# Patient Record
Sex: FEMALE | Race: WHITE | NOT HISPANIC OR LATINO | Employment: PART TIME | URBAN - METROPOLITAN AREA
[De-identification: names, ages, dates, MRNs, and addresses within clinical notes are randomized per-mention and may not be internally consistent; named-entity substitution may affect disease eponyms.]

---

## 2017-01-26 ENCOUNTER — PRENATAL OFFICE VISIT (OUTPATIENT)
Dept: FAMILY MEDICINE | Facility: CLINIC | Age: 32
End: 2017-01-26
Payer: COMMERCIAL

## 2017-01-26 VITALS
RESPIRATION RATE: 12 BRPM | HEIGHT: 68 IN | WEIGHT: 168 LBS | BODY MASS INDEX: 25.46 KG/M2 | HEART RATE: 118 BPM | OXYGEN SATURATION: 99 % | TEMPERATURE: 98 F | SYSTOLIC BLOOD PRESSURE: 108 MMHG | DIASTOLIC BLOOD PRESSURE: 62 MMHG

## 2017-01-26 DIAGNOSIS — Z34.80 SUPERVISION OF OTHER NORMAL PREGNANCY, ANTEPARTUM: Primary | ICD-10-CM

## 2017-01-26 DIAGNOSIS — Z67.40 TYPE O BLOOD, RH POSITIVE: ICD-10-CM

## 2017-01-26 LAB
ALBUMIN UR QL STRIP: NEGATIVE MG/DL
GLUCOSE UR STRIP-MCNC: NEGATIVE MG/DL

## 2017-01-26 PROCEDURE — 99207 ZZC PRENATAL VISIT: CPT | Performed by: FAMILY MEDICINE

## 2017-01-26 PROCEDURE — 00000219 ZZHCL STATISTIC OBSA - URINALYSIS: Performed by: FAMILY MEDICINE

## 2017-01-26 NOTE — PROGRESS NOTES
S:Mandy Novak is here today for a Prenatal Visit at 28w3d gestation.  Concerns today:  abd muscles stretching, lower pelvic pressure  Bleeding:  No  Cramping/Contractions:No  Leaking of fluid: No  Baby moving:  Yes  O: See OB Vitals  ASSESSMENT:/PLAN: went over signs of preeclampsia ( sudden swelling of hands, feet or face, sudden severe headache or right upper abdominal pain, abruption ( any bleeding or spotting) , premature labor, and fetal kick counts today.   Return in 2 weeks.   1 hr gct last visit was 120.   ---Mey Mcleod MD

## 2017-01-26 NOTE — PATIENT INSTRUCTIONS
went over signs of preeclampsia ( sudden swelling of hands, feet or face, sudden severe headache or right upper abdominal pain, abruption ( any bleeding or spotting) , premature labor, and fetal kick counts today.                Thank you for choosing [facility]  for your Health Care. It was a pleasure seeing you at your visit today. Please contact us with any questions or concerns you may have.                   Mey Mcleod MD                                  To reach your [facility] care team after hours call:   [phone]    Our clinic hours are:     Monday- 7:30 am - 7:00 pm                             Tuesday through Friday- 7:30 am - 5:00 pm                                        Saturday- 8:00 am - 12:00 pm                  Phone:  [phone]    Our pharmacy hours are:     Monday  8:00 am to 7:00 pm      Tuesday through Friday 8:00am to 6:00pm                        Saturday - 9:00 am to 1:00 pm      Sunday : Closed.              Phone:  [phone]      There is also information available at our web site:  [email or web address]    If your provider ordered any lab tests and you do not receive the results within 10 business days, please call the clinic.    If you need a medication refill please contact your pharmacy.  Please allow 2 business days for your refill to be completed.    Our clinic offers telephone visits and e visits.  Please ask one of your team members to explain more.      Use MTailorhart (secure email communication and access to your chart) to send your primary care provider a message or make an appointment. Ask someone on your Team how to sign up for [employer].

## 2017-01-26 NOTE — NURSING NOTE
"Chief Complaint   Patient presents with     Prenatal Care       Initial /62 mmHg  Pulse 118  Temp(Src) 98  F (36.7  C) (Tympanic)  Resp 12  Ht 5' 7.5\" (1.715 m)  Wt 168 lb (76.204 kg)  BMI 25.91 kg/m2  SpO2 99%  LMP 07/16/2016 Estimated body mass index is 25.91 kg/(m^2) as calculated from the following:    Height as of this encounter: 5' 7.5\" (1.715 m).    Weight as of this encounter: 168 lb (76.204 kg).  BP completed using cuff size: large  "

## 2017-01-26 NOTE — MR AVS SNAPSHOT
After Visit Summary   1/26/2017    Mandy Novak    MRN: [id number]           Patient Information     Date Of Birth          1985        Visit Information        Provider Department      1/26/2017 9:45 AM Mey Mcleod MD [facility]        Today's Diagnoses     Supervision of other normal pregnancy, antepartum    -  1     Type o blood, rh positive           Care Instructions    went over signs of preeclampsia ( sudden swelling of hands, feet or face, sudden severe headache or right upper abdominal pain, abruption ( any bleeding or spotting) , premature labor, and fetal kick counts today.                Thank you for choosing [facility]  for your Health Care. It was a pleasure seeing you at your visit today. Please contact us with any questions or concerns you may have.                   Mey Mcleod MD                                  To reach your [facility] care team after hours call:   [phone]    Our clinic hours are:     Monday- 7:30 am - 7:00 pm                             Tuesday through Friday- 7:30 am - 5:00 pm                                        Saturday- 8:00 am - 12:00 pm                  Phone:  [phone]    Our pharmacy hours are:     Monday  8:00 am to 7:00 pm      Tuesday through Friday 8:00am to 6:00pm                        Saturday - 9:00 am to 1:00 pm      Sunday : Closed.              Phone:  [phone]      There is also information available at our web site:  [email or web address]    If your provider ordered any lab tests and you do not receive the results within 10 business days, please call the clinic.    If you need a medication refill please contact your pharmacy.  Please allow 2 business days for your refill to be completed.    Our clinic offers telephone visits and e visits.  Please ask one of your team members to explain more.      Use [facility] (secure email communication and access to your  "chart) to send your primary care provider a message or make an appointment. Ask someone on your Team how to sign up for Effi[employer]t.                     Follow-ups after your visit        Your next 10 appointments already scheduled     Feb 23, 2017  8:00 AM   [facility]    [address] [place] [address] [id number] [phone]              Who to contact     If you have questions or need follow up information about today's clinic visit or your schedule please contact [facility] directly at [phone].  Normal or non-critical lab and imaging results will be communicated to you by MyChart, letter or phone within 4 business days after the clinic has received the results. If you do not hear from us within 7 days, please contact the clinic through LabArchiveshart or phone. If you have a critical or abnormal lab result, we will notify you by phone as soon as possible.  Submit refill requests through [employer] or call your pharmacy and they will forward the refill request to us. Please allow 3 business days for your refill to be completed.          Additional Information About Your Visit        LabArchiveshart Information     [employer] gives you secure access to your electronic health record. If you see a primary care provider, you can also send messages to your care team and make appointments. If you have questions, please call your primary care clinic.  If you do not have a primary care provider, please call [phone] and they will assist you.        Care EveryWhere ID     This is your Care EveryWhere ID. This could be used by other organizations to access your [place] medical records  [id number]        Your Vitals Were     Pulse Temperature Respirations Height BMI (Body Mass Index) Pulse Oximetry    118 98  F (36.7  C) (Tympanic) 12 5' 7.5\" (1.715 m) 25.91 kg/m2 99%    Last Period                   07/16/2016 "            Blood Pressure from Last 3 Encounters:   01/26/17 108/62   12/28/16 94/60   12/01/16 98/56    Weight from Last 3 Encounters:   01/26/17 168 lb (76.204 kg)   12/28/16 162 lb (73.483 kg)   12/01/16 156 lb (70.761 kg)              We Performed the Following     Glucose and albumin, OB urine        Primary Care Provider Office Phone # Fax #    Mey Mcleod [phone]       [facility] [address] [place] [address]        Thank you!     Thank you for choosing [facility]  for your care. Our goal is always to provide you with excellent care. Hearing back from our patients is one way we can continue to improve our services. Please take a few minutes to complete the written survey that you may receive in the mail after your visit with us. Thank you!             Your Updated Medication List - Protect others around you: Learn how to safely use, store and throw away your medicines at www.disposemymeds.org.          This list is accurate as of: 1/26/17 10:31 AM.  Always use your most recent med list.                   Brand Name Dispense Instructions for use    order for DME     1 Units    For breast pump and all needed accessories - tubing , valves, bottles, etc.       Prenatal Vitamins 28-0.8 MG Tabs      Take 1 tablet by mouth daily

## 2017-01-31 ENCOUNTER — MYC MEDICAL ADVICE (OUTPATIENT)
Dept: FAMILY MEDICINE | Facility: CLINIC | Age: 32
End: 2017-01-31

## 2017-02-09 ENCOUNTER — PRENATAL OFFICE VISIT (OUTPATIENT)
Dept: FAMILY MEDICINE | Facility: CLINIC | Age: 32
End: 2017-02-09
Payer: COMMERCIAL

## 2017-02-09 VITALS
DIASTOLIC BLOOD PRESSURE: 60 MMHG | HEART RATE: 84 BPM | OXYGEN SATURATION: 100 % | HEIGHT: 68 IN | BODY MASS INDEX: 26.07 KG/M2 | WEIGHT: 172 LBS | TEMPERATURE: 98.2 F | SYSTOLIC BLOOD PRESSURE: 102 MMHG

## 2017-02-09 DIAGNOSIS — Z34.80 SUPERVISION OF OTHER NORMAL PREGNANCY, ANTEPARTUM: Primary | ICD-10-CM

## 2017-02-09 DIAGNOSIS — Z67.40 TYPE O BLOOD, RH POSITIVE: ICD-10-CM

## 2017-02-09 DIAGNOSIS — O26.843 UTERINE SIZE DATE DISCREPANCY PREGNANCY, THIRD TRIMESTER: ICD-10-CM

## 2017-02-09 LAB
ALBUMIN UR QL STRIP: NEGATIVE MG/DL
GLUCOSE UR STRIP-MCNC: NEGATIVE MG/DL

## 2017-02-09 PROCEDURE — 99207 ZZC COMPLICATED OB VISIT: CPT | Performed by: FAMILY MEDICINE

## 2017-02-09 PROCEDURE — 00000219 ZZHCL STATISTIC OBSA - URINALYSIS: Performed by: FAMILY MEDICINE

## 2017-02-09 NOTE — PATIENT INSTRUCTIONS
went over signs of preeclampsia ( sudden swelling of hands, feet or face, sudden severe headache or right upper abdominal pain, abruption ( any bleeding or spotting) , premature labor, and fetal kick counts today.                Thank you for choosing [facility]  for your Health Care. It was a pleasure seeing you at your visit today. Please contact us with any questions or concerns you may have.                   Mey Mcleod MD                                  To reach your [facility] care team after hours call:   [phone]    Our clinic hours are:     Monday- 7:30 am - 7:00 pm                             Tuesday through Friday- 7:30 am - 5:00 pm                                        Saturday- 8:00 am - 12:00 pm                  Phone:  [phone]    Our pharmacy hours are:     Monday  8:00 am to 7:00 pm      Tuesday through Friday 8:00am to 6:00pm                        Saturday - 9:00 am to 1:00 pm      Sunday : Closed.              Phone:  [phone]      There is also information available at our web site:  [email or web address]    If your provider ordered any lab tests and you do not receive the results within 10 business days, please call the clinic.    If you need a medication refill please contact your pharmacy.  Please allow 2 business days for your refill to be completed.    Our clinic offers telephone visits and e visits.  Please ask one of your team members to explain more.      Use Greendizerhart (secure email communication and access to your chart) to send your primary care provider a message or make an appointment. Ask someone on your Team how to sign up for [employer].

## 2017-02-09 NOTE — NURSING NOTE
"Chief Complaint   Patient presents with     Prenatal Care     Initial /60 mmHg  Pulse 84  Temp(Src) 98.2  F (36.8  C) (Oral)  Ht 5' 7.5\" (1.715 m)  Wt 172 lb (78.019 kg)  BMI 26.53 kg/m2  SpO2 100%  LMP 07/16/2016 Estimated body mass index is 26.53 kg/(m^2) as calculated from the following:    Height as of this encounter: 5' 7.5\" (1.715 m).    Weight as of this encounter: 172 lb (78.019 kg).  BP completed using cuff size regular left Arm  Fanta Auuth CMA    "

## 2017-02-09 NOTE — MR AVS SNAPSHOT
After Visit Summary   2/9/2017    Mandy Novak    MRN: [id number]           Patient Information     Date Of Birth          1985        Visit Information        Provider Department      2/9/2017 1:45 PM Mey Mcleod MD [facility]        Today's Diagnoses     Supervision of other normal pregnancy, antepartum    -  1     Type o blood, rh positive           Care Instructions    went over signs of preeclampsia ( sudden swelling of hands, feet or face, sudden severe headache or right upper abdominal pain, abruption ( any bleeding or spotting) , premature labor, and fetal kick counts today.                Thank you for choosing [facility]  for your Health Care. It was a pleasure seeing you at your visit today. Please contact us with any questions or concerns you may have.                   Mey Mcleod MD                                  To reach your [facility] care team after hours call:   [phone]    Our clinic hours are:     Monday- 7:30 am - 7:00 pm                             Tuesday through Friday- 7:30 am - 5:00 pm                                        Saturday- 8:00 am - 12:00 pm                  Phone:  [phone]    Our pharmacy hours are:     Monday  8:00 am to 7:00 pm      Tuesday through Friday 8:00am to 6:00pm                        Saturday - 9:00 am to 1:00 pm      Sunday : Closed.              Phone:  [phone]      There is also information available at our web site:  [email or web address]    If your provider ordered any lab tests and you do not receive the results within 10 business days, please call the clinic.    If you need a medication refill please contact your pharmacy.  Please allow 2 business days for your refill to be completed.    Our clinic offers telephone visits and e visits.  Please ask one of your team members to explain more.      Use [employer] (secure email communication and access to your  chart) to send your primary care provider a message or make an appointment. Ask someone on your Team how to sign up for MyChart.                     Follow-ups after your visit        Your next 10 appointments already scheduled     Feb 23, 2017  8:00 AM   MyChart OB Short with [facility]   [facility]    [address] [place] [id number]   [phone]            Mar 06, 2017  3:30 PM   MyChart OB Short with Mey Mcleod MD   [facility]    [address] [place] [id number]   [phone]            Mar 24, 2017  9:45 AM   MyChart OB Short with Mey Mcleod MD   [facility]    [address] [place] [id number]   [phone]            Mar 30, 2017  9:45 AM   MyChart OB Short with Mey Mcleod MD   [facility]    [address] [place] [address]   [phone]            Apr 06, 2017  9:45 AM   MyChart OB Short with Mey Mcleod MD   [facility]    [address] [place] [address]   [phone]              Who to contact     If you have questions or need follow up information about today's clinic visit or your schedule please contact [facility] directly at [phone].  Normal or non-critical lab and imaging results will be communicated to you by MyChart, letter or phone within 4 business days after the clinic has received the results. If you do not hear from us within 7 days, please contact the clinic through MyChart or phone. If you have a critical or abnormal lab result, we will notify you by phone as soon as possible.  Submit refill requests through [employer] or call your pharmacy and they will forward the  "refill request to us. Please allow 3 business days for your refill to be completed.          Additional Information About Your Visit        ClusterFlunkhart Information     [employer] gives you secure access to your electronic health record. If you see a primary care provider, you can also send messages to your care team and make appointments. If you have questions, please call your primary care clinic.  If you do not have a primary care provider, please call [phone] and they will assist you.        Care EveryWhere ID     This is your Care EveryWhere ID. This could be used by other organizations to access your [place] medical records  [id number]        Your Vitals Were     Pulse Temperature Height BMI (Body Mass Index) Pulse Oximetry Last Period    84 98.2  F (36.8  C) (Oral) 5' 7.5\" (1.715 m) 26.53 kg/m2 100% 07/16/2016       Blood Pressure from Last 3 Encounters:   02/09/17 102/60   01/26/17 108/62   12/28/16 94/60    Weight from Last 3 Encounters:   02/09/17 172 lb (78.019 kg)   01/26/17 168 lb (76.204 kg)   12/28/16 162 lb (73.483 kg)              We Performed the Following     Glucose and albumin, OB urine        Primary Care Provider Office Phone # Fax #    Mey Mcleod [phone]       [facility] [address] [place] [address]        Thank you!     Thank you for choosing [facility]  for your care. Our goal is always to provide you with excellent care. Hearing back from our patients is one way we can continue to improve our services. Please take a few minutes to complete the written survey that you may receive in the mail after your visit with us. Thank you!             Your Updated Medication List - Protect others around you: Learn how to safely use, store and throw away your medicines at www.disposemymeds.org.          This list is accurate as of: 2/9/17  2:12 PM.  Always use your most recent med list.                   Brand Name Dispense " Instructions for use    order for DME     1 Units    For breast pump and all needed accessories - tubing , valves, bottles, etc.       Prenatal Vitamins 28-0.8 MG Tabs      Take 1 tablet by mouth daily

## 2017-02-09 NOTE — PROGRESS NOTES
S:Mandy Novak is here today for a Prenatal Visit at 30w3d gestation.  Concerns today:  None   Bleeding:  No  Cramping/Contractions: Yes some intermittent contractions   Leaking of fluid: No  Baby moving:  Yes  Edema : :No  O: See OB Vitals  ASSESSMENT:/PLAN:     ICD-10-CM    1. Supervision of other normal pregnancy, antepartum Z34.80 Glucose and albumin, OB urine     MAT FETAL MED CTR REFERRAL-PREGNANCY   2. Type o blood, rh positive Z67.40    3. Uterine size date discrepancy pregnancy, third trimester O26.843 MAT FETAL MED CTR REFERRAL-PREGNANCY     went over signs of preeclampsia ( sudden swelling of hands, feet or face, sudden severe headache or right upper abdominal pain, abruption ( any bleeding or spotting) , premature labor, and fetal kick counts today.    Labor warnings and kick counts discussed. Follow up in 2 weeks. Will get MFM US to rule out polyhyrdramios. --Mey Mcleod MD

## 2017-02-13 ENCOUNTER — PRE VISIT (OUTPATIENT)
Dept: MATERNAL FETAL MEDICINE | Facility: CLINIC | Age: 32
End: 2017-02-13

## 2017-02-13 DIAGNOSIS — Z53.9 ERRONEOUS ENCOUNTER--DISREGARD: Primary | ICD-10-CM

## 2017-02-14 ENCOUNTER — PRE VISIT (OUTPATIENT)
Dept: MATERNAL FETAL MEDICINE | Facility: CLINIC | Age: 32
End: 2017-02-14

## 2017-02-17 ENCOUNTER — OFFICE VISIT (OUTPATIENT)
Dept: MATERNAL FETAL MEDICINE | Facility: CLINIC | Age: 32
End: 2017-02-17
Attending: FAMILY MEDICINE
Payer: COMMERCIAL

## 2017-02-17 ENCOUNTER — HOSPITAL ENCOUNTER (OUTPATIENT)
Dept: ULTRASOUND IMAGING | Facility: CLINIC | Age: 32
Discharge: HOME | End: 2017-02-17
Attending: FAMILY MEDICINE | Admitting: FAMILY MEDICINE
Payer: COMMERCIAL

## 2017-02-17 DIAGNOSIS — O26.90 PREGNANCY RELATED CONDITION, UNSPECIFIED TRIMESTER: ICD-10-CM

## 2017-02-17 DIAGNOSIS — O26.843 UTERINE SIZE DATE DISCREPANCY, THIRD TRIMESTER: Primary | ICD-10-CM

## 2017-02-17 PROCEDURE — 76811 OB US DETAILED SNGL FETUS: CPT

## 2017-02-17 NOTE — PROGRESS NOTES
"Please see \"Imaging\" tab under \"Chart Review\" for details of today's US at the [facility].    Patricio Covington MD  Maternal-Fetal Medicine    "

## 2017-02-17 NOTE — MR AVS SNAPSHOT
After Visit Summary   2/17/2017    Mandy Novak    MRN: [id number]           Patient Information     Date Of Birth          1985        Visit Information        Provider Department      2/17/2017 8:30 AM Patricio Covington MD [facility] Maternal Fetal Medicine - [facility]        Today's Diagnoses     Uterine size date discrepancy, third trimester    -  1       Follow-ups after your visit        Your next 10 appointments already scheduled     Feb 23, 2017  8:00 AM CST   MyChart OB Short with [facility]   [facility]    [address] [place] [phone]   [phone]            Mar 06, 2017  3:30 PM CST   MyChart OB Short with Mey [facility]cleod MD   [facility]    [address] [place] [address]   [phone]            Mar 24, 2017  9:45 AM CDT   MyChart OB Short with Mey Mcleod MD   [facility]    [address] [place] [address]   [phone]            Mar 30, 2017  9:45 AM CDT   MyChart OB Short with Mey Mcleod MD   [facility]    [address] [place] [address]   [phone]            Apr 06, 2017  9:45 AM CDT   MyChart OB Short with Mey Mcleod MD   [facility]    [address] [place] [address]   [phone]              Who to contact     If you have questions or need follow up information about today's clinic visit or your schedule please contact [facility] directly at [phone].  Normal or non-critical lab and imaging results will be communicated to you by MyChart, letter or phone within 4 business days after the clinic has received the results. If you do not hear  from us within 7 days, please contact the clinic through [employer] or phone. If you have a critical or abnormal lab result, we will notify you by phone as soon as possible.  Submit refill requests through [employer] or call your pharmacy and they will forward the refill request to us. Please allow 3 business days for your refill to be completed.          Additional Information About Your Visit        [employer] System[employer]ar[employer] Information     [employer] gives you secure access to your electronic health record. If you see a primary care provider, you can also send messages to your care team and make appointments. If you have questions, please call your primary care clinic.  If you do not have a primary care provider, please call [phone] and they will assist you.        Care EveryWhere ID     This is your Care EveryWhere ID. This could be used by other organizations to access your [place] medical records  [id number]        Your Vitals Were     Last Period                   07/16/2016            Blood Pressure from Last 3 Encounters:   02/09/17 102/60   01/26/17 108/62   12/28/16 94/60    Weight from Last 3 Encounters:   02/09/17 78 kg (172 lb)   01/26/17 76.2 kg (168 lb)   12/28/16 73.5 kg (162 lb)              Today, you had the following     No orders found for display       Primary Care Provider Office Phone # Fax #    Mey Mcleod [phone]       [facility] [address] [place] [address]        Thank you!     Thank you for choosing [facility]  for your care. Our goal is always to provide you with excellent care. Hearing back from our patients is one way we can continue to improve our services. Please take a few minutes to complete the written survey that you may receive in the mail after your visit with us. Thank you!             Your Updated Medication List - Protect others around you: Learn how to safely use, store and throw away your medicines  at www.disposemymeds.org.          This list is accurate as of: 2/17/17  8:36 AM.  Always use your most recent med list.                   Brand Name Dispense Instructions for use    order for DME     1 Units    For breast pump and all needed accessories - tubing , valves, bottles, etc.       Prenatal Vitamins 28-0.8 MG Tabs      Take 1 tablet by mouth daily

## 2017-02-23 ENCOUNTER — PRENATAL OFFICE VISIT (OUTPATIENT)
Dept: FAMILY MEDICINE | Facility: CLINIC | Age: 32
End: 2017-02-23
Payer: COMMERCIAL

## 2017-02-23 VITALS
BODY MASS INDEX: 26.84 KG/M2 | TEMPERATURE: 97.9 F | DIASTOLIC BLOOD PRESSURE: 70 MMHG | WEIGHT: 177.13 LBS | HEIGHT: 68 IN | SYSTOLIC BLOOD PRESSURE: 110 MMHG | OXYGEN SATURATION: 100 %

## 2017-02-23 DIAGNOSIS — Z34.80 SUPERVISION OF OTHER NORMAL PREGNANCY, ANTEPARTUM: ICD-10-CM

## 2017-02-23 DIAGNOSIS — Z67.40 TYPE O BLOOD, RH POSITIVE: ICD-10-CM

## 2017-02-23 LAB
ALBUMIN UR QL STRIP: NEGATIVE MG/DL
GLUCOSE UR STRIP-MCNC: NEGATIVE MG/DL

## 2017-02-23 PROCEDURE — 00000219 ZZHCL STATISTIC OBSA - URINALYSIS: Performed by: FAMILY MEDICINE

## 2017-02-23 PROCEDURE — 99207 ZZC COMPLICATED OB VISIT: CPT | Performed by: FAMILY MEDICINE

## 2017-02-23 NOTE — NURSING NOTE
"Chief Complaint   Patient presents with     Prenatal Care     32w3d       Initial /70  Temp 97.9  F (36.6  C) (Tympanic)  Ht 5' 7.5\" (1.715 m)  Wt 177 lb 2 oz (80.3 kg)  LMP 07/16/2016  SpO2 100%  BMI 27.33 kg/m2 Estimated body mass index is 27.33 kg/(m^2) as calculated from the following:    Height as of this encounter: 5' 7.5\" (1.715 m).    Weight as of this encounter: 177 lb 2 oz (80.3 kg).  Medication Reconciliation: complete   Any Woo, CMA      "

## 2017-02-23 NOTE — MR AVS SNAPSHOT
After Visit Summary   2/23/2017    Mandy Novak    MRN: [id number]           Patient Information     Date Of Birth          1985        Visit Information        Provider Department      2/23/2017 8:00 AM Mey Mcleod MD [facility]        Today's Diagnoses     Cecilio breech presentation, not applicable or unspecified fetus    -  1    Supervision of other normal pregnancy, antepartum        Type o blood, rh positive           Follow-ups after your visit        Your next 10 appointments already scheduled     Mar 06, 2017  3:30 PM CST   MyChart OB Short with Mey Mcleod MD   [facility] ([facility])    [address] [place] [address]   [phone]            Mar 24, 2017  9:45 AM CDT   MyChart OB Short with Mey Mcleod MD   [facility] ([facility])    [address] [place] [address]   [phone]            Mar 30, 2017  9:45 AM CDT   MyChart OB Short with Mey Mcleod MD   [facility] ([facility])    [address] [place] [address]   [phone]            Apr 06, 2017  9:45 AM CDT   MyChart OB Short with Mey Mcleod MD   [facility] ([facility])    [address] [place] [address]   [phone]              Who to contact     If you have questions or need follow up information about today's clinic visit or your schedule please contact [facility] directly at [phone].  Normal or non-critical lab and imaging results will be communicated to you by MyChart, letter or phone within 4 business days after the clinic has received the results. If you do not hear from us within 7 days, please contact the clinic through MyChart or phone. If you have a critical or abnormal lab result, we will notify  "you by phone as soon as possible.  Submit refill requests through Yummly or call your pharmacy and they will forward the refill request to us. Please allow 3 business days for your refill to be completed.          Additional Information About Your Visit        SandLinksharZounds Information     Yummly gives you secure access to your electronic health record. If you see a primary care provider, you can also send messages to your care team and make appointments. If you have questions, please call your primary care clinic.  If you do not have a primary care provider, please call [phone] and they will assist you.        Care EveryWhere ID     This is your Care EveryWhere ID. This could be used by other organizations to access your [facility] medical records  [id number]        Your Vitals Were     Temperature Height Last Period Pulse Oximetry BMI (Body Mass Index)       97.9  F (36.6  C) (Tympanic) 5' 7.5\" (1.715 m) 07/16/2016 100% 27.33 kg/m2        Blood Pressure from Last 3 Encounters:   02/23/17 110/70   02/09/17 102/60   01/26/17 108/62    Weight from Last 3 Encounters:   02/23/17 177 lb 2 oz (80.3 kg)   02/09/17 172 lb (78 kg)   01/26/17 168 lb (76.2 kg)              Today, you had the following     No orders found for display       Primary Care Provider Office Phone # Fax #    Meyblossom Mcleod [phone]       [address] [address]        Thank you!     Thank you for choosing [facility]  for your care. Our goal is always to provide you with excellent care. Hearing back from our patients is one way we can continue to improve our services. Please take a few minutes to complete the written survey that you may receive in the mail after your visit with us. Thank you!             Your Updated Medication List - Protect others around you: Learn how to safely use, store and throw away your medicines at www.disposemymeds.org.          This " list is accurate as of: 2/23/17  8:59 AM.  Always use your most recent med list.                   Brand Name Dispense Instructions for use    order for DME     1 Units    For breast pump and all needed accessories - tubing , valves, bottles, etc.       Prenatal Vitamins 28-0.8 MG Tabs      Take 1 tablet by mouth daily

## 2017-02-23 NOTE — PROGRESS NOTES
S:Mandy Novak is here today for a Prenatal Visit at 32w3d gestation.  Concerns today:  None except for new diagnosis of breech presentation - on US last week   Bleeding:  No  Cramping/Contractions:irregular mild tightening   Leaking of fluid: No  Baby moving:  Yes  Edema : :No  O: See OB Vitals  ASSESSMENT:/PLAN:hopefully fetus will turn, but if any questions - will repeat US at 35+ weeks in anticipation of possible version at 36+ weeks.   Labor warnings and kick counts discussed. Follow up in 2 weeks. went over signs of preeclampsia ( sudden swelling of hands, feet or face, sudden severe headache or right upper abdominal pain, abruption ( any bleeding or spotting) , premature labor, and fetal kick counts today.  ---Mey Mcleod MD

## 2017-03-06 ENCOUNTER — PRENATAL OFFICE VISIT (OUTPATIENT)
Dept: FAMILY MEDICINE | Facility: CLINIC | Age: 32
End: 2017-03-06
Payer: COMMERCIAL

## 2017-03-06 VITALS
SYSTOLIC BLOOD PRESSURE: 100 MMHG | BODY MASS INDEX: 27.28 KG/M2 | HEIGHT: 68 IN | TEMPERATURE: 97 F | OXYGEN SATURATION: 99 % | HEART RATE: 118 BPM | RESPIRATION RATE: 12 BRPM | WEIGHT: 180 LBS | DIASTOLIC BLOOD PRESSURE: 56 MMHG

## 2017-03-06 DIAGNOSIS — Z23 NEED FOR TDAP VACCINATION: Primary | ICD-10-CM

## 2017-03-06 DIAGNOSIS — Z67.40 TYPE O BLOOD, RH POSITIVE: ICD-10-CM

## 2017-03-06 DIAGNOSIS — Z34.80 SUPERVISION OF OTHER NORMAL PREGNANCY, ANTEPARTUM: ICD-10-CM

## 2017-03-06 LAB
ALBUMIN UR QL STRIP: NEGATIVE MG/DL
GLUCOSE UR STRIP-MCNC: NEGATIVE MG/DL

## 2017-03-06 PROCEDURE — 99207 ZZC COMPLICATED OB VISIT: CPT | Performed by: FAMILY MEDICINE

## 2017-03-06 PROCEDURE — 00000219 ZZHCL STATISTIC OBSA - URINALYSIS: Performed by: FAMILY MEDICINE

## 2017-03-06 NOTE — MR AVS SNAPSHOT
After Visit Summary   3/6/2017    Mandy Novak    MRN: [id number]           Patient Information     Date Of Birth          1985        Visit Information        Provider Department      3/6/2017 3:30 PM Mey Mcleod MD [facility]        Today's Diagnoses     Need for Tdap vaccination    -  1    Cecilio breech presentation, not applicable or unspecified fetus        Supervision of other normal pregnancy, antepartum        Type o blood, rh positive          Care Instructions      went over signs of preeclampsia ( sudden swelling of hands, feet or face, sudden severe headache or right upper abdominal pain, abruption ( any bleeding or spotting) , premature labor, and fetal kick counts today.    Breech Presentation  With breech presentation, your baby is in a buttocks - or feet-first position. Babies are usually in a head-first position. A breech presentation can make it hard for the baby s head to fit through the birth canal during delivery. This can cause lack of oxygen or nerve damage in your baby.  Checking for breech presentation  Your doctor can tell that your baby is in a breech presentation by gently pressing on your belly. If after about 35 weeks your baby still isn t in a head-first position, you may have a test called ultrasound. This test uses sound waves to form an image of your baby on a screen.  Types of breech presentations  As you near your due date, your baby may be in one of the following 3 breech presentations:    Cecilio breech  The baby s buttocks point down toward the birth canal. The legs extend up toward the head.    Complete breech  The baby sits cross-legged. The buttocks point down and the knees are bent. The feet are tucked under the legs.    Footling breech  One or both of the baby s feet or legs are stretched down into the birth canal. The buttocks are also pointing downward.  Delivering your baby  Even if the baby s position can t be changed,  a breech baby can sometimes be born vaginally, although this is rare. Your doctor will discuss the risks with you. More often, a  section (surgical delivery) is done. You will have anesthesia (medicine to block pain). But you may remain awake and alert.  Once you deliver  Whether you give birth vaginally or by  section, you and your baby will most likely be fine. Just because your baby is in a breech position doesn t mean that he or she will have health problems.  Can you have a vaginal delivery?  In some cases, your doctor may try to turn the baby head-down by applying pressure on your abdomen. This technique is called an external cephalic version. If this works, you might be able to have a vaginal delivery. Your doctor will discuss this with you.     1872-7917 [employer]. [address] [place] [address], [place] [address]. All rights reserved. This information is not intended as a substitute for professional medical care. Always follow your healthcare professional's instructions.        External Version  If your baby doesn t move into a head-first position on his or her own, your healthcare provider may attempt to do an external version. Your healthcare provider will try to rotate your baby by pressing down on your belly. Your healthcare provider may give you medicine to relax your uterus. This can make it easier for him or her to rotate your baby. During a version, your healthcare provider will use ultrasound to watch your baby.  Changing your baby s position    1    Your healthcare provider presses down on your belly and locates your baby s head and bottom.    2    By pressing down on your belly, your healthcare provider may be able to rotate the baby into a head-first position. This often will allow a vaginal birth.  Delivering your baby  Even if your baby s position can t be changed, he or she can sometimes be born vaginally. The mode of delivery should depend on the experience of your  healthcare provider. A  section (surgical delivery) is the preferred mode of delivery for most healthcare providers, because of the diminishing expertise in vaginal breech delivery. You will have anesthesia (medicine to block pain), but you may remain awake and alert.     8458-4706 [employer]. [address] [place] [address]. All rights reserved. This information is not intended as a substitute for professional medical care. Always follow your healthcare professional's instructions.                 Thank you for choosing [facility]  for your Health Care. It was a pleasure seeing you at your visit today. Please contact us with any questions or concerns you may have.                   Mey Mcleod MD                                  To reach your [facility] care team after hours call:   [phone]    Our clinic hours are:     Monday- 7:30 am - 7:00 pm                             Tuesday through Friday- 7:30 am - 5:00 pm                                        Saturday- 8:00 am - 12:00 pm                  Phone:  [phone]    Our pharmacy hours are:     Monday  8:00 am to 7:00 pm      Tuesday through Friday 8:00am to 6:00pm                        Saturday - 9:00 am to 1:00 pm       : Closed.              Phone:  [phone]      There is also information available at our web site:  [email or web address]    If your provider ordered any lab tests and you do not receive the results within 10 business days, please call the clinic.    If you need a medication refill please contact your pharmacy.  Please allow 2 business days for your refill to be completed.    Our clinic offers telephone visits and e visits.  Please ask one of your team members to explain more.      Use PayActiv (secure email communication and access to your chart) to send your primary care provider a message or make an appointment. Ask someone on your Team how to sign up  for MyChart.                     Follow-ups after your visit        Your next 10 appointments already scheduled     Mar 17, 2017  3:45 PM CDT   ESTABLISHED PRENATAL with Mey Mcleod MD   [facility]    [address] [place] [id number]   [phone]            Mar 24, 2017  9:45 AM CDT   MyChart OB Short with Mey Mcleod MD   [facility] ([facility])    [address] [place] [id number] [phone]            Mar 30, 2017  9:45 AM CDT   MyChart OB Short with Mey Mcleod MD   [facility]    [address] [place] [address]   [phone]            Apr 06, 2017  9:45 AM CDT   MyChart OB Short with Mey Mcleod MD   [facility] ([facility])    [address] [place] [address]   [phone]            Apr 13, 2017  3:30 PM CDT   MyChart OB Short with Mey Mcleod MD   [facility] ([facility])    [address] [place] [id number] [phone]              Who to contact     If you have questions or need follow up information about today's clinic visit or your schedule please contact [facility] directly at [phone].  Normal or non-critical lab and imaging results will be communicated to you by MyChart, letter or phone within 4 business days after the clinic has received the results. If you do not hear from us within 7 days, please contact the clinic through MyChart or phone. If you have a critical or abnormal lab result, we will notify you by phone as soon as possible.  Submit refill requests through AdEspresso or call your pharmacy and they will forward the refill request to us. Please allow 3 business days for your refill to be completed.           "Additional Information About Your Visit        MyChart Information     [employer] gives you secure access to your electronic health record. If you see a primary care provider, you can also send messages to your care team and make appointments. If you have questions, please call your primary care clinic.  If you do not have a primary care provider, please call [phone] and they will assist you.        Care EveryWhere ID     This is your Care EveryWhere ID. This could be used by other organizations to access your [facility] medical records  [id number]        Your Vitals Were     Pulse Temperature Respirations Height Last Period Pulse Oximetry    118 97  F (36.1  C) (Tympanic) 12 5' 7.5\" (1.715 m) 07/16/2016 99%    Breastfeeding? BMI (Body Mass Index)                No 27.78 kg/m2           Blood Pressure from Last 3 Encounters:   03/06/17 100/56   02/23/17 110/70   02/09/17 102/60    Weight from Last 3 Encounters:   03/06/17 180 lb (81.6 kg)   02/23/17 177 lb 2 oz (80.3 kg)   02/09/17 172 lb (78 kg)              We Performed the Following     ADMIN 1st VACCINE     Glucose and albumin, OB urine     TDAP (ADACEL AGES 11-64)        Primary Care Provider Office Phone # Fax #    Mey Mcleod [phone]       [facility] [address] [place] [address]        Thank you!     Thank you for choosing [facility]  for your care. Our goal is always to provide you with excellent care. Hearing back from our patients is one way we can continue to improve our services. Please take a few minutes to complete the written survey that you may receive in the mail after your visit with us. Thank you!             Your Updated Medication List - Protect others around you: Learn how to safely use, store and throw away your medicines at www.disposemymeds.org.          This list is accurate as of: 3/6/17  4:39 PM.  Always use your most recent med list.                   Brand " Name Dispense Instructions for use    order for DME     1 Units    For breast pump and all needed accessories - tubing , valves, bottles, etc.       Prenatal Vitamins 28-0.8 MG Tabs      Take 1 tablet by mouth daily

## 2017-03-06 NOTE — PROGRESS NOTES
S:Mandy Novak is here today for a Prenatal Visit at 34w0d gestation.  Concerns today:  Breech- rechecking again with me at 35+weeks .   Bleeding:  No  Cramping/Contractions:No  Leaking of fluid: No  Baby moving:  Yes  O: See OB Vitals  ASSESSMENT:/PLAN: went over signs of preeclampsia ( sudden swelling of hands, feet or face, sudden severe headache or right upper abdominal pain, abruption ( any bleeding or spotting) , premature labor, and fetal kick counts today.  Will recheck US at approx 36 weeks. 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  I'll call ob/gyn specialists to firm up a plan re: version.   Return in 10+days. ---Mey Mcleod MD

## 2017-03-06 NOTE — NURSING NOTE
"Chief Complaint   Patient presents with     Prenatal Care       Initial Pulse 118  Temp 97  F (36.1  C) (Tympanic)  Resp 12  Ht 5' 7.5\" (1.715 m)  Wt 180 lb (81.6 kg)  LMP 07/16/2016  SpO2 99%  Breastfeeding? No  BMI 27.78 kg/m2 Estimated body mass index is 27.78 kg/(m^2) as calculated from the following:    Height as of this encounter: 5' 7.5\" (1.715 m).    Weight as of this encounter: 180 lb (81.6 kg).  Medication Reconciliation: complete   Laurie Bloedow LPN    "

## 2017-03-06 NOTE — PATIENT INSTRUCTIONS
went over signs of preeclampsia ( sudden swelling of hands, feet or face, sudden severe headache or right upper abdominal pain, abruption ( any bleeding or spotting) , premature labor, and fetal kick counts today.    Breech Presentation  With breech presentation, your baby is in a buttocks - or feet-first position. Babies are usually in a head-first position. A breech presentation can make it hard for the baby s head to fit through the birth canal during delivery. This can cause lack of oxygen or nerve damage in your baby.  Checking for breech presentation  Your doctor can tell that your baby is in a breech presentation by gently pressing on your belly. If after about 35 weeks your baby still isn t in a head-first position, you may have a test called ultrasound. This test uses sound waves to form an image of your baby on a screen.  Types of breech presentations  As you near your due date, your baby may be in one of the following 3 breech presentations:    Cecilio breech  The baby s buttocks point down toward the birth canal. The legs extend up toward the head.    Complete breech  The baby sits cross-legged. The buttocks point down and the knees are bent. The feet are tucked under the legs.    Footling breech  One or both of the baby s feet or legs are stretched down into the birth canal. The buttocks are also pointing downward.  Delivering your baby  Even if the baby s position can t be changed, a breech baby can sometimes be born vaginally, although this is rare. Your doctor will discuss the risks with you. More often, a  section (surgical delivery) is done. You will have anesthesia (medicine to block pain). But you may remain awake and alert.  Once you deliver  Whether you give birth vaginally or by  section, you and your baby will most likely be fine. Just because your baby is in a breech position doesn t mean that he or she will have health problems.  Can you have a vaginal delivery?  In some  cases, your doctor may try to turn the baby head-down by applying pressure on your abdomen. This technique is called an external cephalic version. If this works, you might be able to have a vaginal delivery. Your doctor will discuss this with you.     4666-9109 [employer]. [address] [place] [address]. All rights reserved. This information is not intended as a substitute for professional medical care. Always follow your healthcare professional's instructions.        External Version  If your baby doesn t move into a head-first position on his or her own, your healthcare provider may attempt to do an external version. Your healthcare provider will try to rotate your baby by pressing down on your belly. Your healthcare provider may give you medicine to relax your uterus. This can make it easier for him or her to rotate your baby. During a version, your healthcare provider will use ultrasound to watch your baby.  Changing your baby s position    1    Your healthcare provider presses down on your belly and locates your baby s head and bottom.    2    By pressing down on your belly, your healthcare provider may be able to rotate the baby into a head-first position. This often will allow a vaginal birth.  Delivering your baby  Even if your baby s position can t be changed, he or she can sometimes be born vaginally. The mode of delivery should depend on the experience of your healthcare provider. A  section (surgical delivery) is the preferred mode of delivery for most healthcare providers, because of the diminishing expertise in vaginal breech delivery. You will have anesthesia (medicine to block pain), but you may remain awake and alert.     9820-4482 [employer]. [address] [place] [address]. All rights reserved. This information is not intended as a substitute for professional medical care. Always follow your healthcare professional's instructions.                  Thank you for choosing [facility]  for your Health Care. It was a pleasure seeing you at your visit today. Please contact us with any questions or concerns you may have.                   Mey Mcleod MD                                  To reach your [facility] care team after hours call:   [phone]    Our clinic hours are:     Monday- 7:30 am - 7:00 pm                             Tuesday through Friday- 7:30 am - 5:00 pm                                        Saturday- 8:00 am - 12:00 pm                  Phone:  [phone]    Our pharmacy hours are:     Monday  8:00 am to 7:00 pm      Tuesday through Friday 8:00am to 6:00pm                        Saturday - 9:00 am to 1:00 pm      Sunday : Closed.              Phone:  [phone]      There is also information available at our web site:  [email or web address]    If your provider ordered any lab tests and you do not receive the results within 10 business days, please call the clinic.    If you need a medication refill please contact your pharmacy.  Please allow 2 business days for your refill to be completed.    Our clinic offers telephone visits and e visits.  Please ask one of your team members to explain more.      Use FRESShart (secure email communication and access to your chart) to send your primary care provider a message or make an appointment. Ask someone on your Team how to sign up for [employer].

## 2017-03-16 ENCOUNTER — TELEPHONE (OUTPATIENT)
Dept: FAMILY MEDICINE | Facility: CLINIC | Age: 32
End: 2017-03-16

## 2017-03-16 NOTE — TELEPHONE ENCOUNTER
Last saw pt 3/6/2017 and was still breech at that time. Cecilio patiño on US on 2/17/2017 at [facility].  Talked with ob/gyn specialists - initially I thought pt was going to be 36 weeks tomorrow, but she isn't going to be 36 weeks until Monday 3/20/2017.  Version attempt can't be done until that time.  I tt'd pt and she'd like to hold off on version attempt till 3/24/2017 as she has a work commitment that she'd really like to be at on Thursday 3/23/2017 in the evening.   Laurie, ob/gyn specialists's surgery/procedure  will call pt to discuss and schedule her for possible version next week , taking the above into account.  Pt will discuss her timing preference with Laurie on that call.      Pt also mentioned that she had a really vivid dream that baby had turned on its own last week and since then she's had more kicks on her right side  And more pelvic/bladder pressure without signif.or rhythmic contractions.  Pt will keep her appt with me for tomorrow 3/17/2017 and we will examine for presentation at that time.

## 2017-03-16 NOTE — PROGRESS NOTES
S:Mandy Novak is here today for a Prenatal Visit at 35w3d gestation.  Concerns today:  Hoping baby has turned- was amisha breech 2 weeks ago. Feels like baby has turned -feeling a butt up in her left rib cage and getting kicked more now on the right. More suprapubic pressure than previous - ema on bladder.   Bleeding:  No  Cramping/Contractions:No  Leaking of fluid: No  Baby moving:  Yes  O: See OB Vitals. Measuring 39cm today. Presenting part not engaged in pelvis at all.  I'm not fully convinced baby has turned spontaneously.    ASSESSMENT:/PLAN:     ICD-10-CM    1. Supervision of other normal pregnancy, antepartum Z34.80 Glucose and albumin, OB urine     Group B strep PCR   2. Amisha breech presentation, not applicable or unspecified fetus O32.1XX0 OB/GYN REFERRAL     US OB Ltd One Or More Fetus FU/Repeat     CANCELED: US OB Ltd One Or More Fetus FU/Repeat   3. Type o blood, rh positive Z67.40    4. Need for Tdap vaccination Z23 TDAP (ADACEL AGES 11-64)     ADMIN 1st VACCINE   5. Large for gestational age P08.1 US OB Ltd One Or More Fetus FU/Repeat     CANCELED: US OB Ltd One Or More Fetus FU/Repeat    has appt with ob/gyn specialists for 10:30am next Friday - 1 week from today 3/24/2017,  if still breech, for version. Will get US for presentation and EFW early next week prior to that to ensure presentation.   Tdap booster and GBS PCR done today.  went over signs of preeclampsia ( sudden swelling of hands, feet or face, sudden severe headache or right upper abdominal pain, abruption ( any bleeding or spotting) , premature labor, and fetal kick counts today.    Pelvis proven to 7lbs 14oz at at 38.5 weeks.   Return in 1 weeks. ---Mey Mcleod MD

## 2017-03-17 ENCOUNTER — PRENATAL OFFICE VISIT (OUTPATIENT)
Dept: FAMILY MEDICINE | Facility: CLINIC | Age: 32
End: 2017-03-17
Payer: COMMERCIAL

## 2017-03-17 VITALS
BODY MASS INDEX: 27.63 KG/M2 | HEIGHT: 68 IN | HEART RATE: 120 BPM | TEMPERATURE: 98.4 F | WEIGHT: 182.3 LBS | DIASTOLIC BLOOD PRESSURE: 68 MMHG | OXYGEN SATURATION: 100 % | SYSTOLIC BLOOD PRESSURE: 112 MMHG

## 2017-03-17 DIAGNOSIS — Z23 NEED FOR TDAP VACCINATION: ICD-10-CM

## 2017-03-17 DIAGNOSIS — Z67.40 TYPE O BLOOD, RH POSITIVE: ICD-10-CM

## 2017-03-17 DIAGNOSIS — Z34.80 SUPERVISION OF OTHER NORMAL PREGNANCY, ANTEPARTUM: Primary | ICD-10-CM

## 2017-03-17 LAB
ALBUMIN UR QL STRIP: NEGATIVE MG/DL
GLUCOSE UR STRIP-MCNC: NEGATIVE MG/DL

## 2017-03-17 PROCEDURE — 87653 STREP B DNA AMP PROBE: CPT | Performed by: FAMILY MEDICINE

## 2017-03-17 PROCEDURE — 99207 ZZC COMPLICATED OB VISIT: CPT | Performed by: FAMILY MEDICINE

## 2017-03-17 PROCEDURE — 00000219 ZZHCL STATISTIC OBSA - URINALYSIS: Performed by: FAMILY MEDICINE

## 2017-03-17 PROCEDURE — 90471 IMMUNIZATION ADMIN: CPT | Performed by: FAMILY MEDICINE

## 2017-03-17 PROCEDURE — 90715 TDAP VACCINE 7 YRS/> IM: CPT | Performed by: FAMILY MEDICINE

## 2017-03-17 NOTE — MR AVS SNAPSHOT
After Visit Summary   3/17/2017    Mandy Novak    MRN: [id number]           Patient Information     Date Of Birth          1985        Visit Information        Provider Department      3/17/2017 3:45 PM Mey Mcleod MD [facility]        Today's Diagnoses     Supervision of other normal pregnancy, antepartum    -  1    Cecilio breech presentation, not applicable or unspecified fetus        Type o blood, rh positive        Need for Tdap vaccination        Large for gestational age          Care Instructions    went over signs of preeclampsia ( sudden swelling of hands, feet or face, sudden severe headache or right upper abdominal pain, abruption ( any bleeding or spotting) , premature labor, and fetal kick counts today.                Thank you for choosing [facility]  for your Health Care. It was a pleasure seeing you at your visit today. Please contact us with any questions or concerns you may have.                   Mey Mcleod MD                                  To reach your [facility] care team after hours call:   [phone]    Our clinic hours are:     Monday- 7:30 am - 7:00 pm                             Tuesday through Friday- 7:30 am - 5:00 pm                                        Saturday- 8:00 am - 12:00 pm                  Phone:  [phone]    Our pharmacy hours are:     Monday  8:00 am to 7:00 pm      Tuesday through Friday 8:00am to 6:00pm                        Saturday - 9:00 am to 1:00 pm      Sunday : Closed.              Phone:  [phone]      There is also information available at our web site:  [email or web address]    If your provider ordered any lab tests and you do not receive the results within 10 business days, please call the clinic.    If you need a medication refill please contact your pharmacy.  Please allow 2 business days for your refill to be completed.    Our clinic offers  telephone visits and e visits.  Please ask one of your team members to explain more.      Use [employer] (secure email communication and access to your chart) to send your primary care provider a message or make an appointment. Ask someone on your Team how to sign up for [employer].                     Follow-ups after your visit        Additional Services     OB/GYN REFERRAL       Your provider has referred you to:  [facility]: OBGYN MANNY Jean-Baptiste [phone]   [email or web address]    Please be aware that coverage of these services is subject to the terms and limitations of your health insurance plan.  Call member services at your health plan with any benefit or coverage questions.      Please bring the following with you to your appointment:    (1) Any X-Rays, CTs or MRIs which have been performed.  Contact the facility where they were done to arrange for  prior to your scheduled appointment.  Any new CT, MRI or other procedures ordered by your specialist must be performed at a [facility] or coordinated by your clinic's referral office.    (2) List of current medications   (3) This referral request   (4) Any documents/labs given to you for this referral                  Your next 10 appointments already scheduled     Mar 24, 2017  9:45 AM CDT   ESTABLISHED PRENATAL with Mey Mcleod MD   [facility] ([facility]    [address] [place] [address]   [phone]            Mar 30, 2017  9:45 AM CDT   ESTABLISHED PRENATAL with Mey Mcleod MD   [facility] ([facility])    [address] [place] [address]   [phone]            Apr 06, 2017  9:45 AM CDT   ESTABLISHED PRENATAL with Mey Mcleod MD   [facility] ([facility])    [address] [place] [address]   [phone]            Apr 13, 2017   "3:30 PM CDT   ESTABLISHED PRENATAL with Mey Mcleod MD   [facility]    [address] [place] [id number] [phone]              Future tests that were ordered for you today     Open Future Orders        Priority Expected Expires Ordered    US OB Ltd One Or More Fetus FU/Repeat Routine 6/15/2017 3/17/2018 3/17/2017            Who to contact     If you have questions or need follow up information about today's clinic visit or your schedule please contact [facility] directly at [phone].  Normal or non-critical lab and imaging results will be communicated to you by MyChart, letter or phone within 4 business days after the clinic has received the results. If you do not hear from us within 7 days, please contact the clinic through [employer] or phone. If you have a critical or abnormal lab result, we will notify you by phone as soon as possible.  Submit refill requests through [employer] or call your pharmacy and they will forward the refill request to us. Please allow 3 business days for your refill to be completed.          Additional Information About Your Visit        [employer] Information     [employer] gives you secure access to your electronic health record. If you see a primary care provider, you can also send messages to your care team and make appointments. If you have questions, please call your primary care clinic.  If you do not have a primary care provider, please call [phone] and they will assist you.        Care EveryWhere ID     This is your Care EveryWhere ID. This could be used by other organizations to access your [facility] medical records  [id number]        Your Vitals Were     Pulse Temperature Height Last Period Pulse Oximetry BMI (Body Mass Index)    120 98.4  F (36.9  C) (Oral) 5' 7.5\" (1.715 m) 07/16/2016 100% 28.13 kg/m2       Blood Pressure from Last 3 Encounters:   03/17/17 112/68   03/06/17 " 100/56   02/23/17 110/70    Weight from Last 3 Encounters:   03/17/17 182 lb 4.8 oz (82.7 kg)   03/06/17 180 lb (81.6 kg)   02/23/17 177 lb 2 oz (80.3 kg)              We Performed the Following     ADMIN 1st VACCINE     Glucose and albumin, OB urine     Group B strep PCR     OB/GYN REFERRAL     TDAP (ADACEL AGES 11-64)          Today's Medication Changes          These changes are accurate as of: 3/17/17  4:26 PM.  If you have any questions, ask your nurse or doctor.               Stop taking these medicines if you haven't already. Please contact your care team if you have questions.     order for DME   Stopped by:  Mey Mcleod MD                    Primary Care Provider Office Phone # Fax #    Mey Mcleod [phone]       [address] [address]        Thank you!     Thank you for choosing [facility]  for your care. Our goal is always to provide you with excellent care. Hearing back from our patients is one way we can continue to improve our services. Please take a few minutes to complete the written survey that you may receive in the mail after your visit with us. Thank you!             Your Updated Medication List - Protect others around you: Learn how to safely use, store and throw away your medicines at www.disposemymeds.org.          This list is accurate as of: 3/17/17  4:26 PM.  Always use your most recent med list.                   Brand Name Dispense Instructions for use    Prenatal Vitamins 28-0.8 MG Tabs      Take 1 tablet by mouth daily

## 2017-03-17 NOTE — NURSING NOTE
"Chief Complaint   Patient presents with     Prenatal Care       Initial /68 (BP Location: Right arm, Patient Position: Chair, Cuff Size: Adult Regular)  Pulse 120  Temp 98.4  F (36.9  C) (Oral)  Ht 5' 7.5\" (1.715 m)  Wt 182 lb 4.8 oz (82.7 kg)  LMP 07/16/2016  SpO2 100%  BMI 28.13 kg/m2 Estimated body mass index is 28.13 kg/(m^2) as calculated from the following:    Height as of this encounter: 5' 7.5\" (1.715 m).    Weight as of this encounter: 182 lb 4.8 oz (82.7 kg).  Medication Reconciliation: complete   Jeanette Saul, EDGARD  "

## 2017-03-17 NOTE — PATIENT INSTRUCTIONS
went over signs of preeclampsia ( sudden swelling of hands, feet or face, sudden severe headache or right upper abdominal pain, abruption ( any bleeding or spotting) , premature labor, and fetal kick counts today.                Thank you for choosing [facility]  for your Health Care. It was a pleasure seeing you at your visit today. Please contact us with any questions or concerns you may have.                   Mey Mcleod MD                                  To reach your [facility] care team after hours call:   [phone]    Our clinic hours are:     Monday- 7:30 am - 7:00 pm                             Tuesday through Friday- 7:30 am - 5:00 pm                                        Saturday- 8:00 am - 12:00 pm                  Phone:  [phone]    Our pharmacy hours are:     Monday  8:00 am to 7:00 pm      Tuesday through Friday 8:00am to 6:00pm                        Saturday - 9:00 am to 1:00 pm      Sunday : Closed.              Phone:  [phone]      There is also information available at our web site:  [email or web address]    If your provider ordered any lab tests and you do not receive the results within 10 business days, please call the clinic.    If you need a medication refill please contact your pharmacy.  Please allow 2 business days for your refill to be completed.    Our clinic offers telephone visits and e visits.  Please ask one of your team members to explain more.      Use [employer] (secure email communication and access to your chart) to send your primary care provider a message or make an appointment. Ask someone on your Team how to sign up for [employer].

## 2017-03-19 LAB
GP B STREP DNA SPEC QL NAA+PROBE: NORMAL
SPECIMEN SOURCE: NORMAL

## 2017-03-20 ENCOUNTER — HOSPITAL ENCOUNTER (OUTPATIENT)
Dept: ULTRASOUND IMAGING | Facility: CLINIC | Age: 32
Discharge: HOME | End: 2017-03-20
Attending: FAMILY MEDICINE | Admitting: FAMILY MEDICINE
Payer: COMMERCIAL

## 2017-03-20 PROCEDURE — 76816 OB US FOLLOW-UP PER FETUS: CPT

## 2017-03-24 ENCOUNTER — HOSPITAL ENCOUNTER (OUTPATIENT)
Facility: CLINIC | Age: 32
Discharge: HOME | End: 2017-03-24
Attending: OBSTETRICS & GYNECOLOGY | Admitting: OBSTETRICS & GYNECOLOGY
Payer: COMMERCIAL

## 2017-03-24 VITALS
DIASTOLIC BLOOD PRESSURE: 74 MMHG | SYSTOLIC BLOOD PRESSURE: 127 MMHG | TEMPERATURE: 98.8 F | RESPIRATION RATE: 16 BRPM | WEIGHT: 182 LBS | BODY MASS INDEX: 27.58 KG/M2 | HEIGHT: 68 IN

## 2017-03-24 PROCEDURE — 59025 FETAL NON-STRESS TEST: CPT | Mod: 76

## 2017-03-24 PROCEDURE — 59412 ANTEPARTUM MANIPULATION: CPT

## 2017-03-24 PROCEDURE — 59025 FETAL NON-STRESS TEST: CPT

## 2017-03-24 PROCEDURE — 25000125 ZZHC RX 250: Performed by: OBSTETRICS & GYNECOLOGY

## 2017-03-24 PROCEDURE — 96372 THER/PROPH/DIAG INJ SC/IM: CPT | Mod: 59

## 2017-03-24 RX ORDER — TERBUTALINE SULFATE 1 MG/ML
0.25 INJECTION, SOLUTION SUBCUTANEOUS ONCE
Status: COMPLETED | OUTPATIENT
Start: 2017-03-24 | End: 2017-03-24

## 2017-03-24 RX ADMIN — TERBUTALINE SULFATE 0.25 MG: 1 INJECTION, SOLUTION SUBCUTANEOUS at 11:11

## 2017-03-24 NOTE — PROGRESS NOTES
Mandy is a 32 year old  IUP at 36 4/7 weeks admitted for external version.  Baby was noted to be breech during an u/s at 30 weeks gestation. Risks of external version reviewed. She agrees to proceed.    U/S= Breech, back maternal left side. Pt given Terbutaline 45 minutes prior to procedure. Category 1 fetal tracing.    External version attempted in a forward roll.     Assessment per u/s successful version    Plan may d/c home after 1 hour of fetal monitoring          Precautions reviewed

## 2017-03-24 NOTE — DISCHARGE INSTRUCTIONS
Discharge Instruction for Undelivered Patients      You were seen for: External Version  We Consulted: Dr Caren Mejia  You had (Test or Medicine):Terbutaline and NST     Diet:   Drink 8 to 12 glasses of liquids (milk, juice, water) every day.  You may eat meals and snacks.     Activity:  Call your doctor or nurse midwife if your baby is moving less than usual.     Call your provider if you notice:  Swelling in your face or increased swelling in your hands or legs.  Headaches that are not relieved by Tylenol (acetaminophen).  Changes in your vision (blurring: seeing spots or stars.)  Nausea (sick to your stomach) and vomiting (throwing up).   Weight gain of 5 pounds or more per week.  Heartburn that doesn't go away.    Signs of bladder infection: pain when you urinate (use the toilet), need to go more often and more urgently.  The bag of neal (rupture of membranes) breaks, or you notice leaking in your underwear.  Bright red blood in your underwear.  Abdominal (lower belly) or stomach pain.    Second (plus) baby: Contractions (tightening) less than 10 minutes apart and getting stronger.  *If less than 34 weeks: Contractions (tightenings) more than 6 times in one hour.  Increase or change in vaginal discharge (note the color and amount)      Follow-up:  As scheduled in the clinic

## 2017-03-24 NOTE — PROGRESS NOTES
Data: Patient [facility] at 1030.   Reason for maternal/fetal assessment per patient is External Version  . Patient is a . Prenatal record reviewed.      Obstetric History       T1      TAB0   SAB0   E0   M0   L1       # Outcome Date GA Lbr Eh/2nd Weight Sex Delivery Anes PTL Lv   2 Current            1 Term 06/19/15 38w5d 16:00 / 02:37 3.569 kg (7 lb 13.9 oz) M Vag-Spont EPI  Y      Apgar1:  8                Apgar5: 9         Medical History:   Past Medical History:   Diagnosis Date     ASCUS with positive high risk HPV 2014    + HPV 16, hx of ASCUS , but neg HPV in       Cervical high risk HPV (human papillomavirus) test positive 2015    NIL pap, + HPV 16 & other HPV colp - atypia      (normal spontaneous vaginal delivery) 2015     Resolved - Marginal placenta previa with intrapartum hemorrhage in first trimester-repeat 1/15/2015= resolved  2014     Shoulder dystocia, delivered, current hospitalization 2015     Type o blood, rh positive    . Gestational Age 36w4d. VSS. Cervix: not examined.  Fetal movement present. Patient denies cramping, backache, vaginal discharge, pelvic pressure, UTI symptoms, GI problems, bloody show, vaginal bleeding, edema, headache, visual disturbances, epigastric or URQ pain, abdominal pain, rupture of membranes. Support persons Raymond present.  Action: Verbal consent for EFM. Triage assessment completed. EFM applied for fetal well being. Uterine assessment performed. Fetal assessment: Presumed adequate fetal oxygenation documented (see flow record). Patient education given on fetal movement. Patient instructed to report change in fetal movement, vaginal leaking of fluid or bleeding, abdominal pain, or any concerns related to the pregnancy to her nurse/physician.   Response: Dr. Mejia informed of pt disposition. Plan per provider is discharge home. Patient verbalized understanding of education and verbalized agreement with  plan. Discharged ambulatory at 1245.

## 2017-03-24 NOTE — PLAN OF CARE
36.4 wk  pt and spouse present to labor and delivery for scheduled external version.  Monitors explained and applied, database completed, all questions answered with nothing further at this time.

## 2017-03-24 NOTE — IP AVS SNAPSHOT
[facility] Labor and Delivery    [address]    [facility] [address]    Phone:  [phone]    Fax:  [phone]                                       After Visit Summary   3/24/2017    Mandy Novak    MRN: [id number]           After Visit Summary Signature Page     I have received my discharge instructions, and my questions have been answered. I have discussed any challenges I see with this plan with the nurse or doctor.    ..........................................................................................................................................  Patient/Patient Representative Signature      ..........................................................................................................................................  Patient Representative Print Name and Relationship to Patient    ..................................................               ................................................  Date                                            Time    ..........................................................................................................................................  Reviewed by Signature/Title    ...................................................              ..............................................  Date                                                            Time

## 2017-03-24 NOTE — PROVIDER NOTIFICATION
03/24/17 1130   Provider Notification   Provider Name/Title Dr Mejia   Method of Notification At Bedside   here for external version

## 2017-03-24 NOTE — IP AVS SNAPSHOT
MRN:[id number]                      After Visit Summary   3/24/2017    Mandy Novak    MRN: [id number]           Thank you!     Thank you for choosing [facility] for your care. Our goal is always to provide you with excellent care. Hearing back from our patients is one way we can continue to improve our services. Please take a few minutes to complete the written survey that you may receive in the mail after you visit. If you would like to speak to someone directly about your visit please contact Patient Relations at [phone]. Thank you!          Patient Information     Date Of Birth          1985        About your hospital stay     You were admitted on:  March 24, 2017 You last received care in the:  [facility] Labor and Delivery    You were discharged on:  March 24, 2017       Who to Call     For medical emergencies, please call 911.  For non-urgent questions about your medical care, please call your primary care provider or clinic, [phone]          Attending Provider     Provider Specialty    Caren Mejia MD OB/Gyn       Primary Care Provider Office Phone # Fax #    Mey Mcleod [phone]       [address] [address]        After Care Instructions     Discharge Instructions - Post Extrenal Version Procedure                 Your next 10 appointments already scheduled     Mar 30, 2017  9:45 AM CDT   ESTABLISHED PRENATAL with Mey Mcleod MD   [facility]    [address] [place] [id number] [phone]            Apr 06, 2017  9:45 AM CDT   ESTABLISHED PRENATAL with Mey Mcleod MD   [facility] ([facility])    [address] [place] [id number] [phone]            Apr 13, 2017  3:30 PM CDT   ESTABLISHED PRENATAL with Mey GOMEZ  "MD Harsha   [facility]    [address] [place] [id number] [phone]              Further instructions from your care team       Discharge Instruction for Undelivered Patients      You were seen for: External Version  We Consulted: Dr Caren Mejia  You had (Test or Medicine):Terbutaline and NST     Diet:   Drink 8 to 12 glasses of liquids (milk, juice, water) every day.  You may eat meals and snacks.     Activity:  Call your doctor or nurse midwife if your baby is moving less than usual.     Call your provider if you notice:  Swelling in your face or increased swelling in your hands or legs.  Headaches that are not relieved by Tylenol (acetaminophen).  Changes in your vision (blurring: seeing spots or stars.)  Nausea (sick to your stomach) and vomiting (throwing up).   Weight gain of 5 pounds or more per week.  Heartburn that doesn't go away.    Signs of bladder infection: pain when you urinate (use the toilet), need to go more often and more urgently.  The bag of neal (rupture of membranes) breaks, or you notice leaking in your underwear.  Bright red blood in your underwear.  Abdominal (lower belly) or stomach pain.    Second (plus) baby: Contractions (tightening) less than 10 minutes apart and getting stronger.  *If less than 34 weeks: Contractions (tightenings) more than 6 times in one hour.  Increase or change in vaginal discharge (note the color and amount)      Follow-up:  As scheduled in the clinic          Pending Results     No orders found from 3/22/2017 to 3/25/2017.            Admission Information     Date & Time Provider Department Dept. Phone    3/24/2017 Caren Mejia MD [facility] Labor and Delivery [phone]      Your Vitals Were     Blood Pressure Temperature Respirations Height Weight Last Period    127/74 98.8  F (37.1  C) (Oral) 16 1.727 m (5' 8\") 82.6 kg (182 lb) 07/16/2016    BMI (Body Mass Index)       "             27.67 kg/m2           [employer] Information     [employer] gives you secure access to your electronic health record. If you see a primary care provider, you can also send messages to your care team and make appointments. If you have questions, please call your primary care clinic.  If you do not have a primary care provider, please call [phone] and they will assist you.        Care EveryWhere ID     This is your Care EveryWhere ID. This could be used by other organizations to access your [facility] medical records  [id number]           Review of your medicines      UNREVIEWED medicines. Ask your doctor about these medicines        Dose / Directions    Prenatal Vitamins 28-0.8 MG Tabs   Used for:  Absence of menstruation        Dose:  1 tablet   Take 1 tablet by mouth daily   Refills:  0                Protect others around you: Learn how to safely use, store and throw away your medicines at www.disposemymeds.org.             Medication List: This is a list of all your medications and when to take them. Check marks below indicate your daily home schedule. Keep this list as a reference.      Medications           Morning Afternoon Evening Bedtime As Needed    Prenatal Vitamins 28-0.8 MG Tabs   Take 1 tablet by mouth daily

## 2017-03-25 NOTE — OP NOTE
DATE OF PROCEDURE:  3/24/2017      PREOPERATIVE DIAGNOSES:   1.  Intrauterine pregnancy at 36 weeks and 4 days.   2.  Breech presentation.      POSTOPERATIVE DIAGNOSES:   1.  Intrauterine pregnancy at 36 weeks and 4 days.   2.  Vertex presentation, successful version     PROCEDURE:  External version.      SURGEON:  Salma Avitia MD      PROCEDURE:  Mandy Novak was given the risks of the procedure.  She agreed to go ahead with the external version.  She was given a shot of terbutaline 0.2 mg subcu 45 minutes before the procedure.  An ultrasound prior to the terbutaline confirmed breech presentation.  The fetus was noted to have a category 1 fetal tracing.  Ultrasound revealed breech presentation with the baby's back along the maternal left side.  The patient was placed in the supine position.  Version was performed with a forward roll.  Gentle pressure was placed along the fetal head and  the buttocks was elevated out of the pelvis.  With 1 attempt the baby was successfully verted to the vertex position.  The fetal heart tracing will be monitored for 1 hour post-procedure and if category 1, the patient will be discharged home.  She was given precautions and will see her primary doctor next week.         SALMA AVITIA MD             D: 2017 11:59   T: 2017 23:18   MT: [id number]      Name:     MANDY NOVAK   MRN:      [id number]        Account:        [id number]   :      1985           Procedure Date: 2017      Document: [id number]

## 2017-03-30 ENCOUNTER — PRENATAL OFFICE VISIT (OUTPATIENT)
Dept: FAMILY MEDICINE | Facility: CLINIC | Age: 32
End: 2017-03-30
Payer: COMMERCIAL

## 2017-03-30 VITALS
TEMPERATURE: 98 F | OXYGEN SATURATION: 100 % | SYSTOLIC BLOOD PRESSURE: 104 MMHG | HEART RATE: 104 BPM | HEIGHT: 68 IN | BODY MASS INDEX: 27.92 KG/M2 | WEIGHT: 184.2 LBS | DIASTOLIC BLOOD PRESSURE: 68 MMHG

## 2017-03-30 DIAGNOSIS — Z34.80 SUPERVISION OF OTHER NORMAL PREGNANCY, ANTEPARTUM: Primary | ICD-10-CM

## 2017-03-30 DIAGNOSIS — Z67.40 TYPE O BLOOD, RH POSITIVE: ICD-10-CM

## 2017-03-30 DIAGNOSIS — O44.31: ICD-10-CM

## 2017-03-30 LAB
ALBUMIN UR QL STRIP: NEGATIVE MG/DL
GLUCOSE UR STRIP-MCNC: NEGATIVE MG/DL

## 2017-03-30 PROCEDURE — 99207 ZZC COMPLICATED OB VISIT: CPT | Performed by: FAMILY MEDICINE

## 2017-03-30 PROCEDURE — 00000219 ZZHCL STATISTIC OBSA - URINALYSIS: Performed by: FAMILY MEDICINE

## 2017-03-30 NOTE — PATIENT INSTRUCTIONS
Am I in Labor?  What is Labor?  Labor is the process by which your uterus contacts in a regular pattern and causes the cervix to open and prepare for delivery  What do contractions feel like?  When they first start, contractions feel like menstrual cramps.  You can feel them in your back.  At first, they will probably be 15 to 20 minutes apart.  As labor goes on, the contractions will get stronger, closer together and more painful.  What should I do when the contractions start?   -Walk.  If the pains you are having are labor pains, walking will make the contractions   come faster and harder.     -Take a shower or bath   -Eat, Labor is a big event.  It takes a lot of energy.   -Drink water  When should I go to the hospital or call my Provider?   -Your contractions have been 5 minutes apart or less for at least 1 hour   -If several contractions are so painful that you cannot walk or talk during one.   -Your bag of water breaks  Call the main office number [phone] 24 hours a day, 7 days a week and ask for the physician on call if you think you are in labor.                 Thank you for choosing [facility]  for your Health Care. It was a pleasure seeing you at your visit today. Please contact us with any questions or concerns you may have.                   Mey Mcleod MD                                  To reach your [facility] care team after hours call:   [phone]    Our clinic hours are:     Monday- 7:30 am - 7:00 pm                             Tuesday through Friday- 7:30 am - 5:00 pm                                        Saturday- 8:00 am - 12:00 pm                  Phone:  [phone]    Our pharmacy hours are:     Monday  8:00 am to 7:00 pm      Tuesday through Friday 8:00am to 6:00pm                        Saturday - 9:00 am to 1:00 pm      Sunday : Closed.              Phone:  [phone]      There is also information available at  our web site:  [email or web address]    If your provider ordered any lab tests and you do not receive the results within 10 business days, please call the clinic.    If you need a medication refill please contact your pharmacy.  Please allow 2 business days for your refill to be completed.    Our clinic offers telephone visits and e visits.  Please ask one of your team members to explain more.      Use [employer] (secure email communication and access to your chart) to send your primary care provider a message or make an appointment. Ask someone on your Team how to sign up for [employer].

## 2017-03-30 NOTE — PROGRESS NOTES
SUBJECTIVE:                                                    Mandy Novak is here today for a Prenatal Visit at 37w3d gestation.    Concerns today:  Had a successful version  last Friday and baby turned with head down  Bleeding:  No.   Cramping/Contractions:Yes contractions - nothing rhythmic   Leaking of fluid: No  Baby moving:  Yes  O: See OB Vitals. EFW on US on 3/20/2017 = 3139g .   ASSESSMENT:/PLAN: went over signs of preeclampsia ( sudden swelling of hands, feet or face, sudden severe headache or right upper abdominal pain, abruption ( any bleeding or spotting) , premature labor, and fetal kick counts today.  GBS negative, blood type O positive.   Return in 1 weeks . --- Mey Mcleod MD  [facility]

## 2017-03-30 NOTE — MR AVS SNAPSHOT
After Visit Summary   3/30/2017    Mandy Novak    MRN: [id number]           Patient Information     Date Of Birth          1985        Visit Information        Provider Department      3/30/2017 9:45 AM Mey Mcleod MD [facility]        Today's Diagnoses     Supervision of other normal pregnancy, antepartum    -  1    Type o blood, rh positive        Resolved - Marginal placenta previa with intrapartum hemorrhage in first trimester-repeat 1/15/2015= resolved         Cecilio breech presentation, not applicable or unspecified fetus- resolved s/p version on 3/24/2017           Follow-ups after your visit        Your next 10 appointments already scheduled     Apr 06, 2017  9:45 AM CDT   ESTABLISHED PRENATAL with Mey Mcleod MD   [facility]    [address] [place] [id number] [phone]            Apr 13, 2017  3:30 PM CDT   ESTABLISHED PRENATAL with Mey Mcleod MD   [facility]    [address] [place] [id number] [phone]              Who to contact     If you have questions or need follow up information about today's clinic visit or your schedule please contact [facility] directly at [phone].  Normal or non-critical lab and imaging results will be communicated to you by MyChart, letter or phone within 4 business days after the clinic has received the results. If you do not hear from us within 7 days, please contact the clinic through MyChart or phone. If you have a critical or abnormal lab result, we will notify you by phone as soon as possible.  Submit refill requests through [employer] or call your pharmacy and they will forward the refill request to us. Please allow 3 business days for your refill to be completed.          Additional Information About Your Visit        [facility]  "Information     [employer] gives you secure access to your electronic health record. If you see a primary care provider, you can also send messages to your care team and make appointments. If you have questions, please call your primary care clinic.  If you do not have a primary care provider, please call [phone] and they will assist you.        Care EveryWhere ID     This is your Care EveryWhere ID. This could be used by other organizations to access your [place] medical records  [id number]        Your Vitals Were     Pulse Temperature Height Last Period Pulse Oximetry BMI (Body Mass Index)    104 98  F (36.7  C) (Oral) 5' 7.5\" (1.715 m) 07/16/2016 100% 28.42 kg/m2       Blood Pressure from Last 3 Encounters:   03/30/17 104/68   03/24/17 127/74   03/17/17 112/68    Weight from Last 3 Encounters:   03/30/17 184 lb 3.2 oz (83.6 kg)   03/24/17 182 lb (82.6 kg)   03/17/17 182 lb 4.8 oz (82.7 kg)              We Performed the Following     Glucose albumin OB urine        Primary Care Provider Office Phone # Fax #    Mey Mcleod [phone]       [address] [address]        Thank you!     Thank you for choosing [facility]  for your care. Our goal is always to provide you with excellent care. Hearing back from our patients is one way we can continue to improve our services. Please take a few minutes to complete the written survey that you may receive in the mail after your visit with us. Thank you!             Your Updated Medication List - Protect others around you: Learn how to safely use, store and throw away your medicines at www.disposemymeds.org.          This list is accurate as of: 3/30/17 10:42 AM.  Always use your most recent med list.                   Brand Name Dispense Instructions for use    Prenatal Vitamins 28-0.8 MG Tabs      Take 1 tablet by mouth daily         "

## 2017-03-30 NOTE — NURSING NOTE
"Chief Complaint   Patient presents with     Prenatal Care       Initial /68 (BP Location: Right arm, Patient Position: Chair, Cuff Size: Adult Regular)  Pulse 104  Temp 98  F (36.7  C) (Oral)  Ht 5' 7.5\" (1.715 m)  Wt 184 lb 3.2 oz (83.6 kg)  LMP 07/16/2016  SpO2 100%  BMI 28.42 kg/m2 Estimated body mass index is 28.42 kg/(m^2) as calculated from the following:    Height as of this encounter: 5' 7.5\" (1.715 m).    Weight as of this encounter: 184 lb 3.2 oz (83.6 kg).  Medication Reconciliation: complete   Jeanette Saul, CMA    "

## 2017-04-06 ENCOUNTER — OFFICE VISIT (OUTPATIENT)
Dept: MATERNAL FETAL MEDICINE | Facility: CLINIC | Age: 32
End: 2017-04-06
Attending: FAMILY MEDICINE
Payer: COMMERCIAL

## 2017-04-06 ENCOUNTER — PRENATAL OFFICE VISIT (OUTPATIENT)
Dept: FAMILY MEDICINE | Facility: CLINIC | Age: 32
End: 2017-04-06
Payer: COMMERCIAL

## 2017-04-06 ENCOUNTER — HOSPITAL ENCOUNTER (OUTPATIENT)
Dept: ULTRASOUND IMAGING | Facility: CLINIC | Age: 32
Discharge: HOME | End: 2017-04-06
Attending: FAMILY MEDICINE | Admitting: FAMILY MEDICINE
Payer: COMMERCIAL

## 2017-04-06 VITALS
HEIGHT: 68 IN | SYSTOLIC BLOOD PRESSURE: 108 MMHG | HEART RATE: 101 BPM | TEMPERATURE: 97.8 F | DIASTOLIC BLOOD PRESSURE: 70 MMHG | OXYGEN SATURATION: 100 % | WEIGHT: 185 LBS | BODY MASS INDEX: 28.04 KG/M2

## 2017-04-06 DIAGNOSIS — R11.0 NAUSEA: ICD-10-CM

## 2017-04-06 DIAGNOSIS — R42 LIGHTHEADEDNESS: ICD-10-CM

## 2017-04-06 DIAGNOSIS — O26.843 UTERINE SIZE DATE DISCREPANCY, THIRD TRIMESTER: Primary | ICD-10-CM

## 2017-04-06 DIAGNOSIS — Z34.80 SUPERVISION OF OTHER NORMAL PREGNANCY, ANTEPARTUM: Primary | ICD-10-CM

## 2017-04-06 DIAGNOSIS — Z67.40 TYPE O BLOOD, RH POSITIVE: ICD-10-CM

## 2017-04-06 DIAGNOSIS — O26.90 PREGNANCY RELATED CONDITION, UNSPECIFIED TRIMESTER: ICD-10-CM

## 2017-04-06 LAB
ALBUMIN UR QL STRIP: NEGATIVE MG/DL
GLUCOSE UR STRIP-MCNC: NEGATIVE MG/DL

## 2017-04-06 PROCEDURE — 99207 ZZC COMPLICATED OB VISIT: CPT | Performed by: FAMILY MEDICINE

## 2017-04-06 PROCEDURE — 00000219 ZZHCL STATISTIC OBSA - URINALYSIS: Performed by: FAMILY MEDICINE

## 2017-04-06 PROCEDURE — 76816 OB US FOLLOW-UP PER FETUS: CPT

## 2017-04-06 NOTE — LETTER
[address] [address]  [phone]          April 6, 2017    RE:  Mandy Novak                                                                                                                                                       [address] [place] [address] [address] [place] [address]            To whom it may concern:    Mandy Novak is under my professional care for pregnancy related complications.   She  needs to start her maternity leave now - as of today April 6, 2017.  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         Sincerely,         Mey Mcleod M.D.

## 2017-04-06 NOTE — MR AVS SNAPSHOT
After Visit Summary   4/6/2017    Mandy Novak    MRN: [id number]           Patient Information     Date Of Birth          1985        Visit Information        Provider Department      4/6/2017 2:00 PM George Valdivia MD [facility] Maternal Fetal Medicine [facility]        Today's Diagnoses     Uterine size date discrepancy, third trimester    -  1      Care Instructions    No c          Follow-ups after your visit        Your next 10 appointments already scheduled     Apr 13, 2017  3:30 PM CDT   ESTABLISHED PRENATAL with Mey [facility]cleod MD   [facility]    [address] [place] [id number]   [phone]            Apr 20, 2017  3:45 PM CDT   ESTABLISHED PRENATAL with Mey Mcleod MD   [facility]    [address] [place] [address]   [phone]              Future tests that were ordered for you today     Open Future Orders        Priority Expected Expires Ordered    MFM US Comprehensive Single F/U Routine  4/6/2018 4/6/2017            Who to contact     If you have questions or need follow up information about today's clinic visit or your schedule please contact [facility]AL [facility] directly at [phone].  Normal or non-critical lab and imaging results will be communicated to you by MyChart, letter or phone within 4 business days after the clinic has received the results. If you do not hear from us within 7 days, please contact the clinic through MyChart or phone. If you have a critical or abnormal lab result, we will notify you by phone as soon as possible.  Submit refill requests through [employer] or call your pharmacy and they will forward the refill request to us. Please allow 3 business days for your refill to be completed.          Additional Information About Your Visit        MyChart Information      [employer] gives you secure access to your electronic health record. If you see a primary care provider, you can also send messages to your care team and make appointments. If you have questions, please call your primary care clinic.  If you do not have a primary care provider, please call [phone] and they will assist you.        Care EveryWhere ID     This is your Care EveryWhere ID. This could be used by other organizations to access your [place] medical records  [id number]        Your Vitals Were     Last Period                   07/16/2016            Blood Pressure from Last 3 Encounters:   04/06/17 108/70   03/30/17 104/68   03/24/17 127/74    Weight from Last 3 Encounters:   04/06/17 83.9 kg (185 lb)   03/30/17 83.6 kg (184 lb 3.2 oz)   03/24/17 82.6 kg (182 lb)              Today, you had the following     No orders found for display       Primary Care Provider Office Phone # Fax #    Mey Mcleod [phone]       [place] [address]        Thank you!     Thank you for choosing [facility]  for your care. Our goal is always to provide you with excellent care. Hearing back from our patients is one way we can continue to improve our services. Please take a few minutes to complete the written survey that you may receive in the mail after your visit with us. Thank you!             Your Updated Medication List - Protect others around you: Learn how to safely use, store and throw away your medicines at www.disposemymeds.org.          This list is accurate as of: 4/6/17 11:59 PM.  Always use your most recent med list.                   Brand Name Dispense Instructions for use    Prenatal Vitamins 28-0.8 MG Tabs      Take 1 tablet by mouth daily

## 2017-04-06 NOTE — PATIENT INSTRUCTIONS
went over signs of preeclampsia ( sudden swelling of hands, feet or face, sudden severe headache or right upper abdominal pain, abruption ( any bleeding or spotting) , premature labor, and fetal kick counts today.                    Am I in Labor?  What is Labor?  Labor is the process by which your uterus contacts in a regular pattern and causes the cervix to open and prepare for delivery  What do contractions feel like?  When they first start, contractions feel like menstrual cramps.  You can feel them in your back.  At first, they will probably be 15 to 20 minutes apart.  As labor goes on, the contractions will get stronger, closer together and more painful.  What should I do when the contractions start?   -Walk.  If the pains you are having are labor pains, walking will make the contractions   come faster and harder.     -Take a shower or bath   -Eat, Labor is a big event.  It takes a lot of energy.   -Drink water  When should I go to the hospital or call my Provider?   -Your contractions have been 5 minutes apart or less for at least 1 hour   -If several contractions are so painful that you cannot walk or talk during one.   -Your bag of water breaks  Call the main office number [phone] 24 hours a day, 7 days a week and ask for the physician on call if you think you are in labor.                 Thank you for choosing [facility]  for your Health Care. It was a pleasure seeing you at your visit today. Please contact us with any questions or concerns you may have.                   Mey Mcleod MD                                  To reach your [facility] care team after hours call:   [phone]    Our clinic hours are:     Monday- 7:30 am - 7:00 pm                             Tuesday through Friday- 7:30 am - 5:00 pm                                        Saturday- 8:00 am - 12:00 pm                  Phone:  [phone]    Our pharmacy hours are:     Monday   8:00 am to 7:00 pm      Tuesday through Friday 8:00am to 6:00pm                        Saturday - 9:00 am to 1:00 pm      Sunday : Closed.              Phone:  [phone]      There is also information available at our web site:  [email or web address]    If your provider ordered any lab tests and you do not receive the results within 10 business days, please call the clinic.    If you need a medication refill please contact your pharmacy.  Please allow 2 business days for your refill to be completed.    Our clinic offers telephone visits and e visits.  Please ask one of your team members to explain more.      Use [employer] (secure email communication and access to your chart) to send your primary care provider a message or make an appointment. Ask someone on your Team how to sign up for [employer].

## 2017-04-06 NOTE — NURSING NOTE
"Chief Complaint   Patient presents with     Prenatal Care       Initial /70 (BP Location: Left arm, Patient Position: Chair, Cuff Size: Adult Regular)  Pulse 101  Temp 97.8  F (36.6  C) (Oral)  Ht 5' 7.5\" (1.715 m)  Wt 185 lb (83.9 kg)  LMP 07/16/2016  SpO2 100%  BMI 28.55 kg/m2 Estimated body mass index is 28.55 kg/(m^2) as calculated from the following:    Height as of this encounter: 5' 7.5\" (1.715 m).    Weight as of this encounter: 185 lb (83.9 kg).  Medication Reconciliation: complete   Jeanette Saul CMA  "

## 2017-04-06 NOTE — PROGRESS NOTES
"  SUBJECTIVE:                                                    Mandy Novak is here today for a Prenatal Visit at 38w3d gestation.      Concerns today:  None. No big movements to suggest return to breech.  Getting kicked a lot RUQ. Having a hard time at work - on her feet all the time as a - getting a lot of pressure and was a bit lightheaded and nauseated today.   Bleeding:  No  Cramping/Contractions:Yes, having more contractions - when standing up or moving in bed  Leaking of fluid: No  Baby moving:  Yes  O: See OB Vitals  /70 (BP Location: Left arm, Patient Position: Chair, Cuff Size: Adult Regular)  Pulse 101  Temp 97.8  F (36.6  C) (Oral)  Ht 5' 7.5\" (1.715 m)  Wt 185 lb (83.9 kg)  LMP 07/16/2016  SpO2 100%  BMI 28.55 kg/m2     ASSESSMENT:/PLAN:went over signs of preeclampsia ( sudden swelling of hands, feet or face, sudden severe headache or right upper abdominal pain, abruption ( any bleeding or spotting) , premature labor, and fetal kick counts today.  GBS negative. Start maternity leave now. Letter written. Pt will drop of FMLA paperwork if needed.   Return in 1 weeks.     ASSESSMENT/PLAN:                                                        ICD-10-CM    1. Supervision of other normal pregnancy, antepartum Z34.80 Glucose and albumin, OB urine   2. Type o blood, rh positive Z67.40    3. Nausea- with standing for long periods for her job R11.0    4. Lightheadedness- with standing R42    5. Cecilio breech presentation, not applicable or unspecified fetus- resolved with external version on 3/24/2017 O32.1XX0    6. Large for gestational age P08.1 MAT FETAL MED CTR REFERRAL-PREGNANCY     Measuring large for gestational age - 42 cm today. Will get another US for EFW and ensure cephalic presentation - feels cephalic on external and internal exam today.   See Patient Instructions.          Mey Mcleod MD  [facility]      "

## 2017-04-06 NOTE — MR AVS SNAPSHOT
After Visit Summary   4/6/2017    Mandy Novak    MRN: [id number]           Patient Information     Date Of Birth          1985        Visit Information        Provider Department      4/6/2017 9:45 AM Mey Mcleod MD [facility]        Today's Diagnoses     Supervision of other normal pregnancy, antepartum    -  1    Type o blood, rh positive        Nausea- with standing for long periods for her job        Lightheadedness- with standing        Cecilio breech presentation, not applicable or unspecified fetus- resolved with external version on 3/24/2017        Large for gestational age          Care Instructions    went over signs of preeclampsia ( sudden swelling of hands, feet or face, sudden severe headache or right upper abdominal pain, abruption ( any bleeding or spotting) , premature labor, and fetal kick counts today.                    Am I in Labor?  What is Labor?  Labor is the process by which your uterus contacts in a regular pattern and causes the cervix to open and prepare for delivery  What do contractions feel like?  When they first start, contractions feel like menstrual cramps.  You can feel them in your back.  At first, they will probably be 15 to 20 minutes apart.  As labor goes on, the contractions will get stronger, closer together and more painful.  What should I do when the contractions start?   -Walk.  If the pains you are having are labor pains, walking will make the contractions   come faster and harder.     -Take a shower or bath   -Eat, Labor is a big event.  It takes a lot of energy.   -Drink water  When should I go to the hospital or call my Provider?   -Your contractions have been 5 minutes apart or less for at least 1 hour   -If several contractions are so painful that you cannot walk or talk during one.   -Your bag of water breaks  Call the main office number [phone] 24 hours a day, 7 days a week and ask for the physician on  call if you think you are in labor.                 Thank you for choosing [facility]  for your Health Care. It was a pleasure seeing you at your visit today. Please contact us with any questions or concerns you may have.                   Mey Mcleod MD                                  To reach your [facility] care team after hours call:   [phone]    Our clinic hours are:     Monday- 7:30 am - 7:00 pm                             Tuesday through Friday- 7:30 am - 5:00 pm                                        Saturday- 8:00 am - 12:00 pm                  Phone:  [phone]    Our pharmacy hours are:     Monday  8:00 am to 7:00 pm      Tuesday through Friday 8:00am to 6:00pm                        Saturday - 9:00 am to 1:00 pm      Sunday : Closed.              Phone:  [phone]      There is also information available at our web site:  [email or web address]    If your provider ordered any lab tests and you do not receive the results within 10 business days, please call the clinic.    If you need a medication refill please contact your pharmacy.  Please allow 2 business days for your refill to be completed.    Our clinic offers telephone visits and e visits.  Please ask one of your team members to explain more.      Use [employer] (secure email communication and access to your chart) to send your primary care provider a message or make an appointment. Ask someone on your Team how to sign up for [employer].                     Follow-ups after your visit        Additional Services     [facility] F[facility] REFERRAL-PREGNANCY       >> Patient may proceed with recommendations for further testing as directed by the Maternal Fetal Medicine Specialist >>    >> If requesting Fetal Echo: MFM will determine appropriate location for exam due to indication.    >> If requesting Lung Maturity Amnio:  If results indicate fetal lung maturity, induction or C/S is recommended within 36  hours.  Please schedule accordingly.     Dear Patient:   Please be aware that coverage of these services is subject to the terms and limitations of your health insurance plan.  Call member services at your health plan with any benefit or coverage questions.      Please bring the following to your appointment:    >>  Any x-rays, CTs or MRIs which have been performed.  Contact the facility where they were done to arrange for  prior to your scheduled appointment.  Any new CT, MRI or other procedures ordered by your specialist must be performed at a [facility] or coordinated by your clinic's referral office.  >>  List of current medications   >>  This referral request   >>  Any documents/labs given to you for this referral                  Your next 10 appointments already scheduled     Apr 13, 2017  3:30 PM CDT   [facility]    [address] [place] [id number] [phone]            Apr 20, 2017  3:45 PM CDT   ESTABLISHED PRENATAL with Mey Mcleod MD   [facility]    [address] [place] [id number] [phone]              Who to contact     If you have questions or need follow up information about today's clinic visit or your schedule please contact [facility] directly at [phone].  Normal or non-critical lab and imaging results will be communicated to you by MyChart, letter or phone within 4 business days after the clinic has received the results. If you do not hear from us within 7 days, please contact the clinic through MyChart or phone. If you have a critical or abnormal lab result, we will notify you by phone as soon as possible.  Submit refill requests through [employer] or call your pharmacy and they will forward the refill request to us. Please allow 3 business days for your  "refill to be completed.          Additional Information About Your Visit        [employer] Information     [employer] gives you secure access to your electronic health record. If you see a primary care provider, you can also send messages to your care team and make appointments. If you have questions, please call your primary care clinic.  If you do not have a primary care provider, please call [phone] and they will assist you.        Care EveryWhere ID     This is your Care EveryWhere ID. This could be used by other organizations to access your [facility] medical records  [id number]        Your Vitals Were     Pulse Temperature Height Last Period Pulse Oximetry BMI (Body Mass Index)    101 97.8  F (36.6  C) (Oral) 5' 7.5\" (1.715 m) 07/16/2016 100% 28.55 kg/m2       Blood Pressure from Last 3 Encounters:   04/06/17 108/70   03/30/17 104/68   03/24/17 127/74    Weight from Last 3 Encounters:   04/06/17 185 lb (83.9 kg)   03/30/17 184 lb 3.2 oz (83.6 kg)   03/24/17 182 lb (82.6 kg)              We Performed the Following     Glucose and albumin, OB urine     MAT FETAL MED CTR REFERRAL-PREGNANCY        Primary Care Provider Office Phone # Fax #    Mey Mcleod [phone]       [facility] [address] [place] [id number]        Thank you!     Thank you for choosing [facility]  for your care. Our goal is always to provide you with excellent care. Hearing back from our patients is one way we can continue to improve our services. Please take a few minutes to complete the written survey that you may receive in the mail after your visit with us. Thank you!             Your Updated Medication List - Protect others around you: Learn how to safely use, store and throw away your medicines at www.disposemymeds.org.          This list is accurate as of: 4/6/17 10:51 AM.  Always use your most recent med list.                   Brand Name Dispense Instructions for " use    Prenatal Vitamins 28-0.8 MG Tabs      Take 1 tablet by mouth daily

## 2017-04-07 NOTE — PROGRESS NOTES
"Please see \"Imaging\" tab under \"Chart Review\" for details of today's ultrasound.    George Valdivia M.D.  Specialist in Maternal-Fetal Medicine     "

## 2017-04-12 NOTE — PROGRESS NOTES
SUBJECTIVE:                                                    Mandy Novak is here today for a Prenatal Visit at 39w3d gestation.    Concerns today:  None.   Bleeding:  No  Cramping/Contractions:Yes - more contractions, nothing since Monday night.  Leaking of fluid: No  Baby moving:  Yes  O: See OB Vitals  US on 2017: IMPRESSION   ---------------------------------------------------------------------------------------------------------  1) Intrauterine pregnancy at 38 3/7 weeks gestational age.  2) None of the anomalies commonly detected by ultrasound were evident in the detailed fetal anatomic survey described above.  3) Growth parameters and estimated fetal weight were consistent with a LARGE for gestation age pattern of growth. EFW is in the 85th %ile and  the abdominal circumference is in the 99th %ile for this gestational age. The margin of error in determining fetal size is significant at this gestation.  4) The amniotic fluid volume appeared normal. MADISON = 20.8.      ASSESSMENT/PLAN:                                                        ICD-10-CM    1. Supervision of other normal pregnancy, antepartum Z34.80 Glucose and albumin, OB urine   2. Type o blood, rh positive Z67.40    3. Large for gestational age P08.1    4. History of shoulder dystocia in prior pregnancy- with first baby 7lbs 14oz 2015  Z87.59    5. Cecilio breech presentation, fetus 1-resolved with external version on 3/24/2017- baby now cephalic O32.1XX1    EFW today : 3950g by Leopold's.     Elias's Scorin  Number assessment:   0  1  2  3  Dilation of cervix:     0  1-2  3-4  5-6+   Effacement of cervix:    0-30% 40-50% 60-70% 80%+   Consistency :  Firm  Average Soft  ----  Position:  Posterior Mid  Anterior ----  Station:   -3   -2  -1/0  +1     And ballotable  Plan to induce on 2017, for fetal macrosomia with borderline high polyhydramnios at 40 weeks EGA.   See Patient Instructions.   Plan for induction on  4/17/2017 at 40 weeks EGA secondary to large for gestational age, borderline high MADISON and history of shoulder dystocia with previous baby 7lbs 14oz. I called L&D just now and unable to schedule induction until tomorrow. Will call them back tomorrow. went over signs of preeclampsia ( sudden swelling of hands, feet or face, sudden severe headache or right upper abdominal pain, abruption ( any bleeding or spotting) , premature labor, and fetal kick counts today.             Mey Mcleod MD    [facility]

## 2017-04-13 ENCOUNTER — PRENATAL OFFICE VISIT (OUTPATIENT)
Dept: FAMILY MEDICINE | Facility: CLINIC | Age: 32
End: 2017-04-13
Payer: COMMERCIAL

## 2017-04-13 VITALS
OXYGEN SATURATION: 100 % | DIASTOLIC BLOOD PRESSURE: 70 MMHG | HEIGHT: 68 IN | HEART RATE: 104 BPM | TEMPERATURE: 98.1 F | WEIGHT: 187.8 LBS | SYSTOLIC BLOOD PRESSURE: 110 MMHG | BODY MASS INDEX: 28.46 KG/M2

## 2017-04-13 DIAGNOSIS — Z67.40 TYPE O BLOOD, RH POSITIVE: ICD-10-CM

## 2017-04-13 DIAGNOSIS — Z34.80 SUPERVISION OF OTHER NORMAL PREGNANCY, ANTEPARTUM: Primary | ICD-10-CM

## 2017-04-13 DIAGNOSIS — Z87.59 HISTORY OF SHOULDER DYSTOCIA IN PRIOR PREGNANCY: ICD-10-CM

## 2017-04-13 LAB
ALBUMIN UR QL STRIP: NEGATIVE MG/DL
GLUCOSE UR STRIP-MCNC: NEGATIVE MG/DL

## 2017-04-13 PROCEDURE — 00000219 ZZHCL STATISTIC OBSA - URINALYSIS: Performed by: FAMILY MEDICINE

## 2017-04-13 PROCEDURE — 99207 ZZC COMPLICATED OB VISIT: CPT | Performed by: FAMILY MEDICINE

## 2017-04-13 NOTE — NURSING NOTE
"Chief Complaint   Patient presents with     Prenatal Care       Initial /70 (BP Location: Left arm, Patient Position: Chair, Cuff Size: Adult Regular)  Pulse 104  Temp 98.1  F (36.7  C) (Oral)  Ht 5' 7.5\" (1.715 m)  Wt 187 lb 12.8 oz (85.2 kg)  LMP 07/16/2016  SpO2 100%  BMI 28.98 kg/m2 Estimated body mass index is 28.98 kg/(m^2) as calculated from the following:    Height as of this encounter: 5' 7.5\" (1.715 m).    Weight as of this encounter: 187 lb 12.8 oz (85.2 kg).  Medication Reconciliation: complete   Jeanette Saul CMA  "

## 2017-04-13 NOTE — MR AVS SNAPSHOT
After Visit Summary   4/13/2017    Mandy Novak    MRN: [id number]           Patient Information     Date Of Birth          1985        Visit Information        Provider Department      4/13/2017 3:30 PM Mey Mcleod MD [facility] [place]        Today's Diagnoses     Supervision of other normal pregnancy, antepartum    -  1    Type o blood, rh positive          Care Instructions                 1 hour prior to scheduled arrival time - call [facility] Labor and Delivery [phone] to confirm 0730 arrival time.     Labor Induction  Labor induction is a way to help get your labor started. This can protect your health and your baby s, too.   Ways to Induce Labor  Your doctor can get your labor started by using any of three methods or a combination of them. Here are some common treatments:  Prostaglandin: A medicine that s placed in your vagina. It may be given as a time-release stick. It softens, thins, and opens the cervix. This is called cervical ripening.   Pitocin: A medicine that s given through an IV (intravenous) line. You may get it within 4-24 hours of being given prostaglandin. Pitocin helps start contractions. It s always given in the hospital.   Rupturing the membrane: A procedure that uses a small tool to break your bag of water. It s done more often in women who have given birth before. And it s always done in the hospital.    Reasons for Inducing Labor:   Being past due date  Pre-eclampsia ( High Blood pressure with related health problems)   Certain disesases such as diabetes  Low amniotic fluid  A ruptured bag of water or other risk factor that can affect the baby's health      What to Expect  Prostaglandin may be given in your doctor s office. You may then be sent home to wait for your cervix to ripen. Other methods of inducing labor are done in the hospital. You ll need to stay there until you give birth. Monitors may be attached to your belly to measure  contractions and help make sure your baby has no problems. No matter how labor is induced, a few factors may affect how long it takes you to give birth. These include how long it takes for your cervix to thin and open and when contractions begin.    With labor induction, you may have a greater chance of :   A  Section ( surgical delivery)  An infection  A Longer hospital stay      Give Yourself Time  Even though inducing labor gets the process started, you still may need to wait. Mothers who have labor induced most often give birth within a day or so. But it can take as long as a few days to give birth.  If You re Sent Home  In some cases of cervical ripening, you may be sent home for a while. But be ready to go to the hospital as soon as you feel regular and strong contractions. You should go to the hospital right away if your bag of water breaks or if you can t feel your baby move.    2442-0761 [place] [address]. All rights reserved. This information is not intended as a substitute for professional medical care. Always follow your healthcare professional's instructions  Patient Education     Dinoprostone Vaginal gel     Dinoprostone Vaginal insert     Dinoprostone Vaginal suppository   Dinoprostone Vaginal suppository   What is this medicine?  DINOPROSTONE, PROSTAGLANDIN E2 (dye rolando PROST one  pros tuh GLAN din) stimulates contractions to empty the uterus. Sometimes it is used after a miscarriage. It can be used when necessary to induce  between the 12th and 20th week of pregnancy. This medicine may also be used to treat a condition called benign hydatidiform mole.     This medicine may be used for other purposes; ask your health care provider or pharmacist if you have questions.  What should I tell my health care provider before I take this medicine?  They need to know if you have any of the following conditions:    heart disease    kidney disease    liver  disease    lung or breathing disease, like asthma    vaginal inflammation or infection    an unusual or allergic reaction to dinoprostone, prostaglandins, other medicines, foods, dyes, or preservatives    breast-feeding  How should I use this medicine?  This medicine is inserted into the vagina by a health-care professional in a hospital or clinic setting. Remain lying down for 10 minutes after it is inserted.  Talk to your pediatrician regarding the use of this medicine in children. Special care may be needed.  Overdosage: If you think you have taken too much of this medicine contact a poison control center or emergency room at once.  NOTE: This medicine is only for you. Do not share this medicine with others.  What if I miss a dose?  This does not apply.  What may interact with this medicine?    oxytocin  This list may not describe all possible interactions. Give your health care provider a list of all the medicines, herbs, non-prescription drugs, or dietary supplements you use. Also tell them if you smoke, drink alcohol, or use illegal drugs. Some items may interact with your medicine.  What should I watch for while using this medicine?  You will be closely monitored while you receive this medicine.  This medicine can affect other muscles outside of the uterus and cause side effects. Most of these effects will go away quickly.  Contact your doctor or health care professional immediately if you get an unpleasant vaginal discharge, continued fever, chills and shivering, or increase in vaginal bleeding several days after treatment.  What side effects may I notice from receiving this medicine?  Side effects that you should report to your doctor or health care professional as soon as possible:    allergic reactions like skin rash, itching or hives, swelling of the face, lips, or tongue    chest pain or palpitations    continuous or excessive vaginal bleeding    dizziness or fainting    fever    foul smelling vaginal  discharge    abdominal pain, or severe cramping pains  Side effects that usually do not require medical attention (report to your doctor or health care professional if they continue or are bothersome):    diarrhea    chills or shivering    hot flashes    headache    nausea, vomiting  This list may not describe all possible side effects. Call your doctor for medical advice about side effects. You may report side effects to FDA at 6-132-BVZ-2382.  Where should I keep my medicine?  This does not apply. You will not be given this medicine to store at home.  NOTE:This sheet is a summary. It may not cover all possible information. If you have questions about this medicine, talk to your doctor, pharmacist, or health care provider. Copyright   [employer]  Back  SP    Oxytocin Solution for injection  What is this medicine?  OXYTOCIN (ox i TOE sin) is a man-made form of a natural hormone. It works by causing the uterus to contract. It is used to increase the strength of contractions of the uterus. It can be used during childbirth to speed delivery or after childbirth to control bleeding. It is also used clear the uterus after an incomplete  or miscarriage.  This medicine may be used for other purposes; ask your health care provider or pharmacist if you have questions.  What should I tell my health care provider before I take this medicine?  They need to know if you have any of these conditions:    any condition where vaginal childbirth is unwanted like cervical cancer, herpes infection, oversized fetal head    dangerous position of the fetus, placenta, or umbilical cord    history of uterine surgery like  section    pregnant many times before    uterus over stimulated    an unusual or allergic reaction to oxytocin, other medicines, foods, dyes, or preservatives    pregnant or trying to get pregnant    breast-feeding  How should I use this medicine?  This medicine is for infusion into a vein. It is  given by a health care professional in a hospital or clinic setting.  Talk to your pediatrician regarding the use of this medicine in children. Special care may be needed.  Overdosage: If you think you have taken too much of this medicine contact a poison control center or emergency room at once.  NOTE: This medicine is only for you. Do not share this medicine with others.  What if I miss a dose?  This does not apply.  What may interact with this medicine?  Do not take this medicine with any of the following medications:    Amna Tam  This medicine may also interact with the following medications:    dinoprostone, prostaglandin E2    medicines for blood pressure    medicines used for sleep during surgery    other medicines to contract the uterus  This list may not describe all possible interactions. Give your health care provider a list of all the medicines, herbs, non-prescription drugs, or dietary supplements you use. Also tell them if you smoke, drink alcohol, or use illegal drugs. Some items may interact with your medicine.  What should I watch for while using this medicine?  Your condition will be monitored carefully while you are receiving this medicine.  What side effects may I notice from receiving this medicine?  Side effects that you should report to your doctor or health care professional as soon as possible:    allergic reactions like skin rash, itching or hives, swelling of the face, lips, or tongue    breathing problems    excessive or continuing vaginal bleeding    fast, irregular heartbeat    feeling faint or lightheaded, falls    high blood pressure    seizures    unusual bleeding or bruising    unusual swelling, sudden weight gain  Side effects that usually do not require medical attention (report to your doctor or health care professional if they continue or are bothersome):    headache    nausea and vomiting  This list may not describe all possible side effects. Call your doctor for  medical advice about side effects. You may report side effects to FDA at 0-907-MXI-6244.  Where should I keep my medicine?  This drug is given in a hospital or clinic and will not be stored at home.  NOTE:This sheet is a summary. It may not cover all possible information. If you have questions about this medicine, talk to your doctor, pharmacist, or health care provider. Copyright  2011 [employer]  Back.           Follow-ups after your visit        Your next 10 appointments already scheduled     Apr 20, 2017  3:45 PM CDT   [facility]    [address] [place] [id number] [phone]              Who to contact     If you have questions or need follow up information about today's clinic visit or your schedule please contact [facility] directly at [phone].  Normal or non-critical lab and imaging results will be communicated to you by MyChart, letter or phone within 4 business days after the clinic has received the results. If you do not hear from us within 7 days, please contact the clinic through [employer] or phone. If you have a critical or abnormal lab result, we will notify you by phone as soon as possible.  Submit refill requests through [employer] or call your pharmacy and they will forward the refill request to us. Please allow 3 business days for your refill to be completed.          Additional Information About Your Visit        Linkedwith Information     [employer] gives you secure access to your electronic health record. If you see a primary care provider, you can also send messages to your care team and make appointments. If you have questions, please call your primary care clinic.  If you do not have a primary care provider, please call [phone] and they will assist you.        Care EveryWhere ID     This is your Care EveryWhere ID. This could be used by other  "organizations to access your [facility] medical records  [id number]        Your Vitals Were     Pulse Temperature Height Last Period Pulse Oximetry BMI (Body Mass Index)    104 98.1  F (36.7  C) (Oral) 5' 7.5\" (1.715 m) 07/16/2016 100% 28.98 kg/m2       Blood Pressure from Last 3 Encounters:   04/13/17 110/70   04/06/17 108/70   03/30/17 104/68    Weight from Last 3 Encounters:   04/13/17 187 lb 12.8 oz (85.2 kg)   04/06/17 185 lb (83.9 kg)   03/30/17 184 lb 3.2 oz (83.6 kg)              We Performed the Following     Glucose and albumin, OB urine        Primary Care Provider Office Phone # Fax #    Mey Mcleod [phone]       [address] [address]        Thank you!     Thank you for choosing [facility]  for your care. Our goal is always to provide you with excellent care. Hearing back from our patients is one way we can continue to improve our services. Please take a few minutes to complete the written survey that you may receive in the mail after your visit with us. Thank you!             Your Updated Medication List - Protect others around you: Learn how to safely use, store and throw away your medicines at www.disposemymeds.org.          This list is accurate as of: 4/13/17  4:10 PM.  Always use your most recent med list.                   Brand Name Dispense Instructions for use    Prenatal Vitamins 28-0.8 MG Tabs      Take 1 tablet by mouth daily         "

## 2017-04-13 NOTE — PATIENT INSTRUCTIONS
1 hour prior to scheduled arrival time - call [facility] Labor and Delivery [phone] to confirm 0730 arrival time.     Labor Induction  Labor induction is a way to help get your labor started. This can protect your health and your baby s, too.   Ways to Induce Labor  Your doctor can get your labor started by using any of three methods or a combination of them. Here are some common treatments:  Prostaglandin: A medicine that s placed in your vagina. It may be given as a time-release stick. It softens, thins, and opens the cervix. This is called cervical ripening.   Pitocin: A medicine that s given through an IV (intravenous) line. You may get it within 4-24 hours of being given prostaglandin. Pitocin helps start contractions. It s always given in the hospital.   Rupturing the membrane: A procedure that uses a small tool to break your bag of water. It s done more often in women who have given birth before. And it s always done in the hospital.    Reasons for Inducing Labor:   Being past due date  Pre-eclampsia ( High Blood pressure with related health problems)   Certain disesases such as diabetes  Low amniotic fluid  A ruptured bag of water or other risk factor that can affect the baby's health      What to Expect  Prostaglandin may be given in your doctor s office. You may then be sent home to wait for your cervix to ripen. Other methods of inducing labor are done in the hospital. You ll need to stay there until you give birth. Monitors may be attached to your belly to measure contractions and help make sure your baby has no problems. No matter how labor is induced, a few factors may affect how long it takes you to give birth. These include how long it takes for your cervix to thin and open and when contractions begin.    With labor induction, you may have a greater chance of :   A  Section ( surgical delivery)  An infection  A Longer hospital stay      Give Yourself Time  Even though inducing  labor gets the process started, you still may need to wait. Mothers who have labor induced most often give birth within a day or so. But it can take as long as a few days to give birth.  If You re Sent Home  In some cases of cervical ripening, you may be sent home for a while. But be ready to go to the hospital as soon as you feel regular and strong contractions. You should go to the hospital right away if your bag of water breaks or if you can t feel your baby move.    [id number] [facility], [address] [place] [address], [place] [address]. All rights reserved. This information is not intended as a substitute for professional medical care. Always follow your healthcare professional's instructions  Patient Education     Dinoprostone Vaginal gel     Dinoprostone Vaginal insert     Dinoprostone Vaginal suppository   Dinoprostone Vaginal suppository   What is this medicine?  DINOPROSTONE, PROSTAGLANDIN E2 (dye rolando PROST one  pros tuh GLAN din) stimulates contractions to empty the uterus. Sometimes it is used after a miscarriage. It can be used when necessary to induce  between the 12th and 20th week of pregnancy. This medicine may also be used to treat a condition called benign hydatidiform mole.     This medicine may be used for other purposes; ask your health care provider or pharmacist if you have questions.  What should I tell my health care provider before I take this medicine?  They need to know if you have any of the following conditions:    heart disease    kidney disease    liver disease    lung or breathing disease, like asthma    vaginal inflammation or infection    an unusual or allergic reaction to dinoprostone, prostaglandins, other medicines, foods, dyes, or preservatives    breast-feeding  How should I use this medicine?  This medicine is inserted into the vagina by a health-care professional in a hospital or clinic setting. Remain lying down for 10 minutes after it is inserted.  Talk to your  pediatrician regarding the use of this medicine in children. Special care may be needed.  Overdosage: If you think you have taken too much of this medicine contact a poison control center or emergency room at once.  NOTE: This medicine is only for you. Do not share this medicine with others.  What if I miss a dose?  This does not apply.  What may interact with this medicine?    oxytocin  This list may not describe all possible interactions. Give your health care provider a list of all the medicines, herbs, non-prescription drugs, or dietary supplements you use. Also tell them if you smoke, drink alcohol, or use illegal drugs. Some items may interact with your medicine.  What should I watch for while using this medicine?  You will be closely monitored while you receive this medicine.  This medicine can affect other muscles outside of the uterus and cause side effects. Most of these effects will go away quickly.  Contact your doctor or health care professional immediately if you get an unpleasant vaginal discharge, continued fever, chills and shivering, or increase in vaginal bleeding several days after treatment.  What side effects may I notice from receiving this medicine?  Side effects that you should report to your doctor or health care professional as soon as possible:    allergic reactions like skin rash, itching or hives, swelling of the face, lips, or tongue    chest pain or palpitations    continuous or excessive vaginal bleeding    dizziness or fainting    fever    foul smelling vaginal discharge    abdominal pain, or severe cramping pains  Side effects that usually do not require medical attention (report to your doctor or health care professional if they continue or are bothersome):    diarrhea    chills or shivering    hot flashes    headache    nausea, vomiting  This list may not describe all possible side effects. Call your doctor for medical advice about side effects. You may report side effects to FDA  at 1-854-FDA-4232.  Where should I keep my medicine?  This does not apply. You will not be given this medicine to store at home.  NOTE:This sheet is a summary. It may not cover all possible information. If you have questions about this medicine, talk to your doctor, pharmacist, or health care provider. Copyright   [employer]  Back  SP    Oxytocin Solution for injection  What is this medicine?  OXYTOCIN (ox i TOE sin) is a man-made form of a natural hormone. It works by causing the uterus to contract. It is used to increase the strength of contractions of the uterus. It can be used during childbirth to speed delivery or after childbirth to control bleeding. It is also used clear the uterus after an incomplete  or miscarriage.  This medicine may be used for other purposes; ask your health care provider or pharmacist if you have questions.  What should I tell my health care provider before I take this medicine?  They need to know if you have any of these conditions:    any condition where vaginal childbirth is unwanted like cervical cancer, herpes infection, oversized fetal head    dangerous position of the fetus, placenta, or umbilical cord    history of uterine surgery like  section    pregnant many times before    uterus over stimulated    an unusual or allergic reaction to oxytocin, other medicines, foods, dyes, or preservatives    pregnant or trying to get pregnant    breast-feeding  How should I use this medicine?  This medicine is for infusion into a vein. It is given by a health care professional in a hospital or clinic setting.  Talk to your pediatrician regarding the use of this medicine in children. Special care may be needed.  Overdosage: If you think you have taken too much of this medicine contact a poison control center or emergency room at once.  NOTE: This medicine is only for you. Do not share this medicine with others.  What if I miss a dose?  This does not apply.  What  may interact with this medicine?  Do not take this medicine with any of the following medications:    Amna Tam  This medicine may also interact with the following medications:    dinoprostone, prostaglandin E2    medicines for blood pressure    medicines used for sleep during surgery    other medicines to contract the uterus  This list may not describe all possible interactions. Give your health care provider a list of all the medicines, herbs, non-prescription drugs, or dietary supplements you use. Also tell them if you smoke, drink alcohol, or use illegal drugs. Some items may interact with your medicine.  What should I watch for while using this medicine?  Your condition will be monitored carefully while you are receiving this medicine.  What side effects may I notice from receiving this medicine?  Side effects that you should report to your doctor or health care professional as soon as possible:    allergic reactions like skin rash, itching or hives, swelling of the face, lips, or tongue    breathing problems    excessive or continuing vaginal bleeding    fast, irregular heartbeat    feeling faint or lightheaded, falls    high blood pressure    seizures    unusual bleeding or bruising    unusual swelling, sudden weight gain  Side effects that usually do not require medical attention (report to your doctor or health care professional if they continue or are bothersome):    headache    nausea and vomiting  This list may not describe all possible side effects. Call your doctor for medical advice about side effects. You may report side effects to FDA at 8-087-FDA-3843.  Where should I keep my medicine?  This drug is given in a hospital or clinic and will not be stored at home.  NOTE:This sheet is a summary. It may not cover all possible information. If you have questions about this medicine, talk to your doctor, pharmacist, or health care provider. Copyright  2011 [employer]  Back.

## 2017-04-14 ENCOUNTER — TELEPHONE (OUTPATIENT)
Dept: FAMILY MEDICINE | Facility: CLINIC | Age: 32
End: 2017-04-14

## 2017-04-14 DIAGNOSIS — Z87.59 HISTORY OF SHOULDER DYSTOCIA IN PRIOR PREGNANCY: ICD-10-CM

## 2017-04-14 DIAGNOSIS — Z67.40 TYPE O BLOOD, RH POSITIVE: ICD-10-CM

## 2017-04-14 DIAGNOSIS — Z34.80 SUPERVISION OF OTHER NORMAL PREGNANCY, ANTEPARTUM: ICD-10-CM

## 2017-04-17 ENCOUNTER — ANESTHESIA (OUTPATIENT)
Dept: OBGYN | Facility: CLINIC | Age: 32
End: 2017-04-17
Payer: COMMERCIAL

## 2017-04-17 ENCOUNTER — ANESTHESIA EVENT (OUTPATIENT)
Dept: OBGYN | Facility: CLINIC | Age: 32
End: 2017-04-17
Payer: COMMERCIAL

## 2017-04-17 ENCOUNTER — HOSPITAL ENCOUNTER (INPATIENT)
Facility: CLINIC | Age: 32
LOS: 3 days | Discharge: HOME | End: 2017-04-20
Attending: FAMILY MEDICINE | Admitting: FAMILY MEDICINE
Payer: COMMERCIAL

## 2017-04-17 DIAGNOSIS — D62 ACUTE POSTHEMORRHAGIC ANEMIA: ICD-10-CM

## 2017-04-17 DIAGNOSIS — R79.89 ELEVATED D-DIMER: ICD-10-CM

## 2017-04-17 DIAGNOSIS — K59.03 DRUG-INDUCED CONSTIPATION: ICD-10-CM

## 2017-04-17 DIAGNOSIS — Z91.89 AT RISK FOR INEFFECTIVE BREASTFEEDING: ICD-10-CM

## 2017-04-17 LAB — HGB BLD-MCNC: 8.3 G/DL (ref 11.7–15.7)

## 2017-04-17 PROCEDURE — 86780 TREPONEMA PALLIDUM: CPT | Performed by: FAMILY MEDICINE

## 2017-04-17 PROCEDURE — 25000128 H RX IP 250 OP 636: Performed by: ANESTHESIOLOGY

## 2017-04-17 PROCEDURE — 25000125 ZZHC RX 250: Performed by: FAMILY MEDICINE

## 2017-04-17 PROCEDURE — 86900 BLOOD TYPING SEROLOGIC ABO: CPT | Performed by: FAMILY MEDICINE

## 2017-04-17 PROCEDURE — 37000011 ZZH ANESTHESIA WARD SERVICE

## 2017-04-17 PROCEDURE — 59400 OBSTETRICAL CARE: CPT | Performed by: FAMILY MEDICINE

## 2017-04-17 PROCEDURE — 00HU33Z INSERTION OF INFUSION DEVICE INTO SPINAL CANAL, PERCUTANEOUS APPROACH: ICD-10-PCS | Performed by: FAMILY MEDICINE

## 2017-04-17 PROCEDURE — 88307 TISSUE EXAM BY PATHOLOGIST: CPT | Mod: 26 | Performed by: FAMILY MEDICINE

## 2017-04-17 PROCEDURE — 86901 BLOOD TYPING SEROLOGIC RH(D): CPT | Performed by: FAMILY MEDICINE

## 2017-04-17 PROCEDURE — 40000671 ZZH STATISTIC ANESTHESIA CASE

## 2017-04-17 PROCEDURE — 36415 COLL VENOUS BLD VENIPUNCTURE: CPT | Performed by: FAMILY MEDICINE

## 2017-04-17 PROCEDURE — 3E0R3CZ INTRODUCTION OF REGIONAL ANESTHETIC INTO SPINAL CANAL, PERCUTANEOUS APPROACH: ICD-10-PCS | Performed by: FAMILY MEDICINE

## 2017-04-17 PROCEDURE — 25800025 ZZH RX 258: Performed by: FAMILY MEDICINE

## 2017-04-17 PROCEDURE — 88307 TISSUE EXAM BY PATHOLOGIST: CPT | Performed by: FAMILY MEDICINE

## 2017-04-17 PROCEDURE — 10907ZC DRAINAGE OF AMNIOTIC FLUID, THERAPEUTIC FROM PRODUCTS OF CONCEPTION, VIA NATURAL OR ARTIFICIAL OPENING: ICD-10-PCS | Performed by: FAMILY MEDICINE

## 2017-04-17 PROCEDURE — 0KQM0ZZ REPAIR PERINEUM MUSCLE, OPEN APPROACH: ICD-10-PCS | Performed by: FAMILY MEDICINE

## 2017-04-17 PROCEDURE — 86923 COMPATIBILITY TEST ELECTRIC: CPT | Performed by: FAMILY MEDICINE

## 2017-04-17 PROCEDURE — 72200001 ZZH LABOR CARE VAGINAL DELIVERY SINGLE

## 2017-04-17 PROCEDURE — 85018 HEMOGLOBIN: CPT | Performed by: FAMILY MEDICINE

## 2017-04-17 PROCEDURE — 12000029 ZZH R&B OB INTERMEDIATE

## 2017-04-17 PROCEDURE — 86850 RBC ANTIBODY SCREEN: CPT | Performed by: FAMILY MEDICINE

## 2017-04-17 RX ORDER — SODIUM CHLORIDE, SODIUM LACTATE, POTASSIUM CHLORIDE, CALCIUM CHLORIDE 600; 310; 30; 20 MG/100ML; MG/100ML; MG/100ML; MG/100ML
INJECTION, SOLUTION INTRAVENOUS CONTINUOUS
Status: DISCONTINUED | OUTPATIENT
Start: 2017-04-17 | End: 2017-04-18 | Stop reason: CLARIF

## 2017-04-17 RX ORDER — OXYTOCIN/0.9 % SODIUM CHLORIDE 30/500 ML
1-24 PLASTIC BAG, INJECTION (ML) INTRAVENOUS CONTINUOUS
Status: DISCONTINUED | OUTPATIENT
Start: 2017-04-17 | End: 2017-04-18 | Stop reason: CLARIF

## 2017-04-17 RX ORDER — OXYTOCIN/0.9 % SODIUM CHLORIDE 30/500 ML
100-340 PLASTIC BAG, INJECTION (ML) INTRAVENOUS CONTINUOUS PRN
Status: DISCONTINUED | OUTPATIENT
Start: 2017-04-17 | End: 2017-04-18 | Stop reason: CLARIF

## 2017-04-17 RX ORDER — NALOXONE HYDROCHLORIDE 0.4 MG/ML
.1-.4 INJECTION, SOLUTION INTRAMUSCULAR; INTRAVENOUS; SUBCUTANEOUS
Status: DISCONTINUED | OUTPATIENT
Start: 2017-04-17 | End: 2017-04-17

## 2017-04-17 RX ORDER — ACETAMINOPHEN 325 MG/1
650 TABLET ORAL EVERY 4 HOURS PRN
Status: DISCONTINUED | OUTPATIENT
Start: 2017-04-17 | End: 2017-04-18 | Stop reason: CLARIF

## 2017-04-17 RX ORDER — ONDANSETRON 4 MG/1
4 TABLET, ORALLY DISINTEGRATING ORAL EVERY 6 HOURS PRN
Status: DISCONTINUED | OUTPATIENT
Start: 2017-04-17 | End: 2017-04-18 | Stop reason: CLARIF

## 2017-04-17 RX ORDER — CARBOPROST TROMETHAMINE 250 UG/ML
250 INJECTION, SOLUTION INTRAMUSCULAR
Status: DISCONTINUED | OUTPATIENT
Start: 2017-04-17 | End: 2017-04-18 | Stop reason: CLARIF

## 2017-04-17 RX ORDER — OXYTOCIN 10 [USP'U]/ML
10 INJECTION, SOLUTION INTRAMUSCULAR; INTRAVENOUS
Status: DISCONTINUED | OUTPATIENT
Start: 2017-04-17 | End: 2017-04-18 | Stop reason: CLARIF

## 2017-04-17 RX ORDER — FENTANYL CITRATE 50 UG/ML
50-100 INJECTION, SOLUTION INTRAMUSCULAR; INTRAVENOUS
Status: DISCONTINUED | OUTPATIENT
Start: 2017-04-17 | End: 2017-04-18 | Stop reason: CLARIF

## 2017-04-17 RX ORDER — NALBUPHINE HYDROCHLORIDE 10 MG/ML
2.5-5 INJECTION, SOLUTION INTRAMUSCULAR; INTRAVENOUS; SUBCUTANEOUS EVERY 6 HOURS PRN
Status: DISCONTINUED | OUTPATIENT
Start: 2017-04-17 | End: 2017-04-17

## 2017-04-17 RX ORDER — ONDANSETRON 2 MG/ML
4 INJECTION INTRAMUSCULAR; INTRAVENOUS EVERY 6 HOURS PRN
Status: DISCONTINUED | OUTPATIENT
Start: 2017-04-17 | End: 2017-04-18 | Stop reason: CLARIF

## 2017-04-17 RX ORDER — NALOXONE HYDROCHLORIDE 0.4 MG/ML
.1-.4 INJECTION, SOLUTION INTRAMUSCULAR; INTRAVENOUS; SUBCUTANEOUS
Status: DISCONTINUED | OUTPATIENT
Start: 2017-04-17 | End: 2017-04-18 | Stop reason: CLARIF

## 2017-04-17 RX ORDER — NALBUPHINE HYDROCHLORIDE 10 MG/ML
2.5-5 INJECTION, SOLUTION INTRAMUSCULAR; INTRAVENOUS; SUBCUTANEOUS EVERY 6 HOURS PRN
Status: DISCONTINUED | OUTPATIENT
Start: 2017-04-17 | End: 2017-04-18 | Stop reason: CLARIF

## 2017-04-17 RX ORDER — EPHEDRINE SULFATE 50 MG/ML
5 INJECTION, SOLUTION INTRAMUSCULAR; INTRAVENOUS; SUBCUTANEOUS
Status: DISCONTINUED | OUTPATIENT
Start: 2017-04-17 | End: 2017-04-17

## 2017-04-17 RX ORDER — OXYCODONE AND ACETAMINOPHEN 5; 325 MG/1; MG/1
1 TABLET ORAL
Status: DISCONTINUED | OUTPATIENT
Start: 2017-04-17 | End: 2017-04-18 | Stop reason: CLARIF

## 2017-04-17 RX ORDER — IBUPROFEN 800 MG/1
800 TABLET, FILM COATED ORAL
Status: COMPLETED | OUTPATIENT
Start: 2017-04-17 | End: 2017-04-18

## 2017-04-17 RX ORDER — EPHEDRINE SULFATE 50 MG/ML
5 INJECTION, SOLUTION INTRAMUSCULAR; INTRAVENOUS; SUBCUTANEOUS
Status: DISCONTINUED | OUTPATIENT
Start: 2017-04-17 | End: 2017-04-18 | Stop reason: CLARIF

## 2017-04-17 RX ORDER — LIDOCAINE 40 MG/G
CREAM TOPICAL
Status: DISCONTINUED | OUTPATIENT
Start: 2017-04-17 | End: 2017-04-18 | Stop reason: CLARIF

## 2017-04-17 RX ORDER — METHYLERGONOVINE MALEATE 0.2 MG/ML
200 INJECTION INTRAVENOUS
Status: DISCONTINUED | OUTPATIENT
Start: 2017-04-17 | End: 2017-04-18 | Stop reason: CLARIF

## 2017-04-17 RX ADMIN — FENTANYL CITRATE 100 MCG: 50 INJECTION INTRAMUSCULAR; INTRAVENOUS at 18:11

## 2017-04-17 RX ADMIN — OXYTOCIN-SODIUM CHLORIDE 0.9% IV SOLN 30 UNIT/500ML 2 MILLI-UNITS/MIN: 30-0.9/5 SOLUTION at 08:39

## 2017-04-17 RX ADMIN — SODIUM CHLORIDE, POTASSIUM CHLORIDE, SODIUM LACTATE AND CALCIUM CHLORIDE: 600; 310; 30; 20 INJECTION, SOLUTION INTRAVENOUS at 18:50

## 2017-04-17 RX ADMIN — SODIUM CHLORIDE, POTASSIUM CHLORIDE, SODIUM LACTATE AND CALCIUM CHLORIDE: 600; 310; 30; 20 INJECTION, SOLUTION INTRAVENOUS at 16:18

## 2017-04-17 RX ADMIN — FENTANYL CITRATE 100 MCG: 50 INJECTION INTRAMUSCULAR; INTRAVENOUS at 17:10

## 2017-04-17 RX ADMIN — Medication: at 18:27

## 2017-04-17 RX ADMIN — SODIUM CHLORIDE, POTASSIUM CHLORIDE, SODIUM LACTATE AND CALCIUM CHLORIDE: 600; 310; 30; 20 INJECTION, SOLUTION INTRAVENOUS at 08:20

## 2017-04-17 NOTE — PROVIDER NOTIFICATION
04/17/17 1725   Provider Notification   Provider Name/Title Dr. Mcleod   Method of Notification Phone   Request Evaluate - Remote   Notification Reason SVE   MD updated on SVE 6.5/70/-2, pt more uncomfortable, received fentanyl 100mcg x1, thinking epidural in an hour or so. No new orders, will continue to update MD.

## 2017-04-17 NOTE — PROVIDER NOTIFICATION
04/17/17 1454   Provider Notification   Provider Name/Title Dr. Mcleod   Method of Notification Phone   Request Evaluate - Remote   Notification Reason Labor Status   MD updated on SVE, ctx pattern, FHR tracing, pt getting more uncomfortable but not wanting anything for pain yet. No new orders at this time.

## 2017-04-17 NOTE — PROVIDER NOTIFICATION
04/17/17 1257   Provider Notification   Provider Name/Title Dr. Mcleod   Method of Notification Phone   Notification Reason Status Update   MD called unit, updated on tracing, ctx pattern and strength, FHR, and maternal position, no SVE since last update. No new orders, will update physician with any changes.

## 2017-04-17 NOTE — ANESTHESIA PROCEDURE NOTES
Peripheral nerve/Neuraxial procedure note :        Assessment/Narrative  .  .  . Comments:  Pre-Procedure  Performed by Leonides Morales MD  Location: OB.      PreAnesthestic Checklist: patient identified, IV checked, risks and benefits discussed, informed consent obtained, monitors and equipment checked, pre-op evaluation and at physician/surgeon's request.    Timeout   Correct Patient: Yes  Correct Procedure: Epidural catheter placement  Correct Site: Yes   Correct Position: Yes    Procedure Documentation  Procedure:   Epidural catheter block for Labor    Patient currently in labor and she and OBMD request a labor epidural to control her labor pains. Patient was interviewed and examined. Procedure and risks including but not limited to bleeding, infection, nerve injury, paralysis, PDPH, and inadequate block requiring intervention discussed with patient. Questions answered. This epidural is to be placed in anticipation of vaginal delivery.  She consents to the epidural procedure.  Time-out was performed.  I or my partners remain immediately available for management of any issues or complications and will monitor at appropriate intervals.  Procedure: Patient sitting. Betadine prep x 3. Sterile drape applied.  Lidocaine 1%  local infiltration at L 3-4.  17 G. Tuohy needle at L3-4 by loss of resistance into epidural space.  No CSF, paresthesia or blood. 1.5 % Lidocaine with 1:200,000 Epinephrine 5cc test dose. Then 0.25% bupivicaine 10 cc with NS 5 cc.  Epidural catheter inserted w/o resistance to 5 cm in epidural space.  Aspiration negative for blood and CSF.   Negative for neuro change, paresthesia or symptoms of intravascular injection or intrathecal injection.  Infusion orders written and infusion of 0.125% bupivicaine 15cc per hour started.    Leonides Morales MD

## 2017-04-17 NOTE — IP AVS SNAPSHOT
[facility]    [address] [facility] [address]    Phone:  [phone]    Fax:  [phone]                                       After Visit Summary   4/17/2017    Mandy Novak    MRN: [id number]           After Visit Summary Signature Page     I have received my discharge instructions, and my questions have been answered. I have discussed any challenges I see with this plan with the nurse or doctor.    ..........................................................................................................................................  Patient/Patient Representative Signature      ..........................................................................................................................................  Patient Representative Print Name and Relationship to Patient    ..................................................               ................................................  Date                                            Time    ..........................................................................................................................................  Reviewed by Signature/Title    ...................................................              ..............................................  Date                                                            Time

## 2017-04-17 NOTE — PLAN OF CARE
Problem: Goal Outcome Summary  Goal: Goal Outcome Summary  Outcome: Therapy, progress toward functional goals as expected  0739:  , 40w0d, here for induction of labor for hx of shoulder dystocia and mild polyhydramnios.  EUM and EFM placed.  Admission exam and database collected.    0812:  SVE done.  SVE 3/40/-2.  Dr. Mcleod notified of SVE.  Plan for Dr. Mcleod to come see patient and AROM around 0930.    0820:  18G IV placed.  LR started.   0839:  Pitocin started per Dr. Mcleod order.  Will continue to monitor.   0952:  Dr. Mcleod here to AROM.  AROM done with FSE.  Clear fluid noted.  SVE per Dr. Mcleod 3/40/-2.    1115:  Report given to Marleny RN and ORLIN Levine.    Jaz Dixon RN

## 2017-04-17 NOTE — ANESTHESIA PREPROCEDURE EVALUATION
PAC NOTE:       ANESTHESIA PRE EVALUATION:  Anesthesia Evaluation       history and physical reviewed .             ROS/MED HX    ENT/Pulmonary:  - neg pulmonary ROS     Neurologic:  - neg neurologic ROS     Cardiovascular:  - neg cardiovascular ROS       METS/Exercise Tolerance:     Hematologic:         Musculoskeletal:         GI/Hepatic:  - neg GI/hepatic ROS       Renal/Genitourinary:         Endo:         Psychiatric:         Infectious Disease:         Malignancy:         Other:                     Physical Exam  Normal systems: cardiovascular, pulmonary and dental    Airway   Mallampati: II  TM distance: > 3 FB  Neck ROM: full  Mouth opening: > 3 cm    Dental     Cardiovascular       Pulmonary         neg OB ROS            Anesthesia Plan      History & Physical Review      ASA Status:  .  OB Epidural Asa: 2            Postoperative Care      Consents  Anesthetic plan, risks, benefits and alternatives discussed with:  Patient..                            .

## 2017-04-17 NOTE — H&P
[facility] Labor and Delivery History and Physical    Mandy Novak MRN# [id number]   Age: 32 year old YOB: 1985     Date of Admission:  2017    Primary care provider: Mey Mcleod           Chief Complaint:   Mandy Novak is a 32 year old female who is 40w0d pregnant and being admitted for induction of labor, indication suspected macrosomia and history of shoulder dystocia with 7lbs 14oz infant.          Pregnancy history:     OBSTETRIC HISTORY:    Obstetric History       T1      TAB0   SAB0   E0   M0   L1       # Outcome Date GA Lbr Eh/2nd Weight Sex Delivery Anes PTL Lv   2 Current            1 Term 06/19/15 38w5d 16:00 / 02:37 3.569 kg (7 lb 13.9 oz) M Vag-Spont EPI  Y      Name: Maik      Complications: Shoulder Dystocia      Apgar1:  8                Apgar5: 9          EDC: Estimated Date of Delivery: 17    Prenatal Labs:   Lab Results   Component Value Date    ABO O 2017    RH  Pos 2017    AS Neg 2017    HEPBANG Nonreactive 2016    CHPCRT  2016     Negative   Negative for C. trachomatis rRNA by transcription mediated amplification.   A negative result by transcription mediated amplification does not preclude the   presence of C. trachomatis infection because results are dependent on proper   and adequate collection, absence of inhibitors, and sufficient rRNA to be   detected.      GCPCRT  2016     Negative   Negative for N. gonorrhoeae rRNA by transcription mediated amplification.   A negative result by transcription mediated amplification does not preclude the   presence of N. gonorrhoeae infection because results are dependent on proper   and adequate collection, absence of inhibitors, and sufficient rRNA to be   detected.      TREPAB Negative 2016    HGB 11.7 2016       GBS Status:   Lab Results   Component Value Date    GBS  2017     Negative  No GBS DNA detected, presumed  negative for GBS or number of bacteria may be   below the limit of detection of the assay.   Assay performed on incubated broth culture of specimen using [employer] real-time   PCR.         Active Problem List  Patient Active Problem List   Diagnosis     CARDIOVASCULAR SCREENING; LDL GOAL LESS THAN 160     Situational syncope- with any blood draws or shots     ASCUS with positive high risk HPV- while pregnant 2016 = normal - recheck pap and cotest postpartum     Resolved - Marginal placenta previa with intrapartum hemorrhage in first trimester-repeat 1/15/2015= resolved      Multiple pigmented nevi     Supervision of other normal pregnancy, antepartum     Type o blood, rh positive     Cecilio breech presentation - seen on 31 week US - resolved with external version on 3/24/2017     Large for gestational age     Indication for care in labor or delivery     History of shoulder dystocia in prior pregnancy- with first baby 7lbs 14oz 2015        Medication Prior to Admission  Prescriptions Prior to Admission   Medication Sig Dispense Refill Last Dose     Prenatal Vit-Fe Fumarate-FA (PRENATAL VITAMINS) 28-0.8 MG TABS Take 1 tablet by mouth daily   2017 at Unknown time   .        Maternal Past Medical History:     Past Medical History:   Diagnosis Date     ASCUS with positive high risk HPV 2014    + HPV 16, hx of ASCUS , but neg HPV in       Cervical high risk HPV (human papillomavirus) test positive 2015    NIL pap, + HPV 16 & other HPV colp - atypia      (normal spontaneous vaginal delivery) 2015     Resolved - Marginal placenta previa with intrapartum hemorrhage in first trimester-repeat 1/15/2015= resolved  2014     Shoulder dystocia, delivered, current hospitalization 2015     Type o blood, rh positive                        Family History:     Family History   Problem Relation Age of Onset     DIABETES Maternal Grandfather      diet controlled     Thyroid Disease Maternal  "Grandmother      Family History Negative Mother      Family History Negative Father      Family history reviewed and updated in [facility]            Social History:     Social History   Substance Use Topics     Smoking status: Never Smoker     Smokeless tobacco: Never Used     Alcohol use No      Comment: Rarely - none since becoming pregnant            Review of Systems:   C: NEGATIVE for fever, chills, change in weight  E/M: NEGATIVE for ear, mouth and throat problems  R: NEGATIVE for significant cough or SOB  CV: NEGATIVE for chest pain, palpitations or peripheral edema          Physical Exam:     Vitals were reviewed  Patient Vitals for the past 8 hrs:   BP Temp Temp src Pulse Height   17 0920 108/65 - - - -   17 0851 - - - - 1.727 m (5' 8\")   17 0751 118/77 98.4  F (36.9  C) Oral 102 -     Constitutional:   awake, alert, cooperative, no apparent distress, and appears stated age      Cervix:   Membranes: intact   Dilation: 3   Effacement: 40%   Station:-2   Consistency: soft   Position: Mid  Attempted rupture of membranes with amniohook, but no bulging bag.  Required some mild fundal pressure - FSE placed without difficulty = no fluid return at all. Good capture achieved.   Presentation:Cephalic- confirmed by my manual exam today.   Fetal Heart Rate Tracing: reactive and reassuring, Tier 1 (normal)  Tocometer:pt presented malcolm spontaneously on her own q2-3 minutes on admission this am at approx. 0800, now  frequency q 3-5 minutes -now getting started on pitocin per protocol for augmentation/induction of labor.   EFW - 4100g by Leopold's today.                        Assessment:   Mandy Novak is a 40w0d pregnant female admitted with active labor management and induction of labor, indication suspected macrosomia and history of shoulder dystocia at term with first baby = 7lbs 14oz fetus.         Plan:   Admit - see IP orders  Pain medication - ok for epidural   Anticipate LINDA TSAI " consultant on call Dr. Crissy Souza/ available prn- left message at her office to call me on my cell , [phone],  when she comes out of a room.   Labor induction/augmentation  with Pitocin.              Mey Mcleod MD

## 2017-04-17 NOTE — PROVIDER NOTIFICATION
04/17/17 1807   Provider Notification   Provider Name/Title Dr. Mcleod   Method of Notification Phone   Notification Reason SVE   Updated on SVE 8/70%/-2, prepping for epidural. MD on her way in.

## 2017-04-18 PROBLEM — R79.89 ELEVATED D-DIMER: Status: ACTIVE | Noted: 2017-04-18

## 2017-04-18 PROBLEM — D62 ACUTE POSTHEMORRHAGIC ANEMIA: Status: ACTIVE | Noted: 2017-04-18

## 2017-04-18 LAB
APTT PPP: 28 SEC (ref 22–37)
BASOPHILS # BLD AUTO: 0 10E9/L (ref 0–0.2)
BASOPHILS # BLD AUTO: 0 10E9/L (ref 0–0.2)
BASOPHILS NFR BLD AUTO: 0.2 %
BASOPHILS NFR BLD AUTO: 0.2 %
D DIMER PPP FEU-MCNC: 6.3 UG/ML FEU (ref 0–0.5)
D DIMER PPP FEU-MCNC: 8.6 UG/ML FEU (ref 0–0.5)
DIFFERENTIAL METHOD BLD: ABNORMAL
DIFFERENTIAL METHOD BLD: ABNORMAL
EOSINOPHIL # BLD AUTO: 0 10E9/L (ref 0–0.7)
EOSINOPHIL # BLD AUTO: 0.1 10E9/L (ref 0–0.7)
EOSINOPHIL NFR BLD AUTO: 0.1 %
EOSINOPHIL NFR BLD AUTO: 0.4 %
ERYTHROCYTE [DISTWIDTH] IN BLOOD BY AUTOMATED COUNT: 13.2 % (ref 10–15)
ERYTHROCYTE [DISTWIDTH] IN BLOOD BY AUTOMATED COUNT: 13.2 % (ref 10–15)
FACT X ACT/NOR PPP CHRO: 90 % (ref 70–130)
FIBRINOGEN PPP-MCNC: 348 MG/DL (ref 200–420)
HCT VFR BLD AUTO: 21.4 % (ref 35–47)
HCT VFR BLD AUTO: 22.9 % (ref 35–47)
HGB BLD-MCNC: 6.9 G/DL (ref 11.7–15.7)
HGB BLD-MCNC: 7.5 G/DL (ref 11.7–15.7)
IMM GRANULOCYTES # BLD: 0.1 10E9/L (ref 0–0.4)
IMM GRANULOCYTES # BLD: 0.1 10E9/L (ref 0–0.4)
IMM GRANULOCYTES NFR BLD: 0.6 %
IMM GRANULOCYTES NFR BLD: 0.7 %
INR PPP: 1.05 (ref 0.86–1.14)
LYMPHOCYTES # BLD AUTO: 1.7 10E9/L (ref 0.8–5.3)
LYMPHOCYTES # BLD AUTO: 2.8 10E9/L (ref 0.8–5.3)
LYMPHOCYTES NFR BLD AUTO: 12.6 %
LYMPHOCYTES NFR BLD AUTO: 22.9 %
MCH RBC QN AUTO: 27.5 PG (ref 26.5–33)
MCH RBC QN AUTO: 27.5 PG (ref 26.5–33)
MCHC RBC AUTO-ENTMCNC: 32.2 G/DL (ref 31.5–36.5)
MCHC RBC AUTO-ENTMCNC: 32.8 G/DL (ref 31.5–36.5)
MCV RBC AUTO: 84 FL (ref 78–100)
MCV RBC AUTO: 85 FL (ref 78–100)
MONOCYTES # BLD AUTO: 0.8 10E9/L (ref 0–1.3)
MONOCYTES # BLD AUTO: 1.1 10E9/L (ref 0–1.3)
MONOCYTES NFR BLD AUTO: 6.6 %
MONOCYTES NFR BLD AUTO: 8.2 %
NEUTROPHILS # BLD AUTO: 10.8 10E9/L (ref 1.6–8.3)
NEUTROPHILS # BLD AUTO: 8.5 10E9/L (ref 1.6–8.3)
NEUTROPHILS NFR BLD AUTO: 69.3 %
NEUTROPHILS NFR BLD AUTO: 78.2 %
NRBC # BLD AUTO: 0 10*3/UL
NRBC # BLD AUTO: 0 10*3/UL
NRBC BLD AUTO-RTO: 0 /100
NRBC BLD AUTO-RTO: 0 /100
PLATELET # BLD AUTO: 200 10E9/L (ref 150–450)
PLATELET # BLD AUTO: 210 10E9/L (ref 150–450)
RBC # BLD AUTO: 2.51 10E12/L (ref 3.8–5.2)
RBC # BLD AUTO: 2.73 10E12/L (ref 3.8–5.2)
T PALLIDUM IGG+IGM SER QL: NORMAL
WBC # BLD AUTO: 12.2 10E9/L (ref 4–11)
WBC # BLD AUTO: 13.8 10E9/L (ref 4–11)

## 2017-04-18 PROCEDURE — 40000084 ZZH STATISTIC IP LACTATION SERVICES 16-30 MIN

## 2017-04-18 PROCEDURE — 85384 FIBRINOGEN ACTIVITY: CPT | Performed by: FAMILY MEDICINE

## 2017-04-18 PROCEDURE — 85610 PROTHROMBIN TIME: CPT | Performed by: FAMILY MEDICINE

## 2017-04-18 PROCEDURE — 85025 COMPLETE CBC W/AUTO DIFF WBC: CPT | Performed by: FAMILY MEDICINE

## 2017-04-18 PROCEDURE — 12000029 ZZH R&B OB INTERMEDIATE

## 2017-04-18 PROCEDURE — 85018 HEMOGLOBIN: CPT | Performed by: FAMILY MEDICINE

## 2017-04-18 PROCEDURE — 85730 THROMBOPLASTIN TIME PARTIAL: CPT | Performed by: FAMILY MEDICINE

## 2017-04-18 PROCEDURE — 25000132 ZZH RX MED GY IP 250 OP 250 PS 637: Performed by: FAMILY MEDICINE

## 2017-04-18 PROCEDURE — 36415 COLL VENOUS BLD VENIPUNCTURE: CPT | Performed by: FAMILY MEDICINE

## 2017-04-18 PROCEDURE — 25000125 ZZHC RX 250: Performed by: FAMILY MEDICINE

## 2017-04-18 PROCEDURE — 85260 CLOT FACTOR X STUART-POWER: CPT | Performed by: FAMILY MEDICINE

## 2017-04-18 PROCEDURE — 85379 FIBRIN DEGRADATION QUANT: CPT | Performed by: FAMILY MEDICINE

## 2017-04-18 RX ORDER — OXYCODONE HYDROCHLORIDE 5 MG/1
5-10 TABLET ORAL
Status: DISCONTINUED | OUTPATIENT
Start: 2017-04-18 | End: 2017-04-20 | Stop reason: HOSPADM

## 2017-04-18 RX ORDER — BISACODYL 10 MG
10 SUPPOSITORY, RECTAL RECTAL DAILY PRN
Status: DISCONTINUED | OUTPATIENT
Start: 2017-04-20 | End: 2017-04-20 | Stop reason: HOSPADM

## 2017-04-18 RX ORDER — LANOLIN 100 %
OINTMENT (GRAM) TOPICAL
Status: DISCONTINUED | OUTPATIENT
Start: 2017-04-18 | End: 2017-04-20 | Stop reason: HOSPADM

## 2017-04-18 RX ORDER — NALOXONE HYDROCHLORIDE 0.4 MG/ML
.1-.4 INJECTION, SOLUTION INTRAMUSCULAR; INTRAVENOUS; SUBCUTANEOUS
Status: DISCONTINUED | OUTPATIENT
Start: 2017-04-18 | End: 2017-04-20 | Stop reason: HOSPADM

## 2017-04-18 RX ORDER — OXYTOCIN/0.9 % SODIUM CHLORIDE 30/500 ML
100 PLASTIC BAG, INJECTION (ML) INTRAVENOUS CONTINUOUS
Status: DISCONTINUED | OUTPATIENT
Start: 2017-04-18 | End: 2017-04-20 | Stop reason: HOSPADM

## 2017-04-18 RX ORDER — HYDROCORTISONE 2.5 %
CREAM (GRAM) TOPICAL 3 TIMES DAILY PRN
Status: DISCONTINUED | OUTPATIENT
Start: 2017-04-18 | End: 2017-04-20 | Stop reason: HOSPADM

## 2017-04-18 RX ORDER — IBUPROFEN 400 MG/1
400-800 TABLET, FILM COATED ORAL EVERY 6 HOURS PRN
Status: DISCONTINUED | OUTPATIENT
Start: 2017-04-18 | End: 2017-04-20 | Stop reason: HOSPADM

## 2017-04-18 RX ORDER — FERROUS GLUCONATE 324(38)MG
324 TABLET ORAL
Status: DISCONTINUED | OUTPATIENT
Start: 2017-04-18 | End: 2017-04-20 | Stop reason: HOSPADM

## 2017-04-18 RX ORDER — OXYTOCIN/0.9 % SODIUM CHLORIDE 30/500 ML
340 PLASTIC BAG, INJECTION (ML) INTRAVENOUS CONTINUOUS PRN
Status: DISCONTINUED | OUTPATIENT
Start: 2017-04-18 | End: 2017-04-20 | Stop reason: HOSPADM

## 2017-04-18 RX ORDER — MISOPROSTOL 200 UG/1
400 TABLET ORAL
Status: DISCONTINUED | OUTPATIENT
Start: 2017-04-18 | End: 2017-04-20 | Stop reason: HOSPADM

## 2017-04-18 RX ORDER — OXYTOCIN 10 [USP'U]/ML
10 INJECTION, SOLUTION INTRAMUSCULAR; INTRAVENOUS
Status: DISCONTINUED | OUTPATIENT
Start: 2017-04-18 | End: 2017-04-20 | Stop reason: HOSPADM

## 2017-04-18 RX ORDER — AMOXICILLIN 250 MG
1-2 CAPSULE ORAL 2 TIMES DAILY
Status: DISCONTINUED | OUTPATIENT
Start: 2017-04-18 | End: 2017-04-20 | Stop reason: HOSPADM

## 2017-04-18 RX ORDER — ACETAMINOPHEN 325 MG/1
650 TABLET ORAL EVERY 4 HOURS PRN
Status: DISCONTINUED | OUTPATIENT
Start: 2017-04-18 | End: 2017-04-20 | Stop reason: HOSPADM

## 2017-04-18 RX ADMIN — IBUPROFEN 800 MG: 400 TABLET ORAL at 09:29

## 2017-04-18 RX ADMIN — SENNOSIDES AND DOCUSATE SODIUM 2 TABLET: 8.6; 5 TABLET ORAL at 21:43

## 2017-04-18 RX ADMIN — IBUPROFEN 800 MG: 400 TABLET ORAL at 21:43

## 2017-04-18 RX ADMIN — IBUPROFEN 800 MG: 400 TABLET ORAL at 15:30

## 2017-04-18 RX ADMIN — OXYTOCIN-SODIUM CHLORIDE 0.9% IV SOLN 30 UNIT/500ML 100 ML/HR: 30-0.9/5 SOLUTION at 02:00

## 2017-04-18 RX ADMIN — FERROUS GLUCONATE 324 MG: 324 TABLET ORAL at 10:48

## 2017-04-18 RX ADMIN — SENNOSIDES AND DOCUSATE SODIUM 1 TABLET: 8.6; 5 TABLET ORAL at 09:29

## 2017-04-18 RX ADMIN — IBUPROFEN 800 MG: 800 TABLET ORAL at 01:48

## 2017-04-18 NOTE — ANESTHESIA POSTPROCEDURE EVALUATION
Patient: Mandy Novak    * No procedures listed *    Diagnosis:* No pre-op diagnosis entered *  Diagnosis Additional Information: labor    Anesthesia Type:  Epidural    Note:  Anesthesia Post Evaluation    Patient location during evaluation: Floor  Patient participation: Able to fully participate in evaluation  Level of consciousness: awake  Pain management: adequate  Airway patency: patent  Cardiovascular status: acceptable  Respiratory status: acceptable  Hydration status: euvolemic  PONV: controlled     Anesthetic complications: None          Last vitals:  Vitals:    04/18/17 0453 04/18/17 0631 04/18/17 0825   BP: 92/45 92/47 95/54   Pulse:  97    Resp:  16 16   Temp:  98.7  F (37.1  C) 98.4  F (36.9  C)   SpO2: 98% 99% 99%         Electronically Signed By: Dutch Kumar MD  April 18, 2017  9:24 AM

## 2017-04-18 NOTE — PROVIDER NOTIFICATION
04/17/17 1836   Provider Notification   Provider Name/Title Dr. Mcleod   Method of Notification At Bedside   Notification Reason Status Update   MD at bedside, reviewed tracing and POC with patient, will be in house.

## 2017-04-18 NOTE — PROVIDER NOTIFICATION
04/17/17 2022   Provider Notification   Provider Name/Title Dr Mcleod   Method of Notification At Bedside   Request Evaluate in Person   Notification Reason SVE;Status Update     Dr Mcleod at bedside to perform SVE.  Pt informed and plan to labor down at this point.

## 2017-04-18 NOTE — PLAN OF CARE
"Problem: Goal Outcome Summary  Goal: Goal Outcome Summary  Outcome: Improving  Stable patient. Bleeding has been small, no clots. Started on oral iron this AM. Ruslan discontinued this AM. Orders for labs to be drawn at 1800 and results to be called to Dr. Mcleod. First time patient was up she stated she felt \"whoosy\" right before getting back in bed. Second time patient tolerated well. Pain being managed with ibuprofen. Using ice for comfort. Breastfeeding infant.       "

## 2017-04-18 NOTE — PROVIDER NOTIFICATION
04/18/17 0649   Provider Notification   Provider Name/Title Dr. Mcleod   Method of Notification Phone   Request Evaluate-Remote   Notification Reason Lab Results;Status Update     MD notified of Hgb result, and pt vital signs. BP's soft, but seem to be close to baseline for patient. Scant bleeding. MD will be in before 9 AM to see patient. No new orders at this time.

## 2017-04-18 NOTE — LACTATION NOTE
Lactation visit. Parents report they saw me with their 1st baby 22 months ago. Mom had inverted nipples and was using a too small nipple shield with nipple damage. Her nipples are more everted this time. She only stopped nursing her older baby when she found out she was pregnant again this time. Offered LatchAssist to help ramya the nipples a little ac if baby is searching unsuccessfully otherwise, baby has latched and NW already. Since she is so sleepy this AM assisted with laid back position at 45 degrees with baby on top to help awaken sleepy baby. Baby continued to sleep at this time but did do a little rooting. They understand this could take awhile to awaken and nurse so are prepared to wait. Offered nipple shield again if struggling to latch as they were successful weaning their first from the shield early on. Enc mom to do hand expression after nursing or if baby is sleepy and not awakening, they could squeeze out onto a spoon and feed baby that way to see if it helps her awaken sooner. Instructed mom in best technique and mom did it perfectly getting large drops of colostrum. Lactation to follow up tomorrow. Encouraged mom to call us PRN.

## 2017-04-18 NOTE — PLAN OF CARE
Transferred from L&D at 01:45, PPH in L&D. Vitals/fundus checks completed per PPH orders, WNL and scant bleeding. Breastfeeding infant. Bonding well. SO at bedside, supportive with cares. Hgb drawn this AM, results pending, please update MD with result. Will continue to monitor.

## 2017-04-18 NOTE — L&D DELIVERY NOTE
OB Vaginal Delivery Note - late entry - secondary to EPIC down at time of delivery     Mandy Novak MRN# [id number]   Age: 32 year old YOB: 1985       GA: 40w0d  GP:   Labor Complications: Excessive Vaginal Bleeding;Dysfunctional Labor   EBL: 1385  mL  QBL:  See paper flow sheets.    Delivery Type: Vaginal, Spontaneous Delivery   ROM to Delivery Time: 11h 57m  [place] Weight: 4.36 kg (9 lb 9.8 oz)    1 Minute 5 Minute 10 Minute   Apgar Totals: 8    9                Delivery Details:  Mandy Novak, a 32 year old  female delivered a viable infant with apgars of 8   and 9  . Patient was fully dilated at 2022 hr , elected to labor down till 2149hrs and pushing after 5  hours 32  minutes in active labor. Delivery was via vaginal, spontaneous delivery  to a sterile field under epidural  anesthesia. Infant delivered in vertex  middle  occiput  anterior  position. Anterior and posterior shoulders delivered without difficulty after a midline episiotomy was cut secondary to EFW 4100g with hx of shoulder dystocia of 7lbs 14oz baby with previous delivery. The cord was clamped x2 ,after allowing cord to stop pulsating at 1.5minutes, cut in between the clamps and 3 vessels  were noted. Cord blood was obtained in routine fashion with the following disposition: lab .      Cord complications: none   Placenta delivered at  10:00 PM . Placental disposition was Pathology . Pitocin was administered in pt's IV wide open after delivery of the placenta. There was a large clot and some brisk bleeding behind the placenta , but Fundal massage performed and fundus found to be firm within 30 seconds after delivery of the placenta. No cytotec or methergine used as uterus firmed up fairly quickly with the above.     Episiotomy: median    Perineum, vagina, cervix were inspected, and the following lacerations were noted:   Perineal lacerations: 2nd            vaginal laceration noted     there was  significant bleeding from 2 small arteries from extension of the episiotomy slightly into the vagina which was stopped with 3-0 monocryl suture.     Any lacerations were repaired in the usual fashion using 3-0 monocryl.     Excellent hemostasis was noted after repair was complete. Needle and lap counts correct. Infant and patient in delivery room in good and stable condition.         Labor Event Times    Labor onset date:  17 Onset time:   2:50 PM   Dilation complete date:  17 Complete time:   8:22 PM   Start pushing date/time:  2017            Labor Length    1st Stage (hrs):  5 (min):  32   2nd Stage (hrs):  1 (min):  27   3rd Stage (hrs):  0 (min):  11      Labor Events     labor?:  No    steroids:  None   Labor Type:  AROM, Induction, Augmentation   Predominate monitoring during 1st stage:  continuous electronic fetal monitoring      Antibiotics received during labor?:  No      Rupture identifier:  Rupture 1   Rupture date/time: 17 0952   Rupture type:  Artificial Rupture of Membranes   Fluid color:  Clear      Induction:  Oxytocin, AROM   Induction date/time:     Cervical ripening date/time:        Augmentation:  Oxytocin, AROM   Additional Management:  induction done at 40 +0/7 wks EGA by first trimester US for LGA with EFW of 4100g and hx of shoulder dystocia w/previous baby at 7lbs 14oz   1:1 continuous labor support provided by?:  RN          Delivery/Placenta Date and Time    Delivery Date:  17 Delivery Time:   9:49 PM   Placenta Date/Time:  2017 10:00 PM   Oxytocin given at the time of delivery:  after delivery of placenta      Vaginal Counts    Initial count performed by 2 team members:   Two Team Members   Dr. Harsha RILEY, RN          Needles Suture Tiro Sponges Instruments   Initial counts 2 0 5    Added to count  1     Final counts 2 1 5       Placed during labor Accounted for at the end of labor   No NA   No NA   No NA      Final  "count performed by 2 team members:   Two Team Members   MD Rena Harp RN         Final count correct?:  Yes         Apgars    Living status:  Yes    1 Minute 5 Minute 10 Minute 15 Minute 20 Minute   Skin color: 0  1       Heart rate: 2  2       Reflex irritability: 2  2       Muscle tone: 2  2       Respiratory effort: 2  2       Total: 8  9          Apgars assigned by:  RENA COPE RN      Cord    Vessels:  3 Vessels Complications:  None   Cord Blood Disposition:  Lab Gases Sent?:  No         Jeannette Resuscitation    Methods:  None      Jeannette Care at Delivery:  None needed - category 1 tracing throughout labor and delivery   Output in Delivery Room:  Stool- terminal just after  body delivered        Measurements    Weight:  153.8 oz - 4360g - 9lbs 10oz Length:  20.5\"   Head circumference:  0.356 m = 14 inches    Observed anomalies, comments:  none      Skin to Skin and Feeding Plan    Skin to skin initiation date/time: 17   Skin to skin with:  Mother   Skin to skin end date/time: 2017   Breastfeeding initiated date/time:  2017   How do you plan to feed your baby:  Breastfeeding      Labor Events and Shoulder Dystocia    Fetal Tracing Prior to Delivery:  Category 1   Fetal Tracing Comments:  several mild variable decels only    Shoulder dystocia present?:  Neg- a 2nd degree midline episiotomy was done prior to delivery of the fetal head to avoid recurrence of this.             Delivery (Maternal) (Provider to Complete) ([id number])    Episiotomy:  Median   Indications for episiotomy:  Fetal Macrosomia   Perineal lacerations:  2nd    Vaginal laceration?:  Yes Repaired?:  Yes   Cervical laceration?:  No    Vaginal delivery est. blood loss (mL):  1385         Mother's Information  Mother: Mandy Novak #[id number]    Start of Mother's Information     IO Blood Loss  17 1450 - 17 1054    Mom's I/O Activity            End of Mother's " Information  Mother: Mandy Novak #[id number]            Delivery - Provider to Complete ([id number])    Delivering clinician:  MEY HUERTA   Attempted Delivery Types (Choose all that apply):  Spontaneous Vaginal Delivery   Delivery Type (Choose the 1 that will go to the Birth History):  Vaginal, Spontaneous Delivery                     Placenta    Delayed Cord Clamping:  Done   Date/Time:  4/17/2017 10:00 PM   Removal:  Spontaneous   Comments:  intact with 3vessel cord - signif. clot passed after delivery of the placenta - placenta sent to pathology secondary to post-partum hemorrhage   Disposition:  Pathology      Anesthesia    Method:  Epidural   Cervical dilation at placement:  8-10   Analgesic:   BIRTH HISTORY: ANALGESIC   BUPIVACAINE 0.125%  ML NS EPIDURAL DRIP            Presentation and Position    Presentation:  Vertex   Position:  Middle Occiput Anterior                        Mey Huerta MD

## 2017-04-18 NOTE — PROVIDER NOTIFICATION
04/17/17 1939   Provider Notification   Provider Name/Title Dr Mcleod   Method of Notification At Bedside   Request (came in for update)   Notification Reason Status Update;SVE;Maternal Vital Sign Change       Dr Mcleod came to bedside for update.  Updated on SVE, decreased blood pressures resolving with fluid bolus and continued moderate variability.

## 2017-04-18 NOTE — DOWNTIME EVENT NOTE
The EMR was down for 7 hours on 4/18/2017.     SARAI Beck RN were responsible for completing the paper charting during this time period.      The following information was re-entered into the system by Babar Beck: Flowsheet data, Intake and output and MAR     The following information will remain in the paper chart: progress notes, focused assessment data.      Babar Beck  4/18/2017

## 2017-04-18 NOTE — PROGRESS NOTES
[facility] Obstetrics Post-Partum Progress Note          Assessment and Plan:    Assessment:   Post-partum day #1  Induced vaginal delivery secondary to suspected macrosomia and hx of shoulder dystocia with 7lbs 14oz baby   Midline episiotomy  1.  (normal spontaneous vaginal delivery)    2. Routine postpartum follow-up    3. Immediate postpartum hemorrhage, postpartum    4. Acute posthemorrhagic anemia    5. Elevated d-dimer       L&D complications: Vaginal bleeding with post-partum hemorrhage with EBL = 1385 by QBL      Doing well.  Clean wound without signs of infection.  No excessive bleeding  Pain well-controlled.  Was on post-partum hemorrhage precautions overnight and has a larios catheter in place - good UOP .       Plan:   Ambulation encouraged  Breast feeding strategies discussed  Repeat CBC d/p  and repeat d-dimer at 6pm tonight and again tomorrow am  Lactation consultation  Remove larios catheter.   Monitor wound for signs of infection  Pain control measures as needed  Reportable signs and symptoms dicussed with the patient  Start iron supplementation  Uterine massage  Anticipate discharge in 1 days           Interval History:   Doing well.  Pain is well-controlled with Ibuprofen thus far.  No fevers.  No history of foul-smelling vaginal discharge.  Good appetite.  Denies chest pain, shortness of breath, nausea or vomiting.  Vaginal bleeding is similar to a heavy menstrual flow, but no clots noted.  Not ambulating as yet secondary to larios and post-partum hemorrhage.   Breastfeeding well.          Significant Problems:    post-partum hemorrhage and post-hemorrhagic anemia. No signs of tachycardia. BP low, but at baseline = tends to run in the /60-70 range.  Hasn't been up to RR yet . Will watch for pre-syncopal symptoms.  No headache. otherwise feeling pretty well.           Review of Systems:    The patient denies any chest pain, shortness of breath, excessive pain, fever, chills, purulent  drainage from the wound, nausea or vomiting.          Medications:     All medications related to the patient's surgery have been reviewed  Current Facility-Administered Medications   Medication     oxytocin (PITOCIN) 30 units in 500 mL 0.9% NaCl infusion     ibuprofen (ADVIL/MOTRIN) tablet 400-800 mg     acetaminophen (TYLENOL) tablet 650 mg     naloxone (NARCAN) injection 0.1-0.4 mg     senna-docusate (SENOKOT-S;PERICOLACE) 8.6-50 MG per tablet 1-2 tablet     [START ON 4/20/2017] bisacodyl (DULCOLAX) Suppository 10 mg     [START ON 4/20/2017] sodium phosphate (FLEET ENEMA) 1 enema     hydrocortisone 2.5 % cream     lanolin ointment     lactated ringers BOLUS 1,000 mL     oxytocin (PITOCIN) 30 units in 500 mL 0.9% NaCl infusion     oxytocin (PITOCIN) injection 10 Units     misoprostol (CYTOTEC) tablet 400 mcg     NO Rho (D) immune globulin (RhoGam) needed - mother Rh POSITIVE     oxyCODONE (ROXICODONE) IR tablet 5-10 mg     No MMR Needed - Assessment: Patient does not need MMR vaccine     No Tdap Needed - Assessment: Patient does not need Tdap vaccine     ferrous gluconate (FERGON) tablet 324 mg             Physical Exam:     All vitals stable  Patient Vitals for the past 8 hrs:   BP Temp Temp src Pulse Heart Rate Resp SpO2   04/18/17 1040 96/58 - - - 86 - -   04/18/17 0825 95/54 98.4  F (36.9  C) Oral - 91 16 99 %   04/18/17 0631 92/47 98.7  F (37.1  C) Oral 97 - 16 99 %   04/18/17 0453 92/45 - - - 69 - 98 %   04/18/17 0340 94/46 - - - 88 - 97 %     Uterine fundus is firm, non-tender and 2cm below the level of the umbilicus  Episiotomy sutures intact and wound healing well.           Data:     All laboratory data related to this surgery reviewed  Hemoglobin   Date Value Ref Range Status   04/18/2017 7.5 (L) 11.7 - 15.7 g/dL Final   04/17/2017 8.3 (L) 11.7 - 15.7 g/dL Final   12/01/2016 11.7 11.7 - 15.7 g/dL Final   09/22/2016 13.1 11.7 - 15.7 g/dL Final   07/31/2015 13.3 11.7 - 15.7 g/dL Final     INR   Date Value  Ref Range Status   2017 1.05 0.86 - 1.14 Final        Results for orders placed or performed during the hospital encounter of 17 (from the past 24 hour(s))   Hemoglobin   Result Value Ref Range    Hemoglobin 8.3 (L) 11.7 - 15.7 g/dL   CBC with platelets differential   Result Value Ref Range    WBC 13.8 (H) 4.0 - 11.0 10e9/L    RBC Count 2.73 (L) 3.8 - 5.2 10e12/L    Hemoglobin 7.5 (L) 11.7 - 15.7 g/dL    Hematocrit 22.9 (L) 35.0 - 47.0 %    MCV 84 78 - 100 fl    MCH 27.5 26.5 - 33.0 pg    MCHC 32.8 31.5 - 36.5 g/dL    RDW 13.2 10.0 - 15.0 %    Platelet Count 200 150 - 450 10e9/L    Diff Method Automated Method     % Neutrophils 78.2 %    % Lymphocytes 12.6 %    % Monocytes 8.2 %    % Eosinophils 0.1 %    % Basophils 0.2 %    % Immature Granulocytes 0.7 %    Nucleated RBCs 0 0 /100    Absolute Neutrophil 10.8 (H) 1.6 - 8.3 10e9/L    Absolute Lymphocytes 1.7 0.8 - 5.3 10e9/L    Absolute Monocytes 1.1 0.0 - 1.3 10e9/L    Absolute Eosinophils 0.0 0.0 - 0.7 10e9/L    Absolute Basophils 0.0 0.0 - 0.2 10e9/L    Abs Immature Granulocytes 0.1 0 - 0.4 10e9/L    Absolute Nucleated RBC 0.0    D dimer quantitative   Result Value Ref Range    D Dimer 8.6 (H) 0.0 - 0.50 ug/ml FEU   Factor 10 chromogenic   Result Value Ref Range    Chromogenic Factor 10 90 70 - 130 %   Fibrinogen activity   Result Value Ref Range    Fibrinogen 348 200 - 420 mg/dL   INR   Result Value Ref Range    INR 1.05 0.86 - 1.14   Partial thromboplastin time   Result Value Ref Range    PTT 28 22 - 37 sec     No imaging studies have been ordered.     Spent 35 minutes on pt care today.  face to face time from 1010am  to 1035am.  Greater than 50% of time spent in coordination of care/counseling today re:     1.  (normal spontaneous vaginal delivery)    2. Routine postpartum follow-up    3. Immediate postpartum hemorrhage, postpartum    4. Acute posthemorrhagic anemia    5. Elevated d-dimer       Discussed pt's case and elevated d-dimer and hgb  level with Dr. LEONIE Romeo, ob/gyn specialists.  With pt being relatively asymptomatic in regards to anemia and post-partum hemorrhage right now - will hold on transfusion and start oral iron supplement.  Consider Venofer. Recheck cbc with diff and d-dimer again at 6pm today. Call results to me.             Mey Mcleod MD

## 2017-04-18 NOTE — PLAN OF CARE
Blood pressures stable in 80's/40's.  Patient declines headache, nausea, or dizziness.  Dr Mcleod present and aware.

## 2017-04-19 LAB
ABO + RH BLD: NORMAL
ABO + RH BLD: NORMAL
BASOPHILS # BLD AUTO: 0 10E9/L (ref 0–0.2)
BASOPHILS NFR BLD AUTO: 0.3 %
BLD GP AB SCN SERPL QL: NORMAL
BLD PROD TYP BPU: NORMAL
BLD UNIT ID BPU: 0
BLD UNIT ID BPU: 0
BLOOD BANK CMNT PATIENT-IMP: NORMAL
BLOOD PRODUCT CODE: NORMAL
BLOOD PRODUCT CODE: NORMAL
BPU ID: NORMAL
BPU ID: NORMAL
DIFFERENTIAL METHOD BLD: ABNORMAL
EOSINOPHIL # BLD AUTO: 0.1 10E9/L (ref 0–0.7)
EOSINOPHIL NFR BLD AUTO: 1.2 %
ERYTHROCYTE [DISTWIDTH] IN BLOOD BY AUTOMATED COUNT: 13.2 % (ref 10–15)
HCT VFR BLD AUTO: 21.9 % (ref 35–47)
HGB BLD-MCNC: 6.8 G/DL (ref 11.7–15.7)
HGB BLD-MCNC: 8.5 G/DL (ref 11.7–15.7)
IMM GRANULOCYTES # BLD: 0.1 10E9/L (ref 0–0.4)
IMM GRANULOCYTES NFR BLD: 1.1 %
LYMPHOCYTES # BLD AUTO: 2.9 10E9/L (ref 0.8–5.3)
LYMPHOCYTES NFR BLD AUTO: 25 %
MCH RBC QN AUTO: 27 PG (ref 26.5–33)
MCHC RBC AUTO-ENTMCNC: 31.1 G/DL (ref 31.5–36.5)
MCV RBC AUTO: 87 FL (ref 78–100)
MONOCYTES # BLD AUTO: 0.9 10E9/L (ref 0–1.3)
MONOCYTES NFR BLD AUTO: 7.4 %
NEUTROPHILS # BLD AUTO: 7.5 10E9/L (ref 1.6–8.3)
NEUTROPHILS NFR BLD AUTO: 65 %
NRBC # BLD AUTO: 0 10*3/UL
NRBC BLD AUTO-RTO: 0 /100
NUM BPU REQUESTED: 2
PLATELET # BLD AUTO: 219 10E9/L (ref 150–450)
RBC # BLD AUTO: 2.52 10E12/L (ref 3.8–5.2)
SPECIMEN EXP DATE BLD: NORMAL
TRANSFUSION STATUS PATIENT QL: NORMAL
WBC # BLD AUTO: 11.6 10E9/L (ref 4–11)

## 2017-04-19 PROCEDURE — 25000132 ZZH RX MED GY IP 250 OP 250 PS 637: Performed by: FAMILY MEDICINE

## 2017-04-19 PROCEDURE — 85025 COMPLETE CBC W/AUTO DIFF WBC: CPT | Performed by: FAMILY MEDICINE

## 2017-04-19 PROCEDURE — 40000083 ZZH STATISTIC IP LACTATION SERVICES 1-15 MIN

## 2017-04-19 PROCEDURE — P9016 RBC LEUKOCYTES REDUCED: HCPCS | Performed by: FAMILY MEDICINE

## 2017-04-19 PROCEDURE — 85018 HEMOGLOBIN: CPT | Performed by: FAMILY MEDICINE

## 2017-04-19 PROCEDURE — 12000027 ZZH R&B OB

## 2017-04-19 PROCEDURE — 36415 COLL VENOUS BLD VENIPUNCTURE: CPT | Performed by: FAMILY MEDICINE

## 2017-04-19 RX ADMIN — SENNOSIDES AND DOCUSATE SODIUM 2 TABLET: 8.6; 5 TABLET ORAL at 22:05

## 2017-04-19 RX ADMIN — IBUPROFEN 800 MG: 400 TABLET ORAL at 15:46

## 2017-04-19 RX ADMIN — IBUPROFEN 800 MG: 400 TABLET ORAL at 05:24

## 2017-04-19 RX ADMIN — IBUPROFEN 800 MG: 400 TABLET ORAL at 22:05

## 2017-04-19 RX ADMIN — SENNOSIDES AND DOCUSATE SODIUM 1 TABLET: 8.6; 5 TABLET ORAL at 09:30

## 2017-04-19 RX ADMIN — SENNOSIDES AND DOCUSATE SODIUM 1 TABLET: 8.6; 5 TABLET ORAL at 09:42

## 2017-04-19 RX ADMIN — FERROUS GLUCONATE 324 MG: 324 TABLET ORAL at 09:30

## 2017-04-19 NOTE — LACTATION NOTE
"LC to see patient.  She reports that nursing has been going well but her nipples are \"tender\".  LC assessed nipples to be reddened.  No cracking or scabbing.  She is using hydrogel.  LC also adjusted the latch to bring baby closer and to look up to breast.  She used a shield with her first and has dimpling to the center of her nipple.  Baby is nursing well without the shield.  She is aware she should call with any feeding difficulties.  No other questions or concerns noted.  "

## 2017-04-19 NOTE — PLAN OF CARE
Problem: Goal Outcome Summary  Goal: Goal Outcome Summary  Outcome: Improving  Data: Vital signs within normal limits. Postpartum checks within normal limits - see flow record. Patient eating and drinking normally. Patient able to empty bladder independently and is up ambulating. No apparent signs of infection. Episiotomy healing well. Patient performing self cares and is able to care for infant. Breastfeeding. Hemoglobin 6.8 - provider aware - pt receiving blood transfusion. No reaction - tolerating well. Will monitor. Pt had post partum hemorrhage - milk not in yet. Mother and baby to stay overnight per MD.   Action:  Pt declined pain intervention.  Patient education done about blood transfusion.   Response: Positive attachment behaviors observed with infant. Support persons father of baby present.   Plan: Anticipate discharge on 4/20/17.

## 2017-04-19 NOTE — PROVIDER NOTIFICATION
04/19/17 0848   Provider Notification   Provider Name/Title Dr. Mcleod   Method of Notification Phone   Request Evaluate-Remote   Notification Reason Lab Results     Lab called with critical lab value - hemoglobin 6.8. MD to assess.

## 2017-04-19 NOTE — PROVIDER NOTIFICATION
04/19/17 1122   Provider Notification   Provider Name/Title Dr. Mcleod   Method of Notification Phone   Request Evaluate-Remote   Notification Reason Other     Pt consented to blood transfusion - provider notified. Plan to give 2 units and have pt stay overnight. Will monitor and wait for orders.

## 2017-04-19 NOTE — PROVIDER NOTIFICATION
04/18/17 2127   Provider Notification   Provider Name/Title Dr. Mcleod   Method of Notification Phone   Request Evaluate-Remote   Notification Reason Lab Results   Hgb critical at 6.9, patient asymptomatic. D-dimer decreased from yesterday at 6.3. Plan to maintain IV access overnight and reevaluate plan of care after AM lab draw.

## 2017-04-19 NOTE — PLAN OF CARE
Vitals and postpartum checks WNL. Breastfeeding, reports tender nipples, using mother's love and was given hydrogel pads. Hgb low from evening, plan for recheck of labs in AM and possible transfusion pending the results. Bonding well with infant. Meeting expected outcomes. Will continue to monitor.

## 2017-04-19 NOTE — PROGRESS NOTES
[facility] Obstetrics Post-Partum Progress Note          Assessment and Plan:    Assessment:  1.  (normal spontaneous vaginal delivery)    2. Routine postpartum follow-up    3. Immediate postpartum hemorrhage, postpartum    4. Acute posthemorrhagic anemia - hgb slightly decreased again today = 6.8. Pt is relatively asymptomatic.    5. Elevated d-dimer - decreased yesterday am from post-partum. No evidence of DIC.        Post-partum day #2  Induced vaginal delivery secondary to suspected macrosomia and previous hx of shoulder dystocia with a 7lbs 14 oz infant   L&D complications: Vaginal bleeding secondary to episiotomy and extension of that   Post-partum hemorrhage secondary to clot behind the placenta       Doing well.  Clean wound without signs of infection.  Normal healing wound.  No excessive bleeding currently - vaginal bleeding has been minimal since episiotomy repaired. No clots. Tapering off well s/p vaginal delivery and postpartum hemorrhage.   Pain well-controlled.      Plan:will keep in hospital an additional midnight secondary to post-hemorrhagic anemia at a critical level and need for transfusion.    Ambulation encouraged  Breast feeding strategies discussed  Hemoglobin in AM  Monitor wound for signs of infection  Pain control measures as needed  Reportable signs and symptoms dicussed with the patient  Start iron supplementation  Uterine massage  Transfuse 2 units of PRBC's secondary to post-partum hemorrhage with post-hemorrhagic anemia and still downward trending hgb. Spent 30 minutes talking with pt and her  this am about risks , benefits and alternatives and went over all the risks on the transfusion consent in detail item by item.    I had ORLIN Marcos witness most of this discussion and she and I signed the consent.  Pt thought about this for approx. 3 hours prior to her signing the consent as well and agreeing to the transfusion.   Anticipate discharge tomorrow.             Interval History:   Doing well relatively speaking.  Pain is well-controlled.  No fevers.  No history of foul-smelling vaginal discharge.  Good appetite.  Denies chest pain, shortness of breath, nausea or vomiting.  Vaginal bleeding is similar to a heavy menstrual flow.  Ambulatory.  Breastfeeding well.  No headache. No dizziness or presyncopal feelings.    No tachycardia.           Significant Problems:    see above. otherwise no signif. Chronic medical problems   Patient Active Problem List   Diagnosis     CARDIOVASCULAR SCREENING; LDL GOAL LESS THAN 160     Situational syncope- with any blood draws or shots     ASCUS with positive high risk HPV- while pregnant 2016 = normal - recheck pap and cotest postpartum     Resolved - Marginal placenta previa with intrapartum hemorrhage in first trimester-repeat 1/15/2015= resolved      Multiple pigmented nevi     Supervision of other normal pregnancy, antepartum     Type o blood, rh positive     Cecilio breech presentation - seen on 31 week US - resolved with external version on 3/24/2017     Large for gestational age     Indication for care in labor or delivery     History of shoulder dystocia in prior pregnancy- with first baby 7lbs 14oz 2015      Labor and delivery, indication for care      (normal spontaneous vaginal delivery)     Immediate postpartum hemorrhage, postpartum     Elevated d-dimer     Acute posthemorrhagic anemia               Review of Systems:    The patient denies any chest pain, shortness of breath, excessive pain, fever, chills, purulent drainage from the wound, nausea or vomiting.          Medications:     Current Facility-Administered Medications   Medication     oxytocin (PITOCIN) 30 units in 500 mL 0.9% NaCl infusion     ibuprofen (ADVIL/MOTRIN) tablet 400-800 mg     acetaminophen (TYLENOL) tablet 650 mg     naloxone (NARCAN) injection 0.1-0.4 mg     senna-docusate (SENOKOT-S;PERICOLACE) 8.6-50 MG per tablet 1-2 tablet     [START ON  4/20/2017] bisacodyl (DULCOLAX) Suppository 10 mg     [START ON 4/20/2017] sodium phosphate (FLEET ENEMA) 1 enema     hydrocortisone 2.5 % cream     lanolin ointment     lactated ringers BOLUS 1,000 mL     oxytocin (PITOCIN) 30 units in 500 mL 0.9% NaCl infusion     oxytocin (PITOCIN) injection 10 Units     misoprostol (CYTOTEC) tablet 400 mcg     NO Rho (D) immune globulin (RhoGam) needed - mother Rh POSITIVE     oxyCODONE (ROXICODONE) IR tablet 5-10 mg     No MMR Needed - Assessment: Patient does not need MMR vaccine     No Tdap Needed - Assessment: Patient does not need Tdap vaccine     ferrous gluconate (FERGON) tablet 324 mg     sodium chloride (PF) 0.9% PF flush 3 mL             Physical Exam:     All vitals stable  Patient Vitals for the past 12 hrs:   BP Temp Temp src Pulse Resp   04/19/17 1222 - 98.6  F (37  C) Oral - -   04/19/17 1221 99/65 99.1  F (37.3  C) Oral 79 16   04/19/17 0900 95/54 98.1  F (36.7  C) Oral 73 18   04/19/17 0149 97/44 97.9  F (36.6  C) Oral 82 18     Uterine fundus is firm, non-tender and 3cm below  the level of the umbilicus.   Lower extremity varicose veins = nontender to palpation, no bruits, and compressible - no palpable cords or clots detected on palpation.           Data:     Hemoglobin   Date Value Ref Range Status   04/19/2017 6.8 (LL) 11.7 - 15.7 g/dL Final     Comment:     This result has been called to TROY BANG(OB) by Dulce Thompson on 04 19 2017   at   0846, and has been read back.     04/18/2017 6.9 (LL) 11.7 - 15.7 g/dL Final     Comment:     This result has been called to EMELY MONTIEL(LD) by Cuong Beebe on 04 18 2017 at 1924, and has been read back.     04/18/2017 7.5 (L) 11.7 - 15.7 g/dL Final   04/17/2017 8.3 (L) 11.7 - 15.7 g/dL Final   12/01/2016 11.7 11.7 - 15.7 g/dL Final     INR   Date Value Ref Range Status   04/18/2017 1.05 0.86 - 1.14 Final        Lab Results   Component Value Date    WBC 11.6 (H) 04/19/2017    WBC 12.2 (H) 04/18/2017    WBC 13.8 (H)  2017    WBC 7.8 2016    WBC 7.9 2014    HGB 6.8 (LL) 2017    HGB 6.9 (LL) 2017    HGB 7.5 (L) 2017    HGB 8.3 (L) 2017    HGB 11.7 2016    HCT 21.9 (L) 2017    HCT 21.4 (L) 2017    HCT 22.9 (L) 2017    HCT 36.6 2016    HCT 35.8 2014     2017     2017     2017     2016     2014    DD 6.3 (H) 2017    DD 8.6 (H) 2017    INR 1.05 2017     No imaging studies have been ordered    Spent 50 minutes on pt care today.  face to face time from 0730  to 0810.  Greater than 50% of time spent in coordination of care/counseling today re:     1.  (normal spontaneous vaginal delivery)    2. Routine postpartum follow-up    3. Immediate postpartum hemorrhage, postpartum    4. Acute posthemorrhagic anemia    5. Elevated d-dimer                    Mey Mcleod MD

## 2017-04-19 NOTE — PLAN OF CARE
Problem: Goal Outcome Summary  Goal: Goal Outcome Summary  Outcome: Improving  Data: Vital signs within normal limits. Postpartum checks within normal limits - see flow record. Patient eating and drinking normally. Patient able to empty bladder independently and is up ambulating. No apparent signs of infection. Episiotomy healing well. Patient performing self cares and is able to care for infant.  Action: Patient medicated during the shift for cramping. See MAR. Patient reassessed within 1 hour after each medication and pain was improved - patient stated she was comfortable. Patient education done about postpartum cares. See flow record.  Response: Positive attachment behaviors observed with infant. Support persons Raymond present.   Plan: Maintain IV access overnight and reevaluate plan of care after AM lab draw. Anticipate discharge on 4/19/17.

## 2017-04-20 VITALS
OXYGEN SATURATION: 99 % | SYSTOLIC BLOOD PRESSURE: 107 MMHG | RESPIRATION RATE: 16 BRPM | HEART RATE: 73 BPM | TEMPERATURE: 98.7 F | HEIGHT: 68 IN | DIASTOLIC BLOOD PRESSURE: 73 MMHG

## 2017-04-20 LAB
APTT PPP: 25 SEC (ref 22–37)
BASOPHILS # BLD AUTO: 0 10E9/L (ref 0–0.2)
BASOPHILS NFR BLD AUTO: 0.4 %
COPATH REPORT: NORMAL
D DIMER PPP FEU-MCNC: 3.1 UG/ML FEU (ref 0–0.5)
DIFFERENTIAL METHOD BLD: ABNORMAL
EOSINOPHIL # BLD AUTO: 0.2 10E9/L (ref 0–0.7)
EOSINOPHIL NFR BLD AUTO: 2 %
ERYTHROCYTE [DISTWIDTH] IN BLOOD BY AUTOMATED COUNT: 13.3 % (ref 10–15)
FIBRINOGEN PPP-MCNC: 338 MG/DL (ref 200–420)
HCT VFR BLD AUTO: 25.1 % (ref 35–47)
HGB BLD-MCNC: 8.2 G/DL (ref 11.7–15.7)
IMM GRANULOCYTES # BLD: 0.1 10E9/L (ref 0–0.4)
IMM GRANULOCYTES NFR BLD: 1.1 %
INR PPP: 0.87 (ref 0.86–1.14)
LYMPHOCYTES # BLD AUTO: 2.5 10E9/L (ref 0.8–5.3)
LYMPHOCYTES NFR BLD AUTO: 25 %
MCH RBC QN AUTO: 28 PG (ref 26.5–33)
MCHC RBC AUTO-ENTMCNC: 32.7 G/DL (ref 31.5–36.5)
MCV RBC AUTO: 86 FL (ref 78–100)
MONOCYTES # BLD AUTO: 0.7 10E9/L (ref 0–1.3)
MONOCYTES NFR BLD AUTO: 6.8 %
NEUTROPHILS # BLD AUTO: 6.4 10E9/L (ref 1.6–8.3)
NEUTROPHILS NFR BLD AUTO: 64.7 %
NRBC # BLD AUTO: 0 10*3/UL
NRBC BLD AUTO-RTO: 0 /100
PLATELET # BLD AUTO: 229 10E9/L (ref 150–450)
RBC # BLD AUTO: 2.93 10E12/L (ref 3.8–5.2)
WBC # BLD AUTO: 10 10E9/L (ref 4–11)

## 2017-04-20 PROCEDURE — 25000132 ZZH RX MED GY IP 250 OP 250 PS 637: Performed by: FAMILY MEDICINE

## 2017-04-20 PROCEDURE — 36415 COLL VENOUS BLD VENIPUNCTURE: CPT | Performed by: FAMILY MEDICINE

## 2017-04-20 PROCEDURE — 85610 PROTHROMBIN TIME: CPT | Performed by: FAMILY MEDICINE

## 2017-04-20 PROCEDURE — 85730 THROMBOPLASTIN TIME PARTIAL: CPT | Performed by: FAMILY MEDICINE

## 2017-04-20 PROCEDURE — 40000084 ZZH STATISTIC IP LACTATION SERVICES 16-30 MIN

## 2017-04-20 PROCEDURE — 85025 COMPLETE CBC W/AUTO DIFF WBC: CPT | Performed by: FAMILY MEDICINE

## 2017-04-20 PROCEDURE — 85379 FIBRIN DEGRADATION QUANT: CPT | Performed by: FAMILY MEDICINE

## 2017-04-20 PROCEDURE — 85384 FIBRINOGEN ACTIVITY: CPT | Performed by: FAMILY MEDICINE

## 2017-04-20 RX ORDER — LANOLIN 100 %
OINTMENT (GRAM) TOPICAL
COMMUNITY
Start: 2017-04-20 | End: 2018-08-15

## 2017-04-20 RX ORDER — ACETAMINOPHEN 325 MG/1
650 TABLET ORAL EVERY 4 HOURS PRN
Qty: 100 TABLET | COMMUNITY
Start: 2017-04-20 | End: 2017-05-31

## 2017-04-20 RX ORDER — AMOXICILLIN 250 MG
1-2 CAPSULE ORAL 2 TIMES DAILY
Qty: 30 TABLET | Refills: 3 | Status: SHIPPED | OUTPATIENT
Start: 2017-04-20 | End: 2017-05-31

## 2017-04-20 RX ORDER — BISACODYL 10 MG
10 SUPPOSITORY, RECTAL RECTAL DAILY PRN
Qty: 10 SUPPOSITORY | Refills: 0 | Status: SHIPPED | OUTPATIENT
Start: 2017-04-20 | End: 2017-05-31

## 2017-04-20 RX ORDER — FERROUS GLUCONATE 324(38)MG
324 TABLET ORAL
Qty: 90 TABLET | Refills: 1 | Status: SHIPPED | OUTPATIENT
Start: 2017-04-20 | End: 2017-06-07

## 2017-04-20 RX ORDER — IBUPROFEN 400 MG/1
400-800 TABLET, FILM COATED ORAL EVERY 6 HOURS PRN
Qty: 120 TABLET | COMMUNITY
Start: 2017-04-20 | End: 2017-05-31

## 2017-04-20 RX ADMIN — SENNOSIDES AND DOCUSATE SODIUM 2 TABLET: 8.6; 5 TABLET ORAL at 08:17

## 2017-04-20 RX ADMIN — FERROUS GLUCONATE 324 MG: 324 TABLET ORAL at 08:17

## 2017-04-20 RX ADMIN — IBUPROFEN 800 MG: 400 TABLET ORAL at 08:17

## 2017-04-20 NOTE — PLAN OF CARE
Problem: Goal Outcome Summary  Goal: Goal Outcome Summary  Outcome: Improving  Pt vital signs stable. Up ad brooke and independent with all self and  cares. Breast feeding with good latch. Taking ibuprofen for pain. Received 2 units of blood today, tolerated it well. HGB drawn and is pending. IV is saline locked. Spouse at bedside and supportive. Pt was teary this shift, she said she was missing her son who is at home and feels emotional. Eden score is 1. Continue to monitor.

## 2017-04-20 NOTE — PLAN OF CARE
Patient is discharging to home with baby and .  Discharge instructions and medications given and reviewed with no further questions at this time.  Decline breast pump.  Specific follow up recommendation discussed.  ID bands verified.

## 2017-04-20 NOTE — LACTATION NOTE
Lactation visit. Mom reports her milk is in and baby is NW. She asked for a nipple shield last evening for soreness and it is working well. She used with her 1st baby and is familiar with use and care and will call us PRN if problems arise.

## 2017-04-20 NOTE — DISCHARGE SUMMARY
[facility] Discharge Summary    Mandy Novak MRN# [id number]   Age: 32 year old YOB: 1985     Date of Admission:  2017  Date of Discharge::  2017  Admitting Physician:  Mey Mcleod MD  Discharge Physician:  Mey Mcleod MD     Home clinic: [facility]          Admission Diagnoses:   Indication for care in labor or delivery  Labor and delivery, indication for care          Discharge Diagnosis:   1.  (normal spontaneous vaginal delivery)    2. Routine postpartum follow-up    3. Immediate postpartum hemorrhage, postpartum    4. Acute posthemorrhagic anemia    5. Elevated d-dimer    6. Drug-induced constipation    7. At risk for ineffective breastfeeding               Procedures:   Procedure(s): Fetal scalp electrode placement  Midline episiotomy  Repair of second degree perineal laceration  Blood transfusion for hemoglobin 6.8       Blood or human products: 2 units PRBC's on 2017            Medications Prior to Admission:     Prescriptions Prior to Admission   Medication Sig Dispense Refill Last Dose     Prenatal Vit-Fe Fumarate-FA (PRENATAL VITAMINS) 28-0.8 MG TABS Take 1 tablet by mouth daily   2017 at Unknown time             Discharge Medications:     Current Discharge Medication List      START taking these medications    Details   ibuprofen (ADVIL/MOTRIN) 400 MG tablet Take 1-2 tablets (400-800 mg) by mouth every 6 hours as needed for other (cramping)  Qty: 120 tablet    Associated Diagnoses:  (normal spontaneous vaginal delivery)      acetaminophen (TYLENOL) 325 MG tablet Take 2 tablets (650 mg) by mouth every 4 hours as needed for mild pain or fever (greater than or equal to 38  C /100.4  F (oral) or 38.5  C/ 101.4  F (core).)  Qty: 100 tablet    Associated Diagnoses:  (normal spontaneous vaginal delivery)      ferrous gluconate (FERGON) 324 (38 FE) MG tablet Take 1 tablet (324 mg) by mouth daily (with  breakfast) Take 1 tab twice daily for the first 1-2 weeks  Qty: 90 tablet, Refills: 1    Associated Diagnoses: Immediate postpartum hemorrhage, postpartum; Acute posthemorrhagic anemia      bisacodyl (DULCOLAX) 10 MG Suppository Place 1 suppository (10 mg) rectally daily as needed for constipation  Qty: 10 suppository, Refills: 0    Associated Diagnoses: Drug-induced constipation      senna-docusate (SENOKOT-S;PERICOLACE) 8.6-50 MG per tablet Take 1-2 tablets by mouth 2 times daily  Qty: 30 tablet, Refills: 3    Associated Diagnoses: Drug-induced constipation      lanolin ointment Apply topically every hour as needed for dry skin (sore nipples)    Associated Diagnoses: At risk for ineffective breastfeeding      methylcellulose, laxative, POWD Take 0.3 g (1.05 teaspoonful) by mouth daily In 8 oz of water or juice for daily fiber therapy while on iron  Qty: 245 g, Refills: 1    Associated Diagnoses: Drug-induced constipation         CONTINUE these medications which have NOT CHANGED    Details   Prenatal Vit-Fe Fumarate-FA (PRENATAL VITAMINS) 28-0.8 MG TABS Take 1 tablet by mouth daily    Associated Diagnoses: Absence of menstruation                   Consultations:   No consultations were requested during this admission          Brief History of Labor:   See L&D summary.  Uncomplicated induced labor at 40wks 0 days for LGA and hx of shoulder dystocia with 7lbs 14oz baby.  Pt received and epidural for anesthesia at 8cm dilation and was allowed to labor down after completion of dilation for approx. 1 hour.  A moderately long Midline episiotomy was cut after perineum and introitus noted not to be stretching adequately at +4 station.  Baby - viable female infant (9lbs 10oz) delivered in OA position vertex presentation.  No shoulder dystocia. After delivery of baby placenta delivered 11 minutes later with signif. Large  Clots behind the placenta and signif. Small arterial bleeding from vaginal extension of the midline  episiotomy.  2nd degree laceration/episiotomy was repaired in the usual fashion with 3-0 monocryl.   QBL was 1385ml.  And a post-partum hemorrhage was diagnosed.            Hospital Course:   The patient's hospital course was unremarkable for symptoms, post-partum hemorrhage order set was enacted immediately after delivery.  Pt had no further signif. Vaginal bleeding after episiotomy was repaired - normal post-partum lochia without any clots.  However her d-dimer was high initially at 8.6, no clotting noted, no evidence of DIC.  Her hgb immediately after delivery and repair was 8.3 and continued to trend downward while the patient was asymptomatic.  At approx. 33hrs post-partum, pt's hgb was 6.8.  Written informed consent was obtained for blood transfusion.  pt received 2 units of prbc's without complication and felt much better after the transfusion and then next am felt much better from an energy perspective. On discharge, her pain was well controlled. Vaginal bleeding is similar to mild  menstrual flow.  Voiding without difficulty.  Ambulating well and tolerating a normal diet.  No fever.  Breastfeeding well.  Infant is stable.  No bowel movement yet.* She was discharged on post-partum day #3 - discharge was post-poned secondary to postpartum hemorrhagic anemia and need for transfusion. Post transfusion hgb was 8.5 last night.     Post-partum hemoglobin this am:  Hemoglobin   Date Value Ref Range Status   04/20/2017 8.2 (L) 11.7 - 15.7 g/dL Final   .          Discharge Instructions and Follow-Up:   Discharge diet: Regular   Discharge activity: Activity as tolerated   Discharge follow-up: Follow up with primary care provider in 1 week for hgb recheck and in 6 weeks for regular post-partum full exam.    Wound care: Drink plenty of fluids  Ice to area for comfort  Keep wound clean and dry           Discharge Disposition:   Discharged to home      Spent 45 minutes on pt care today.  face to face time from 0820 to  0852hrs.  Greater than 50% of time spent in coordination of care/counseling today re:   1.  (normal spontaneous vaginal delivery)    2. Routine postpartum follow-up    3. Immediate postpartum hemorrhage, postpartum    4. Acute posthemorrhagic anemia    5. Elevated d-dimer    6. Drug-induced constipation    7. At risk for ineffective breastfeeding       Attestation:  I have reviewed today's vital signs, notes, medications, labs and examined pt myself.             Mey Mcleod MD

## 2017-04-20 NOTE — PLAN OF CARE
Problem: Goal Outcome Summary  Goal: Goal Outcome Summary  Outcome: Improving  Vss, mood is improved and no longer tearful, up ad [place], fundal checks wnl, voids w/o difficulty, iv SL, using ibuprofen for pain, breastfeeding well independently with shield, using ml and hydrogel for bilateral breast tenderness, bonding well with infant,  at bedside and attentive, hgb improved from 6.8 to 8.5 after transfusion on evenings, continue to monitor.

## 2017-04-20 NOTE — PLAN OF CARE
Problem: Postpartum, Vaginal Delivery (Adult)  Goal: Signs and Symptoms of Listed Potential Problems Will be Absent or Manageable (Postpartum, Vaginal Delivery)  Signs and symptoms of listed potential problems will be absent or manageable by discharge/transition of care (reference Postpartum, Vaginal Delivery (Adult) CPG).   Data: Vital signs within normal limits. Postpartum checks within normal limits - see flow record. Patient eating and drinking normally. Patient able to empty bladder independently and is up ambulating. No apparent signs of infection. episiotomy healing well. Patient performing self cares and is able to care for infant.  Action: Patient medicated during the shift for pain. See MAR. Patient reassessed within 1 hour after each medication and pain was improved - patient stated she was comfortable. Patient education done about discharge teaching and when to follow up with a doctor. See flow record.  Response: Positive attachment behaviors observed with infant. Support persons FOB present.   Plan: Anticipate discharge today afternoon.

## 2017-04-20 NOTE — DISCHARGE INSTRUCTIONS
[facility] Lactation Consultant:  [phone]    Postpartum Vaginal Delivery Instructions  Follow up with your primary OB in clinic in 1 week for a hemoglobin check, and  Follow up with your primary OB in clinic in 6 weeks for a full postpartum check.  Activity       Ask family and friends for help when you need it.    Do not place anything in your vagina for 6 weeks.    You are not restricted on other activities, but take it easy for a few weeks to allow your body to recover from delivery.  You are able to do any activities you feel up to that point.    No driving until you have stopped taking your pain medications (usually two weeks after delivery).     Call your health care provider if you have any of these symptoms:       Increased pain, swelling, redness, or fluid around your stiches from an episiotomy or perineal tear.    A fever above 100.4 F (38 C) with or without chills when placing a thermometer under your tongue.    You soak a sanitary pad with blood within 1 hour, or you see blood clots larger than a golf ball.    Bleeding that lasts more than 6 weeks.    Vaginal discharge that smells bad.    Severe pain, cramping or tenderness in your lower belly area.    A need to urinate more frequently (use the toilet more often), more urgently (use the toilet very quickly), or it burns when you urinate.    Nausea and vomiting.    Redness, swelling or pain around a vein in your leg.    Problems breastfeeding or a red or painful area on your breast.    Chest pain and cough or are gasping for air.    Problems coping with sadness, anxiety, or depression.  If you have any concerns about hurting yourself or the baby, call your provider immediately.     You have questions or concerns after you return home.     Keep your hands clean:  Always wash your hands before touching your perineal area and stitches.  This helps reduce your risk of infection.  If your hands aren't dirty, you may use an alcohol hand-rub to clean  your hands. Keep your nails clean and short.

## 2017-04-21 DIAGNOSIS — D62 ACUTE POSTHEMORRHAGIC ANEMIA: ICD-10-CM

## 2017-04-21 LAB — HGB BLD-MCNC: 8.6 G/DL (ref 11.7–15.7)

## 2017-04-21 PROCEDURE — 36415 COLL VENOUS BLD VENIPUNCTURE: CPT | Performed by: FAMILY MEDICINE

## 2017-04-21 PROCEDURE — 00000218 ZZHCL STATISTIC OBHBG - HEMOGLOBIN: Performed by: FAMILY MEDICINE

## 2017-04-28 ENCOUNTER — HOSPITAL ENCOUNTER (OUTPATIENT)
Dept: LAB | Facility: CLINIC | Age: 32
Discharge: HOME | End: 2017-04-28
Attending: FAMILY MEDICINE | Admitting: FAMILY MEDICINE
Payer: COMMERCIAL

## 2017-04-28 DIAGNOSIS — D62 ACUTE POSTHEMORRHAGIC ANEMIA: ICD-10-CM

## 2017-04-28 LAB — HGB BLD-MCNC: 11.2 G/DL (ref 11.7–15.7)

## 2017-04-28 PROCEDURE — 85018 HEMOGLOBIN: CPT | Performed by: FAMILY MEDICINE

## 2017-04-28 PROCEDURE — 36415 COLL VENOUS BLD VENIPUNCTURE: CPT | Performed by: FAMILY MEDICINE

## 2017-05-31 ENCOUNTER — PRENATAL OFFICE VISIT (OUTPATIENT)
Dept: FAMILY MEDICINE | Facility: CLINIC | Age: 32
End: 2017-05-31
Payer: COMMERCIAL

## 2017-05-31 VITALS
WEIGHT: 156.4 LBS | DIASTOLIC BLOOD PRESSURE: 80 MMHG | HEIGHT: 68 IN | TEMPERATURE: 98.1 F | OXYGEN SATURATION: 97 % | BODY MASS INDEX: 23.7 KG/M2 | SYSTOLIC BLOOD PRESSURE: 124 MMHG | HEART RATE: 68 BPM

## 2017-05-31 DIAGNOSIS — E04.9 ENLARGED THYROID: ICD-10-CM

## 2017-05-31 DIAGNOSIS — D62 ACUTE POSTHEMORRHAGIC ANEMIA: ICD-10-CM

## 2017-05-31 DIAGNOSIS — Z67.40 TYPE O BLOOD, RH POSITIVE: ICD-10-CM

## 2017-05-31 DIAGNOSIS — R79.89 ELEVATED D-DIMER: ICD-10-CM

## 2017-05-31 DIAGNOSIS — Z34.80 SUPERVISION OF OTHER NORMAL PREGNANCY, ANTEPARTUM: ICD-10-CM

## 2017-05-31 DIAGNOSIS — Z87.59 HISTORY OF SHOULDER DYSTOCIA IN PRIOR PREGNANCY: ICD-10-CM

## 2017-05-31 DIAGNOSIS — D22.9 MULTIPLE PIGMENTED NEVI: ICD-10-CM

## 2017-05-31 DIAGNOSIS — Z30.011 ENCOUNTER FOR INITIAL PRESCRIPTION OF CONTRACEPTIVE PILLS: ICD-10-CM

## 2017-05-31 DIAGNOSIS — Z12.4 SCREENING FOR MALIGNANT NEOPLASM OF CERVIX: ICD-10-CM

## 2017-05-31 DIAGNOSIS — Z91.89 BREASTFEEDING PROBLEM: ICD-10-CM

## 2017-05-31 LAB
HGB BLD-MCNC: 14.2 G/DL (ref 11.7–15.7)
T3 SERPL-MCNC: 99 NG/DL (ref 60–181)
T4 FREE SERPL-MCNC: 0.91 NG/DL (ref 0.76–1.46)
TSH SERPL DL<=0.05 MIU/L-ACNC: 1.25 MU/L (ref 0.4–4)

## 2017-05-31 PROCEDURE — 36415 COLL VENOUS BLD VENIPUNCTURE: CPT | Performed by: FAMILY MEDICINE

## 2017-05-31 PROCEDURE — 99207 ZZC POST PARTUM EXAM: CPT | Performed by: FAMILY MEDICINE

## 2017-05-31 PROCEDURE — 84439 ASSAY OF FREE THYROXINE: CPT | Performed by: FAMILY MEDICINE

## 2017-05-31 PROCEDURE — 84480 ASSAY TRIIODOTHYRONINE (T3): CPT | Performed by: FAMILY MEDICINE

## 2017-05-31 PROCEDURE — G0145 SCR C/V CYTO,THINLAYER,RESCR: HCPCS | Performed by: FAMILY MEDICINE

## 2017-05-31 PROCEDURE — 87624 HPV HI-RISK TYP POOLED RSLT: CPT | Performed by: FAMILY MEDICINE

## 2017-05-31 PROCEDURE — 86800 THYROGLOBULIN ANTIBODY: CPT | Performed by: FAMILY MEDICINE

## 2017-05-31 PROCEDURE — 00000218 ZZHCL STATISTIC OBHBG - HEMOGLOBIN: Performed by: FAMILY MEDICINE

## 2017-05-31 PROCEDURE — 84443 ASSAY THYROID STIM HORMONE: CPT | Performed by: FAMILY MEDICINE

## 2017-05-31 PROCEDURE — 86376 MICROSOMAL ANTIBODY EACH: CPT | Performed by: FAMILY MEDICINE

## 2017-05-31 RX ORDER — ACETAMINOPHEN AND CODEINE PHOSPHATE 120; 12 MG/5ML; MG/5ML
1 SOLUTION ORAL DAILY
Qty: 28 TABLET | Refills: 11 | Status: SHIPPED | OUTPATIENT
Start: 2017-05-31 | End: 2018-05-03

## 2017-05-31 ASSESSMENT — ANXIETY QUESTIONNAIRES
2. NOT BEING ABLE TO STOP OR CONTROL WORRYING: NOT AT ALL
1. FEELING NERVOUS, ANXIOUS, OR ON EDGE: SEVERAL DAYS
GAD7 TOTAL SCORE: 2
5. BEING SO RESTLESS THAT IT IS HARD TO SIT STILL: NOT AT ALL
3. WORRYING TOO MUCH ABOUT DIFFERENT THINGS: NOT AT ALL
7. FEELING AFRAID AS IF SOMETHING AWFUL MIGHT HAPPEN: NOT AT ALL
6. BECOMING EASILY ANNOYED OR IRRITABLE: SEVERAL DAYS

## 2017-05-31 ASSESSMENT — PATIENT HEALTH QUESTIONNAIRE - PHQ9: 5. POOR APPETITE OR OVEREATING: NOT AT ALL

## 2017-05-31 NOTE — PROGRESS NOTES
SUBJECTIVE:                                                    Mandy Novak is a 32 year old female who presents to clinic today for the following health issues:    Post Partum Visit :                   Delivery date: 2017        Patient had a  of viable girl, weight 9 lbs 10 ozs,           with Induction and Hemorrhage complications.  Hemoglobin   Date Value Ref Range Status   2017 11.2 (L) 11.7 - 15.7 g/dL Final   2017 8.6 (L) 11.7 - 15.7 g/dL Final   did receive 2 units of PRBC's in hospital for postpartum hemorrhage.   hgb got down to 6.8 in hospital.           Accompanying Signs & Symptoms:                        Breast feeding: breastfeeding going well, every 1-3 hrs, 8-12 times/24 hours  - wondering about seeing a lactation consultant  Abdominal pain: no                       Bleeding since delivery: YES        Contraception choice: none        Patient has not had intercourse since delivery.        Last PAP: Pap letter mailed.         Pt screened for postpartum depression and complaints are: NEGATIVE         PHQ9= 1        CRISTOFER= 2    Patient Active Problem List   Diagnosis     CARDIOVASCULAR SCREENING; LDL GOAL LESS THAN 160     Situational syncope- with any blood draws or shots     ASCUS with positive high risk HPV- while pregnant 2016 = normal - recheck pap and cotest postpartum     Resolved - Marginal placenta previa with intrapartum hemorrhage in first trimester-repeat 1/15/2015= resolved      Multiple pigmented nevi     Supervision of other normal pregnancy, antepartum     Type o blood, rh positive     Cecilio breech presentation - seen on 31 week US - resolved with external version on 3/24/2017     Large for gestational age     Indication for care in labor or delivery     History of shoulder dystocia in prior pregnancy- with first baby 7lbs 14oz 2015       (normal spontaneous vaginal delivery)     Immediate postpartum hemorrhage, postpartum     Elevated d-dimer  "    Acute posthemorrhagic anemia       Current Outpatient Prescriptions   Medication Sig Dispense Refill     norethindrone (MICRONOR) 0.35 MG per tablet Take 1 tablet (0.35 mg) by mouth daily 28 tablet 11     ferrous gluconate (FERGON) 324 (38 FE) MG tablet Take 1 tablet (324 mg) by mouth daily (with breakfast) Take 1 tab twice daily for the first 1-2 weeks 90 tablet 1     lanolin ointment Apply topically every hour as needed for dry skin (sore nipples)       Prenatal Vit-Fe Fumarate-FA (PRENATAL VITAMINS) 28-0.8 MG TABS Take 1 tablet by mouth daily          No Known Allergies       Problem list and histories reviewed & adjusted, as indicated.  Additional history: as documented    Reviewed and updated as needed this visit by clinical staff  Tobacco  Allergies  Meds  Med Hx  Surg Hx  Fam Hx  Soc Hx      Reviewed and updated as needed this visit by Provider         ROS:   ROS: 12 point ROS neg other than the symptoms noted above    OBJECTIVE:                                                    /80 (BP Location: Right arm, Patient Position: Chair, Cuff Size: Adult Regular)  Pulse 68  Temp 98.1  F (36.7  C) (Oral)  Ht 5' 7.5\" (1.715 m)  Wt 156 lb 6.4 oz (70.9 kg)  LMP 07/16/2016  SpO2 97%  BMI 24.13 kg/m2  Body mass index is 24.13 kg/(m^2).   GENERAL: healthy, alert, well nourished, well hydrated, no distress  HENT: ear canals- normal; TMs- normal; Nose- normal; Mouth- no ulcers, no lesions  NECK: no tenderness, no adenopathy, no asymmetry, no masses, no stiffness; thyroid- diffusely, mildly enlarged to  palpation  RESP: lungs clear to auscultation - no rales, no rhonchi, no wheezes  BREAST: no masses, no tenderness, no nipple discharge, no palpable axillary masses or adenopathy  CV: regular rates and rhythm, normal S1 S2, no S3 or S4 and no murmur, no click or rub -  ABDOMEN: soft, no tenderness, no  hepatosplenomegaly, no masses, normal bowel sounds  MS: extremities- no gross deformities noted, no " edema  - female:normal female external genitalia;  cervix- normal, adnexae- normal; uterus- nonpregnant size - normal, no masses, no discharge  Skin: many pigmented nevi     Diagnostic test results:  See [facility] orders.      ASSESSMENT/PLAN:                                                        ICD-10-CM    1. Routine postpartum follow-up Z39.2    2. Immediate postpartum hemorrhage, postpartum O72.1 OB hemoglobin   3. Large for gestational age P08.1    4. Cecilio breech presentation, fetus 1- resolved s/p version at 36 weeks O32.1XX1    5. Supervision of other normal pregnancy, antepartum Z34.80    6. Type o blood, rh positive Z67.40    7.  (normal spontaneous vaginal delivery) O80    8. Elevated d-dimer R79.1    9. Acute posthemorrhagic anemia D62 OB hemoglobin   10. Screening for malignant neoplasm of cervix Z12.4 Pap imaged thin layer screen with HPV - recommended age 30 - 65 years (select HPV order below)     HPV High Risk Types DNA Cervical   11. History of shoulder dystocia in prior pregnancy Z87.59    12. Enlarged thyroid E04.9 Anti thyroglobulin antibody     T4 free     TSH     Thyroid peroxidase antibody     T3 total   13. Multiple pigmented nevi D22.9 DERMATOLOGY REFERRAL   14. Encounter for initial prescription of contraceptive pills Z30.011 norethindrone (MICRONOR) 0.35 MG per tablet   15. Breastfeeding problem Z91.89 LACTATION REFERRAL     Please, call or return to clinic or go to the ER immediately if signs or symptoms worsen or fail to improve as anticipated.   See Patient Instructions. .recheck with me in 1 year or sooner , if needed.          Mey Mcleod MD    [facility]

## 2017-05-31 NOTE — MR AVS SNAPSHOT
After Visit Summary   2017    Mandy Novak    MRN: [id number]           Patient Information     Date Of Birth          1985        Visit Information        Provider Department      2017 11:15 AM Mey Mcleod MD [facility] [place]        Today's Diagnoses     Routine postpartum follow-up    -  1    Immediate postpartum hemorrhage, postpartum        Large for gestational age        Cceilio breech presentation, fetus 1- resolved s/p version at 36 weeks        Supervision of other normal pregnancy, antepartum        Type o blood, rh positive         (normal spontaneous vaginal delivery)        Elevated d-dimer        Acute posthemorrhagic anemia        Screening for malignant neoplasm of cervix        History of shoulder dystocia in prior pregnancy        Enlarged thyroid        Multiple pigmented nevi        Encounter for initial prescription of contraceptive pills          Care Instructions                    Common Problems of New Mothers  Most mothers-to-be focus on the birth of their child. It is only after the birth that you begin to realize that the birth was just the beginning.  Mental and emotional preparation is just as important as preparing the layette.  Fatigue  Delivering a baby has been compared to running a marathon. Combine that with the change in sleep habits that night feedings require and you can understand why new mothers are exhausted. Anxiety over being a new parent adds to the tiredness.  Hints to help manage tiredness:  Expect to be tired, and don't be upset with yourself about it.   Nap when the baby naps.   Try to sleep at least 1 and 1/2 or 2 hours during the day for the first 2 to 3 weeks. Ask your , a friend, or relative to take care of the baby during this time.   You may want to nurse the baby in bed during night feedings.   If you are bottle feeding, share night feedings with your spouse.  Appearance  As a new mother, don't  expect miracles. It took you 9 months of pregnancy to gain weight, and it will take time to get your body back into shape. One of the biggest challenges of early motherhood may be just having the time and energy to brush your hair once a day!  Hints on appearance:  Get a haircut that is easy to care for.   Start exercising as soon as your healthcare provider gives the OK. Walk with your baby around your house, yard, or the neighborhood as often as you can. Being more physically active will help you lose weight, and walking can also help calm a fussy baby.   When you are physically ready, joining an exercise or aerobics class will get you out of the house and keep you motivated to exercise. If you go back to work, park as far away from entrances as you can and use stairs instead of elevators.   Buy some new clothes as a reward for a successful start in life as a mother and for starting regular exercise. But wait until your figure has shrunk a bit from the exercise!  Lack of Confidence  It may take some time to get used to your new baby. Even if this is not your first baby, you soon learn that all babies are different. Give yourself time to get to know your  and don t expect to get it perfect every time. Some babies are fussy, some are colicky, some develop allergies and most won t sleep through the night for many months. Babies and parenting are unpredictable. Stay flexible and have a good sense of humor. If you expect too much, it can lead to a feeling a failure.  Pain from a  Section    birth complicates the healing process and requires more rest and recuperation.  Hints for recovering from a  birth:  Use the time in the hospital to rest. You may need to control the number of phone calls and visitors.   Make sure there is some kind of help available when you come home for at least the first 2 weeks. The more rest you get during that time, the faster you will heal.   Until your  incision heals, make sure you lift your baby slowly, keeping your arms close to your body, so that you put minimal strain on your stomach muscles.  Pain from an Episiotomy   An episiotomy is an incision often made during birth to give more room for the baby to pass through the birth canal. It usually heals within 7 to 10 days and with no complications.  Hints for recovering from an episiotomy:  Warm sitz baths and heat lamps (at a safe distance) can provide some comfort.   You can prevent some of the pain by tightening your buttocks before you sit down.   Avoid straining when you have a bowel movement.  Postpartum Depression  After childbirth, many mothers feel more emotional. The hormones your body made when your were pregnant, a lack of sleep, pain from childbirth, changing eating habits and change in appearance can all lead to the baby blues. You may feel sad, afraid, or angry. For most women these baby blues are mild and go away within a week. Postpartum depression lasts longer and is more severe.  If you feel you can't, or don t want to, take care of your baby, or if you have thoughts of hurting yourself or the baby, get help. Do not try to overcome postpartum depression by yourself. It can be successfully treated with either therapy or antidepressant medicine or both.  Hints for dealing with post partum depression:  Find someone you trust to talk about how you are feeling. Other new mothers are a good support system.   Get someone to watch the baby and do something to pamper yourself. Get a massage, get a pedicure or just take a long nap. Take time to focus on yourself and not just on the baby.   Try to return to some of the things you enjoyed doing before the baby was born. It's important to know that even though you're a mom now, you still have your own interests.   Try infant massage. Spending quiet time with your baby not only can relax your baby but can relax you as well.  Don't try to be supermom. Give  yourself time to adjust to being a mother. Listen to your body and enjoy your new baby.    Published by [employer].  This content is reviewed periodically and is subject to change as new health information becomes available. The information is intended to inform and educate and is not a replacement for medical evaluation, advice, diagnosis or treatment by a healthcare professional.  Written by Kareen Villegas.    2011 [facility] and/or its affiliates. All rights reserved.                 Postpartum Depression  What is postpartum depression?   After childbirth, many mothers feel more emotional. They may feel sad, afraid, or angry. This is called postpartum blues or the baby blues. For most women these postpartum blues are mild and go away within a week. Postpartum depression lasts longer and is more severe. About 10 to 20% of women, especially very young mothers, have the more severe form.   How does it occur?   You may have postpartum depression within a few days to a few weeks after giving birth or having a miscarriage. For about 60% of women, it is their first episode of depression. While hormone changes after giving birth seem to play a part, the full causes are not known. Risk factors that increase your chances of getting postpartum depression are:   having been depressed sometime before you got pregnant   having been depressed after a previous pregnancy   having family members who were depressed, especially after a pregnancy   returning home with your baby to a very stressful home or relationship   having a baby with health problems or a baby who cries often   having a miscarriage late in pregnancy or a stillbirth   If your pregnancy was unwanted you are also at risk for post partum depression   What are the symptoms?   Besides feeling sad and uninterested in activities, you may also:   feel unable or unwilling to care for your baby   think often about bad things that could happen to your baby or feel like  hurting your baby   be irritable   have trouble falling asleep, wake up very early, or sleep too much   feel overwhelmed by everyday activities such as taking a shower or doing laundry   have little appetite or eat too much   be tired and low in energy   have low sexual desire and function   feel worthless and guilty   have trouble concentrating or remembering things   feel hopeless or just do not care about anything   have unexplained pain in your back or abdomen, or get headaches   worry that you will never feel better   Some women also become anxious, have hallucinations, or delusions. If you have hallucinations (hear voices or see things not present) or delusions (thoughts not grounded in reality) this is called postpartum psychosis. This is a medical emergency. Get help right away.   How is it diagnosed?   Your healthcare provider or a mental health professional can tell you if your symptoms are postpartum depression. He or she will ask about your symptoms and any drug or alcohol use. You may be tested to rule out medical problems such as hormone imbalances.   How is it treated?   Do not try to deal with postpartum depression by yourself. It can be successfully treated with either psychotherapy or antidepressant medicine or both. Discuss this with your healthcare provider or therapist.   Medicine  Several types of medicines can help treat postpartum depression. Discuss the use of medicines with your healthcare provider if you are breast-feeding. Your healthcare provider will carefully select a medicine for you.   You must take antidepressant medicines daily to get full benefit from them. It is recommended that you keep taking the medicine for at least 6 months.   Psychotherapy  Seeing a mental health therapist is helpful. Therapy may last a short time or may need to go on for many months. Cognitive behavioral therapy (CBT) is a way to help you identify and change thought processes that lead to depression.  Replacing negative thoughts with more positive ones can help your depression.   Claims have been made that certain herbal and dietary products help control depression symptoms. Omega-3 fatty acids may help to reduce symptoms of depression.  No herb or dietary supplement has been proven to consistently or completely relieve postpartum depression. Supplements are not tested or standardized and may vary in strengths and effects. They may have side effects and are not always safe.   Learning ways to relax may help. Yoga and meditation may also be helpful. You may want to talk with your healthcare provider about using these methods along with medicines and psychotherapy.   Support  Ask for help with night time feedings so that you can sleep. You may also find it useful to get help with household chores. Take time for yourself without your baby. Hire a sitter, leave your baby with a close friend or your spouse, and get out. Spend time with support groups and friends, and don't be afraid to share both your fears and your joys.   How long will the effects last?   In most cases postpartum depression slowly goes away in the first 9 months after birth. For a few women it lasts for more than 1 year. Treatment helps speed the recovery.   What can I do to help myself or my loved one?   Maintaining a healthy lifestyle is crucial. Staying physically and socially active, especially with your partner, is very important. Having regular sleep and eating patterns will also help you. Since you will need to be up during the night with your baby during the first few months, it is important to take naps to keep your energy up.   Certain medicines such as benzodiazepines and levofloxacin (Levaquin) can add to the symptoms of depression. It is important to check with your healthcare provider before taking any new prescription or nonprescription medicines.   To help prevent postpartum depression:   Exercise as appropriate for your physical  condition in the days right after giving birth.   Participate in activities with your significant other and baby.   Talk to your family and friends.   Ask for support.   Avoid alcohol and caffeine.   Eat a healthy diet.   Develop a regular sleep and nap pattern.   Learn ways to lower stress, such as breathing and muscle relaxation exercises.   When should I seek help?   Do not try to overcome postpartum depression by yourself. Seek professional help if you believe that you or a loved one has the symptoms described here.   Get emergency care if you or a loved one has serious thoughts of suicide or harming your baby, or if you hear voices or see things not present, or have delusions (thoughts not grounded in reality).     Published by [employer].  This content is reviewed periodically and is subject to change as new health information becomes available. The information is intended to inform and educate and is not a replacement for medical evaluation, advice, diagnosis or treatment by a healthcare professional.   Written by Haydee Murguia, PhD, for [employer].   ? 2010 [employer] and/or its affiliates. All Rights Reserved.   Copyright   Clinical Reference Systems 2011  Adult Health Advisor                    Exercise After Delivery  What are the benefits of a postpartum exercise program?   Now that your baby is here, you may want to get rid of added pregnancy pounds and get back into shape. Along with losing weight, an exercise program can help you:   Reduce stress.   Tighten stretched abdominal and pelvic muscles.   Give you more energy.   Lessen the feelings of depression that can happen after childbirth.   Prepare you for the physical demands of parenthood.   When can I start exercising?   It can take up to 1 year to recover from the changes that happen during pregnancy and childbirth. Once you have received the OK from your healthcare provider AND you feel ready, you can begin a gentle exercise program. Walking  and gentle stretching and strengthening exercises are the best exercises to start with. You should avoid any rigorous exercise such as running or jumping for at least 6 weeks after the birth of your baby. If you had a , you might also need to wait 6 weeks before you begin any abdominal strengthening exercises.   What exercises should I do?   Walking is one of the best exercises to start with because it is gentle, you do not need special equipment, and you can bring your baby with you. Begin with 15 minutes of walking at least 3 times a week. Try to increase this time 5 minutes each week. Once you are up to walking continuously for 45 minutes, increase the intensity of your workout by walking faster or walking up hills. After 6 weeks you may be able to begin a jogging program if that is your goal.   Bicycling and swimming are also good choices. Yoga and Pilates classes for new mothers can also be helpful. Usually these can be started 1 to 2 weeks after a vaginal delivery.   When your healthcare provider gives you the okay, you can begin doing exercises to strengthen your abdominal muscles.   Kegel exercises can help strengthen the muscles of your pelvic floor. The pelvic floor muscles help support the urethra, bladder, vagina, uterus, and rectum. They are used when you urinate, have bowel movements and during sex. Your healthcare provider can teach you how to do Kegel exercises.   How often should I exercise?   When you exercise, listen to your body. Don't push yourself too hard or too fast. Try to exercise at least 3 days each week, with a goal of 5 days a week. If you have to, exercise for short periods of time during the day. Two 15-minute sessions can be just as good as one 30-minute workout.   How can I make the most of my exercise program?   Warm up and cool down with light stretches before and after your workout.   Drink plenty of water before and after you exercise to avoid getting dehydrated.   Try  to eat a healthy diet to keep your energy level up.   Nurse your baby or pump before exercising if you are breastfeeding.   Wear a sports bra that fits properly.   Make sure that your exercises are enjoyable, not stressful   Remember to be patient. It may take several months before you are as fit as you were before your pregnancy.   If you have any increased pain, bleeding, or dizziness, stop exercising right away and contact your healthcare provider.     Published by [employer].  This content is reviewed periodically and is subject to change as new health information becomes available. The information is intended to inform and educate and is not a replacement for medical evaluation, advice, diagnosis or treatment by a healthcare professional.   Written by Kindra Pritchett PT, [facility], OCS.   ? 2010 [facility] and/or its affiliates. All Rights Reserved.   Copyright   Clinical Reference Systems 2011               Thank you for choosing [facility]  for your Health Care. It was a pleasure seeing you at your visit today. Please contact us with any questions or concerns you may have.                   Mey Mcleod MD                                  To reach your [facility] care team after hours call:   [phone]    Our clinic hours are:     Monday- 7:30 am - 7:00 pm                             Tuesday through Friday- 7:30 am - 5:00 pm                                        Saturday- 8:00 am - 12:00 pm                  Phone:  [phone]    Our pharmacy hours are:     Monday  8:00 am to 7:00 pm      Tuesday through Friday 8:00am to 6:00pm                        Saturday - 9:00 am to 1:00 pm      Sunday : Closed.              Phone:  [phone]      There is also information available at our web site:  [email or web address]    If your provider ordered any lab tests and you do not receive the results within 10 business days, please call the clinic.    If you need a medication  refill please contact your pharmacy.  Please allow 2 business days for your refill to be completed.    Our clinic offers telephone visits and e visits.  Please ask one of your team members to explain more.      Use HelpHivet (secure email communication and access to your chart) to send your primary care provider a message or make an appointment. Ask someone on your Team how to sign up for [employer].                 Follow-ups after your visit        Additional Services     DERMATOLOGY REFERRAL       Your provider has referred you to: [facility]: [facility] [place] [phone]   [email or web address]    Please be aware that coverage of these services is subject to the terms and limitations of your health insurance plan.  Call member services at your health plan with any benefit or coverage questions.      Please bring the following with you to your appointment:    (1) Any X-Rays, CTs or MRIs which have been performed.  Contact the facility where they were done to arrange for  prior to your scheduled appointment.  Any new CT, MRI or other procedures ordered by your specialist must be performed at a [facility] facility or coordinated by your clinic's referral office.  (2) List of current medications  (3) This referral request   (4) Any documents/labs given to you for this referral                  Who to contact     If you have questions or need follow up information about today's clinic visit or your schedule please contact [facility] [place] directly at [phone].  Normal or non-critical lab and imaging results will be communicated to you by MyChart, letter or phone within 4 business days after the clinic has received the results. If you do not hear from us within 7 days, please contact the clinic through MyChart or phone. If you have a critical or abnormal lab result, we will notify you by phone as soon as possible.  Submit refill requests through  "[employer] or call your pharmacy and they will forward the refill request to us. Please allow 3 business days for your refill to be completed.          Additional Information About Your Visit        MyChart Information     [employer] gives you secure access to your electronic health record. If you see a primary care provider, you can also send messages to your care team and make appointments. If you have questions, please call your primary care clinic.  If you do not have a primary care provider, please call [phone] and they will assist you.        Care EveryWhere ID     This is your Care EveryWhere ID. This could be used by other organizations to access your [facility] medical records  [id number]        Your Vitals Were     Pulse Temperature Height Last Period Pulse Oximetry BMI (Body Mass Index)    68 98.1  F (36.7  C) (Oral) 5' 7.5\" (1.715 m) 07/16/2016 97% 24.13 kg/m2       Blood Pressure from Last 3 Encounters:   05/31/17 124/80   04/20/17 107/73   04/13/17 110/70    Weight from Last 3 Encounters:   05/31/17 156 lb 6.4 oz (70.9 kg)   04/13/17 187 lb 12.8 oz (85.2 kg)   04/06/17 185 lb (83.9 kg)              We Performed the Following     Anti thyroglobulin antibody     DERMATOLOGY REFERRAL     HPV High Risk Types DNA Cervical     OB hemoglobin     Pap imaged thin layer screen with HPV - recommended age 30 - 65 years (select HPV order below)     T3 total     T4 free     Thyroid peroxidase antibody     TSH          Today's Medication Changes          These changes are accurate as of: 5/31/17 12:13 PM.  If you have any questions, ask your nurse or doctor.               Start taking these medicines.        Dose/Directions    norethindrone 0.35 MG per tablet   Commonly known as:  MICRONOR   Used for:  Encounter for initial prescription of contraceptive pills   Started by:  Mey Mcleod MD        Dose:  1 tablet   Take 1 tablet (0.35 mg) by mouth daily   Quantity:  28 tablet   Refills:  11            Where " to get your medicines      These medications were sent to [facility] [address] [facility] - [place], [place] - [address]  [address], [facility] [address]     Phone:  [phone]     norethindrone 0.35 MG per tablet                Primary Care Provider Office Phone # Fax #    Mey Mcleod [phone]       [address] [address]        Thank you!     Thank you for choosing [facility]  for your care. Our goal is always to provide you with excellent care. Hearing back from our patients is one way we can continue to improve our services. Please take a few minutes to complete the written survey that you may receive in the mail after your visit with us. Thank you!             Your Updated Medication List - Protect others around you: Learn how to safely use, store and throw away your medicines at www.disposemymeds.org.          This list is accurate as of: 5/31/17 12:13 PM.  Always use your most recent med list.                   Brand Name Dispense Instructions for use    ferrous gluconate 324 (38 FE) MG tablet    FERGON    90 tablet    Take 1 tablet (324 mg) by mouth daily (with breakfast) Take 1 tab twice daily for the first 1-2 weeks       lanolin ointment      Apply topically every hour as needed for dry skin (sore nipples)       norethindrone 0.35 MG per tablet    MICRONOR    28 tablet    Take 1 tablet (0.35 mg) by mouth daily       Prenatal Vitamins 28-0.8 MG Tabs      Take 1 tablet by mouth daily

## 2017-05-31 NOTE — PATIENT INSTRUCTIONS
Common Problems of New Mothers  Most mothers-to-be focus on the birth of their child. It is only after the birth that you begin to realize that the birth was just the beginning.  Mental and emotional preparation is just as important as preparing the layette.  Fatigue  Delivering a baby has been compared to running a marathon. Combine that with the change in sleep habits that night feedings require and you can understand why new mothers are exhausted. Anxiety over being a new parent adds to the tiredness.  Hints to help manage tiredness:  Expect to be tired, and don't be upset with yourself about it.   Nap when the baby naps.   Try to sleep at least 1 and 1/2 or 2 hours during the day for the first 2 to 3 weeks. Ask your , a friend, or relative to take care of the baby during this time.   You may want to nurse the baby in bed during night feedings.   If you are bottle feeding, share night feedings with your spouse.  Appearance  As a new mother, don't expect miracles. It took you 9 months of pregnancy to gain weight, and it will take time to get your body back into shape. One of the biggest challenges of early motherhood may be just having the time and energy to brush your hair once a day!  Hints on appearance:  Get a haircut that is easy to care for.   Start exercising as soon as your healthcare provider gives the OK. Walk with your baby around your house, yard, or the neighborhood as often as you can. Being more physically active will help you lose weight, and walking can also help calm a fussy baby.   When you are physically ready, joining an exercise or aerobics class will get you out of the house and keep you motivated to exercise. If you go back to work, park as far away from entrances as you can and use stairs instead of elevators.   Buy some new clothes as a reward for a successful start in life as a mother and for starting regular exercise. But wait until your figure has shrunk a bit  from the exercise!  Lack of Confidence  It may take some time to get used to your new baby. Even if this is not your first baby, you soon learn that all babies are different. Give yourself time to get to know your  and don t expect to get it perfect every time. Some babies are fussy, some are colicky, some develop allergies and most won t sleep through the night for many months. Babies and parenting are unpredictable. Stay flexible and have a good sense of humor. If you expect too much, it can lead to a feeling a failure.  Pain from a  Section    birth complicates the healing process and requires more rest and recuperation.  Hints for recovering from a  birth:  Use the time in the hospital to rest. You may need to control the number of phone calls and visitors.   Make sure there is some kind of help available when you come home for at least the first 2 weeks. The more rest you get during that time, the faster you will heal.   Until your incision heals, make sure you lift your baby slowly, keeping your arms close to your body, so that you put minimal strain on your stomach muscles.  Pain from an Episiotomy   An episiotomy is an incision often made during birth to give more room for the baby to pass through the birth canal. It usually heals within 7 to 10 days and with no complications.  Hints for recovering from an episiotomy:  Warm sitz baths and heat lamps (at a safe distance) can provide some comfort.   You can prevent some of the pain by tightening your buttocks before you sit down.   Avoid straining when you have a bowel movement.  Postpartum Depression  After childbirth, many mothers feel more emotional. The hormones your body made when your were pregnant, a lack of sleep, pain from childbirth, changing eating habits and change in appearance can all lead to the baby blues. You may feel sad, afraid, or angry. For most women these baby blues are mild and go away within a week.  Postpartum depression lasts longer and is more severe.  If you feel you can't, or don t want to, take care of your baby, or if you have thoughts of hurting yourself or the baby, get help. Do not try to overcome postpartum depression by yourself. It can be successfully treated with either therapy or antidepressant medicine or both.  Hints for dealing with post partum depression:  Find someone you trust to talk about how you are feeling. Other new mothers are a good support system.   Get someone to watch the baby and do something to pamper yourself. Get a massage, get a pedicure or just take a long nap. Take time to focus on yourself and not just on the baby.   Try to return to some of the things you enjoyed doing before the baby was born. It's important to know that even though you're a mom now, you still have your own interests.   Try infant massage. Spending quiet time with your baby not only can relax your baby but can relax you as well.  Don't try to be supermom. Give yourself time to adjust to being a mother. Listen to your body and enjoy your new baby.    Published by [employer].  This content is reviewed periodically and is subject to change as new health information becomes available. The information is intended to inform and educate and is not a replacement for medical evaluation, advice, diagnosis or treatment by a healthcare professional.  Written by Kareen Villegas.    2011 [facility] and/or its affiliates. All rights reserved.                 Postpartum Depression  What is postpartum depression?   After childbirth, many mothers feel more emotional. They may feel sad, afraid, or angry. This is called postpartum blues or the baby blues. For most women these postpartum blues are mild and go away within a week. Postpartum depression lasts longer and is more severe. About 10 to 20% of women, especially very young mothers, have the more severe form.   How does it occur?   You may have postpartum depression  within a few days to a few weeks after giving birth or having a miscarriage. For about 60% of women, it is their first episode of depression. While hormone changes after giving birth seem to play a part, the full causes are not known. Risk factors that increase your chances of getting postpartum depression are:   having been depressed sometime before you got pregnant   having been depressed after a previous pregnancy   having family members who were depressed, especially after a pregnancy   returning home with your baby to a very stressful home or relationship   having a baby with health problems or a baby who cries often   having a miscarriage late in pregnancy or a stillbirth   If your pregnancy was unwanted you are also at risk for post partum depression   What are the symptoms?   Besides feeling sad and uninterested in activities, you may also:   feel unable or unwilling to care for your baby   think often about bad things that could happen to your baby or feel like hurting your baby   be irritable   have trouble falling asleep, wake up very early, or sleep too much   feel overwhelmed by everyday activities such as taking a shower or doing laundry   have little appetite or eat too much   be tired and low in energy   have low sexual desire and function   feel worthless and guilty   have trouble concentrating or remembering things   feel hopeless or just do not care about anything   have unexplained pain in your back or abdomen, or get headaches   worry that you will never feel better   Some women also become anxious, have hallucinations, or delusions. If you have hallucinations (hear voices or see things not present) or delusions (thoughts not grounded in reality) this is called postpartum psychosis. This is a medical emergency. Get help right away.   How is it diagnosed?   Your healthcare provider or a mental health professional can tell you if your symptoms are postpartum depression. He or she will ask about  your symptoms and any drug or alcohol use. You may be tested to rule out medical problems such as hormone imbalances.   How is it treated?   Do not try to deal with postpartum depression by yourself. It can be successfully treated with either psychotherapy or antidepressant medicine or both. Discuss this with your healthcare provider or therapist.   Medicine  Several types of medicines can help treat postpartum depression. Discuss the use of medicines with your healthcare provider if you are breast-feeding. Your healthcare provider will carefully select a medicine for you.   You must take antidepressant medicines daily to get full benefit from them. It is recommended that you keep taking the medicine for at least 6 months.   Psychotherapy  Seeing a mental health therapist is helpful. Therapy may last a short time or may need to go on for many months. Cognitive behavioral therapy (CBT) is a way to help you identify and change thought processes that lead to depression. Replacing negative thoughts with more positive ones can help your depression.   Claims have been made that certain herbal and dietary products help control depression symptoms. Omega-3 fatty acids may help to reduce symptoms of depression.  No herb or dietary supplement has been proven to consistently or completely relieve postpartum depression. Supplements are not tested or standardized and may vary in strengths and effects. They may have side effects and are not always safe.   Learning ways to relax may help. Yoga and meditation may also be helpful. You may want to talk with your healthcare provider about using these methods along with medicines and psychotherapy.   Support  Ask for help with night time feedings so that you can sleep. You may also find it useful to get help with household chores. Take time for yourself without your baby. Hire a sitter, leave your baby with a close friend or your spouse, and get out. Spend time with support groups and  friends, and don't be afraid to share both your fears and your joys.   How long will the effects last?   In most cases postpartum depression slowly goes away in the first 9 months after birth. For a few women it lasts for more than 1 year. Treatment helps speed the recovery.   What can I do to help myself or my loved one?   Maintaining a healthy lifestyle is crucial. Staying physically and socially active, especially with your partner, is very important. Having regular sleep and eating patterns will also help you. Since you will need to be up during the night with your baby during the first few months, it is important to take naps to keep your energy up.   Certain medicines such as benzodiazepines and levofloxacin (Levaquin) can add to the symptoms of depression. It is important to check with your healthcare provider before taking any new prescription or nonprescription medicines.   To help prevent postpartum depression:   Exercise as appropriate for your physical condition in the days right after giving birth.   Participate in activities with your significant other and baby.   Talk to your family and friends.   Ask for support.   Avoid alcohol and caffeine.   Eat a healthy diet.   Develop a regular sleep and nap pattern.   Learn ways to lower stress, such as breathing and muscle relaxation exercises.   When should I seek help?   Do not try to overcome postpartum depression by yourself. Seek professional help if you believe that you or a loved one has the symptoms described here.   Get emergency care if you or a loved one has serious thoughts of suicide or harming your baby, or if you hear voices or see things not present, or have delusions (thoughts not grounded in reality).     Published by [employer].  This content is reviewed periodically and is subject to change as new health information becomes available. The information is intended to inform and educate and is not a replacement for medical evaluation,  advice, diagnosis or treatment by a healthcare professional.   Written by Haydee Murguia, PhD, for [employer].   ?  [facility] and/or its affiliates. All Rights Reserved.   Copyright   [employer] 2011  Adult Health Advisor                    Exercise After Delivery  What are the benefits of a postpartum exercise program?   Now that your baby is here, you may want to get rid of added pregnancy pounds and get back into shape. Along with losing weight, an exercise program can help you:   Reduce stress.   Tighten stretched abdominal and pelvic muscles.   Give you more energy.   Lessen the feelings of depression that can happen after childbirth.   Prepare you for the physical demands of parenthood.   When can I start exercising?   It can take up to 1 year to recover from the changes that happen during pregnancy and childbirth. Once you have received the OK from your healthcare provider AND you feel ready, you can begin a gentle exercise program. Walking and gentle stretching and strengthening exercises are the best exercises to start with. You should avoid any rigorous exercise such as running or jumping for at least 6 weeks after the birth of your baby. If you had a , you might also need to wait 6 weeks before you begin any abdominal strengthening exercises.   What exercises should I do?   Walking is one of the best exercises to start with because it is gentle, you do not need special equipment, and you can bring your baby with you. Begin with 15 minutes of walking at least 3 times a week. Try to increase this time 5 minutes each week. Once you are up to walking continuously for 45 minutes, increase the intensity of your workout by walking faster or walking up hills. After 6 weeks you may be able to begin a jogging program if that is your goal.   Bicycling and swimming are also good choices. Yoga and Pilates classes for new mothers can also be helpful. Usually these can be started 1 to 2  weeks after a vaginal delivery.   When your healthcare provider gives you the okay, you can begin doing exercises to strengthen your abdominal muscles.   Kegel exercises can help strengthen the muscles of your pelvic floor. The pelvic floor muscles help support the urethra, bladder, vagina, uterus, and rectum. They are used when you urinate, have bowel movements and during sex. Your healthcare provider can teach you how to do Kegel exercises.   How often should I exercise?   When you exercise, listen to your body. Don't push yourself too hard or too fast. Try to exercise at least 3 days each week, with a goal of 5 days a week. If you have to, exercise for short periods of time during the day. Two 15-minute sessions can be just as good as one 30-minute workout.   How can I make the most of my exercise program?   Warm up and cool down with light stretches before and after your workout.   Drink plenty of water before and after you exercise to avoid getting dehydrated.   Try to eat a healthy diet to keep your energy level up.   Nurse your baby or pump before exercising if you are breastfeeding.   Wear a sports bra that fits properly.   Make sure that your exercises are enjoyable, not stressful   Remember to be patient. It may take several months before you are as fit as you were before your pregnancy.   If you have any increased pain, bleeding, or dizziness, stop exercising right away and contact your healthcare provider.     Published by [employer].  This content is reviewed periodically and is subject to change as new health information becomes available. The information is intended to inform and educate and is not a replacement for medical evaluation, advice, diagnosis or treatment by a healthcare professional.   Written by Kindra Pritchett PT, [facility], OCS.   ? 2010 [employer] and/or its affiliates. All Rights Reserved.   Copyright   [employer] 2011               Thank you for choosing [facility]  for your Health Care. It was a pleasure seeing you at your visit today. Please contact us with any questions or concerns you may have.                   Mey Mcleod MD                                  To reach your [facility] care team after hours call:   [phone]    Our clinic hours are:     Monday- 7:30 am - 7:00 pm                             Tuesday through Friday- 7:30 am - 5:00 pm                                        Saturday- 8:00 am - 12:00 pm                  Phone:  [phone]    Our pharmacy hours are:     Monday  8:00 am to 7:00 pm      Tuesday through Friday 8:00am to 6:00pm                        Saturday - 9:00 am to 1:00 pm      Sunday : Closed.              Phone:  [phone]      There is also information available at our web site:  [email or web address]    If your provider ordered any lab tests and you do not receive the results within 10 business days, please call the clinic.    If you need a medication refill please contact your pharmacy.  Please allow 2 business days for your refill to be completed.    Our clinic offers telephone visits and e visits.  Please ask one of your team members to explain more.      Use [email or web address] (secure email communication and access to your chart) to send your primary care provider a message or make an appointment. Ask someone on your Team how to sign up for [employer]t.

## 2017-05-31 NOTE — NURSING NOTE
"Chief Complaint   Patient presents with     Postpartum Care       Initial /80 (BP Location: Right arm, Patient Position: Chair, Cuff Size: Adult Regular)  Pulse 68  Temp 98.1  F (36.7  C) (Oral)  Ht 5' 7.5\" (1.715 m)  Wt 156 lb 6.4 oz (70.9 kg)  LMP 07/16/2016  SpO2 97%  BMI 24.13 kg/m2 Estimated body mass index is 24.13 kg/(m^2) as calculated from the following:    Height as of this encounter: 5' 7.5\" (1.715 m).    Weight as of this encounter: 156 lb 6.4 oz (70.9 kg).  Medication Reconciliation: complete   Jeanette Saul, EDGARD  "

## 2017-06-01 ASSESSMENT — ANXIETY QUESTIONNAIRES: GAD7 TOTAL SCORE: 2

## 2017-06-01 ASSESSMENT — PATIENT HEALTH QUESTIONNAIRE - PHQ9: SUM OF ALL RESPONSES TO PHQ QUESTIONS 1-9: 1

## 2017-06-02 LAB
THYROGLOB AB SERPL IA-ACNC: <20 IU/ML (ref 0–40)
THYROPEROXIDASE AB SERPL-ACNC: 35 IU/ML

## 2017-06-06 LAB
COPATH REPORT: NORMAL
PAP: NORMAL

## 2017-06-07 LAB
FINAL DIAGNOSIS: NORMAL
HPV HR 12 DNA CVX QL NAA+PROBE: NEGATIVE
HPV16 DNA SPEC QL NAA+PROBE: NEGATIVE
HPV18 DNA SPEC QL NAA+PROBE: NEGATIVE
SPECIMEN DESCRIPTION: NORMAL

## 2017-06-28 ENCOUNTER — OFFICE VISIT (OUTPATIENT)
Dept: FAMILY MEDICINE | Facility: CLINIC | Age: 32
End: 2017-06-28
Payer: COMMERCIAL

## 2017-06-28 VITALS
WEIGHT: 156.4 LBS | DIASTOLIC BLOOD PRESSURE: 70 MMHG | SYSTOLIC BLOOD PRESSURE: 108 MMHG | TEMPERATURE: 98.6 F | BODY MASS INDEX: 24.13 KG/M2 | OXYGEN SATURATION: 97 % | HEART RATE: 72 BPM

## 2017-06-28 DIAGNOSIS — F41.9 ANXIETY: ICD-10-CM

## 2017-06-28 LAB — TSH SERPL DL<=0.005 MIU/L-ACNC: 1.06 MU/L (ref 0.4–4)

## 2017-06-28 PROCEDURE — 36415 COLL VENOUS BLD VENIPUNCTURE: CPT | Performed by: FAMILY MEDICINE

## 2017-06-28 PROCEDURE — 84443 ASSAY THYROID STIM HORMONE: CPT | Performed by: FAMILY MEDICINE

## 2017-06-28 PROCEDURE — 99213 OFFICE O/P EST LOW 20 MIN: CPT | Performed by: FAMILY MEDICINE

## 2017-06-28 ASSESSMENT — PATIENT HEALTH QUESTIONNAIRE - PHQ9: 5. POOR APPETITE OR OVEREATING: MORE THAN HALF THE DAYS

## 2017-06-28 ASSESSMENT — ANXIETY QUESTIONNAIRES
6. BECOMING EASILY ANNOYED OR IRRITABLE: NEARLY EVERY DAY
7. FEELING AFRAID AS IF SOMETHING AWFUL MIGHT HAPPEN: NOT AT ALL
5. BEING SO RESTLESS THAT IT IS HARD TO SIT STILL: NOT AT ALL
3. WORRYING TOO MUCH ABOUT DIFFERENT THINGS: NOT AT ALL
2. NOT BEING ABLE TO STOP OR CONTROL WORRYING: NOT AT ALL
IF YOU CHECKED OFF ANY PROBLEMS ON THIS QUESTIONNAIRE, HOW DIFFICULT HAVE THESE PROBLEMS MADE IT FOR YOU TO DO YOUR WORK, TAKE CARE OF THINGS AT HOME, OR GET ALONG WITH OTHER PEOPLE: SOMEWHAT DIFFICULT
GAD7 TOTAL SCORE: 8
1. FEELING NERVOUS, ANXIOUS, OR ON EDGE: NEARLY EVERY DAY

## 2017-06-28 NOTE — PROGRESS NOTES
SUBJECTIVE:                                                    Mandy Novak is a 32 year old female who presents to clinic today for the following health issues:      Abnormal Mood Symptoms - is 2 months postpartum   Onset: 6+ weeks - had some mild postpartum blues after first baby born 2 years ago, but never on medication. She had to get rid of   her 2 cats who never really adjusted to the children since her 2 year old was born and right after bringing her 2 month old home from hospital they had to place them outside the home.     Description:   Depression: YES  Anxiety: YES    Accompanying Signs & Symptoms:  Still participating in activities that you used to enjoy: no  Fatigue: YES, even with 2 month old baby girl sleeping 8 hrs at night.   Irritability: YES- a little   Difficulty concentrating: YES- a little   Changes in appetite: no  Problems with sleep: YES- sometimes   Heart racing/beating fast : no  Thoughts of hurting yourself or others: none    History:   Recent stress: YES- birth of baby and taking care of 2 yr old son as well - does have a really supportive family   Prior depression hospitalization: None  Family history of depression: YES  Family history of anxiety: YES    Precipitating factors:   Alcohol/drug use: no     Alleviating factors:  Sleeping.   Strong fam hx of depression - MGM and pt's brother.     CRISTOFER-7 SCORE 5/31/2017 6/28/2017   Total Score 2 8     PHQ-9 SCORE 5/31/2017 6/28/2017   Total Score 1 8     Pt's mother helps her a lot and she appreciates it and is very supportive of pt.     Therapies Tried and outcome: None      Patient Active Problem List   Diagnosis     CARDIOVASCULAR SCREENING; LDL GOAL LESS THAN 160     Situational syncope- with any blood draws or shots     ASCUS with positive high risk HPV- while pregnant 4/13/2016 = normal - recheck pap and cotest postpartum     Resolved - Marginal placenta previa with intrapartum hemorrhage in first trimester-repeat 1/15/2015=  resolved      Multiple pigmented nevi     Supervision of other normal pregnancy, antepartum     Type O blood, Rh positive     Cecilio breech presentation - seen on 31 week US - resolved with external version on 3/24/2017     Large for gestational age     Indication for care in labor or delivery     History of shoulder dystocia in prior pregnancy- with first baby 7lbs 14oz 2015       (normal spontaneous vaginal delivery)     Immediate postpartum hemorrhage, postpartum     Elevated d-dimer     Acute posthemorrhagic anemia     Anxiety     Postpartum depression associated with second pregnancy       Current Outpatient Prescriptions   Medication Sig Dispense Refill     norethindrone (MICRONOR) 0.35 MG per tablet Take 1 tablet (0.35 mg) by mouth daily 28 tablet 11     lanolin ointment Apply topically every hour as needed for dry skin (sore nipples)       Prenatal Vit-Fe Fumarate-FA (PRENATAL VITAMINS) 28-0.8 MG TABS Take 1 tablet by mouth daily          No Known Allergies     CBC RESULTS:   Recent Labs   Lab Test  17   1241   17   0655   WBC   --    --   10.0   RBC   --    --   2.93*   HGB  14.2   < >  8.2*   HCT   --    --   25.1*   MCV   --    --   86   MCH   --    --   28.0   MCHC   --    --   32.7   RDW   --    --   13.3   PLT   --    --   229    < > = values in this interval not displayed.     Problem list and histories reviewed & adjusted, as indicated.  Additional history: as documented      BP Readings from Last 3 Encounters:   17 108/70   17 124/80   17 107/73    Wt Readings from Last 3 Encounters:   17 156 lb 6.4 oz (70.9 kg)   17 156 lb 6.4 oz (70.9 kg)   17 187 lb 12.8 oz (85.2 kg)         Labs reviewed in EPIC    Reviewed and updated as needed this visit by clinical staff       Reviewed and updated as needed this visit by Provider         ROS:  C: NEGATIVE for fever, chills, change in weight  E/M: NEGATIVE for ear, mouth and throat problems  R: NEGATIVE  for significant cough or SOB  CV: NEGATIVE for chest pain, palpitations or peripheral edema    OBJECTIVE:                                                    /70 (BP Location: Right arm, Patient Position: Chair, Cuff Size: Adult Regular)  LMP 07/16/2016  Body mass index is 24.13 kg/(m^2).   Weight:  156 lbs 6.4 oz    GENERAL: healthy, alert, well nourished, well hydrated, in teary  Distress when talking about her feelings  HENT:  Mouth- no ulcers, no lesions  NECK: no tenderness, no adenopathy, no asymmetry, no masses, no stiffness; thyroid- normal to palpation.   RESP: lungs clear to auscultation - no rales, no rhonchi, no wheezes  CV: regular rates and rhythm, normal S1 S2, no S3 or S4 and no murmur, no click or rub   Alert and oriented. In mild teary  Distress at times. Appears well-groomed and casually dressed. Affect is normal, not particularly depressed. In good humor and laughs appropriately. Not particularly anxious. No evidence of psychosis. Denies any hallucinations or delusions.       Diagnostic test results:  none      ASSESSMENT/PLAN:                                                        ICD-10-CM    1. Postpartum depression associated with second pregnancy F53 TSH with free T4 reflex     MENTAL HEALTH REFERRAL   2. Anxiety F41.9      Pt declines anti-depressant medication at this time - she'll call right away if she changes her mind - would do citalopram 10mg daily to start. Requests a [facility]-based counselor, if possible.    Please, call or return to clinic or go to the ER immediately if signs or symptoms worsen or fail to improve as anticipated.   See Patient Instructions.   Recommended mercury-free fish oil 2000mg twice daily ( if you get fishy burps - keep your fish oil capsules in the freezer).     Recheck with me in 2 weeks or sooner if needed - ok for telephone visit, if needed.             Mey Mcleod MD  [facility] [place]

## 2017-06-28 NOTE — MR AVS SNAPSHOT
After Visit Summary   6/28/2017    Mandy Novak    MRN: [id number]           Patient Information     Date Of Birth          1985        Visit Information        Provider Department      6/28/2017 10:30 AM Mey Mcleod MD [facility] [place]        Today's Diagnoses     Postpartum depression associated with second pregnancy    -  1      Care Instructions     Daisy Mcrae MDiv, MA, LMFT.  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     UNDERSTANDING POSTPARTUM DEPRESSION  What is postpartum depression?  Postpartum depression (PPD) is a psychological disorder experienced days, weeks and even months after delivery. PPD is not completely understood, but it is recognized that psychological, biochemical and hormonal factors contribute to the disorder. PPD is fairly common, affecting, at least 10 to 20 percent of new mothers.  What are the symptoms of PPD?  Symptoms of PPD include:  Exhaustion, severe insomnia, changes in appetite, loss of sexual interest, crying spells without obvious cause, guilt, sadness and anger, despair and/or feelings of worthlessness, forgetfulness, indecisiveness, inability to concentrate, desperation or feelings of hopelessness, withdrawal and obsessive/compulsive behavior   Is PPD the  baby blues   PPD and  baby blues  are not the same.   Baby blues  is characterized by an extreme sense of letdown that lasts approximately two weeks.  PPD is more complicated and longer lasting. PPD does not resolve itself within a few weeks or a month, and if left untreated, it may lead to a more severe anxiety/panic disorder. Effective treatment does not include tranquilizers or anti anxiety medication because these serve only as temporary relief and do not correct the biochemical abnormalities associated with PPD. PPD  "should not be confused with postpartum psychosis.  Postpartum psychosis is characterized by hallucinations, delusions and thoughts of suicide or  of harming the infant.  A woman who experiences these symptoms should find a capable adult to care for her infant and seek help immediately.  What kinds of treatments are available?  A professional treatment plan may include medical and psychiatric evaluations, psychotherapy and group therapy. Medications such as antidepressants can also be prescribed. New antidepressants act more specifically toward PPD and pose no known threat to  babies. Seeking additional support from family and friends is also recommended. Local support programs can also assist mothers experiencing PPD.                 Generalized Anxiety Disorder  What is generalized anxiety disorder?   Generalized anxiety disorder (CRISTOFER) is a condition in which a person worries excessively and unrealistically. They may also be jittery, restless, or dizzy. When these symptoms last for at least 6 months, a diagnosis of CRISTOFER may be made.  CRISTOFER may exist by itself, or with both anxiety and depression. It is estimated that almost 5% of people have had this disorder during their lives.  How does it occur?   The cause of CRISTOFER is unknown. Genetic and environmental factors play a role. Women have CRISTOFER about twice as often as men.  The worry in CRISTOFER is not about panic attacks or being afraid in public places. It is typically \"free-floating\" anxiety out of proportion to any real life situation. The worrying can interfere with normal day-to-day activities and work or school.  What are the symptoms?   Symptoms include excessive, unrealistic, and uncontrollable worrying about many things such as:  the state of the world   the economy   violence in society   your job   the bills   chores   family members  Physical symptoms such as muscle tension, sleep problems, or feeling on edge usually go along with anxiety. A person may be " short-tempered and unable to focus or concentrate because of the worrying. Other symptoms include sweating, shaking, having a very fast heartbeat, feeling out of breath, needing to go to the bathroom often and feeling like fainting. People with CRISTOFER may be uneasy in a group or in a waiting room.  How is it diagnosed?   There is no lab test for CRISTOFER. Your healthcare provider or therapist will ask about your symptoms. He or she will make sure you do not have a medical illness or drug or alcohol problem that could cause the symptoms. Some medicines can cause anxiety or make it worse. These include asthma medicines, stimulants, and steroids such as prednisone.  If you have had the symptoms for at least 6 months, if you have had to cut back on your activities, and if you find it difficult to get things done, you may be diagnosed with generalized anxiety disorder.  How is it treated?   Different types of approaches have proven helpful in treating CRISTOFER. These include medicine, behavior therapy, relaxation therapy, cognitive therapy, and stress management techniques. Which treatments your healthcare provider or therapist uses may depend upon how much the disorder interferes with your day-to-day life.  Several types of medicines can help treat CRISTOFER. Your healthcare provider will work with you to carefully select the best one for you.  How long will the effects last?   CRISTOFER can last many years and sometimes an entire lifetime.   How can I take care of myself?   Get support. Talk with family and friends. Consider joining a support group in your area. Go to a stress management class in your local community.   Learn to manage stress. Ask for help at home and work when the load is too great to handle. Find ways to relax, for example take up a hobby, listen to music, watch movies, take walks. Try deep breathing exercises when you feel stressed.   Take care of your physical health. Try to get at least 7 to 9 hours of sleep each night.  Eat a healthy diet. Limit caffeine. If you smoke, quit. Avoid alcohol and drugs, because they can make your symptoms worse. Exercise according to your healthcare provider's instructions.   Check your medicines. To help prevent problems, tell your healthcare provider and pharmacist about all the medicines, natural remedies, vitamins, and other supplements that you take.   Contact your healthcare provider or therapist if you have any questions or your symptoms seem to be getting worse.  You may also want to contact [facility] (formerly the National Mental Health Association or [facility]). [facility]'s toll-free Information Center number is 3-159-596-[facility]. Its web site address is [email or web address]               Thank you for choosing [facility]  for your Health Care. It was a pleasure seeing you at your visit today. Please contact us with any questions or concerns you may have.                   Mey Mcleod MD                                  To reach your [facility] care team after hours call:   [phone]    Our clinic hours are:     Monday- 7:30 am - 7:00 pm                             Tuesday through Friday- 7:30 am - 5:00 pm                                        Saturday- 8:00 am - 12:00 pm                  Phone:  [phone]    Our pharmacy hours are:     Monday  8:00 am to 7:00 pm      Tuesday through Friday 8:00am to 6:00pm                        Saturday - 9:00 am to 1:00 pm      Sunday : Closed.              Phone:  [phone]      There is also information available at our web site:  [email or web address]    If your provider ordered any lab tests and you do not receive the results within 10 business days, please call the clinic.    If you need a medication refill please contact your pharmacy.  Please allow 2 business days for your refill to be completed.    Our clinic offers telephone visits and e visits.  Please ask one of your team members to explain  more.      Use [email or web address] (secure email communication and access to your chart) to send your primary care provider a message or make an appointment. Ask someone on your Team how to sign up for [email or web address].                             Follow-ups after your visit        Additional Services     MENTAL HEALTH REFERRAL       Your provider has referred you to: [facility]: [facility] - Counseling (Individual/Couples/Family) - [facility] [phone]   [email or web address]   *Patient will be contacted by [facility]'s scheduling partner, [facility]), to schedule an appointment.  Patients may also call P to schedule.  [facility] [phone]   [email or web address]/   *Patient will be contacted by [facility]'s scheduling partner, [facility] ([facility]), to schedule an appointment.  Patients may also call [facility] to schedule.    All scheduling is subject to the client's specific insurance plan & benefits, provider/location availability, and provider clinical specialities.  Please arrive 15 minutes early for your first appointment and bring your completed paperwork.    Please be aware that coverage of these services is subject to the terms and limitations of your health insurance plan.  Call member services at your health plan with any benefit or coverage questions.                  Who to contact     If you have questions or need follow up information about today's clinic visit or your schedule please contact [facility] directly at [phone].  Normal or non-critical lab and imaging results will be communicated to you by Usetracehart, letter or phone within 4 business days after the clinic has received the results. If you do not hear from us within 7 days, please contact the clinic through Usetracehart or phone. If you have a critical or abnormal  lab result, we will notify you by phone as soon as possible.  Submit refill requests through Plain Vanilla or call your pharmacy and they will forward the refill request to us. Please allow 3 business days for your refill to be completed.          Additional Information About Your Visit        [facility] Information     [employer] gives you secure access to your electronic health record. If you see a primary care provider, you can also send messages to your care team and make appointments. If you have questions, please call your primary care clinic.  If you do not have a primary care provider, please call [phone] and they will assist you.        Care EveryWhere ID     This is your Care EveryWhere ID. This could be used by other organizations to access your [facility] medical records  [id number]        Your Vitals Were     Last Period                   07/16/2016            Blood Pressure from Last 3 Encounters:   06/28/17 108/70   05/31/17 124/80   04/20/17 107/73    Weight from Last 3 Encounters:   05/31/17 156 lb 6.4 oz (70.9 kg)   04/13/17 187 lb 12.8 oz (85.2 kg)   04/06/17 185 lb (83.9 kg)              We Performed the Following     MENTAL HEALTH REFERRAL     TSH with free T4 reflex        Primary Care Provider Office Phone # Fax #    Mey Mcleod [phone]       [facility] [address] [place] [id number]        Equal Access to Services     [facility] : Hadii aad ku hadasho Soomaali, waaxda luqadaha, qaybta kaalmada adeegyada, miroslava vega . So [facility] [phone].    ATENCIÓN: Si habla español, tiene a loco disposición servicios gratuitos de asistencia lingüística. Cheryl al [phone].    We comply with applicable federal civil rights laws and [place] laws. We do not discriminate on the basis of race, color, national origin, age, disability sex, sexual orientation or gender identity.            Thank you!     Thank you for choosing  [facility]  for your care. Our goal is always to provide you with excellent care. Hearing back from our patients is one way we can continue to improve our services. Please take a few minutes to complete the written survey that you may receive in the mail after your visit with us. Thank you!             Your Updated Medication List - Protect others around you: Learn how to safely use, store and throw away your medicines at www.disposemymeds.org.          This list is accurate as of: 6/28/17 11:18 AM.  Always use your most recent med list.                   Brand Name Dispense Instructions for use Diagnosis    lanolin ointment      Apply topically every hour as needed for dry skin (sore nipples)    At risk for ineffective breastfeeding       norethindrone 0.35 MG per tablet    MICRONOR    28 tablet    Take 1 tablet (0.35 mg) by mouth daily    Encounter for initial prescription of contraceptive pills       Prenatal Vitamins 28-0.8 MG Tabs      Take 1 tablet by mouth daily    Absence of menstruation

## 2017-06-28 NOTE — PATIENT INSTRUCTIONS
Daisy Mcrae, Wm, MA, LMFT.  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     UNDERSTANDING POSTPARTUM DEPRESSION  What is postpartum depression?  Postpartum depression (PPD) is a psychological disorder experienced days, weeks and even months after delivery. PPD is not completely understood, but it is recognized that psychological, biochemical and hormonal factors contribute to the disorder. PPD is fairly common, affecting, at least 10 to 20 percent of new mothers.  What are the symptoms of PPD?  Symptoms of PPD include:  Exhaustion, severe insomnia, changes in appetite, loss of sexual interest, crying spells without obvious cause, guilt, sadness and anger, despair and/or feelings of worthlessness, forgetfulness, indecisiveness, inability to concentrate, desperation or feelings of hopelessness, withdrawal and obsessive/compulsive behavior   Is PPD the  baby blues   PPD and  baby blues  are not the same.   Baby blues  is characterized by an extreme sense of letdown that lasts approximately two weeks.  PPD is more complicated and longer lasting. PPD does not resolve itself within a few weeks or a month, and if left untreated, it may lead to a more severe anxiety/panic disorder. Effective treatment does not include tranquilizers or anti anxiety medication because these serve only as temporary relief and do not correct the biochemical abnormalities associated with PPD. PPD should not be confused with postpartum psychosis.  Postpartum psychosis is characterized by hallucinations, delusions and thoughts of suicide or  of harming the infant.  A woman who experiences these symptoms should find a capable adult to care for her infant and seek help immediately.  What kinds of treatments are available?  A professional treatment plan may include medical and psychiatric  "evaluations, psychotherapy and group therapy. Medications such as antidepressants can also be prescribed. New antidepressants act more specifically toward PPD and pose no known threat to  babies. Seeking additional support from family and friends is also recommended. Local support programs can also assist mothers experiencing PPD.                 Generalized Anxiety Disorder  What is generalized anxiety disorder?   Generalized anxiety disorder (CRISTOFER) is a condition in which a person worries excessively and unrealistically. They may also be jittery, restless, or dizzy. When these symptoms last for at least 6 months, a diagnosis of CRISTOFER may be made.  CRISTOFER may exist by itself, or with both anxiety and depression. It is estimated that almost 5% of people have had this disorder during their lives.  How does it occur?   The cause of CRISTOFER is unknown. Genetic and environmental factors play a role. Women have CRISTOFER about twice as often as men.  The worry in CRISTOFER is not about panic attacks or being afraid in public places. It is typically \"free-floating\" anxiety out of proportion to any real life situation. The worrying can interfere with normal day-to-day activities and work or school.  What are the symptoms?   Symptoms include excessive, unrealistic, and uncontrollable worrying about many things such as:  the state of the world   the economy   violence in society   your job   the bills   chores   family members  Physical symptoms such as muscle tension, sleep problems, or feeling on edge usually go along with anxiety. A person may be short-tempered and unable to focus or concentrate because of the worrying. Other symptoms include sweating, shaking, having a very fast heartbeat, feeling out of breath, needing to go to the bathroom often and feeling like fainting. People with CRISTOFER may be uneasy in a group or in a waiting room.  How is it diagnosed?   There is no lab test for CRISTOFER. Your healthcare provider or therapist will " ask about your symptoms. He or she will make sure you do not have a medical illness or drug or alcohol problem that could cause the symptoms. Some medicines can cause anxiety or make it worse. These include asthma medicines, stimulants, and steroids such as prednisone.  If you have had the symptoms for at least 6 months, if you have had to cut back on your activities, and if you find it difficult to get things done, you may be diagnosed with generalized anxiety disorder.  How is it treated?   Different types of approaches have proven helpful in treating CRISTOFER. These include medicine, behavior therapy, relaxation therapy, cognitive therapy, and stress management techniques. Which treatments your healthcare provider or therapist uses may depend upon how much the disorder interferes with your day-to-day life.  Several types of medicines can help treat CRISTOFER. Your healthcare provider will work with you to carefully select the best one for you.  How long will the effects last?   CRISTOFER can last many years and sometimes an entire lifetime.   How can I take care of myself?   Get support. Talk with family and friends. Consider joining a support group in your area. Go to a stress management class in your local community.   Learn to manage stress. Ask for help at home and work when the load is too great to handle. Find ways to relax, for example take up a hobby, listen to music, watch movies, take walks. Try deep breathing exercises when you feel stressed.   Take care of your physical health. Try to get at least 7 to 9 hours of sleep each night. Eat a healthy diet. Limit caffeine. If you smoke, quit. Avoid alcohol and drugs, because they can make your symptoms worse. Exercise according to your healthcare provider's instructions.   Check your medicines. To help prevent problems, tell your healthcare provider and pharmacist about all the medicines, natural remedies, vitamins, and other supplements that you take.   Contact your  healthcare provider or therapist if you have any questions or your symptoms seem to be getting worse.  You may also want to contact [facility] (formerly the National Mental Health Association or [facility]). [facility]'s toll-free Information Center number is 0-897-161-[facility]. Its web site address is [email or web address]               Thank you for choosing [facility]  for your Health Care. It was a pleasure seeing you at your visit today. Please contact us with any questions or concerns you may have.                   Mey Mcleod MD                                  To reach your [facility] care team after hours call:   [phone]    Our clinic hours are:     Monday- 7:30 am - 7:00 pm                             Tuesday through Friday- 7:30 am - 5:00 pm                                        Saturday- 8:00 am - 12:00 pm                  Phone:  [phone]    Our pharmacy hours are:     Monday  8:00 am to 7:00 pm      Tuesday through Friday 8:00am to 6:00pm                        Saturday - 9:00 am to 1:00 pm      Sunday : Closed.              Phone:  [phone]      There is also information available at our web site:  [email or web address]    If your provider ordered any lab tests and you do not receive the results within 10 business days, please call the clinic.    If you need a medication refill please contact your pharmacy.  Please allow 2 business days for your refill to be completed.    Our clinic offers telephone visits and e visits.  Please ask one of your team members to explain more.      Use NodePrimehart (secure email communication and access to your chart) to send your primary care provider a message or make an appointment. Ask someone on your Team how to sign up for Qu[facility].

## 2017-06-29 ASSESSMENT — ANXIETY QUESTIONNAIRES: GAD7 TOTAL SCORE: 8

## 2017-06-29 ASSESSMENT — PATIENT HEALTH QUESTIONNAIRE - PHQ9: SUM OF ALL RESPONSES TO PHQ QUESTIONS 1-9: 8

## 2017-08-14 ENCOUNTER — OFFICE VISIT (OUTPATIENT)
Dept: FAMILY MEDICINE | Facility: CLINIC | Age: 32
End: 2017-08-14
Payer: COMMERCIAL

## 2017-08-14 DIAGNOSIS — D22.30 COMPOUND NEVUS OF FACE: ICD-10-CM

## 2017-08-14 DIAGNOSIS — Z12.83 SKIN CANCER SCREENING: Primary | ICD-10-CM

## 2017-08-14 DIAGNOSIS — D22.9 MULTIPLE BENIGN MELANOCYTIC NEVI: ICD-10-CM

## 2017-08-14 DIAGNOSIS — D22.72: ICD-10-CM

## 2017-08-14 DIAGNOSIS — Z80.8 FAMILY HISTORY OF NONMELANOMA SKIN CANCER: ICD-10-CM

## 2017-08-14 PROCEDURE — 99213 OFFICE O/P EST LOW 20 MIN: CPT | Performed by: FAMILY MEDICINE

## 2017-08-14 NOTE — PROGRESS NOTES
[facility] - PRIMARY CARE SKIN    CC : skin cancer screening (full-body)  SUBJECTIVE:                                                    Mandy Novak is a 32 year old female who presents to clinic today for a full-body skin exam because of numerous moles. No particular lesions are bothersome. She would like moles on the face, breast and thigh to be removed due to irritation from friction.        Personal history of skin cancer : NO - has had history of excised nevi, all without dysplasia.  Family history of skin cancer : YES - ?non-melanoma in maternal grandmother.    Sun Exposure History  Previous history of significant sun exposure:  Blistering sunburns : NO  Tanning beds : YES - when younger.  Sunscreen Use : YES, frequency : daily on face and when outdoors on body.  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    Refer to electronic medical record (EMR) for past medical history and medications.    INTEGUMENTARY/SKIN: NEGATIVE for worrisome rashes or lesions  ROS : 14 point review of systems was negative except the symptoms listed above in the HPI.    This document serves as a record of the services and decisions personally performed and made by Eloina Betts MD. It was created on her behalf by Colby Lake, a trained medical scribe.  The creation of this document is based on the scribe's personal observations and the provider's statements to the medical scribe.  Colby Lake, August 14, 2017 3:20 PM      OBJECTIVE:                                                    GENERAL: healthy, alert and no distress  SKIN: Paul Skin Type - I.  This patient was examined from the top of the head to the bottom of the feet  including scalp, face, neck, back, chest, breasts, buttocks, both arms, both legs, both hands, both feet, all 10 fingers and all 10 toes. The dermatoscope was  used to help evaluate pigmented lesions.  Skin Pertinent Findings:  Face : Scattered, 4 mm - 6 mm in size, raised, flesh-colored to light brown, benign lesions consistent with compound nevi.    Arms : Multiple, brown macules most consistent with benign nevi (melanocytic nevi).    Chest : Scattered, 2 mm - 6 mm in size, brown macules most consistent with benign nevi (melanocytic nevi) on the upper and mid chest.    Left base of second toe : 3 mm in size brown macule most consistent with a benign nevus.    Back : Multiple, 2 mm - 5 mm in size, brown to pink macules and raised lesions most consistent with benign nevi (melanocytic nevi).    Significant Findings:  Abdomen, 11 o'clock position, 2 cm superior to umbilicus : 14 mm x 7 mm in size, brown macule. Dermoscopy - no suspicious characteristics.    Left medial proximal thigh : 9 mm x 5 mm in size brown lesion consistent with compound  nevus. ? Irritated benign nevus ? Other    Right lateral cheek : 7 mm x 5 mm in size raised brown lesion consistent with compound nevus. ? Irritated benign nevus ? Other    Left breast, medial lower quadrant : 6 mm x 5 mm in size, flesh-colored, raised lesion consistent with compound nevus. ? Irritated benign nevus ? Other    Diagnostic Test Results:  none           ASSESSMENT:                                                      Encounter Diagnoses   Name Primary?     Skin cancer screening Yes     Multiple benign melanocytic nevi      Compound nevus of face      Family history of nonmelanoma skin cancer      Compound nevus of left thigh          PLAN:                                                    Patient Instructions   FUTURE APPOINTMENTS    Follow up in 1 year(s) for a full-body skin cancer screening.    Return at your convenience for shave removal of lesions on the left leg, right cheek and left breast.    SUN PROTECTION INSTRUCTIONS  Sun damage can lead to skin cancer and premature aging of the skin.      The best way to  "protect from sun damage to your skin is to avoid the sun during peak hours (10 am - 2 pm) even on overcast days.      Use UPF sun-protective clothing, which while more expensive initially provides longer lasting coverage without having to worry about remembering to re-apply.  1. Wear a wide-brimmed hat and sunglasses.   2. Wear sun-protective clothing.  [employer]en and other [employer] make sun protective clothing that are stylish, comfortable and cool. [employer] and other [employer] make UV arm sleeves suitable for golfing, gardening and other activities.      Sunscreen instructions:  1. Use sunscreens with Zinc Oxide, Titanium Dioxide or Avobenzone to protect from UVA rays.  2. Use SPF 30-50+ to protect from UVB rays.  3. Re-apply every 2 hours even if water resistant.  4. Apply on your face every day even when cloudy and even in the winter. UVA \"aging rays\" penetrate window glass and are just as strong in the winter as in the summer.    Product Recommendations:    Good examples include: Prashant Chin, EltaMD, Solbar    Good daily moisturizers with SPF: Vanicream, CeraVe.    For sensitive skin, consider : SkinMedica Essential Defense Mineral Shield Broad Spectrum SPF 35      Never use tanning beds. Tanning beds are associated with much higher risks of skin cancer.    All tanning damages the skin. Aim for ivory skin year round and you will have less trouble with your skin in years to come. There is no merit in getting \"a base tan\" before a warm weather vacation, as any tanning indicates your body's response to sun damage.    Stop smoking. Smokers have higher rates of skin cancer and also have premature skin wrinkling.    FYI  You should use about 3 tablespoons of sunscreen to protect your whole body. Thus a typical eight ounce bottle of sunscreen should last 4 applications. Remember, that the SPF rating is compromised if you don t apply enough. Most people only apply 1/2 - 1/3 of the amount they need. Also " don t forget areas such as your ears, feet, upper back and harder to reach places. Keep in mind that these amounts should be increased for larger body sizes.    Sunscreens with titanium dioxide and/or zinc oxide in the active ingredients are physical blockers as opposed to chemical blockers. Chemical-free sunscreens should not irritate the skin.    Spray-on sunscreens may be used for touch-up application only, not as a base layer. Also, use with caution around small children due to inhalation risk.    Avoid retinyl palmitate products.    Avoid combination products that include both sunscreen and insect repellant, as sunscreen should be applied every 2 hours, but insect repellant should not be applied as frequently.    SPF means sun protection factor, which is just the degree to which the sunscreen can protect against UVB rays. There is no rating system for UVA rays. SPF is calculated as the time skin will burn when sunscreen is applied vs. skin without sunscreen.    Water resistant sunscreens should be re-applied every 1-2 hours.    For more information:  http://www.skincancer.org/prevention/sun-protection/sunscreen/sunscreens-safe-and-effective    SKIN CANCER SELF-EXAM INSTRUCTIONS  Check every month in the mirror or with a household member. Be aware of any changes, especially bleeding or tenderness. Also, make sure to check your nails for color changes after removal of nail polish.    For melanoma, check for:  A - Asymmetry. One half unlike the other half.  B - Border. Irregular, scalloped, ragged, notched, blurred or poorly defined borders.  C - Color. Color variations from one area to another, with shades of tan, brown and/or black present. Sometimes white, red or blue.  D - Diameter. Greater than 6 mm (about the size of a pencil eraser). Any new growth of a mole should be concerning and be evaluated.  E - Evolving. A mole or skin lesion that looks different from the rest or is changing in size, shape or  color.    For basal cell carcinoma and squamous cell carcinoma, check for:    Sores, shiny bumps, nodules, scaly lesions, or wart-like growths that are itchy, tender, crusting, scabbing, eroding, oozing or bleeding.    Open sores/wounds or reddish/irritated areas that do not heal within 2-3 weeks.    Scar-like areas that are white, yellow or waxy in color.    The patient was counseled about sunscreens and sun avoidance. The patient was counseled to check the skin regularly and report any lesion that is new, changing, itching, scabbing, bleeding or otherwise bothersome. The patient was discharged ambulatory and in stable condition.    Recommendations : q1 year skin exams.  Educational brochures given to patient : skin cancer.      PROCEDURES:                                                    None.    TT : 25 minutes.  CT : 15 minutes.      The information in this document, created by the medical scribe for me, accurately reflects the services I personally performed and the decisions made by me. I have reviewed and approved this document for accuracy prior to leaving the patient care area.  Eloina Betts MD August 14, 2017 3:20 PM  [facility] - PRIMARY CARE SKIN

## 2017-08-14 NOTE — PATIENT INSTRUCTIONS
"FUTURE APPOINTMENTS    Follow up in 1 year(s) for a full-body skin cancer screening.    Return at your convenience for shave removal of lesions on the left leg, right cheek and left breast.    SUN PROTECTION INSTRUCTIONS  Sun damage can lead to skin cancer and premature aging of the skin.      The best way to protect from sun damage to your skin is to avoid the sun during peak hours (10 am - 2 pm) even on overcast days.      Use UPF sun-protective clothing, which while more expensive initially provides longer lasting coverage without having to worry about remembering to re-apply.  1. Wear a wide-brimmed hat and sunglasses.   2. Wear sun-protective clothing.  [employer] and other [employer] make sun protective clothing that are stylish, comfortable and cool. [employer] and other [employer] make UV arm sleeves suitable for golfing, gardening and other activities.      Sunscreen instructions:  1. Use sunscreens with Zinc Oxide, Titanium Dioxide or Avobenzone to protect from UVA rays.  2. Use SPF 30-50+ to protect from UVB rays.  3. Re-apply every 2 hours even if water resistant.  4. Apply on your face every day even when cloudy and even in the winter. UVA \"aging rays\" penetrate window glass and are just as strong in the winter as in the summer.    Product Recommendations:    Good examples include: Prashant Chin, EltaMD, Solbar    Good daily moisturizers with SPF: Vanicream, CeraVe.    For sensitive skin, consider : SkinMedica Essential Defense Mineral Shield Broad Spectrum SPF 35      Never use tanning beds. Tanning beds are associated with much higher risks of skin cancer.    All tanning damages the skin. Aim for ivory skin year round and you will have less trouble with your skin in years to come. There is no merit in getting \"a base tan\" before a warm weather vacation, as any tanning indicates your body's response to sun damage.    Stop smoking. Smokers have higher rates of skin cancer and also have premature " skin wrinkling.    FYI  You should use about 3 tablespoons of sunscreen to protect your whole body. Thus a typical eight ounce bottle of sunscreen should last 4 applications. Remember, that the SPF rating is compromised if you don t apply enough. Most people only apply 1/2 - 1/3 of the amount they need. Also don t forget areas such as your ears, feet, upper back and harder to reach places. Keep in mind that these amounts should be increased for larger body sizes.    Sunscreens with titanium dioxide and/or zinc oxide in the active ingredients are physical blockers as opposed to chemical blockers. Chemical-free sunscreens should not irritate the skin.    Spray-on sunscreens may be used for touch-up application only, not as a base layer. Also, use with caution around small children due to inhalation risk.    Avoid retinyl palmitate products.    Avoid combination products that include both sunscreen and insect repellant, as sunscreen should be applied every 2 hours, but insect repellant should not be applied as frequently.    SPF means sun protection factor, which is just the degree to which the sunscreen can protect against UVB rays. There is no rating system for UVA rays. SPF is calculated as the time skin will burn when sunscreen is applied vs. skin without sunscreen.    Water resistant sunscreens should be re-applied every 1-2 hours.    For more information:  http://www.skincancer.org/prevention/sun-protection/sunscreen/sunscreens-safe-and-effective    SKIN CANCER SELF-EXAM INSTRUCTIONS  Check every month in the mirror or with a household member. Be aware of any changes, especially bleeding or tenderness. Also, make sure to check your nails for color changes after removal of nail polish.    For melanoma, check for:  A - Asymmetry. One half unlike the other half.  B - Border. Irregular, scalloped, ragged, notched, blurred or poorly defined borders.  C - Color. Color variations from one area to another, with shades of  tan, brown and/or black present. Sometimes white, red or blue.  D - Diameter. Greater than 6 mm (about the size of a pencil eraser). Any new growth of a mole should be concerning and be evaluated.  E - Evolving. A mole or skin lesion that looks different from the rest or is changing in size, shape or color.    For basal cell carcinoma and squamous cell carcinoma, check for:    Sores, shiny bumps, nodules, scaly lesions, or wart-like growths that are itchy, tender, crusting, scabbing, eroding, oozing or bleeding.    Open sores/wounds or reddish/irritated areas that do not heal within 2-3 weeks.    Scar-like areas that are white, yellow or waxy in color.

## 2017-08-14 NOTE — MR AVS SNAPSHOT
"              After Visit Summary   8/14/2017    Mandy Novak    MRN: [id number]           Patient Information     Date Of Birth          1985        Visit Information        Provider Department      8/14/2017 3:20 PM Yolande Betts MD [facility] - Primary Care Skin        Today's Diagnoses     Skin cancer screening    -  1    Multiple benign melanocytic nevi        Compound nevus of face        Family history of nonmelanoma skin cancer        Compound nevus of left thigh          Care Instructions    FUTURE APPOINTMENTS    Follow up in 1 year(s) for a full-body skin cancer screening.    Return at your convenience for shave removal of lesions on the left leg, right cheek and left breast.    SUN PROTECTION INSTRUCTIONS  Sun damage can lead to skin cancer and premature aging of the skin.      The best way to protect from sun damage to your skin is to avoid the sun during peak hours (10 am - 2 pm) even on overcast days.      Use UPF sun-protective clothing, which while more expensive initially provides longer lasting coverage without having to worry about remembering to re-apply.  1. Wear a wide-brimmed hat and sunglasses.   2. Wear sun-protective clothing.  [employer] and other [employer] make sun protective clothing that are stylish, comfortable and cool. [employer] [place] and other [employer] make UV arm sleeves suitable for golfing, gardening and other activities.      Sunscreen instructions:  1. Use sunscreens with Zinc Oxide, Titanium Dioxide or Avobenzone to protect from UVA rays.  2. Use SPF 30-50+ to protect from UVB rays.  3. Re-apply every 2 hours even if water resistant.  4. Apply on your face every day even when cloudy and even in the winter. UVA \"aging rays\" penetrate window glass and are just as strong in the winter as in the summer.    Product Recommendations:    Good examples include: Blue Lizard, EltaMD, Solbar    Good daily moisturizers with SPF: Vanicream, " "CeraVe.    For sensitive skin, consider : SkinMedica Essential Defense Mineral Shield Broad Spectrum SPF 35      Never use tanning beds. Tanning beds are associated with much higher risks of skin cancer.    All tanning damages the skin. Aim for ivory skin year round and you will have less trouble with your skin in years to come. There is no merit in getting \"a base tan\" before a warm weather vacation, as any tanning indicates your body's response to sun damage.    Stop smoking. Smokers have higher rates of skin cancer and also have premature skin wrinkling.    FYI  You should use about 3 tablespoons of sunscreen to protect your whole body. Thus a typical eight ounce bottle of sunscreen should last 4 applications. Remember, that the SPF rating is compromised if you don t apply enough. Most people only apply 1/2 - 1/3 of the amount they need. Also don t forget areas such as your ears, feet, upper back and harder to reach places. Keep in mind that these amounts should be increased for larger body sizes.    Sunscreens with titanium dioxide and/or zinc oxide in the active ingredients are physical blockers as opposed to chemical blockers. Chemical-free sunscreens should not irritate the skin.    Spray-on sunscreens may be used for touch-up application only, not as a base layer. Also, use with caution around small children due to inhalation risk.    Avoid retinyl palmitate products.    Avoid combination products that include both sunscreen and insect repellant, as sunscreen should be applied every 2 hours, but insect repellant should not be applied as frequently.    SPF means sun protection factor, which is just the degree to which the sunscreen can protect against UVB rays. There is no rating system for UVA rays. SPF is calculated as the time skin will burn when sunscreen is applied vs. skin without sunscreen.    Water resistant sunscreens should be re-applied every 1-2 hours.    For more " information:  http://www.skincancer.org/prevention/sun-protection/sunscreen/sunscreens-safe-and-effective    SKIN CANCER SELF-EXAM INSTRUCTIONS  Check every month in the mirror or with a household member. Be aware of any changes, especially bleeding or tenderness. Also, make sure to check your nails for color changes after removal of nail polish.    For melanoma, check for:  A - Asymmetry. One half unlike the other half.  B - Border. Irregular, scalloped, ragged, notched, blurred or poorly defined borders.  C - Color. Color variations from one area to another, with shades of tan, brown and/or black present. Sometimes white, red or blue.  D - Diameter. Greater than 6 mm (about the size of a pencil eraser). Any new growth of a mole should be concerning and be evaluated.  E - Evolving. A mole or skin lesion that looks different from the rest or is changing in size, shape or color.    For basal cell carcinoma and squamous cell carcinoma, check for:    Sores, shiny bumps, nodules, scaly lesions, or wart-like growths that are itchy, tender, crusting, scabbing, eroding, oozing or bleeding.    Open sores/wounds or reddish/irritated areas that do not heal within 2-3 weeks.    Scar-like areas that are white, yellow or waxy in color.          Follow-ups after your visit        Who to contact     If you have questions or need follow up information about today's clinic visit or your schedule please contact [facility] - PRIMARY CARE SKIN directly at [phone].  Normal or non-critical lab and imaging results will be communicated to you by [employer], letter or phone within 4 business days after the clinic has received the results. If you do not hear from us within 7 days, please contact the clinic through [employer]uun[employer] or phone. If you have a critical or abnormal lab result, we will notify you by phone as soon as possible.  Submit refill requests through [employer] or call your pharmacy and they will forward the refill request to us.  Please allow 3 business days for your refill to be completed.          Additional Information About Your Visit        MyChart Information     [employer] gives you secure access to your electronic health record. If you see a primary care provider, you can also send messages to your care team and make appointments. If you have questions, please call your primary care clinic.  If you do not have a primary care provider, please call [phone] and they will assist you.        Care EveryWhere ID     This is your Care EveryWhere ID. This could be used by other organizations to access your [place] medical records  [id number]         Blood Pressure from Last 3 Encounters:   06/28/17 108/70   05/31/17 124/80   04/20/17 107/73    Weight from Last 3 Encounters:   06/28/17 156 lb 6.4 oz (70.9 kg)   05/31/17 156 lb 6.4 oz (70.9 kg)   04/13/17 187 lb 12.8 oz (85.2 kg)              Today, you had the following     No orders found for display       Primary Care Provider Office Phone # Fax #    Mey Mcleod [phone] [address] [facility] [address]        Equal Access to Services     [facility] Hospital[facility] : Hadii aad ku hadasho Solela, waaxda luqadaha, qaybta kaalmada adeandrewyada, miroslava ramirez. So [facility] [phone].    ATENCIÓN: Si habla español, tiene a loco disposición servicios gratuitos de asistencia lingüística. [facility] [phone].    We comply with applicable federal civil rights laws and [place] laws. We do not discriminate on the basis of race, color, national origin, age, disability sex, sexual orientation or gender identity.            Thank you!     Thank you for choosing [facility] - PRIMARY CARE [facility]  for your care. Our goal is always to provide you with excellent care. Hearing back from our patients is one way we can continue to improve our services. Please take a few minutes to complete the written survey that you may receive in the mail  after your visit with us. Thank you!             Your Updated Medication List - Protect others around you: Learn how to safely use, store and throw away your medicines at www.disposemymeds.org.          This list is accurate as of: 8/14/17  3:33 PM.  Always use your most recent med list.                   Brand Name Dispense Instructions for use Diagnosis    lanolin ointment      Apply topically every hour as needed for dry skin (sore nipples)    At risk for ineffective breastfeeding       norethindrone 0.35 MG per tablet    MICRONOR    28 tablet    Take 1 tablet (0.35 mg) by mouth daily    Encounter for initial prescription of contraceptive pills       Prenatal Vitamins 28-0.8 MG Tabs      Take 1 tablet by mouth daily    Absence of menstruation

## 2017-09-01 ENCOUNTER — ALLIED HEALTH/NURSE VISIT (OUTPATIENT)
Dept: NURSING | Facility: CLINIC | Age: 32
End: 2017-09-01
Payer: COMMERCIAL

## 2017-09-01 DIAGNOSIS — Z23 NEED FOR PROPHYLACTIC VACCINATION AND INOCULATION AGAINST INFLUENZA: Primary | ICD-10-CM

## 2017-09-01 PROCEDURE — 90686 IIV4 VACC NO PRSV 0.5 ML IM: CPT

## 2017-09-01 PROCEDURE — 90471 IMMUNIZATION ADMIN: CPT

## 2017-09-01 NOTE — PROGRESS NOTES
Injectable Influenza Immunization Documentation    1.  Is the person to be vaccinated sick today?  No    2. Does the person to be vaccinated have an allergy to eggs or to a component of the vaccine?  No    3. Has the person to be vaccinated today ever had a serious reaction to influenza vaccine in the past?  No    4. Has the person to be vaccinated ever had Guillain-Co[place] syndrome?  No     Form completed by Jeanette Saul CMA

## 2017-09-01 NOTE — MR AVS SNAPSHOT
After Visit Summary   9/1/2017    Mandy Novak    MRN: [id number]           Patient Information     Date Of Birth          1985        Visit Information        Provider Department      9/1/2017 2:45 PM BASIM MILLER/LPN [facility]        Today's Diagnoses     Need for prophylactic vaccination and inoculation against influenza    -  1       Follow-ups after your visit        Your next 10 appointments already scheduled     Sep 19, 2017 10:00 AM CDT   Office Visit with Yolande Betts MD   [facility] ([facility] )    [address] [place] [address]  [address] [place] [id number]   [phone]           Bring a current list of meds and any records pertaining to this visit. For Physicals, please bring immunization records and any forms needing to be filled out. Please arrive 10 minutes early to complete paperwork.              Who to contact     If you have questions or need follow up information about today's clinic visit or your schedule please contact [facility] directly at [phone].  Normal or non-critical lab and imaging results will be communicated to you by Jorotohart, letter or phone within 4 business days after the clinic has received the results. If you do not hear from us within 7 days, please contact the clinic through Jorotohart or phone. If you have a critical or abnormal lab result, we will notify you by phone as soon as possible.  Submit refill requests through Social[employer]es App or call your pharmacy and they will forward the refill request to us. Please allow 3 business days for your refill to be completed.          Additional Information About Your Visit        MyChart Information     [employer] App gives you secure access to your electronic health record. If you see a primary care provider, you can also send messages to your care team and make appointments. If you have questions, please call your primary care  clinic.  If you do not have a primary care provider, please call [phone] and they will assist you.        Care EveryWhere ID     This is your Care EveryWhere ID. This could be used by other organizations to access your [facility] medical records  [id number]         Blood Pressure from Last 3 Encounters:   06/28/17 108/70   05/31/17 124/80   04/20/17 107/73    Weight from Last 3 Encounters:   06/28/17 156 lb 6.4 oz (70.9 kg)   05/31/17 156 lb 6.4 oz (70.9 kg)   04/13/17 187 lb 12.8 oz (85.2 kg)              We Performed the Following     FLU VAC, SPLIT VIRUS IM > 3 YO (QUADRIVALENT) [66638]     Vaccine Administration, Initial [28085]        Primary Care Provider Office Phone # Fax #    Mey Mcleod [phone] [facility] [address]        Equal Access to Services     RODRIGO KAYE : Hadii aad ku hadasho Soomaali, waaxda luqadaha, qaybta kaalmada adeegyada, waxay idiin haycarltonn maris vega . So [facility] [phone].    ATENCIÓN: Si habla español, tiene a loco disposición servicios gratuitos de asistencia lingüística. Llame al [phone].    We comply with applicable federal civil rights laws and [place] laws. We do not discriminate on the basis of race, color, national origin, age, disability sex, sexual orientation or gender identity.            Thank you!     Thank you for choosing [facility]  for your care. Our goal is always to provide you with excellent care. Hearing back from our patients is one way we can continue to improve our services. Please take a few minutes to complete the written survey that you may receive in the mail after your visit with us. Thank you!             Your Updated Medication List - Protect others around you: Learn how to safely use, store and throw away your medicines at www.disposemymeds.org.          This list is accurate as of: 9/1/17  3:04 PM.  Always use your most recent med list.                    Brand Name Dispense Instructions for use Diagnosis    lanolin ointment      Apply topically every hour as needed for dry skin (sore nipples)    At risk for ineffective breastfeeding       norethindrone 0.35 MG per tablet    MICRONOR    28 tablet    Take 1 tablet (0.35 mg) by mouth daily    Encounter for initial prescription of contraceptive pills       Prenatal Vitamins 28-0.8 MG Tabs      Take 1 tablet by mouth daily    Absence of menstruation

## 2017-09-19 ENCOUNTER — OFFICE VISIT (OUTPATIENT)
Dept: FAMILY MEDICINE | Facility: CLINIC | Age: 32
End: 2017-09-19
Payer: COMMERCIAL

## 2017-09-19 DIAGNOSIS — D48.5 NEOPLASM OF UNCERTAIN BEHAVIOR OF SKIN: Primary | ICD-10-CM

## 2017-09-19 PROCEDURE — 11300 SHAVE SKIN LESION 0.5 CM/<: CPT | Mod: 51 | Performed by: FAMILY MEDICINE

## 2017-09-19 PROCEDURE — 88305 TISSUE EXAM BY PATHOLOGIST: CPT | Performed by: FAMILY MEDICINE

## 2017-09-19 PROCEDURE — 11311 SHAVE SKIN LESION 0.6-1.0 CM: CPT | Performed by: FAMILY MEDICINE

## 2017-09-19 PROCEDURE — 99000 SPECIMEN HANDLING OFFICE-LAB: CPT | Performed by: FAMILY MEDICINE

## 2017-09-19 PROCEDURE — 11301 SHAVE SKIN LESION 0.6-1.0 CM: CPT | Mod: 51 | Performed by: FAMILY MEDICINE

## 2017-09-19 PROCEDURE — 11301 SHAVE SKIN LESION 0.6-1.0 CM: CPT | Mod: 59 | Performed by: FAMILY MEDICINE

## 2017-09-19 NOTE — MR AVS SNAPSHOT
"              After Visit Summary   9/19/2017    Mandy Novak    MRN: [id number]           Patient Information     Date Of Birth          1985        Visit Information        Provider Department      9/19/2017 10:00 AM Yolande Betts MD [facility] Primary [facility]        Today's Diagnoses     Neoplasm of uncertain behavior of skin    -  1      Care Instructions    FUTURE APPOINTMENTS  Follow up per pathology report.    WOUND CARE INSTRUCTIONS  1. After 24 hours, change dressing daily.  2. Wash hands before every dressing change.  3. Wash the wound area with a mild soap, then rinse  4. Gently pat dry with a sterile gauze or Q-tip.  5. Apply Vaseline or Aquaphor only over entire wound. Do NOT use Neosporin - as many people react to neomycin.  6. Finally, cover with a bandage or sterile non-stick gauze with micropore paper tape.  7. Repeat once daily until wound has healed.    Do not let the wound dry out.  The wound will heal faster and with better cosmetic results if routinely kept moist with step #5. It is a false belief that a wound heals better when it is exposed to air and allowed to dry out.      Soap, water and shampoo will not hurt this area.    Do not go swimming or take baths, but showering is encouraged.    Limit use of the area where the procedure was done for a few days to allow for optimal healing.    If you experience bleeding:  Repeat steps #2-5 and hold firm pressure on the area for 10 minutes without checking to see if the bleeding has stopped. \"Checking\" pulls off the protective wound clot and restarts the bleeding all over again. Re-apply pressure for 10 minutes if necessary to stop bleeding.  Use additional sterile gauze and tape to maintain pressure once bleeding has stopped.    Signs of Infection:  Infection can occur in any area where skin has been disrupted.  If you notice persistent redness, swelling, colored drainage, increasing pain, fever or other signs of " infection, please call us at: [phone] and ask to have me or my colleague paged. We will call you back to discuss.    Pathology Results:  You will be notified, generally via letter or MyChart, in approximately 10 days. If there is anything we need to discuss or further treatment needed, I will call you to discuss it.    Skin tissue can be some of the most challenging tissue for pathologists to examine, therefore we may use a specialist outside the [employer] system to read certain submitted tissue samples. When submitted to these outside specialists, [employer] will notify you that your tissue sample result has returned. However, this only means that the tissue sample has been sent to the specialist. These results are not available in [employer], so I will contact you when I receive the final report.    PATIENT INFORMATION : WOUNDS  During the healing process you will notice a number of changes. All wounds develop a small halo of redness surrounding the wound.  This means healing is occurring. Severe itching with extensive redness usually indicates sensitivity to the ointment or bandage tape used to dress the wound.  You should call our office if this develops.      Swelling  and/or discoloration around your surgical site is common, particularly when performed around the eye.    All wounds normally drain.  The larger the wound the more drainage there will be.  After 7-10 days, you will notice the wound beginning to shrink and new skin will begin to grow.  The wound is healed when you can see skin has formed over the entire area.  A healed wound has a healthy, shiny look to the surface and is red to dark pink in color to normalize.  Wounds may take approximately 4-6 weeks to heal.  Larger wounds may take 6-8 weeks. After the wound is healed you may discontinue dressing changes.    You may experience a sensation of tightness as your wound heals. This is normal and will gradually subside.    Your healed wound may be  sensitive to temperature changes. This sensitivity improves with time, but if you re having a lot of discomfort, try to avoid temperature extremes.    Patients frequently experience itching after their wound appears to have healed because of the continue healing under the skin.  Plain Vaseline will help relieve the itching.            Follow-ups after your visit        Who to contact     If you have questions or need follow up information about today's clinic visit or your schedule please contact [facility] directly at [phone].  Normal or non-critical lab and imaging results will be communicated to you by CureVachart, letter or phone within 4 business days after the clinic has received the results. If you do not hear from us within 7 days, please contact the clinic through Sellobuyt or phone. If you have a critical or abnormal lab result, we will notify you by phone as soon as possible.  Submit refill requests through [employer] or call your pharmacy and they will forward the refill request to us. Please allow 3 business days for your refill to be completed.          Additional Information About Your Visit        CureVachart Information     [employer] gives you secure access to your electronic health record. If you see a primary care provider, you can also send messages to your care team and make appointments. If you have questions, please call your primary care clinic.  If you do not have a primary care provider, please call [phone] and they will assist you.        Care EveryWhere ID     This is your Care EveryWhere ID. This could be used by other organizations to access your [facility] medical records  [id number]         Blood Pressure from Last 3 Encounters:   06/28/17 108/70   05/31/17 124/80   04/20/17 107/73    Weight from Last 3 Encounters:   06/28/17 156 lb 6.4 oz (70.9 kg)   05/31/17 156 lb 6.4 oz (70.9 kg)   04/13/17 187 lb 12.8 oz (85.2 kg)              Today, you had the following      No orders found for display       Primary Care Provider Office Phone # Fax #    Mey Mcleod [phone]       [address] [place] [id number]        Equal Access to Services     RODRIGO KAYE : Hadii torres stein kristy Solela, waaxda luqadaha, qaybta kaalmada adeegyada, miroslava larsen gary ramirez. So [facility] [phone].    ATENCIÓN: Si habla español, tiene a loco disposición servicios gratuitos de asistencia lingüística. Llame al [phone].    We comply with applicable federal civil rights laws and [place] laws. We do not discriminate on the basis of race, color, national origin, age, disability sex, sexual orientation or gender identity.            Thank you!     Thank you for choosing [facility]  for your care. Our goal is always to provide you with excellent care. Hearing back from our patients is one way we can continue to improve our services. Please take a few minutes to complete the written survey that you may receive in the mail after your visit with us. Thank you!             Your Updated Medication List - Protect others around you: Learn how to safely use, store and throw away your medicines at www.disposemymeds.org.          This list is accurate as of: 9/19/17 10:11 AM.  Always use your most recent med list.                   Brand Name Dispense Instructions for use Diagnosis    lanolin ointment      Apply topically every hour as needed for dry skin (sore nipples)    At risk for ineffective breastfeeding       norethindrone 0.35 MG per tablet    MICRONOR    28 tablet    Take 1 tablet (0.35 mg) by mouth daily    Encounter for initial prescription of contraceptive pills       Prenatal Vitamins 28-0.8 MG Tabs      Take 1 tablet by mouth daily    Absence of menstruation

## 2017-09-19 NOTE — PROGRESS NOTES
[facility] - PRIMARY CARE SKIN    CC : lesion(s)  SUBJECTIVE:                                                    Mandy Novak is a 32 year old female who presents to clinic today for follow-up shave excisions. When evaluated in August 2017, she reported irritation from friction of moles on the face, back and thigh.    Personal history of skin cancer : NO - has had history of excised nevi, all without dysplasia.  Family history of skin cancer : YES - ?non-melanoma in maternal grandmother.    Sun Exposure History  Previous history of significant sun exposure:  Blistering sunburns : NO  Tanning beds : YES - when younger.  Sunscreen Use : YES, frequency : daily on face and when outdoors on body, SPF: 15.  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    Refer to electronic medical record (EMR) for past medical history and medications.    INTEGUMENTARY/SKIN: NEGATIVE for worrisome rashes or lesions  ROS : 14 point review of systems was negative except the symptoms listed above in the HPI.    This document serves as a record of the services and decisions personally performed and made by Eloina Betts MD. It was created on her behalf by Colby Lake, a trained medical scribe.  The creation of this document is based on the scribe's personal observations and the provider's statements to the medical scribe.  Colby Lake, September 19, 2017 10:05 AM      OBJECTIVE:                                                    GENERAL: healthy, alert and no distress  SKIN: Paul Skin Type - I.  Face, trunk and legs were examined. The dermatoscope was used to help evaluate pigmented lesions.  Skin Pertinent Findings:  Left medial proximal thigh : 9 mm x 5 mm in size brown lesion consistent with compound  nevus. ? Irritated benign nevus ? Other    Right lateral cheek : 7 mm x 5 mm in size raised  brown lesion consistent with compound nevus. ? Irritated benign nevus ? Other    Left lower back, at L1, 15 mm left of midline : 4 mm in size raised brown lesion consistent with compound nevus. ? Irritated benign nevus ? Other    Upper back, midline, at T1 : 6 mm in size, raised brown lesion consistent with compound nevus. ? Irritated benign nevus ? Other.    Left breast, medial lower quadrant : 6 mm x 5 mm in size, flesh-colored, raised lesion consistent with compound nevus. ? Irritated benign nevus ? Other. Shave excision deferred for the future.    Diagnostic Test Results:  none           ASSESSMENT:                                                      Encounter Diagnosis   Name Primary?     Neoplasm of uncertain behavior of skin Yes         PLAN:                                                    Patient Instructions   FUTURE APPOINTMENTS  Follow up per pathology report.    WOUND CARE INSTRUCTIONS  1. After 24 hours, change dressing daily.  2. Wash hands before every dressing change.  3. Wash the wound area with a mild soap, then rinse  4. Gently pat dry with a sterile gauze or Q-tip.  5. Apply Vaseline or Aquaphor only over entire wound. Do NOT use Neosporin - as many people react to neomycin.  6. Finally, cover with a bandage or sterile non-stick gauze with micropore paper tape.  7. Repeat once daily until wound has healed.    Do not let the wound dry out.  The wound will heal faster and with better cosmetic results if routinely kept moist with step #5. It is a false belief that a wound heals better when it is exposed to air and allowed to dry out.      Soap, water and shampoo will not hurt this area.    Do not go swimming or take baths, but showering is encouraged.    Limit use of the area where the procedure was done for a few days to allow for optimal healing.    If you experience bleeding:  Repeat steps #2-5 and hold firm pressure on the area for 10 minutes without checking to see if the bleeding has  "stopped. \"Checking\" pulls off the protective wound clot and restarts the bleeding all over again. Re-apply pressure for 10 minutes if necessary to stop bleeding.  Use additional sterile gauze and tape to maintain pressure once bleeding has stopped.    Signs of Infection:  Infection can occur in any area where skin has been disrupted.  If you notice persistent redness, swelling, colored drainage, increasing pain, fever or other signs of infection, please call us at: [phone] and ask to have me or my colleague paged. We will call you back to discuss.    Pathology Results:  You will be notified, generally via letter or MyChart, in approximately 10 days. If there is anything we need to discuss or further treatment needed, I will call you to discuss it.    Skin tissue can be some of the most challenging tissue for pathologists to examine, therefore we may use a specialist outside the [employer] system to read certain submitted tissue samples. When submitted to these outside specialists, [facility] will notify you that your tissue sample result has returned. However, this only means that the tissue sample has been sent to the specialist. These results are not available in [facility], so I will contact you when I receive the final report.    PATIENT INFORMATION : WOUNDS  During the healing process you will notice a number of changes. All wounds develop a small halo of redness surrounding the wound.  This means healing is occurring. Severe itching with extensive redness usually indicates sensitivity to the ointment or bandage tape used to dress the wound.  You should call our office if this develops.      Swelling  and/or discoloration around your surgical site is common, particularly when performed around the eye.    All wounds normally drain.  The larger the wound the more drainage there will be.  After 7-10 days, you will notice the wound beginning to shrink and new skin will begin to grow.  The wound is healed when you " can see skin has formed over the entire area.  A healed wound has a healthy, shiny look to the surface and is red to dark pink in color to normalize.  Wounds may take approximately 4-6 weeks to heal.  Larger wounds may take 6-8 weeks. After the wound is healed you may discontinue dressing changes.    You may experience a sensation of tightness as your wound heals. This is normal and will gradually subside.    Your healed wound may be sensitive to temperature changes. This sensitivity improves with time, but if you re having a lot of discomfort, try to avoid temperature extremes.    Patients frequently experience itching after their wound appears to have healed because of the continue healing under the skin.  Plain Vaseline will help relieve the itching.        The patient was counseled about sunscreens and sun avoidance. The patient was counseled to check the skin regularly and report any lesion that is new, changing, itching, scabbing, bleeding or otherwise bothersome. The patient was discharged ambulatory and in stable condition.      PROCEDURES:                                                    Name : Shave Excision  Indication : Excision of tissue for pathology evaluation.  Location(s) : Left medial proximal thigh : 9 mm x 5 mm in size brown lesion consistent with compound  nevus. ? Irritated benign nevus ? Other.  Completed by : Eloina Betts MD  Photo Taken : no.  Anesthesia : Patient was anesthetized by infiltrating the area surrounding the lesion with 1% lidocaine.   epinephrine 1:546066 : Yes.  Buffered with bicarbonate : No.  Note : Discussed the risk of pain, infection, scarring, hypo- or hyperpigmentation and recurrence or need for re-treatment. The benefits of treatment and alternative treatments were also discussed.    During this procedure, the universal protocol was utilized. The patient's identity was confirmed by no less than two patient identifiers, correct procedure was verified, correct site  was verified and marked as applicable and a final pause was completed.    Sterile technique was used throughout the procedure. The skin was cleaned and prepped with surgical cleanser. Once adequate anesthesia was obtained, the lesion was removed with a deep scallop shave procedure. The specimen was sent to pathology.    Direct pressure and monsels's and monopolar cautery was applied for hemostasis. No bleeding was present upon the completion of the procedure. The wound was coated with antibacterial ointment. A dry sterile dressing was applied. Patient tolerated the procedure well and left in satisfactory condition.    Primary provider and referring provider will be informed regarding the tissue report when it returns.    Name : Shave Excision  Indication : Excision of tissue for pathology evaluation.  Location(s) : Right lateral cheek : 7 mm x 5 mm in size raised brown lesion consistent with compound nevus. ? Irritated benign nevus ? Other.  Completed by : Eloina Betts MD  Photo Taken : no.  Anesthesia : Patient was anesthetized by infiltrating the area surrounding the lesion with 1% lidocaine.   epinephrine 1:441318 : Yes.  Buffered with bicarbonate : No.  Note : Discussed the risk of pain, infection, scarring, hypo- or hyperpigmentation and recurrence or need for re-treatment. The benefits of treatment and alternative treatments were also discussed.    During this procedure, the universal protocol was utilized. The patient's identity was confirmed by no less than two patient identifiers, correct procedure was verified, correct site was verified and marked as applicable and a final pause was completed.    Sterile technique was used throughout the procedure. The skin was cleaned and prepped with surgical cleanser. Once adequate anesthesia was obtained, the lesion was removed with a deep scallop shave procedure. The specimen was sent to pathology.    Direct pressure and aluminum chloride and monopolar cautery was  applied for hemostasis. No bleeding was present upon the completion of the procedure. The wound was coated with antibacterial ointment. A dry sterile dressing was applied. Patient tolerated the procedure well and left in satisfactory condition.    Primary provider and referring provider will be informed regarding the tissue report when it returns.    Name : Shave Excision  Indication : Excision of tissue for pathology evaluation.  Location(s) : Left lower back, at L1, 15 mm left of midline : 4 mm in size raised brown lesion consistent with compound nevus. ? Irritated benign nevus ? Other.  Completed by : Eloina Betts MD  Photo Taken : no.  Anesthesia : Patient was anesthetized by infiltrating the area surrounding the lesion with 1% lidocaine.   epinephrine 1:878147 : Yes.  Buffered with bicarbonate : No.  Note : Discussed the risk of pain, infection, scarring, hypo- or hyperpigmentation and recurrence or need for re-treatment. The benefits of treatment and alternative treatments were also discussed.    During this procedure, the universal protocol was utilized. The patient's identity was confirmed by no less than two patient identifiers, correct procedure was verified, correct site was verified and marked as applicable and a final pause was completed.    Sterile technique was used throughout the procedure. The skin was cleaned and prepped with surgical cleanser. Once adequate anesthesia was obtained, the lesion was removed with a deep scallop shave procedure. The specimen was sent to pathology.    Direct pressure and monsels's and monopolar cautery was applied for hemostasis. No bleeding was present upon the completion of the procedure. The wound was coated with antibacterial ointment. A dry sterile dressing was applied. Patient tolerated the procedure well and left in satisfactory condition.    Primary provider and referring provider will be informed regarding the tissue report when it returns.    Name : Shave  Excision  Indication : Excision of tissue for pathology evaluation.  Location(s) : Upper back, midline, at T1 : 6 mm in size, raised brown lesion consistent with compound nevus. ? Irritated benign nevus ? Other..  Completed by : Eloina Betts MD  Photo Taken : no.  Anesthesia : Patient was anesthetized by infiltrating the area surrounding the lesion with 1% lidocaine.   epinephrine 1:868685 : Yes.  Buffered with bicarbonate : No.  Note : Discussed the risk of pain, infection, scarring, hypo- or hyperpigmentation and recurrence or need for re-treatment. The benefits of treatment and alternative treatments were also discussed.    During this procedure, the universal protocol was utilized. The patient's identity was confirmed by no less than two patient identifiers, correct procedure was verified, correct site was verified and marked as applicable and a final pause was completed.    Sterile technique was used throughout the procedure. The skin was cleaned and prepped with surgical cleanser. Once adequate anesthesia was obtained, the lesion was removed with a deep scallop shave procedure. The specimen was sent to pathology.    Direct pressure and monsels's and monopolar cautery was applied for hemostasis. No bleeding was present upon the completion of the procedure. The wound was coated with antibacterial ointment. A dry sterile dressing was applied. Patient tolerated the procedure well and left in satisfactory condition.    Primary provider and referring provider will be informed regarding the tissue report when it returns.      The information in this document, created by the medical scribe for me, accurately reflects the services I personally performed and the decisions made by me. I have reviewed and approved this document for accuracy prior to leaving the patient care area.  Eloina Betts MD September 19, 2017 10:02 AM  [facility] - PRIMARY CARE SKIN

## 2017-09-19 NOTE — PATIENT INSTRUCTIONS
"FUTURE APPOINTMENTS  Follow up per pathology report.    WOUND CARE INSTRUCTIONS  1. After 24 hours, change dressing daily.  2. Wash hands before every dressing change.  3. Wash the wound area with a mild soap, then rinse  4. Gently pat dry with a sterile gauze or Q-tip.  5. Apply Vaseline or Aquaphor only over entire wound. Do NOT use Neosporin - as many people react to neomycin.  6. Finally, cover with a bandage or sterile non-stick gauze with micropore paper tape.  7. Repeat once daily until wound has healed.    Do not let the wound dry out.  The wound will heal faster and with better cosmetic results if routinely kept moist with step #5. It is a false belief that a wound heals better when it is exposed to air and allowed to dry out.      Soap, water and shampoo will not hurt this area.    Do not go swimming or take baths, but showering is encouraged.    Limit use of the area where the procedure was done for a few days to allow for optimal healing.    If you experience bleeding:  Repeat steps #2-5 and hold firm pressure on the area for 10 minutes without checking to see if the bleeding has stopped. \"Checking\" pulls off the protective wound clot and restarts the bleeding all over again. Re-apply pressure for 10 minutes if necessary to stop bleeding.  Use additional sterile gauze and tape to maintain pressure once bleeding has stopped.    Signs of Infection:  Infection can occur in any area where skin has been disrupted.  If you notice persistent redness, swelling, colored drainage, increasing pain, fever or other signs of infection, please call us at: [phone] and ask to have me or my colleague paged. We will call you back to discuss.    Pathology Results:  You will be notified, generally via letter or MyChart, in approximately 10 days. If there is anything we need to discuss or further treatment needed, I will call you to discuss it.    Skin tissue can be some of the most challenging tissue for pathologists " to examine, therefore we may use a specialist outside the [employer] system to read certain submitted tissue samples. When submitted to these outside specialists, [facility] will notify you that your tissue sample result has returned. However, this only means that the tissue sample has been sent to the specialist. These results are not available in [facility], so I will contact you when I receive the final report.    PATIENT INFORMATION : WOUNDS  During the healing process you will notice a number of changes. All wounds develop a small halo of redness surrounding the wound.  This means healing is occurring. Severe itching with extensive redness usually indicates sensitivity to the ointment or bandage tape used to dress the wound.  You should call our office if this develops.      Swelling  and/or discoloration around your surgical site is common, particularly when performed around the eye.    All wounds normally drain.  The larger the wound the more drainage there will be.  After 7-10 days, you will notice the wound beginning to shrink and new skin will begin to grow.  The wound is healed when you can see skin has formed over the entire area.  A healed wound has a healthy, shiny look to the surface and is red to dark pink in color to normalize.  Wounds may take approximately 4-6 weeks to heal.  Larger wounds may take 6-8 weeks. After the wound is healed you may discontinue dressing changes.    You may experience a sensation of tightness as your wound heals. This is normal and will gradually subside.    Your healed wound may be sensitive to temperature changes. This sensitivity improves with time, but if you re having a lot of discomfort, try to avoid temperature extremes.    Patients frequently experience itching after their wound appears to have healed because of the continue healing under the skin.  Plain Vaseline will help relieve the itching.

## 2017-09-25 NOTE — PROGRESS NOTES
ADDENDUM:                                                    September 25, 2017 3:51 PM  Pathology report results:

## 2018-05-03 DIAGNOSIS — Z30.011 ENCOUNTER FOR INITIAL PRESCRIPTION OF CONTRACEPTIVE PILLS: ICD-10-CM

## 2018-05-03 RX ORDER — ACETAMINOPHEN AND CODEINE PHOSPHATE 120; 12 MG/5ML; MG/5ML
SOLUTION ORAL
Qty: 28 TABLET | Refills: 5 | Status: SHIPPED | OUTPATIENT
Start: 2018-05-03 | End: 2018-05-21 | Stop reason: ALTCHOICE

## 2018-05-21 ENCOUNTER — OFFICE VISIT (OUTPATIENT)
Dept: FAMILY MEDICINE | Facility: CLINIC | Age: 33
End: 2018-05-21
Payer: COMMERCIAL

## 2018-05-21 VITALS
BODY MASS INDEX: 22.58 KG/M2 | HEART RATE: 88 BPM | WEIGHT: 149 LBS | OXYGEN SATURATION: 98 % | TEMPERATURE: 97.5 F | RESPIRATION RATE: 12 BRPM | SYSTOLIC BLOOD PRESSURE: 118 MMHG | DIASTOLIC BLOOD PRESSURE: 64 MMHG | HEIGHT: 68 IN

## 2018-05-21 DIAGNOSIS — Z13.6 CARDIOVASCULAR SCREENING; LDL GOAL LESS THAN 160: ICD-10-CM

## 2018-05-21 DIAGNOSIS — Z00.01 ENCOUNTER FOR ROUTINE ADULT HEALTH EXAMINATION WITH ABNORMAL FINDINGS: Primary | ICD-10-CM

## 2018-05-21 DIAGNOSIS — Z78.9 BREASTFEEDING (INFANT): ICD-10-CM

## 2018-05-21 DIAGNOSIS — S93.401A SPRAIN OF RIGHT ANKLE, UNSPECIFIED LIGAMENT, INITIAL ENCOUNTER: ICD-10-CM

## 2018-05-21 DIAGNOSIS — L71.0 PERIORAL DERMATITIS: ICD-10-CM

## 2018-05-21 DIAGNOSIS — Z30.41 SURVEILLANCE FOR BIRTH CONTROL, ORAL CONTRACEPTIVES: ICD-10-CM

## 2018-05-21 LAB
ALBUMIN UR QL STRIP: NEGATIVE MG/DL
BETA HCG QUAL IFA URINE: NEGATIVE
GLUCOSE UR STRIP-MCNC: NEGATIVE MG/DL

## 2018-05-21 PROCEDURE — 84703 CHORIONIC GONADOTROPIN ASSAY: CPT | Performed by: FAMILY MEDICINE

## 2018-05-21 PROCEDURE — 00000219 ZZHCL STATISTIC OBSA - URINALYSIS: Performed by: FAMILY MEDICINE

## 2018-05-21 PROCEDURE — 99214 OFFICE O/P EST MOD 30 MIN: CPT | Mod: 25 | Performed by: FAMILY MEDICINE

## 2018-05-21 PROCEDURE — 99395 PREV VISIT EST AGE 18-39: CPT | Performed by: FAMILY MEDICINE

## 2018-05-21 RX ORDER — CLINDAMYCIN PHOSPHATE 10 UG/ML
LOTION TOPICAL 2 TIMES DAILY
Qty: 60 ML | Refills: 1 | Status: SHIPPED | OUTPATIENT
Start: 2018-05-21 | End: 2019-10-02

## 2018-05-21 RX ORDER — AZITHROMYCIN 250 MG/1
500 TABLET, FILM COATED ORAL DAILY
Qty: 20 TABLET | Refills: 0 | Status: SHIPPED | OUTPATIENT
Start: 2018-05-21 | End: 2018-08-02

## 2018-05-21 RX ORDER — ETHYNODIOL DIACETATE AND ETHINYL ESTRADIOL 1 MG-35MCG
1 KIT ORAL DAILY
Qty: 84 TABLET | Refills: 3 | Status: SHIPPED | OUTPATIENT
Start: 2018-05-21 | End: 2018-08-02 | Stop reason: SINTOL

## 2018-05-21 ASSESSMENT — PATIENT HEALTH QUESTIONNAIRE - PHQ9: 5. POOR APPETITE OR OVEREATING: MORE THAN HALF THE DAYS

## 2018-05-21 ASSESSMENT — ANXIETY QUESTIONNAIRES
7. FEELING AFRAID AS IF SOMETHING AWFUL MIGHT HAPPEN: NOT AT ALL
6. BECOMING EASILY ANNOYED OR IRRITABLE: SEVERAL DAYS
5. BEING SO RESTLESS THAT IT IS HARD TO SIT STILL: SEVERAL DAYS
3. WORRYING TOO MUCH ABOUT DIFFERENT THINGS: NOT AT ALL
1. FEELING NERVOUS, ANXIOUS, OR ON EDGE: SEVERAL DAYS
GAD7 TOTAL SCORE: 5
2. NOT BEING ABLE TO STOP OR CONTROL WORRYING: NOT AT ALL
IF YOU CHECKED OFF ANY PROBLEMS ON THIS QUESTIONNAIRE, HOW DIFFICULT HAVE THESE PROBLEMS MADE IT FOR YOU TO DO YOUR WORK, TAKE CARE OF THINGS AT HOME, OR GET ALONG WITH OTHER PEOPLE: SOMEWHAT DIFFICULT

## 2018-05-21 NOTE — PROGRESS NOTES
"  SUBJECTIVE:                                                    Mandy Novak is a 33 year old female who presents to clinic today for the following health issues:  S:Mandy Novak is here today for a Prenatal Visit at Unknown gestation.      Concerns today:  ***  Bleeding:  {.:711259::\"No\"}  Cramping/Contractions:{.:192456::\"No\"}  Leaking of fluid: {.:276715::\"No\"}  Baby moving:  {.:165024::\"Yes\"}  O: See OB Vitals  ASSESSMENT:/PLAN:  Return in *** weeks      {Superlists:864321}    Problem list and histories reviewed & adjusted, as indicated.  Additional history: as documented    Reviewed and updated as needed this visit by clinical staff       Reviewed and updated as needed this visit by Provider         ROS:   ROS: 12 point ROS neg other than the symptoms noted above    OBJECTIVE:                                                    There were no vitals taken for this visit.  There is no height or weight on file to calculate BMI.   {EXAM - NORMAL- condensed - partially selected:931498::\"GENERAL: healthy, alert, well nourished, well hydrated, no distress\",\"HENT: ear canals- normal; TMs- normal; Nose- normal; Mouth- no ulcers, no lesions\",\"NECK: no tenderness, no adenopathy, no asymmetry, no masses, no stiffness; thyroid- normal to palpation\",\"RESP: lungs clear to auscultation - no rales, no rhonchi, no wheezes\",\"CV: regular rates and rhythm, normal S1 S2, no S3 or S4 and no murmur, no click or rub -\",\"ABDOMEN: soft, no tenderness, no  hepatosplenomegaly, no masses, normal bowel sounds\"}    {Diagnostic Test Results:354409}     ASSESSMENT/PLAN:                                                        ICD-10-CM    1. Supervision of other normal pregnancy, antepartum Z34.80        {FOLLOW UP CLINIC OPTIONS:709587}         Mey Mcleod MD    [facility] [place]  "

## 2018-05-21 NOTE — MR AVS SNAPSHOT
After Visit Summary   5/21/2018    Mandy Novak    MRN: [id number]           Patient Information     Date Of Birth          1985        Visit Information        Provider Department      5/21/2018 3:15 PM Mey Mcleod MD [facility] [place]        Today's Diagnoses     Encounter for routine adult health examination with abnormal findings    -  1    Breastfeeding (infant)        Perioral dermatitis- right facial cheek         Surveillance for birth control, oral contraceptives        CARDIOVASCULAR SCREENING; LDL GOAL LESS THAN 160        Sprain of right ankle, unspecified ligament, initial encounter          Care Instructions      Preventive Health Recommendations  Female Ages 26 - 39  Yearly exam:   See your health care provider every year in order to    Review health changes.     Discuss preventive care.      Review your medicines if you your doctor has prescribed any.    Until age 30: Get a Pap test every three years (more often if you have had an abnormal result).    After age 30: Talk to your doctor about whether you should have a Pap test every 3 years or have a Pap test with HPV screening every 5 years.   You do not need a Pap test if your uterus was removed (hysterectomy) and you have not had cancer.  You should be tested each year for STDs (sexually transmitted diseases), if you're at risk.   Talk to your provider about how often to have your cholesterol checked.  If you are at risk for diabetes, you should have a diabetes test (fasting glucose).  Shots: Get a flu shot each year. Get a tetanus shot every 10 years.   Nutrition:     Eat at least 5 servings of fruits and vegetables each day.    Eat whole-grain bread, whole-wheat pasta and brown rice instead of white grains and rice.    Talk to your provider about Calcium and Vitamin D.     Lifestyle    Exercise at least 150 minutes a week (30 minutes a day, 5 days of the week). This will help you control your weight  and prevent disease.    Limit alcohol to one drink per day.    No smoking.     Wear sunscreen to prevent skin cancer.    See your dentist every six months for an exam and cleaning.    For acne:   Try Oxywash or Proactiv+ cleanser  for cleansing. - has benzoyl peroxide in it - wash for 30 seconds gently, then leave on for 1-2 minutes - twice daily-  rinse for twice as long - use white linens and wash hands well with soap after using.  Proactiv + available in Kiosk at [facility].    Also highly recommend - Benzaderm Wash - Derma Topix Benzaderm 10% Wash with Aloe Vera 7.75 oz - available on [employer] or our [facility] in [place]  for about $14  - really gentle, but thorough.      Use the clindamycin lotion every am and pm after cleansing.     For spot treatment for pustules : try Desmond Badescu drying lotion ( available on-line at [employer] or [employer] in [place] - dip q-tip through the clear liquid into the pink substance in the bottom ( don't shake it up) and dot on your pustules.     For makeup try:  Try  Bare Skin foundation from the [employer] or their Matte  line - available at [employer] in [place].      Try Cera-Ve with clear zinc sunscreen for face or neutrogena - clear skin -sunscreen daily from forehead to lower chest or from [employer] Invisib[employer]ur Perfecting Shield with SPF 30++.      Ugandan Gold - more mineral based sunscreens - SPF 50 - Tinted or untinted.  - available on [employer] or [employer].      Recheck with Mey Mcleod MD again in 6 weeks or sooner , if needed.                          Ankle Sprain            What is an ankle sprain?   An ankle sprain is an injury that causes a stretch or tear of one or more ligaments in the ankle joint. Ligaments are strong bands of tissue that connect bones at the joint.  Sprains may be classified as mild, moderate, or severe.  There are many ligaments in the ankle. The most common type of sprain involves the ligaments on the outside  part of the ankle (lateral ankle sprain). Ligaments on the inside of the ankle may also be injured (medial ankle sprain) as well as ligaments that are high and in the middle of the ankle (high ankle sprains).  How does it occur?   A sprain is caused by twisting your ankle. Your foot usually turns in or under but may turn to the outside.  What are the symptoms?   Symptoms of a sprained ankle include:  mild aching to sudden pain   swelling   bruising   inability to move the ankle properly   pain in the ankle even when you are not putting any weight on it  How is it diagnosed?   To diagnose a sprained ankle, the healthcare provider will ask about your symptoms and examine your ankle carefully. X-rays may be taken of your ankle.  How it is treated?   To treat this condition:  Put an ice pack, gel pack, or package of frozen vegetables, wrapped in a cloth on the area every 3 to 4 hours, for up to 20 minutes at a time.   Raise the ankle with a pillow when you sit or lie down.   Use an elastic bandage, lace-up brace or ankle stirrup (an Aircast or Gel cast) on the ankle as directed by your provider.   Use crutches until you can walk without pain.   Take an anti-inflammatory such as ibuprofen, or other medicine as directed by your provider. Nonsteroidal anti-inflammatory medicines (NSAIDs) may cause stomach bleeding and other problems. These risks increase with age. Read the label and take as directed. Unless recommended by your healthcare provider, do not take for more than 10 days.   Follow your provider s instructions for doing exercises to help you recover.   Rarely, severe ankle sprains with complete tearing of the ligaments need surgery. After surgery your ankle will be in a cast for 4 to 8 weeks.  How long will the effects last?   The length of recovery depends on many factors such as your age, health, and if you have had a previous ankle injury. Recovery time also depends on the severity of the sprain. A mild ankle  sprain may recover within a few weeks, whereas a severe ankle sprain may take 6 weeks or longer to recover. Recovery also depends on which ligaments were torn. A lateral sprain (outside ligaments) takes less time to recover than a medial sprain (inside ligaments) or a high ankle sprain (high, middle ligaments).  When can I return to my normal activities?  Everyone recovers from an injury at a different rate. Return to your activities depends on how soon your ankle recovers, not by how many days or weeks it has been since your injury has occurred. In general, the longer you have symptoms before you start treatment, the longer it will take to get better. The goal of rehabilitation is to return to your normal activities as soon as is safely possible. If you return too soon you may worsen your injury.  You may safely return to your normal activities when, starting from the top of the list and progressing to the end, each of the following is true:  You have full range of motion in the injured ankle compared to the uninjured ankle.   You have full strength of the injured ankle compared to the uninjured ankle.   You can walk straight ahead without pain or limping.  How can I help prevent an ankle sprain?   To help prevent an ankle sprain:  Wear proper, well-fitting shoes when you exercise.   Stretch gently and adequately before and after athletic or recreational activities.   Avoid sharp turns and quick changes in direction and movement.   Consider taping the ankle or wearing a brace for strenuous sports, especially if you have a previous injury.          Ankle Sprain Exercises  As soon as you can tolerate pressure on the ball of your foot, begin stretching your ankle using the towel stretch. When this stretch is easy, try the other exercises.  Towel stretch: Sit on a hard surface with your injured leg stretched out in front of you. Loop a towel around your toes and the ball of your foot and pull the towel toward your body  keeping your leg straight. Hold this position for 15 to 30 seconds and then relax. Repeat 3 times.   Standing calf stretch: Stand facing a wall with your hands on the wall at about eye level. Keep your injured leg back with your heel on the floor. Keep the other leg forward with the knee bent. Turn your back foot slightly inward (as if you were pigeon-toed). Slowly lean into the wall until you feel a stretch in the back of your calf. Hold the stretch for 15 to 30 seconds. Return to the starting position. Repeat 3 times. Do this exercise several times each day.   Standing soleus stretch: Stand facing a wall with your hands on the wall at about chest height. Keep your injured leg back with your heel on the floor. Keep the other leg forward with the knee bent. Turn your back foot slightly inward (as if you were pigeon-toed). Bend your back knee slightly and gently lean into the wall until you feel a stretch in the lower calf of your injured leg. Hold the stretch for 15 to 30 seconds. Return to the starting position. Repeat 3 times.   Ankle range of motion: Sit or lie down with your legs straight and your knees pointing toward the ceiling. Point your toes on your injured side toward your nose, then away from your body. Point your toes in toward your other foot and then out away from your other foot. Finally, move the top of your foot in circles. Move only your foot and ankle. Don't move your leg. Repeat 10 times in each direction. Push hard in all directions.   Resisted ankle dorsiflexion: Tie a knot in one end of the elastic tubing and shut the knot in a door. Tie a loop in the other end of the tubing and put the foot on your injured side through the loop so that the tubing goes around the top of the foot. Sit facing the door with your injured leg straight out in front of you. Move away from the door until there is tension in the tubing. Keeping your leg straight, pull the top of your foot toward your body, stretching  the tubing. Slowly return to the starting position. Do 2 sets of 15.   Resisted ankle plantar flexion: Sit with your injured leg stretched out in front of you. Loop the tubing around the ball of your foot. Hold the ends of the tubing with both hands. Gently press the ball of your foot down and point your toes, stretching the tubing. Return to the starting position. Do 2 sets of 15.   Resisted ankle inversion: Sit with your legs stretched out in front of you. Cross the ankle of your uninjured leg over your other ankle. Wrap elastic tubing around the ball of the foot of your injured leg and then loop it around your other foot so that the tubing is anchored there at one end. Hold the other end of the tubing in your hand. Turn the foot of your injured leg inward and upward. This will stretch the tubing. Return to the starting position. Do 2 sets of 15.   Resisted ankle eversion: Sit with both legs stretched out in front of you, with your feet about a shoulder's width apart. Tie a loop in one end of elastic tubing. Put the foot of your injured leg through the loop so that the tubing goes around the arch of that foot and wraps around the outside of the other foot. Hold onto the other end of the tubing with your hand to provide tension. Turn the foot of your injured leg up and out. Make sure you keep your other foot still so that it will allow the tubing to stretch as you move the foot of your injured leg. Return to the starting position. Do 2 sets of 15.  You may do the following exercises when you can stand on your injured ankle without pain.  Heel raise: Balance yourself while standing behind a chair or counter. Using the chair or counter as a support to help you, raise your body up onto your toes and hold for 5 seconds. Then slowly lower yourself down without holding onto the support. (It's OK to keep holding onto the support if you need to.) When this exercise becomes less painful, try lowering yourself down on the  injured leg only. Repeat 10 times. Do 2 sets of 15. Rest 30 seconds between sets.   Step-up: Stand with the foot of your injured leg on a support 3 to 5 inches high (like a small step or block of wood). Keep your other foot flat on the floor. Shift your weight onto the injured leg on the support. Straighten your injured leg as the other leg comes off the floor. Return to the starting position by bending your injured leg and slowly lowering your uninjured leg back to the floor. Do 2 sets of 15.   Balance and reach exercises: Stand next to a chair with your injured leg farther from the chair. The chair will provide support if you need it. Stand on the foot of your injured leg and bend your knee slightly. Try to raise the arch of this foot while keeping your big toe on the floor.   0. Keep your foot in this position. With the hand that is farther away from the chair, reach forward in front of you by bending at the waist. Avoid bending your knee any more as you do this. Repeat this 10 times. To make the exercise more challenging, reach farther in front of you. Do 2 sets of 10.   0.  the same position as above. While keeping your arch height, reach the hand that is farther away from the chair across your body toward the chair. The farther you reach, the more challenging the exercise. Do 2 sets of 10.  Jump rope: Jump rope landing on both legs for 5 minutes. Then jump landing on just 1 leg at a time for 5 minutes.  If you have access to a wobble board, do the following exercises:  Wobble board exercises:   0. Stand on a wobble board with your feet shoulder width apart. Rock the board forwards and backwards 30 times, then side to side 30 times. Hold on to a chair if you need support.   0. Rotate the wobble board around so that the edge of the board is in contact with the floor at all times. Do this 30 times in a clockwise and then a counterclockwise direction.   0. Balance on the wobble board for as long as you can  without letting the edges touch the floor. Try to do this for 2 minutes without touching the floor.   0. Rotate the wobble board in clockwise and counterclockwise circles, but do not let the edge of the board touch the floor.   0. When you have mastered exercises A through D, try repeating them while standing on just your injured leg.  After you are able to do these exercises on one leg, try to do them with your eyes closed. Make sure you have something nearby to support you in case you lose your balance.  Published by [employer].  This content is reviewed periodically and is subject to change as new health information becomes available. The information is intended to inform and educate and is not a replacement for medical evaluation, advice, diagnosis or treatment by a healthcare professional.  Written by Helene Westfall, MS, PT, and Kindra Pritchett PT, [facility], [facility] [place], for [facility].    2011 [facility] and/or its affiliates. All rights reserved.                                         Follow-ups after your visit        Future tests that were ordered for you today     Open Future Orders        Priority Expected Expires Ordered    CBC with platelets Routine 5/26/2018 8/21/2018 5/21/2018    Lipid panel reflex to direct LDL Fasting Routine 5/26/2018 8/21/2018 5/21/2018            Who to contact     If you have questions or need follow up information about today's clinic visit or your schedule please contact [facility] directly at [phone].  Normal or non-critical lab and imaging results will be communicated to you by MyChart, letter or phone within 4 business days after the clinic has received the results. If you do not hear from us within 7 days, please contact the clinic through MyChart or phone. If you have a critical or abnormal lab result, we will notify you by phone as soon as possible.  Submit refill requests through [employer] or call your pharmacy and they will forward the refill request to us.  "Please allow 3 business days for your refill to be completed.          Additional Information About Your Visit        MyChart Information     [employer] gives you secure access to your electronic health record. If you see a primary care provider, you can also send messages to your care team and make appointments. If you have questions, please call your primary care clinic.  If you do not have a primary care provider, please call [phone] and they will assist you.        Care EveryWhere ID     This is your Care EveryWhere ID. This could be used by other organizations to access your [facility] medical records  [id number]        Your Vitals Were     Pulse Temperature Respirations Height Last Period Pulse Oximetry    88 97.5  F (36.4  C) (Tympanic) 12 5' 7.5\" (1.715 m) 05/07/2018 98%    Breastfeeding? BMI (Body Mass Index)                Yes 22.99 kg/m2           Blood Pressure from Last 3 Encounters:   05/21/18 118/64   06/28/17 108/70   05/31/17 124/80    Weight from Last 3 Encounters:   05/21/18 149 lb (67.6 kg)   06/28/17 156 lb 6.4 oz (70.9 kg)   05/31/17 156 lb 6.4 oz (70.9 kg)              We Performed the Following     Beta HCG Qual, Urine - [facility] and [place] ([id number])     Glucose and albumin, OB urine          Today's Medication Changes          These changes are accurate as of 5/21/18  4:40 PM.  If you have any questions, ask your nurse or doctor.               Start taking these medicines.        Dose/Directions    azithromycin 250 MG tablet   Commonly known as:  ZITHROMAX   Used for:  Breastfeeding (infant), Perioral dermatitis   Started by:  Mey Mcleod MD        Dose:  500 mg   Take 2 tablets (500 mg) by mouth daily For 4 days, then 2 tabs per week for 6 weeks   Quantity:  20 tablet   Refills:  0       clindamycin 1 % lotion   Commonly known as:  CLEOCIN T   Used for:  Perioral dermatitis   Started by:  Mey Mcleod MD        Apply topically 2 times daily After cleansing "   Quantity:  60 mL   Refills:  1       ethynodiol-ethinyl estradiol 1-35 MG-MCG per tablet   Commonly known as:  KELNOR   Used for:  Surveillance for birth control, oral contraceptives, Perioral dermatitis   Started by:  Mey Mcleod MD        Dose:  1 tablet   Take 1 tablet by mouth daily   Quantity:  84 tablet   Refills:  3         Stop taking these medicines if you haven't already. Please contact your care team if you have questions.     norethindrone 0.35 MG per tablet   Commonly known as:  MICRONOR   Stopped by:  Mey Mcleod MD                Where to get your medicines      These medications were sent to [place] [address] [place] [address] [address]     Phone:  [phone]     clindamycin 1 % lotion    ethynodiol-ethinyl estradiol 1-35 MG-MCG per tablet         Some of these will need a paper prescription and others can be bought over the counter.  Ask your nurse if you have questions.     Bring a paper prescription for each of these medications     azithromycin 250 MG tablet                Primary Care Provider Office Phone # Fax #    Mey Mcleod [phone]       [address] [facility] [address]        Equal Access to Services     RODRIGO KAYE AH: Hadii torres ku hadasho Soomaali, waaxda luqadaha, qaybta kaalmada adeegyada, waxay milagro hayshaina ramirez. So [facility] [phone].    ATENCIÓN: Si habla español, tiene a loco disposición servicios gratuitos de asistencia lingüística. ame al [phone].    We comply with applicable federal civil rights laws and [place] laws. We do not discriminate on the basis of race, color, national origin, age, disability, sex, sexual orientation, or gender identity.            Thank you!     Thank you for choosing [facility]  for your care. Our goal is always to provide you with excellent care. Hearing back from our patients is  one way we can continue to improve our services. Please take a few minutes to complete the written survey that you may receive in the mail after your visit with us. Thank you!             Your Updated Medication List - Protect others around you: Learn how to safely use, store and throw away your medicines at www.disposemymeds.org.          This list is accurate as of 5/21/18  4:40 PM.  Always use your most recent med list.                   Brand Name Dispense Instructions for use Diagnosis    azithromycin 250 MG tablet    ZITHROMAX    20 tablet    Take 2 tablets (500 mg) by mouth daily For 4 days, then 2 tabs per week for 6 weeks    Breastfeeding (infant), Perioral dermatitis       cholecalciferol 1000 UNIT tablet    vitamin D3    100 tablet    Take 1 tablet (1,000 Units) by mouth daily        clindamycin 1 % lotion    CLEOCIN T    60 mL    Apply topically 2 times daily After cleansing    Perioral dermatitis       ethynodiol-ethinyl estradiol 1-35 MG-MCG per tablet    KELNOR    84 tablet    Take 1 tablet by mouth daily    Surveillance for birth control, oral contraceptives, Perioral dermatitis       lanolin ointment      Apply topically every hour as needed for dry skin (sore nipples)    At risk for ineffective breastfeeding       Prenatal Vitamins 28-0.8 MG Tabs      Take 1 tablet by mouth daily    Absence of menstruation

## 2018-05-21 NOTE — PATIENT INSTRUCTIONS
Preventive Health Recommendations  Female Ages 26 - 39  Yearly exam:   See your health care provider every year in order to    Review health changes.     Discuss preventive care.      Review your medicines if you your doctor has prescribed any.    Until age 30: Get a Pap test every three years (more often if you have had an abnormal result).    After age 30: Talk to your doctor about whether you should have a Pap test every 3 years or have a Pap test with HPV screening every 5 years.   You do not need a Pap test if your uterus was removed (hysterectomy) and you have not had cancer.  You should be tested each year for STDs (sexually transmitted diseases), if you're at risk.   Talk to your provider about how often to have your cholesterol checked.  If you are at risk for diabetes, you should have a diabetes test (fasting glucose).  Shots: Get a flu shot each year. Get a tetanus shot every 10 years.   Nutrition:     Eat at least 5 servings of fruits and vegetables each day.    Eat whole-grain bread, whole-wheat pasta and brown rice instead of white grains and rice.    Talk to your provider about Calcium and Vitamin D.     Lifestyle    Exercise at least 150 minutes a week (30 minutes a day, 5 days of the week). This will help you control your weight and prevent disease.    Limit alcohol to one drink per day.    No smoking.     Wear sunscreen to prevent skin cancer.    See your dentist every six months for an exam and cleaning.    For acne:   Try Oxywash or Proactiv+ cleanser  for cleansing. - has benzoyl peroxide in it - wash for 30 seconds gently, then leave on for 1-2 minutes - twice daily-  rinse for twice as long - use white linens and wash hands well with soap after using.  Proactiv + available in [place] at [facility].    Also highly recommend - Benzaderm Wash - Derma Topix Benzaderm 10% Wash with Aloe Vera 7.75 oz - available on [employer] or our [facility] in [place]  for about $14  - really  gentle, but thorough.      Use the clindamycin lotion every am and pm after cleansing.     For spot treatment for pustules : try Desmond Badescu drying lotion ( available on-line at [email or web address] or [facility] in [place] - dip q-tip through the clear liquid into the pink substance in the bottom ( don't shake it up) and dot on your pustules.     For makeup try:  Try  Bare Skin foundation from the [employer] or their Matte  line - available at [facility] in [place].      Try Cera-Ve with clear zinc sunscreen for face or neutrogena - clear skin -sunscreen daily from forehead to lower chest or from [employer] Invisiblur Perfecting Shield with SPF 30++.      Surinamese Gold - more mineral based sunscreens - SPF 50 - Tinted or untinted.  - available on [employer] or [employer].      Recheck with Mey Mcleod MD again in 6 weeks or sooner , if needed.                          Ankle Sprain            What is an ankle sprain?   An ankle sprain is an injury that causes a stretch or tear of one or more ligaments in the ankle joint. Ligaments are strong bands of tissue that connect bones at the joint.  Sprains may be classified as mild, moderate, or severe.  There are many ligaments in the ankle. The most common type of sprain involves the ligaments on the outside part of the ankle (lateral ankle sprain). Ligaments on the inside of the ankle may also be injured (medial ankle sprain) as well as ligaments that are high and in the middle of the ankle (high ankle sprains).  How does it occur?   A sprain is caused by twisting your ankle. Your foot usually turns in or under but may turn to the outside.  What are the symptoms?   Symptoms of a sprained ankle include:  mild aching to sudden pain   swelling   bruising   inability to move the ankle properly   pain in the ankle even when you are not putting any weight on it  How is it diagnosed?   To diagnose a sprained ankle, the healthcare provider will ask about your symptoms and examine  your ankle carefully. X-rays may be taken of your ankle.  How it is treated?   To treat this condition:  Put an ice pack, gel pack, or package of frozen vegetables, wrapped in a cloth on the area every 3 to 4 hours, for up to 20 minutes at a time.   Raise the ankle with a pillow when you sit or lie down.   Use an elastic bandage, lace-up brace or ankle stirrup (an Aircast or Gel cast) on the ankle as directed by your provider.   Use crutches until you can walk without pain.   Take an anti-inflammatory such as ibuprofen, or other medicine as directed by your provider. Nonsteroidal anti-inflammatory medicines (NSAIDs) may cause stomach bleeding and other problems. These risks increase with age. Read the label and take as directed. Unless recommended by your healthcare provider, do not take for more than 10 days.   Follow your provider s instructions for doing exercises to help you recover.   Rarely, severe ankle sprains with complete tearing of the ligaments need surgery. After surgery your ankle will be in a cast for 4 to 8 weeks.  How long will the effects last?   The length of recovery depends on many factors such as your age, health, and if you have had a previous ankle injury. Recovery time also depends on the severity of the sprain. A mild ankle sprain may recover within a few weeks, whereas a severe ankle sprain may take 6 weeks or longer to recover. Recovery also depends on which ligaments were torn. A lateral sprain (outside ligaments) takes less time to recover than a medial sprain (inside ligaments) or a high ankle sprain (high, middle ligaments).  When can I return to my normal activities?  Everyone recovers from an injury at a different rate. Return to your activities depends on how soon your ankle recovers, not by how many days or weeks it has been since your injury has occurred. In general, the longer you have symptoms before you start treatment, the longer it will take to get better. The goal of  rehabilitation is to return to your normal activities as soon as is safely possible. If you return too soon you may worsen your injury.  You may safely return to your normal activities when, starting from the top of the list and progressing to the end, each of the following is true:  You have full range of motion in the injured ankle compared to the uninjured ankle.   You have full strength of the injured ankle compared to the uninjured ankle.   You can walk straight ahead without pain or limping.  How can I help prevent an ankle sprain?   To help prevent an ankle sprain:  Wear proper, well-fitting shoes when you exercise.   Stretch gently and adequately before and after athletic or recreational activities.   Avoid sharp turns and quick changes in direction and movement.   Consider taping the ankle or wearing a brace for strenuous sports, especially if you have a previous injury.          Ankle Sprain Exercises  As soon as you can tolerate pressure on the ball of your foot, begin stretching your ankle using the towel stretch. When this stretch is easy, try the other exercises.  Towel stretch: Sit on a hard surface with your injured leg stretched out in front of you. Loop a towel around your toes and the ball of your foot and pull the towel toward your body keeping your leg straight. Hold this position for 15 to 30 seconds and then relax. Repeat 3 times.   Standing calf stretch: Stand facing a wall with your hands on the wall at about eye level. Keep your injured leg back with your heel on the floor. Keep the other leg forward with the knee bent. Turn your back foot slightly inward (as if you were pigeon-toed). Slowly lean into the wall until you feel a stretch in the back of your calf. Hold the stretch for 15 to 30 seconds. Return to the starting position. Repeat 3 times. Do this exercise several times each day.   Standing soleus stretch: Stand facing a wall with your hands on the wall at about chest height. Keep  your injured leg back with your heel on the floor. Keep the other leg forward with the knee bent. Turn your back foot slightly inward (as if you were pigeon-toed). Bend your back knee slightly and gently lean into the wall until you feel a stretch in the lower calf of your injured leg. Hold the stretch for 15 to 30 seconds. Return to the starting position. Repeat 3 times.   Ankle range of motion: Sit or lie down with your legs straight and your knees pointing toward the ceiling. Point your toes on your injured side toward your nose, then away from your body. Point your toes in toward your other foot and then out away from your other foot. Finally, move the top of your foot in circles. Move only your foot and ankle. Don't move your leg. Repeat 10 times in each direction. Push hard in all directions.   Resisted ankle dorsiflexion: Tie a knot in one end of the elastic tubing and shut the knot in a door. Tie a loop in the other end of the tubing and put the foot on your injured side through the loop so that the tubing goes around the top of the foot. Sit facing the door with your injured leg straight out in front of you. Move away from the door until there is tension in the tubing. Keeping your leg straight, pull the top of your foot toward your body, stretching the tubing. Slowly return to the starting position. Do 2 sets of 15.   Resisted ankle plantar flexion: Sit with your injured leg stretched out in front of you. Loop the tubing around the ball of your foot. Hold the ends of the tubing with both hands. Gently press the ball of your foot down and point your toes, stretching the tubing. Return to the starting position. Do 2 sets of 15.   Resisted ankle inversion: Sit with your legs stretched out in front of you. Cross the ankle of your uninjured leg over your other ankle. Wrap elastic tubing around the ball of the foot of your injured leg and then loop it around your other foot so that the tubing is anchored there  at one end. Hold the other end of the tubing in your hand. Turn the foot of your injured leg inward and upward. This will stretch the tubing. Return to the starting position. Do 2 sets of 15.   Resisted ankle eversion: Sit with both legs stretched out in front of you, with your feet about a shoulder's width apart. Tie a loop in one end of elastic tubing. Put the foot of your injured leg through the loop so that the tubing goes around the arch of that foot and wraps around the outside of the other foot. Hold onto the other end of the tubing with your hand to provide tension. Turn the foot of your injured leg up and out. Make sure you keep your other foot still so that it will allow the tubing to stretch as you move the foot of your injured leg. Return to the starting position. Do 2 sets of 15.  You may do the following exercises when you can stand on your injured ankle without pain.  Heel raise: Balance yourself while standing behind a chair or counter. Using the chair or counter as a support to help you, raise your body up onto your toes and hold for 5 seconds. Then slowly lower yourself down without holding onto the support. (It's OK to keep holding onto the support if you need to.) When this exercise becomes less painful, try lowering yourself down on the injured leg only. Repeat 10 times. Do 2 sets of 15. Rest 30 seconds between sets.   Step-up: Stand with the foot of your injured leg on a support 3 to 5 inches high (like a small step or block of wood). Keep your other foot flat on the floor. Shift your weight onto the injured leg on the support. Straighten your injured leg as the other leg comes off the floor. Return to the starting position by bending your injured leg and slowly lowering your uninjured leg back to the floor. Do 2 sets of 15.   Balance and reach exercises: Stand next to a chair with your injured leg farther from the chair. The chair will provide support if you need it. Stand on the foot of your  injured leg and bend your knee slightly. Try to raise the arch of this foot while keeping your big toe on the floor.   0. Keep your foot in this position. With the hand that is farther away from the chair, reach forward in front of you by bending at the waist. Avoid bending your knee any more as you do this. Repeat this 10 times. To make the exercise more challenging, reach farther in front of you. Do 2 sets of 10.   0.  the same position as above. While keeping your arch height, reach the hand that is farther away from the chair across your body toward the chair. The farther you reach, the more challenging the exercise. Do 2 sets of 10.  Jump rope: Jump rope landing on both legs for 5 minutes. Then jump landing on just 1 leg at a time for 5 minutes.  If you have access to a wobble board, do the following exercises:  Wobble board exercises:   0. Stand on a wobble board with your feet shoulder width apart. Rock the board forwards and backwards 30 times, then side to side 30 times. Hold on to a chair if you need support.   0. Rotate the wobble board around so that the edge of the board is in contact with the floor at all times. Do this 30 times in a clockwise and then a counterclockwise direction.   0. Balance on the wobble board for as long as you can without letting the edges touch the floor. Try to do this for 2 minutes without touching the floor.   0. Rotate the wobble board in clockwise and counterclockwise circles, but do not let the edge of the board touch the floor.   0. When you have mastered exercises A through D, try repeating them while standing on just your injured leg.  After you are able to do these exercises on one leg, try to do them with your eyes closed. Make sure you have something nearby to support you in case you lose your balance.  Published by [employer].  This content is reviewed periodically and is subject to change as new health information becomes available. The information is  intended to inform and educate and is not a replacement for medical evaluation, advice, diagnosis or treatment by a healthcare professional.  Written by Helene Westfall MS, PT, and Kindra Pritchett PT, [facility], [facility], for [facility].    2011 [facility] and/or its affiliates. All rights reserved.

## 2018-05-21 NOTE — PROGRESS NOTES
SUBJECTIVE:   CC: Mandy Novak is an 33 year old woman who presents for preventive health visit.     Healthy Habits:    Do you get at least three servings of calcium containing foods daily (dairy, green leafy vegetables, etc.)? yes    Amount of exercise or daily activities, outside of work: 4 day(s) per week    Problems taking medications regularly No    Medication side effects: No    Have you had an eye exam in the past two years? yes    Do you see a dentist twice per year? yes    Do you have sleep apnea, excessive snoring or daytime drowsiness?no    Acne type rash right facial cheek - hasn't improved x 2 months.       Today's PHQ-2 Score:   PHQ-2 ( 1999 [employer]) 5/31/2017 7/31/2015   Q1: Little interest or pleasure in doing things 0 0   Q2: Feeling down, depressed or hopeless 0 0   PHQ-2 Score 0 0       Abuse: Current or Past(Physical, Sexual or Emotional)- No  Do you feel safe in your environment - Yes    Social History   Substance Use Topics     Smoking status: Never Smoker     Smokeless tobacco: Never Used     Alcohol use No      Comment: Rarely - none since becoming pregnant     If you drink alcohol do you typically have >3 drinks per day or >7 drinks per week? No                     Reviewed orders with patient.  Reviewed health maintenance and updated orders accordingly - Yes      Mammogram not appropriate for this patient based on age.    Pertinent mammograms are reviewed under the imaging tab.  History of abnormal Pap smear: NO - age 30- 65 PAP every 3 years recommended    Reviewed and updated as needed this visit by clinical staff  Tobacco  Allergies  Meds  Med Hx  Surg Hx  Fam Hx  Soc Hx        Reviewed and updated as needed this visit by Provider        Health Maintenance   Topic Date Due     PAP Q3 YR  05/31/2020     HPV Q3 Years  05/31/2020     TETANUS IMMUNIZATION (SYSTEM ASSIGNED)  03/17/2027     HIV SCREEN (SYSTEM ASSIGNED)  Completed     INFLUENZA VACCINE  Completed       Patient  Active Problem List   Diagnosis     CARDIOVASCULAR SCREENING; LDL GOAL LESS THAN 160     Situational syncope- with any blood draws or shots     ASCUS with positive high risk HPV- while pregnant 2016 = normal - recheck pap and cotest postpartum     Resolved - Marginal placenta previa with intrapartum hemorrhage in first trimester-repeat 1/15/2015= resolved      Multiple pigmented nevi     Supervision of other normal pregnancy, antepartum     Type O blood, Rh positive     Cecilio breech presentation - seen on 31 week US - resolved with external version on 3/24/2017     Large for gestational age     Indication for care in labor or delivery     History of shoulder dystocia in prior pregnancy- with first baby 7lbs 14oz 2015       (normal spontaneous vaginal delivery)     Immediate postpartum hemorrhage, postpartum     Elevated d-dimer     Acute posthemorrhagic anemia     Anxiety     Postpartum depression associated with second pregnancy     Family history of nonmelanoma skin cancer       Past Medical History:   Diagnosis Date     ASCUS with positive high risk HPV 2014    + HPV 16, hx of ASCUS , but neg HPV in       Cervical high risk HPV (human papillomavirus) test positive 2015    NIL pap, + HPV 16 & other HPV colp - atypia      (normal spontaneous vaginal delivery) 2015     Resolved - Marginal placenta previa with intrapartum hemorrhage in first trimester-repeat 1/15/2015= resolved  2014     Shoulder dystocia, delivered, current hospitalization 2015     Type O blood, Rh positive        Past Surgical History:   Procedure Laterality Date     HC TOOTH EXTRACTION W/FORCEP      wisdom teeth        Social History     Social History     Marital status:      Spouse name: Raymond Novak     Number of children: 0     Years of education: 18     Occupational History     Teacher -Middle School Choir/Music      [employer]     Social History Main Topics     Smoking status:  Never Smoker     Smokeless tobacco: Never Used     Alcohol use No      Comment: Rarely - none since becoming pregnant     Drug use: No      Comment: no herbal meds either      Sexual activity: Yes     Partners: Male      Comment: Stopped BC end of 09/2014     Other Topics Concern      Service No     Blood Transfusions No     Caffeine Concern Yes     stopped secondary to pregnancy      Exercise Yes     walking     Seat Belt Yes     Self-Exams Yes     Social History Narrative       Family History   Problem Relation Age of Onset     DIABETES Maternal Grandfather      diet controlled     Thyroid Disease Maternal Grandmother      Family History Negative Mother      Family History Negative Father      Current Outpatient Prescriptions   Medication Sig Dispense Refill     azithromycin (ZITHROMAX) 250 MG tablet Take 2 tablets (500 mg) by mouth daily For 4 days, then 2 tabs per week for 6 weeks 20 tablet 0     cholecalciferol (VITAMIN D3) 1000 UNIT tablet Take 1 tablet (1,000 Units) by mouth daily 100 tablet 3     clindamycin (CLEOCIN T) 1 % lotion Apply topically 2 times daily After cleansing 60 mL 1     ethynodiol-ethinyl estradiol (KELNOR) 1-35 MG-MCG per tablet Take 1 tablet by mouth daily 84 tablet 3     Prenatal Vit-Fe Fumarate-FA (PRENATAL VITAMINS) 28-0.8 MG TABS Take 1 tablet by mouth daily       lanolin ointment Apply topically every hour as needed for dry skin (sore nipples)        No Known Allergies     ROS: tripped over an electrical box at work - twisted her ankle - RICE'd it.  Was able to put weight on it right away.   Is still breastfeeding her daughter, Marybel - 13 months old now. Goal at least 15 months.   CONSTITUTIONAL: NEGATIVE for fever, chills, change in weight  INTEGUMENTARU/SKIN: NEGATIVE for worrisome rashes, moles or lesions  EYES: NEGATIVE for vision changes or irritation  ENT: NEGATIVE for ear, mouth and throat problems  RESP: NEGATIVE for significant cough or SOB  BREAST: NEGATIVE for  "masses, tenderness or discharge  CV: NEGATIVE for chest pain, palpitations or peripheral edema  GI: NEGATIVE for nausea, abdominal pain, heartburn, or change in bowel habits  : NEGATIVE for unusual urinary or vaginal symptoms. Periods are regular.  MUSCULOSKELETAL: NEGATIVE for significant arthralgias or myalgia  NEURO: NEGATIVE for weakness, dizziness or paresthesias  PSYCHIATRIC: NEGATIVE for changes in mood or affect.     OBJECTIVE:   /64 (BP Location: Left arm, Patient Position: Sitting, Cuff Size: Adult Regular)  Pulse 88  Temp 97.5  F (36.4  C) (Tympanic)  Resp 12  Ht 5' 7.5\" (1.715 m)  Wt 149 lb (67.6 kg)  LMP 05/07/2018  SpO2 98%  Breastfeeding? Yes  BMI 22.99 kg/m2  EXAM:  GENERAL: healthy, alert and no distress  EYES: Eyes grossly normal to inspection, PERRL and conjunctivae and sclerae normal  HENT: ear canals and TM's normal, nose and mouth without ulcers or lesions  NECK: no adenopathy, no asymmetry, masses, or scars and thyroid normal to palpation  RESP: lungs clear to auscultation - no rales, rhonchi or wheezes  BREAST: normal without masses, tenderness or nipple discharge and no palpable axillary masses or adenopathy  CV: regular rate and rhythm, normal S1 S2, no S3 or S4, no murmur, click or rub, no peripheral edema and peripheral pulses strong  ABDOMEN: soft, nontender, no hepatosplenomegaly, no masses and bowel sounds normal   (female): normal female external genitalia, normal urethral meatus, vaginal mucosa pink, moist, well rugated, and normal cervix/adnexa/uterus without masses or discharge  MS: no gross musculoskeletal defects noted, no edema  SKIN: no suspicious lesions or rashes, but there are grouped somewhat excoriated papules right facial cheek only - never painful or itchy   - some scant micropustules are noted.    NEURO: Normal strength and tone, mentation intact and speech normal  PSYCH: mentation appears normal, affect normal/bright.  .   The right   ankle has no " erythema, ecchymosis, warmth, or edema.  No tenderness over the medial aspect of the ankle or the medial ligaments.  The fifth metatarsal is not tender, nor is the proximal fibula.  The ankle joint is intact without excessive opening on stressing.  Foot is neurovasculary intact.      CBC RESULTS:   Recent Labs   Lab Test  05/31/17   1241   04/20/17   0655   WBC   --    --   10.0   RBC   --    --   2.93*   HGB  14.2   < >  8.2*   HCT   --    --   25.1*   MCV   --    --   86   MCH   --    --   28.0   MCHC   --    --   32.7   RDW   --    --   13.3   PLT   --    --   229    < > = values in this interval not displayed.     Urine HCG = negative today.     ASSESSMENT/PLAN:       ICD-10-CM    1. Encounter for routine adult health examination with abnormal findings Z00.01 Glucose and albumin, OB urine   2. Perioral dermatitis- right facial cheek  L71.0 azithromycin (ZITHROMAX) 250 MG tablet     clindamycin (CLEOCIN T) 1 % lotion     ethynodiol-ethinyl estradiol (KELNOR) 1-35 MG-MCG per tablet     OFFICE/OUTPT VISIT,EST,LEVL III   3. Surveillance for birth control, oral contraceptives Z30.41 ethynodiol-ethinyl estradiol (KELNOR) 1-35 MG-MCG per tablet     Beta HCG Qual, Urine - [facility] and [place] ([id number])     OFFICE/OUTPT VISIT,EST,LEVL III   4. Sprain of right ankle, unspecified ligament, initial encounter S93.401A OFFICE/OUTPT VISIT,EST,LEVL III   5. Breastfeeding (infant) Z78.9 azithromycin (ZITHROMAX) 250 MG tablet     OFFICE/OUTPT VISIT,EST,LEVL III   6. CARDIOVASCULAR SCREENING; LDL GOAL LESS THAN 160 Z13.6 CBC with platelets     Lipid panel reflex to direct LDL Fasting     Was on micronor for birth control - would like to change over to combination pill today. Discussed chelon and pt would like to try that- use back up method of birth control for 1 month after that. Check HCG today.     COUNSELING:   Reviewed preventive health counseling, as reflected in patient instructions       reports that she has never smoked.  "She has never used smokeless tobacco.    Estimated body mass index is 24.13 kg/(m^2) as calculated from the following:    Height as of 5/31/17: 5' 7.5\" (1.715 m).    Weight as of 6/28/17: 156 lb 6.4 oz (70.9 kg).        Spent  25 minutes on pt care today outside of preventative care. All face to face time from 4:05pm  to 4:30pm .  Greater than 50% of time spent in coordination of care/counseling today re:  Perioral dermatitis- right facial cheek     Surveillance for birth control, oral contraceptives    Sprain of right ankle, unspecified ligament, initial encounter    Breastfeeding (infant)         Counseling Resources:  ATP IV Guidelines  Pooled Cohorts Equation Calculator  Breast Cancer Risk Calculator  FRAX Risk Assessment  ICSI Preventive Guidelines  Dietary Guidelines for Americans, 2010  USDA's MyPlate  ASA Prophylaxis  Lung CA Screening    Mey Mcleod MD  [facility] [place]  "

## 2018-05-22 ASSESSMENT — ANXIETY QUESTIONNAIRES: GAD7 TOTAL SCORE: 5

## 2018-05-22 ASSESSMENT — PATIENT HEALTH QUESTIONNAIRE - PHQ9: SUM OF ALL RESPONSES TO PHQ QUESTIONS 1-9: 1

## 2018-07-10 ENCOUNTER — HOSPITAL ENCOUNTER (OUTPATIENT)
Dept: LAB | Facility: CLINIC | Age: 33
Discharge: HOME | End: 2018-07-10
Attending: FAMILY MEDICINE | Admitting: FAMILY MEDICINE
Payer: COMMERCIAL

## 2018-07-10 DIAGNOSIS — Z13.6 CARDIOVASCULAR SCREENING; LDL GOAL LESS THAN 160: ICD-10-CM

## 2018-07-10 LAB
CHOLEST SERPL-MCNC: 134 MG/DL
ERYTHROCYTE [DISTWIDTH] IN BLOOD BY AUTOMATED COUNT: 11.8 % (ref 10–15)
HCT VFR BLD AUTO: 39.5 % (ref 35–47)
HDLC SERPL-MCNC: 56 MG/DL
HGB BLD-MCNC: 13.4 G/DL (ref 11.7–15.7)
LDLC SERPL CALC-MCNC: 58 MG/DL
MCH RBC QN AUTO: 30 PG (ref 26.5–33)
MCHC RBC AUTO-ENTMCNC: 33.9 G/DL (ref 31.5–36.5)
MCV RBC AUTO: 88 FL (ref 78–100)
NONHDLC SERPL-MCNC: 78 MG/DL
PLATELET # BLD AUTO: 228 10E9/L (ref 150–450)
RBC # BLD AUTO: 4.47 10E12/L (ref 3.8–5.2)
TRIGL SERPL-MCNC: 98 MG/DL
WBC # BLD AUTO: 6.6 10E9/L (ref 4–11)

## 2018-07-10 PROCEDURE — 36415 COLL VENOUS BLD VENIPUNCTURE: CPT | Performed by: FAMILY MEDICINE

## 2018-07-10 PROCEDURE — 85027 COMPLETE CBC AUTOMATED: CPT | Performed by: FAMILY MEDICINE

## 2018-07-10 PROCEDURE — 80061 LIPID PANEL: CPT | Performed by: FAMILY MEDICINE

## 2018-07-11 ENCOUNTER — MYC MEDICAL ADVICE (OUTPATIENT)
Dept: FAMILY MEDICINE | Facility: CLINIC | Age: 33
End: 2018-07-11

## 2018-07-19 ENCOUNTER — MYC MEDICAL ADVICE (OUTPATIENT)
Dept: FAMILY MEDICINE | Facility: CLINIC | Age: 33
End: 2018-07-19

## 2018-07-19 NOTE — TELEPHONE ENCOUNTER
Forwarded to J.  Please review patient's Mychart message and advise.  Jody Hernandez, RN, BS, PHN

## 2018-07-23 NOTE — PATIENT INSTRUCTIONS
What is Genital HPV?  HPV (human papillomavirus) is a very common family of viruses. Some strains of genital HPV can cause abnormal changes (dysplasia) in the cells of a woman s cervix. In a small number of women, these changes can lead to cancer if not treated. Also, certain strains of HPV can cause genital warts (condyloma). Although many people carry HPV, it often causes no symptoms. The virus may first be detected when signs of dysplasia or warts are found during a Pap test.     Dysplasia develops when cervical cells change in ways that are not normal.             Warts on the cervix can be detected with a Pap test.             Warts around the genitals may be visible. These external warts can affect the vulva, vagina, and anus.      How does HPV spread?  HPV lives inside skin and mucous membranes. It spreads when skin carrying the virus touches other skin. Genital HPV most often spreads during sexual contact. Condoms and other barriers help protect against the spread by preventing skin contact. But condoms may not cover all affected skin, so they may not provide complete protection. There is no cure for HPV. Even if symptoms go away, the virus may remain in the body. Because it often doesn t cause symptoms, many people who have HPV don t even know it.  When dysplasia occurs  The cervix is the narrow canal at the bottom of the uterus. It s made up of layers of cells that normally change as they grow. Dysplasia occurs when HPV causes some cervical cells to change in ways that are not normal. These abnormal cells can be detected with a Pap test. Left  untreated, abnormal cells can develop into cancer.  When warts form  Certain strains of HPV can make skin cells reproduce more often than they should. These extra skin cells build up into warts. Warts can form on the cervix. They can also form around or inside the genitals. Treating warts helps keep HPV from spreading to sexual partners.     A vaccine is available  that protects against certain strains of HPV. Your health care provider can tell you more.   Date Last Reviewed: 1/1/2017 2000-2017 [employer]. [address] [place] [address], [place] [address]. All rights reserved. This information is not intended as a substitute for professional medical care. Always follow your healthcare professional's instructions.        Genital HPV: Diagnosis and Treatment  Genital HPV is often detected during a routine exam. Your healthcare provider may ask if you are sexually active, and if you have had dysplasia or genital warts before. You may also be checked for signs of other sexually transmitted infections. Genital HPV can t be cured, but its effects can be treated.  Your exam    A Pap test can show signs of dysplasia or warts on the cervix. A sample of cells is taken from the cervix and viewed under a microscope.    A colposcopy may be done to see dysplasia or warts more clearly. A magnifying scope (colposcope) is used to look at the cervix through the vagina.    An acetowhite test makes warts easier to see. Vinegar is applied to the cervix or other skin that may be affected. If warts are present, they turn white. This test may be done during a colposcopy.    A DNA test can find out which strain of HPV you have. Abnormal cells are studied to see if you are at higher risk of cancer. This may affect your treatment plan.  If you have genital warts  The strains of HPV that cause warts are often not the same strains that lead to cancer. If you have genital warts, report them to your healthcare provider. Be aware that genital warts:    Can appear alone or in groups, and may be hard to see    May feel like dry, firm bumps and look like a rash    May look different on skin than on mucous membrane    May look different on a woman than on a man  Your treatment  There is no cure for genital HPV. However, the effects of HPV can be treated. Treating dysplasia removes the cells that can  lead to cancer. Treating warts may help keep you from spreading the virus to others.  Types of treatment    Dysplasia or warts can be removed with heat (cautery), freezing, or laser. The procedure is done by your healthcare provider, usually in the doctor's office. The number of treatments you need depends on how much tissue must be removed.    Medicines can be applied to treat external warts. Some medicines prompt your immune system to fight HPV. Others are caustic agents that destroy warts. Medicines may be applied at the healthcare provider's office or at home.    Other treatments are being developed as more is learned about HPV. An HPV vaccine is available to prevent infection with HPV. Talk to your healthcare provider about whether this vaccine is right for you.  Ways to stay healthy  Although some strains of HPV are linked to cervical cancer, most people with HPV don t develop cancer. Following up with your healthcare provider helps reduce the cancer risk even more. If you or your child has never had HPV,  ask your healthcare provider about receiving the HPV vaccine.  Follow-up care    Schedule follow-up visits as instructed. See your healthcare provider if you notice any new warts.    Have Pap tests as often as your healthcare provider tells you to. This way any dysplasia is found early, when treatment works best.  Keep your immune system strong    Don t smoke. Smoking weakens the immune system, which makes you more susceptible to HPV. Smoking also increases the risk of cervical and other types of cancer.  To protect against HPV  If you have sex, the best way to prevent the spread of HPV is to use a latex condom every time. But remember that condoms and other barriers only protect the skin they cover. If you re with someone new, talk about HPV and other sexually transmitted diseases before you have sex. If you re in a committed relationship and aren t currently using condoms, you may not need to change your  habits. Talk to your partner and make a choice that feels right to both of you.     A vaccine can help prevent HPV in young men and women. Ask your healthcare provider whether this vaccine may be right for you.  Date Last Reviewed: 1/1/2017 2000-2017 [employer]. [address] [place] [address]. All rights reserved. This information is not intended as a substitute for professional medical care. Always follow your healthcare professional's instructions.        HPV and Genital Warts: Taking Care of Yourself  HPV (human papillomavirus) is the virus that causes genital warts. Other types of HPV may also increase your risk for cancer of the cervix or genitals. By taking care of yourself, you can help your body fight against HPV. Regular visits with your healthcare provider, a healthy immune system, and being aware of risks help you stay in control.    Make healthy choices    Eat a nutritious diet. Foods high in beta-carotene (such as tomatoes, squash, and enedelia greens) may help prevent cervical and other cancers. So do foods high in folic acid (such as whole grains, beans, and broccoli).    Quit smoking. Smoking weakens the immune system. It also increases the risk of many cancers, including cervical cancer in women.  Visit your healthcare provider    Treatment for genital warts may take several trips to your healthcare provider. Stick with it. You may need to try a few treatments before you find the one that works best.    Once the warts are removed, schedule follow-up visits as instructed. Use a mirror to perform self-exams between visits. See your healthcare provider right away if you notice any new warts.    Women should have Pap tests as often as their healthcare provider suggests.  Consider your needs    Pregnant women shouldn t use certain treatments for genital warts. Your healthcare provider can tell you which ones are safe. If you become pregnant, make sure your provider knows that  you have HPV.    People with weak immune systems may have more frequent outbreaks and may also not respond as well to treatment. Your healthcare provider can help find the best treatment plan for you.   Date Last Reviewed: 1/1/2017 2000-2017 [employer]. [address] [place] [address], [place] [address]. All rights reserved. This information is not intended as a substitute for professional medical care. Always follow your healthcare professional's instructions.        HPV and Genital Warts: Understanding Your Diagnosis  HPV (human papillomavirus) is the virus that causes genital warts. If you have HPV, you re not alone. Millions of people carry this virus. Finding out you have HPV may be upsetting for you and your partner. But learning about HPV and its treatments can make you both feel better. Then you can go on with your lives together.  Accepting your diagnosis  At first, it may be hard to respond to what you ve learned. Take time to let everything sink in. Here are some things to think about:    How your body looks. Remember that genital warts can be removed. You may feel better if you share any concerns about your body with your partner.    Long-term health issues. Some types (strains) of HPV are linked with cervical and other cancers. Some HPV types are much more linked to cancers than others. Taking care of yourself and seeing your healthcare provider as directed reduces the cancer risk even more.    Protecting your partner. Being honest about HPV will protect your partner s health. You and your partner can take steps to keep HPV from spreading. If you re with someone new, talk about HPV before you have sex.  Talking to your partner     When you re ready, talk to your partner about your diagnosis.     If you re calm, your partner may find it easier to stay calm. Remember, HPV can take months or years to produce warts. It s nearly impossible to know who was infected first. Try not to blame each  other.    Suggest that your partner get checked. Even if no warts are present, visiting a healthcare provider may make your partner feel better.    When you both feel ready, it s OK to have sex. It s safest to use a latex condom every time. But know that condoms and other barriers only protect the skin they cover. Warts are contagious, so avoid touching them. (This includes oral sex.)    If you re in a committed relationship and are not currently using condoms, discuss whether you want to change your habits. Remember that condoms are the only effective way to protect against many diseases.    Suggest that your partner ask his or her healthcare provider about the HPV vaccine. And ask your own healthcare provider whether this vaccine is right for you.    If you have children, talk with your provider about giving them the HPV vaccine  Date Last Reviewed: 1/1/2017 2000-2017 [employer]. [address] [place] [address]. All rights reserved. This information is not intended as a substitute for professional medical care. Always follow your healthcare professional's instructions.

## 2018-08-02 ENCOUNTER — OFFICE VISIT (OUTPATIENT)
Dept: FAMILY MEDICINE | Facility: CLINIC | Age: 33
End: 2018-08-02
Payer: COMMERCIAL

## 2018-08-02 VITALS
SYSTOLIC BLOOD PRESSURE: 112 MMHG | OXYGEN SATURATION: 98 % | BODY MASS INDEX: 22.13 KG/M2 | DIASTOLIC BLOOD PRESSURE: 66 MMHG | TEMPERATURE: 98 F | HEIGHT: 68 IN | WEIGHT: 146 LBS | HEART RATE: 88 BPM

## 2018-08-02 DIAGNOSIS — F51.05 INSOMNIA SECONDARY TO ANXIETY: ICD-10-CM

## 2018-08-02 DIAGNOSIS — F41.9 INSOMNIA SECONDARY TO ANXIETY: ICD-10-CM

## 2018-08-02 DIAGNOSIS — Z20.2 EXPOSURE TO HUMAN PAPILLOMAVIRUS: ICD-10-CM

## 2018-08-02 DIAGNOSIS — F41.9 ANXIETY: Primary | ICD-10-CM

## 2018-08-02 DIAGNOSIS — N92.1 BREAKTHROUGH BLEEDING ON BIRTH CONTROL PILLS: ICD-10-CM

## 2018-08-02 DIAGNOSIS — Z12.4 SCREENING FOR MALIGNANT NEOPLASM OF CERVIX: ICD-10-CM

## 2018-08-02 PROBLEM — G47.00 DIFFICULTY FALLING ASLEEP AT NIGHT UNTIL EARLY MORNING HOURS: Status: ACTIVE | Noted: 2018-08-02

## 2018-08-02 PROCEDURE — 36415 COLL VENOUS BLD VENIPUNCTURE: CPT | Performed by: FAMILY MEDICINE

## 2018-08-02 PROCEDURE — 99215 OFFICE O/P EST HI 40 MIN: CPT | Performed by: FAMILY MEDICINE

## 2018-08-02 PROCEDURE — G0145 SCR C/V CYTO,THINLAYER,RESCR: HCPCS | Performed by: FAMILY MEDICINE

## 2018-08-02 PROCEDURE — 87624 HPV HI-RISK TYP POOLED RSLT: CPT | Performed by: FAMILY MEDICINE

## 2018-08-02 PROCEDURE — 84443 ASSAY THYROID STIM HORMONE: CPT | Performed by: FAMILY MEDICINE

## 2018-08-02 PROCEDURE — 82728 ASSAY OF FERRITIN: CPT | Performed by: FAMILY MEDICINE

## 2018-08-02 RX ORDER — CITALOPRAM HYDROBROMIDE 20 MG/1
TABLET ORAL
Qty: 30 TABLET | Refills: 1 | Status: SHIPPED | OUTPATIENT
Start: 2018-08-02 | End: 2018-10-06

## 2018-08-02 RX ORDER — NORGESTIMATE AND ETHINYL ESTRADIOL 0.25-0.035
1 KIT ORAL DAILY
Qty: 84 TABLET | Refills: 3 | Status: SHIPPED | OUTPATIENT
Start: 2018-08-02 | End: 2019-07-06

## 2018-08-02 ASSESSMENT — ANXIETY QUESTIONNAIRES
2. NOT BEING ABLE TO STOP OR CONTROL WORRYING: NEARLY EVERY DAY
IF YOU CHECKED OFF ANY PROBLEMS ON THIS QUESTIONNAIRE, HOW DIFFICULT HAVE THESE PROBLEMS MADE IT FOR YOU TO DO YOUR WORK, TAKE CARE OF THINGS AT HOME, OR GET ALONG WITH OTHER PEOPLE: SOMEWHAT DIFFICULT
GAD7 TOTAL SCORE: 21
6. BECOMING EASILY ANNOYED OR IRRITABLE: NEARLY EVERY DAY
7. FEELING AFRAID AS IF SOMETHING AWFUL MIGHT HAPPEN: NEARLY EVERY DAY
3. WORRYING TOO MUCH ABOUT DIFFERENT THINGS: NEARLY EVERY DAY
5. BEING SO RESTLESS THAT IT IS HARD TO SIT STILL: NEARLY EVERY DAY
1. FEELING NERVOUS, ANXIOUS, OR ON EDGE: NEARLY EVERY DAY

## 2018-08-02 ASSESSMENT — PATIENT HEALTH QUESTIONNAIRE - PHQ9: 5. POOR APPETITE OR OVEREATING: NEARLY EVERY DAY

## 2018-08-02 NOTE — MR AVS SNAPSHOT
"              After Visit Summary   8/2/2018    Mandy Novak    MRN: [id number]           Patient Information     Date Of Birth          1985        Visit Information        Provider Department      8/2/2018 8:15 AM Mey Mcleod MD [facility]        Today's Diagnoses     Anxiety    -  1    Insomnia secondary to anxiety        Exposure to human papillomavirus-  with genital warts - recently diagnosed        Screening for malignant neoplasm of cervix        Breakthrough bleeding on birth control pills          Care Instructions                     Start new pills on day #5 instead of day #7 after starting placebo week of current pills. Please, call or return to clinic or go to the ER immediately if signs or symptoms worsen or fail to improve as anticipated.     See Patient Instructions    Schedule a telephone visit or in person visit in 1 month to see how mood is going.      Generalized Anxiety Disorder  What is generalized anxiety disorder?   Generalized anxiety disorder (CRISTOFER) is a condition in which a person worries excessively and unrealistically. They may also be jittery, restless, or dizzy. When these symptoms last for at least 6 months, a diagnosis of CRISTOFER may be made.  CRISTOFER may exist by itself, or with both anxiety and depression. It is estimated that almost 5% of people have had this disorder during their lives.  How does it occur?   The cause of CRISTOFER is unknown. Genetic and environmental factors play a role. Women have CRISTOFER about twice as often as men.  The worry in CRISTOFER is not about panic attacks or being afraid in public places. It is typically \"free-floating\" anxiety out of proportion to any real life situation. The worrying can interfere with normal day-to-day activities and work or school.  What are the symptoms?   Symptoms include excessive, unrealistic, and uncontrollable worrying about many things such as:  the state of the world   the economy   violence in " society   your job   the bills   chores   family members  Physical symptoms such as muscle tension, sleep problems, or feeling on edge usually go along with anxiety. A person may be short-tempered and unable to focus or concentrate because of the worrying. Other symptoms include sweating, shaking, having a very fast heartbeat, feeling out of breath, needing to go to the bathroom often and feeling like fainting. People with CRISTOFER may be uneasy in a group or in a waiting room.  How is it diagnosed?   There is no lab test for CRISTOFER. Your healthcare provider or therapist will ask about your symptoms. He or she will make sure you do not have a medical illness or drug or alcohol problem that could cause the symptoms. Some medicines can cause anxiety or make it worse. These include asthma medicines, stimulants, and steroids such as prednisone.  If you have had the symptoms for at least 6 months, if you have had to cut back on your activities, and if you find it difficult to get things done, you may be diagnosed with generalized anxiety disorder.  How is it treated?   Different types of approaches have proven helpful in treating CRISTOFER. These include medicine, behavior therapy, relaxation therapy, cognitive therapy, and stress management techniques. Which treatments your healthcare provider or therapist uses may depend upon how much the disorder interferes with your day-to-day life.  Several types of medicines can help treat CRISTOFER. Your healthcare provider will work with you to carefully select the best one for you.  How long will the effects last?   CRISTOFER can last many years and sometimes an entire lifetime.   How can I take care of myself?   Get support. Talk with family and friends. Consider joining a support group in your area. Go to a stress management class in your local community.   Learn to manage stress. Ask for help at home and work when the load is too great to handle. Find ways to relax, for example take up a hobby, listen  to music, watch movies, take walks. Try deep breathing exercises when you feel stressed.   Take care of your physical health. Try to get at least 7 to 9 hours of sleep each night. Eat a healthy diet. Limit caffeine. If you smoke, quit. Avoid alcohol and drugs, because they can make your symptoms worse. Exercise according to your healthcare provider's instructions.   Check your medicines. To help prevent problems, tell your healthcare provider and pharmacist about all the medicines, natural remedies, vitamins, and other supplements that you take.   Contact your healthcare provider or therapist if you have any questions or your symptoms seem to be getting worse.  You may also want to contact [facility] (formerly the National Mental Health Association or [place]). [facility]'s toll-free Information Center number is 8-428-731-[facility]. Its web site address is http://www.NMHA.org        Sleep and Women  Do you have trouble sleeping? Many women do. Some life changes are unique to women, such as pregnancy or menopause. These changes, along with the demands of family and work, can affect your health and your sleep. Talk to your health care provider if your sleep problems last more than a few weeks.    What Affects Your Sleep  Many factors can affect how well you sleep. Hormone changes can cause mood swings, insomnia, and other problems. Balancing many roles such as mother, partner, worker, and caretaker can also take a toll on your sleep. Worries about these competing demands can keep you awake at night. And, of course, with so much to do, who even has time for sleep? But you need to sleep well to be healthy and have energy. The good news is there are steps you can take to sleep better.  Tips for Better Sleep  Here are some steps you can take to sleep better:    Keep a regular sleep schedule. Go to bed and get up at the same time each day.    Exercise regularly. But avoid strenuous exercise 2 to 4 hours before  bedtime.    Learn to relax. Try a warm bath, yoga, or meditation. Reading a book or listening to music can help you relax before bedtime.    Create a comfortable setting for sleep. Make sure the room is quiet, dark, and not too hot or too cold.    Use your bed only for sleep and sex.    Avoid or limit caffeine, nicotine, and alcohol.  Resources  American Academy of Sleep Medicine   www.sleepeducation.com  National Sleep Foundation   897.535.1746,   www.sleepfoundation.org     9514-5125 [employer], [address] [place] [address], [place] [address]. All rights reserved. This information is not intended as a substitute for professional medical care. Always follow your healthcare professional's instructions.Home  Back  SP    Sleep and Women: Life Stages  A woman goes through many stages during her lifetime. These stages are a natural part of being a woman. Physical and emotional changes take place during the menstrual cycle, pregnancy, motherhood, and menopause. These changes can affect sleep, even cause insomnia. But there are ways you can improve your sleep.    Menstrual Cycle  Many women have physical or emotional symptoms before or during their period. These symptoms may include mood swings, cramping, and fatigue. They can affect how you feel and how you sleep. Eating a well-balanced diet low in fat, salt, and sugar may reduce your symptoms. Vitamin and mineral supplements may also help. Regular exercise can reduce stress and relieve some of your symptoms. You will have more energy during the day and be more tired at bedtime. Afternoon exercise is best. Nighttime exercise may affect your sleep.  Pregnancy    Take a warm shower before bed.    Ask your partner to massage your shoulders, neck, or back.    Sleep with pillows under your stomach and back, and between your knees.    Take naps if you feel tired during the day.    Exercise and practice good posture. Sleep on a firm mattress.    To reduce frequent urination at  night, drink most of your fluids earlier in the day.    Sleep with your upper body raised 6 inches. Don t lie down for two hours after you eat.    Walk, stretch, or massage restless legs.    Avoid or limit coffee, black tea, and cola. These may keep you awake at night.  Motherhood    Ask for help when you need it. Accept help when it s offered.    Many new mothers feel a little down for a few weeks. Share your feelings with your loved ones. Talk to your health care provider if your feelings get in the way of sleeping or eating.    Try to adjust your baby s sleep to fit a day-night cycle. At night, have lights dim and the setting quiet. During the day, keep your baby active longer. Then he or she will sleep better at night.    Take a daily walk with your baby. Fresh air and daylight will help you both sleep better.    When your baby sleeps, lie down for a nap or put your feet up and rest.  Menopause  Menopause is when you stop having periods for good. Just before menopause, your body produces fewer female hormones. This can cause physical, mental, or emotional changes that may affect your sleep. Try these tips:    If you have hot flashes and night sweats, avoid caffeine and spicy foods at nighttime. Wear a cotton nightgown and put cotton sheets on your bed. Keep a window open or use a portable fan.    Mood swings can cause insomnia, memory loss, fatigue, or depression. If these affect your sleep, talk to your health care provider. Lift your spirits by exercising and doing things you enjoy.    1115-9937 [facility], [address] [place] [address], [place] [address]. All rights reserved. This information is not intended as a substitute for professional medical care. Always follow your healthcare professional's instructions.Home  Back  SP    Treating Insomnia  Good sleeping habits are a key part of treatment. If needed, some medications may help you sleep better at first. Making healthy lifestyle changes and learning to  relax can improve your sleep. Treating insomnia takes commitment, but trust that your efforts will pay off. Talk to your health care provider before taking any medication.    Healthy Lifestyle  Your lifestyle affects your health and your sleep. Here are some healthy habits:    Keep a regular sleep schedule. Go to bed and get up at the same time each day.    Exercise regularly. It may help you reduce stress. Avoid strenuous exercise for two to four hours before bedtime.    Avoid or limit naps.    Use your bed only for sleep and sex.    Don t spend too much time in bed trying to fall asleep. If you can t fall asleep, get up and do something until you become tired and drowsy.    Avoid or limit caffeine and nicotine. They can keep you awake at night. Also avoid alcohol. It may help you fall asleep at first, but your sleep will not be restful.  Before Bedtime  To sleep better every night, try these tips:    Have a bedtime routine to let your body and mind know when it s time to sleep.    Think of going to bed as relaxing and enjoyable. Sleep will come sooner.    If your worries don t let you sleep, write them down in a diary. Then close it, and go to bed.    Make sure the room is not too hot or too cold. If it s not dark enough, an eye mask can help. If it s noisy, try using earplugs.  Learn to Relax  Stress, anxiety, and body tension may keep you awake at night. To unwind before bedtime, try a warm bath, meditation, or yoga. Also, try the following:    Deep breathing. Sit or lie back in a chair. Take a slow, deep breath. Hold it for 5 counts. Then breathe out slowly through your mouth. Keep doing this until you feel relaxed.    Progressive muscle relaxation. Tense and then relax the muscles in your body as you breathe deeply. Start with your feet and work up your body to your neck and face.    0363-7161 [facility], [address] [facility], [place] [address]. All rights reserved. This information is not intended as a  substitute for professional medical care. Always follow your healthcare professional's instructions.      What is Genital HPV?  HPV (human papillomavirus) is a very common family of viruses. Some strains of genital HPV can cause abnormal changes (dysplasia) in the cells of a woman s cervix. In a small number of women, these changes can lead to cancer if not treated. Also, certain strains of HPV can cause genital warts (condyloma). Although many people carry HPV, it often causes no symptoms. The virus may first be detected when signs of dysplasia or warts are found during a Pap test.     Dysplasia develops when cervical cells change in ways that are not normal.             Warts on the cervix can be detected with a Pap test.             Warts around the genitals may be visible. These external warts can affect the vulva, vagina, and anus.      How does HPV spread?  HPV lives inside skin and mucous membranes. It spreads when skin carrying the virus touches other skin. Genital HPV most often spreads during sexual contact. Condoms and other barriers help protect against the spread by preventing skin contact. But condoms may not cover all affected skin, so they may not provide complete protection. There is no cure for HPV. Even if symptoms go away, the virus may remain in the body. Because it often doesn t cause symptoms, many people who have HPV don t even know it.  When dysplasia occurs  The cervix is the narrow canal at the bottom of the uterus. It s made up of layers of cells that normally change as they grow. Dysplasia occurs when HPV causes some cervical cells to change in ways that are not normal. These abnormal cells can be detected with a Pap test. Left  untreated, abnormal cells can develop into cancer.  When warts form  Certain strains of HPV can make skin cells reproduce more often than they should. These extra skin cells build up into warts. Warts can form on the cervix. They can also form around or inside  the genitals. Treating warts helps keep HPV from spreading to sexual partners.     A vaccine is available that protects against certain strains of HPV. Your health care provider can tell you more.   Date Last Reviewed: 1/1/2017 2000-2017 [employer]. [address] [place] [address]. All rights reserved. This information is not intended as a substitute for professional medical care. Always follow your healthcare professional's instructions.        Genital HPV: Diagnosis and Treatment  Genital HPV is often detected during a routine exam. Your healthcare provider may ask if you are sexually active, and if you have had dysplasia or genital warts before. You may also be checked for signs of other sexually transmitted infections. Genital HPV can t be cured, but its effects can be treated.  Your exam    A Pap test can show signs of dysplasia or warts on the cervix. A sample of cells is taken from the cervix and viewed under a microscope.    A colposcopy may be done to see dysplasia or warts more clearly. A magnifying scope (colposcope) is used to look at the cervix through the vagina.    An acetowhite test makes warts easier to see. Vinegar is applied to the cervix or other skin that may be affected. If warts are present, they turn white. This test may be done during a colposcopy.    A DNA test can find out which strain of HPV you have. Abnormal cells are studied to see if you are at higher risk of cancer. This may affect your treatment plan.  If you have genital warts  The strains of HPV that cause warts are often not the same strains that lead to cancer. If you have genital warts, report them to your healthcare provider. Be aware that genital warts:    Can appear alone or in groups, and may be hard to see    May feel like dry, firm bumps and look like a rash    May look different on skin than on mucous membrane    May look different on a woman than on a man  Your treatment  There is no cure for  genital HPV. However, the effects of HPV can be treated. Treating dysplasia removes the cells that can lead to cancer. Treating warts may help keep you from spreading the virus to others.  Types of treatment    Dysplasia or warts can be removed with heat (cautery), freezing, or laser. The procedure is done by your healthcare provider, usually in the doctor's office. The number of treatments you need depends on how much tissue must be removed.    Medicines can be applied to treat external warts. Some medicines prompt your immune system to fight HPV. Others are caustic agents that destroy warts. Medicines may be applied at the healthcare provider's office or at home.    Other treatments are being developed as more is learned about HPV. An HPV vaccine is available to prevent infection with HPV. Talk to your healthcare provider about whether this vaccine is right for you.  Ways to stay healthy  Although some strains of HPV are linked to cervical cancer, most people with HPV don t develop cancer. Following up with your healthcare provider helps reduce the cancer risk even more. If you or your child has never had HPV,  ask your healthcare provider about receiving the HPV vaccine.  Follow-up care    Schedule follow-up visits as instructed. See your healthcare provider if you notice any new warts.    Have Pap tests as often as your healthcare provider tells you to. This way any dysplasia is found early, when treatment works best.  Keep your immune system strong    Don t smoke. Smoking weakens the immune system, which makes you more susceptible to HPV. Smoking also increases the risk of cervical and other types of cancer.  To protect against HPV  If you have sex, the best way to prevent the spread of HPV is to use a latex condom every time. But remember that condoms and other barriers only protect the skin they cover. If you re with someone new, talk about HPV and other sexually transmitted diseases before you have sex. If  you re in a committed relationship and aren t currently using condoms, you may not need to change your habits. Talk to your partner and make a choice that feels right to both of you.     A vaccine can help prevent HPV in young men and women. Ask your healthcare provider whether this vaccine may be right for you.  Date Last Reviewed: 1/1/2017 2000-2017 [employer]. [address] [place] [address], [place] [address]. All rights reserved. This information is not intended as a substitute for professional medical care. Always follow your healthcare professional's instructions.        HPV and Genital Warts: Taking Care of Yourself  HPV (human papillomavirus) is the virus that causes genital warts. Other types of HPV may also increase your risk for cancer of the cervix or genitals. By taking care of yourself, you can help your body fight against HPV. Regular visits with your healthcare provider, a healthy immune system, and being aware of risks help you stay in control.    Make healthy choices    Eat a nutritious diet. Foods high in beta-carotene (such as tomatoes, squash, and enedelia greens) may help prevent cervical and other cancers. So do foods high in folic acid (such as whole grains, beans, and broccoli).    Quit smoking. Smoking weakens the immune system. It also increases the risk of many cancers, including cervical cancer in women.  Visit your healthcare provider    Treatment for genital warts may take several trips to your healthcare provider. Stick with it. You may need to try a few treatments before you find the one that works best.    Once the warts are removed, schedule follow-up visits as instructed. Use a mirror to perform self-exams between visits. See your healthcare provider right away if you notice any new warts.    Women should have Pap tests as often as their healthcare provider suggests.  Consider your needs    Pregnant women shouldn t use certain treatments for genital warts. Your healthcare  provider can tell you which ones are safe. If you become pregnant, make sure your provider knows that you have HPV.    People with weak immune systems may have more frequent outbreaks and may also not respond as well to treatment. Your healthcare provider can help find the best treatment plan for you.   Date Last Reviewed: 1/1/2017 2000-2017 [employer]. [address] [place] [address], [place] [address]. All rights reserved. This information is not intended as a substitute for professional medical care. Always follow your healthcare professional's instructions.        HPV and Genital Warts: Understanding Your Diagnosis  HPV (human papillomavirus) is the virus that causes genital warts. If you have HPV, you re not alone. Millions of people carry this virus. Finding out you have HPV may be upsetting for you and your partner. But learning about HPV and its treatments can make you both feel better. Then you can go on with your lives together.  Accepting your diagnosis  At first, it may be hard to respond to what you ve learned. Take time to let everything sink in. Here are some things to think about:    How your body looks. Remember that genital warts can be removed. You may feel better if you share any concerns about your body with your partner.    Long-term health issues. Some types (strains) of HPV are linked with cervical and other cancers. Some HPV types are much more linked to cancers than others. Taking care of yourself and seeing your healthcare provider as directed reduces the cancer risk even more.    Protecting your partner. Being honest about HPV will protect your partner s health. You and your partner can take steps to keep HPV from spreading. If you re with someone new, talk about HPV before you have sex.  Talking to your partner     When you re ready, talk to your partner about your diagnosis.     If you re calm, your partner may find it easier to stay calm. Remember, HPV can take months or years  to produce warts. It s nearly impossible to know who was infected first. Try not to blame each other.    Suggest that your partner get checked. Even if no warts are present, visiting a healthcare provider may make your partner feel better.    When you both feel ready, it s OK to have sex. It s safest to use a latex condom every time. But know that condoms and other barriers only protect the skin they cover. Warts are contagious, so avoid touching them. (This includes oral sex.)    If you re in a committed relationship and are not currently using condoms, discuss whether you want to change your habits. Remember that condoms are the only effective way to protect against many diseases.    Suggest that your partner ask his or her healthcare provider about the HPV vaccine. And ask your own healthcare provider whether this vaccine is right for you.    If you have children, talk with your provider about giving them the HPV vaccine  Date Last Reviewed: 1/1/2017 2000-2017 [employer]. [address] [place] [address]. All rights reserved. This information is not intended as a substitute for professional medical care. Always follow your healthcare professional's instructions.               Thank you for choosing [facility]  for your Health Care. It was a pleasure seeing you at your visit today. Please contact us with any questions or concerns you may have.                   Mey Mcleod MD                                  To reach your [facility] care team after hours call:   [phone]    Our clinic hours are:     Monday- 7:30 am - 7:00 pm                             Tuesday through Friday- 7:30 am - 5:00 pm                                        Saturday- 8:00 am - 12:00 pm                  Phone:  [phone]    Our pharmacy hours are:     Monday  8:00 am to 7:00 pm      Tuesday through Friday 8:00am to 6:00pm                        Saturday - 9:00  am to 1:00 pm      Sunday : Closed.              Phone:  [phone]      There is also information available at our web site:  [email or web address]    If your provider ordered any lab tests and you do not receive the results within 10 business days, please call the clinic.    If you need a medication refill please contact your pharmacy.  Please allow 2 business days for your refill to be completed.    Our clinic offers telephone visits and e visits.  Please ask one of your team members to explain more.      Use [employer] (secure email communication and access to your chart) to send your primary care provider a message or make an appointment. Ask someone on your Team how to sign up for [employer].                         Follow-ups after your visit        Additional Services     MENTAL HEALTH REFERRAL  - Adult; Outpatient Treatment; Individual/Couples/Family/Group Therapy/Health Psychology; Other: Behavioral Healthcare Providers [phone]; We will contact you to schedule the appointment or please call with any questi...       All scheduling is subject to the client's specific insurance plan & benefits, provider/location availability, and provider clinical specialities.  Please arrive 15 minutes early for your first appointment and bring your completed paperwork.    Please be aware that coverage of these services is subject to the terms and limitations of your health insurance plan.  Call member services at your health plan with any benefit or coverage questions.                            Follow-up notes from your care team     Return in about 1 month (around 9/2/2018) for depression/anxiety follow up- ok for telephone visit .      Your next 10 appointments already scheduled     Aug 15, 2018 10:20 AM CDT   Office Visit with Yolande Betts MD   [facility] [place] [address]   [phone]           Bring a current list of  "meds and any records pertaining to this visit. For Physicals, please bring immunization records and any forms needing to be filled out. Please arrive 10 minutes early to complete paperwork.              Who to contact     If you have questions or need follow up information about today's clinic visit or your schedule please contact [facility] directly at [phone].  Normal or non-critical lab and imaging results will be communicated to you by MyChart, letter or phone within 4 business days after the clinic has received the results. If you do not hear from us within 7 days, please contact the clinic through [employer] or phone. If you have a critical or abnormal lab result, we will notify you by phone as soon as possible.  Submit refill requests through [employer] or call your pharmacy and they will forward the refill request to us. Please allow 3 business days for your refill to be completed.          Additional Information About Your Visit        [employer] Information     [employer] gives you secure access to your electronic health record. If you see a primary care provider, you can also send messages to your care team and make appointments. If you have questions, please call your primary care clinic.  If you do not have a primary care provider, please call [phone] and they will assist you.        Care EveryWhere ID     This is your Care EveryWhere ID. This could be used by other organizations to access your [place] medical records  [id number]        Your Vitals Were     Pulse Temperature Height Last Period Pulse Oximetry Breastfeeding?    88 98  F (36.7  C) (Tympanic) 5' 7.5\" (1.715 m) 07/12/2018 98% Yes    BMI (Body Mass Index)                   22.53 kg/m2            Blood Pressure from Last 3 Encounters:   08/02/18 112/66   05/21/18 118/64   06/28/17 108/70    Weight from Last 3 Encounters:   08/02/18 146 lb (66.2 kg)   05/21/18 149 lb (67.6 kg)   06/28/17 156 lb 6.4 oz (70.9 kg)              We " Performed the Following     Ferritin     HPV High Risk Types DNA Cervical     MENTAL HEALTH REFERRAL  - Adult; Outpatient Treatment; Individual/Couples/Family/Group Therapy/Health Psychology; Other: Behavioral Healthcare Providers [phone]; We will contact you to schedule the appointment or please call with any questi...     Pap imaged thin layer screen with HPV - recommended age 30 - 65 years (select HPV order below)     TSH with free T4 reflex          Today's Medication Changes          These changes are accurate as of 8/2/18  9:36 AM.  If you have any questions, ask your nurse or doctor.               Start taking these medicines.        Dose/Directions    citalopram 20 MG tablet   Commonly known as:  celeXA   Used for:  Anxiety   Started by:  Mey Mcleod MD        Take 1/2 tablet (10 mg) for 1-2 weeks, then increase to 1 tablet orally daily   Quantity:  30 tablet   Refills:  1       norgestimate-ethinyl estradiol 0.25-35 MG-MCG per tablet   Commonly known as:  ORTHO-CYCLEN, SPRINTEC   Used for:  Breakthrough bleeding on birth control pills   Started by:  Mey Mcleod MD        Dose:  1 tablet   Take 1 tablet by mouth daily   Quantity:  84 tablet   Refills:  3         Stop taking these medicines if you haven't already. Please contact your care team if you have questions.     ethynodiol-ethinyl estradiol 1-35 MG-MCG per tablet   Commonly known as:  KELNOR   Stopped by:  Mey Mcleod MD                Where to get your medicines      These medications were sent to [place] [address] [place] [facility] - [address] [address]     Phone:  [phone]     citalopram 20 MG tablet    norgestimate-ethinyl estradiol 0.25-35 MG-MCG per tablet                Primary Care Provider Office Phone # Fax #    Mey Mcleod [phone]       [address] [place] [address]        Equal Access to Services      RODRIGO KAYE : Hadii aad ku kristy Iraheta, waaxda luqadaha, qaybta kaalmada adewisam, miroslava milagro hayshaina saenzherveadrianna vega . So [facility] [phone].    ATENCIÓN: Si habla boy, tiene a loco disposición servicios gratuitos de asistencia lingüística. Erikaame al [phone].    We comply with applicable federal civil rights laws and [place] laws. We do not discriminate on the basis of race, color, national origin, age, disability, sex, sexual orientation, or gender identity.            Thank you!     Thank you for choosing [facility]  for your care. Our goal is always to provide you with excellent care. Hearing back from our patients is one way we can continue to improve our services. Please take a few minutes to complete the written survey that you may receive in the mail after your visit with us. Thank you!             Your Updated Medication List - Protect others around you: Learn how to safely use, store and throw away your medicines at www.disposemymeds.org.          This list is accurate as of 8/2/18  9:36 AM.  Always use your most recent med list.                   Brand Name Dispense Instructions for use Diagnosis    cholecalciferol 1000 UNIT tablet    vitamin D3    100 tablet    Take 1 tablet (1,000 Units) by mouth daily        citalopram 20 MG tablet    celeXA    30 tablet    Take 1/2 tablet (10 mg) for 1-2 weeks, then increase to 1 tablet orally daily    Anxiety       clindamycin 1 % lotion    CLEOCIN T    60 mL    Apply topically 2 times daily After cleansing    Perioral dermatitis       lanolin ointment      Apply topically every hour as needed for dry skin (sore nipples)    At risk for ineffective breastfeeding       norgestimate-ethinyl estradiol 0.25-35 MG-MCG per tablet    ORTHO-CYCLEN, SPRINTEC    84 tablet    Take 1 tablet by mouth daily    Breakthrough bleeding on birth control pills       Prenatal Vitamins 28-0.8 MG Tabs      Take 1 tablet by mouth daily    Absence of  menstruation

## 2018-08-02 NOTE — NURSING NOTE
While having labs drawn - patient stated she felt like she was going to black out and felt dizzy  RN helped patient down to floor, supine, with pillow under head and ice pack on back of neck, legs elevated on chair.   0955 Vitals: /86 (R Arm, Adult Reg), O2 99%, P 77, R 14   DENIES: CP, SOB, Difficulty Breathing, Numbness/Tingling, HA, Vision/Speech Changes, N/V    1000 Vitals: /83 (R Arm, Adult Reg), O2 100%, P 76, R 12  DENIES: CP, SOB, Difficulty Breathing, Dizziness/Lightheadedness, Numbness/Tingling, HA, Vision/Speech Changes, N/V    1005 - had patient sit up  Vitals: /86 (R Arm, Adult Reg), O2 100%, P 76, R 12  DENIES: CP, SOB, Difficulty Breathing, Dizziness/Lightheadedness, Numbness/Tingling, HA, Vision/Speech Changes, N/V    1010 - patient stood up. DENIES: CP, SOB, Difficulty Breathing, Dizziness/Lightheadedness, Numbness/Tingling, HA, Vision/Speech Changes, N/V    Patient stable and given the OK to leave.     Leonora Bravo RN  [place] [facility]

## 2018-08-02 NOTE — PROGRESS NOTES
SUBJECTIVE:   Mandy Novak is a 33 year old female who presents to clinic today for the following health issues:      Depression and Anxiety Follow-Up- difficulty falling asleep for the first time in her life.   Having a hard time processing day to day stressors secondary to sleep deprivation.   Having irritational fears of things happening to her young children and ruminates about those.   Had a panic attack last weekend - felt like the walls closing.   Had postpartum depression with her daughter, Marybel - now 15 months old.. But didn't take any meds and has never been on medication for either anxiety or depression.   Both her brother and sister have anxiety and depression and MGM with depression as well.  Doesn't   Know what meds bro. And sister are on.   Still breastfeeding daughter Marybel.  Plans to continue that till age 2 probably.     Pt and her  went in for counseling last year.       Status since last visit: Worsened     Other associated symptoms:Yes    Complicating factors:     Significant life event: Yes-       Current substance abuse: None    PHQ-9 6/28/2017 5/21/2018 8/2/2018   Total Score 8 1 4   Q9: Suicide Ideation Not at all Not at all Not at all     CRISTOFER-7 SCORE 6/28/2017 5/21/2018 8/2/2018   Total Score 8 5 21       PHQ-9  English see above.   PHQ-9   Any Language  CRISTOFER-7  Suicide Assessment Five-step Evaluation and Treatment (SAFE-T)    Amount of exercise or physical activity: 2-3 days/week for an average of 15-30 minutes    Problems taking medications regularly: No    Medication side effects: none    Diet: regular (no restrictions)    Patient Active Problem List   Diagnosis     CARDIOVASCULAR SCREENING; LDL GOAL LESS THAN 160     Situational syncope- with any blood draws or shots     ASCUS with positive high risk HPV- while pregnant 4/13/2016 = normal - recheck pap and cotest postpartum     Resolved - Marginal placenta previa with intrapartum hemorrhage in first trimester-repeat  1/15/2015= resolved      Multiple pigmented nevi     Supervision of other normal pregnancy, antepartum     Type O blood, Rh positive     Cecilio breech presentation - seen on 31 week US - resolved with external version on 3/24/2017     Large for gestational age     Indication for care in labor or delivery     History of shoulder dystocia in prior pregnancy- with first baby 7lbs 14oz 2015       (normal spontaneous vaginal delivery)     Immediate postpartum hemorrhage, postpartum     Elevated d-dimer     Acute posthemorrhagic anemia     Anxiety     Postpartum depression associated with second pregnancy     Family history of nonmelanoma skin cancer       Current Outpatient Prescriptions   Medication Sig Dispense Refill     cholecalciferol (VITAMIN D3) 1000 UNIT tablet Take 1 tablet (1,000 Units) by mouth daily 100 tablet 3     clindamycin (CLEOCIN T) 1 % lotion Apply topically 2 times daily After cleansing 60 mL 1     ethynodiol-ethinyl estradiol (KELNOR) 1-35 MG-MCG per tablet Take 1 tablet by mouth daily 84 tablet 3     Prenatal Vit-Fe Fumarate-FA (PRENATAL VITAMINS) 28-0.8 MG TABS Take 1 tablet by mouth daily       lanolin ointment Apply topically every hour as needed for dry skin (sore nipples)          No Known Allergies       Recent Labs   Lab Test  07/10/18   1650   CHOL  134   HDL  56   LDL  58   TRIG  98      CBC RESULTS:   Recent Labs   Lab Test  07/10/18   1650   WBC  6.6   RBC  4.47   HGB  13.4   HCT  39.5   MCV  88   MCH  30.0   MCHC  33.9   RDW  11.8   PLT  228             Problem list and histories reviewed & adjusted, as indicated.  Additional history: as documented    Patient Active Problem List   Diagnosis     CARDIOVASCULAR SCREENING; LDL GOAL LESS THAN 160     Situational syncope- with any blood draws or shots     ASCUS with positive high risk HPV- while pregnant 2016 = normal - recheck pap and cotest postpartum     Resolved - Marginal placenta previa with intrapartum hemorrhage in first  trimester-repeat 1/15/2015= resolved      Multiple pigmented nevi     Supervision of other normal pregnancy, antepartum     Type O blood, Rh positive     Cecilio breech presentation - seen on 31 week US - resolved with external version on 3/24/2017     Large for gestational age     Indication for care in labor or delivery     History of shoulder dystocia in prior pregnancy- with first baby 7lbs 14oz 2015       (normal spontaneous vaginal delivery)     Immediate postpartum hemorrhage, postpartum     Elevated d-dimer     Acute posthemorrhagic anemia     Anxiety     Postpartum depression associated with second pregnancy     Family history of nonmelanoma skin cancer     Difficulty falling asleep at night until early morning hours     Past Surgical History:   Procedure Laterality Date     HC TOOTH EXTRACTION W/FORCEP      wisdom teeth        Social History   Substance Use Topics     Smoking status: Never Smoker     Smokeless tobacco: Never Used     Alcohol use No      Comment: Rarely - none since becoming pregnant     Family History   Problem Relation Age of Onset     Diabetes Maternal Grandfather      diet controlled     Thyroid Disease Maternal Grandmother      Depression Maternal Grandmother      Family History Negative Mother      Family History Negative Father      Anxiety Disorder Sister      Depression Sister      Anxiety Disorder Brother      Depression Brother            Recent Labs   Lab Test  07/10/18   1650  17   1122  17   1241   LDL  58   --    --    HDL  56   --    --    TRIG  98   --    --    TSH   --   1.06  1.25      BP Readings from Last 3 Encounters:   18 112/66   18 118/64   17 108/70    Wt Readings from Last 3 Encounters:   18 146 lb (66.2 kg)   18 149 lb (67.6 kg)   17 156 lb 6.4 oz (70.9 kg)            Labs reviewed in EPIC    Reviewed and updated as needed this visit by clinical staff  Meds       Reviewed and updated as needed this visit by  "Provider        TSH   Date Value Ref Range Status   06/28/2017 1.06 0.40 - 4.00 mU/L Final        ROS:  CONSTITUTIONAL: NEGATIVE for fever, chills, change in weight  ENT/MOUTH: NEGATIVE for ear, mouth and throat problems  RESP: NEGATIVE for significant cough or SOB  CV: NEGATIVE for chest pain, palpitations or peripheral edema  Also in today for acne follow unit(s) - aveda facial cleanser ( fragrance free) and clindamycin lotion helping a lot.   Had a staph/strep infection previously - now healing well.      has genital warts - pt in today for recheck pap with HPV testing.     Breakthrough bleeding on birth control pills - third pill pack on newer ocp's - getting breakthrough bleeding midcycle. Would like to change ocp's.     No GI symptoms.    No numbness, tingling, or weakness in upper or lower extremities. No bowel or bladder control problems.       OBJECTIVE:                                                    /66  Pulse 88  Temp 98  F (36.7  C) (Tympanic)  Ht 5' 7.5\" (1.715 m)  Wt 146 lb (66.2 kg)  LMP 07/12/2018  SpO2 98%  Breastfeeding? Yes  BMI 22.53 kg/m2  Body mass index is 22.53 kg/(m^2).   GENERAL: healthy, alert, well nourished, well hydrated, no distress  HENT: ear canals- normal; TMs- normal; Nose- normal; Mouth- no ulcers, no lesions  NECK: no tenderness, no adenopathy, no asymmetry, no masses, no stiffness; thyroid- normal to palpation  RESP: lungs clear to auscultation - no rales, no rhonchi, no wheezes  CV: regular rates and rhythm, normal S1 S2, no S3 or S4 and no murmur, no click or rub -  ABDOMEN: soft, no tenderness, no  hepatosplenomegaly, no masses, normal bowel sounds  MS: extremities- no gross deformities noted, no edema  Alert and oriented. No acute distress. Appears well-groomed and casually dressed. Affect is normal, not particularly depressed. In good humor and laughs appropriately. Not particularly anxious. No evidence of psychosis.   Acne: healing papules on right " side - facial cheek.   Vagina and vulva are normal;  no discharge is noted.  Cervix normal without lesions. Uterus anteverted and mobile, normal in size and shape without tenderness.  Adnexa normal in size without masses or tenderness. Pap Smear  With HPV testing  is completed today.       Diagnostic test results:  See [facility] orders.      ASSESSMENT/PLAN:                                                        ICD-10-CM    1. Anxiety F41.9 citalopram (CELEXA) 20 MG tablet     MENTAL HEALTH REFERRAL  - Adult; Outpatient Treatment; Individual/Couples/Family/Group Therapy/Health Psychology; Other: Behavioral Healthcare Providers [phone]; We will contact you to schedule the appointment or please call with any questi...     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 Exposure to human papillomavirus-  with genital warts - recently diagnosed Z20.2 Pap imaged thin layer screen with HPV - recommended age 30 - 65 years (select HPV order below)     HPV High Risk Types DNA Cervical   4. Screening for malignant neoplasm of cervix Z12.4 Pap imaged thin layer screen with HPV - recommended age 30 - 65 years (select HPV order below)   5. Breakthrough bleeding on birth control pills N92.1 norgestimate-ethinyl estradiol (ORTHO-CYCLEN, SPRINTEC) 0.25-35 MG-MCG per tablet     Start new pills on day #5 instead of day #7 after starting placebo week of current pills. Please, call or return to clinic or go to the ER immediately if signs or symptoms worsen or fail to improve as anticipated.     See Patient Instructions    Schedule a telephone visit or in person visit in 1 month to see how mood is going.      Try to schedule exercise into every day for you.     Spent 41  minutes on pt care today. All face to face time from 0856  to 0937hours .  Greater than 50% of time spent in coordination of care/counseling today re:   1. Anxiety    2. Insomnia secondary to anxiety     3. Exposure to human papillomavirus-  with genital warts - recently diagnosed    4. Screening for malignant neoplasm of cervix    5. Breakthrough bleeding on birth control pills        Mey Mcleod MD  [facility] [place]

## 2018-08-02 NOTE — PATIENT INSTRUCTIONS
"                 Start new pills on day #5 instead of day #7 after starting placebo week of current pills. Please, call or return to clinic or go to the ER immediately if signs or symptoms worsen or fail to improve as anticipated.     See Patient Instructions    Schedule a telephone visit or in person visit in 1 month to see how mood is going.      Generalized Anxiety Disorder  What is generalized anxiety disorder?   Generalized anxiety disorder (CRISTOFER) is a condition in which a person worries excessively and unrealistically. They may also be jittery, restless, or dizzy. When these symptoms last for at least 6 months, a diagnosis of CRISTOFER may be made.  CRISTOFER may exist by itself, or with both anxiety and depression. It is estimated that almost 5% of people have had this disorder during their lives.  How does it occur?   The cause of CRISTOFER is unknown. Genetic and environmental factors play a role. Women have CRISTOFER about twice as often as men.  The worry in CRISTOFER is not about panic attacks or being afraid in public places. It is typically \"free-floating\" anxiety out of proportion to any real life situation. The worrying can interfere with normal day-to-day activities and work or school.  What are the symptoms?   Symptoms include excessive, unrealistic, and uncontrollable worrying about many things such as:  the state of the world   the economy   violence in society   your job   the bills   chores   family members  Physical symptoms such as muscle tension, sleep problems, or feeling on edge usually go along with anxiety. A person may be short-tempered and unable to focus or concentrate because of the worrying. Other symptoms include sweating, shaking, having a very fast heartbeat, feeling out of breath, needing to go to the bathroom often and feeling like fainting. People with CRISTOFER may be uneasy in a group or in a waiting room.  How is it diagnosed?   There is no lab test for CRISTOFER. Your healthcare provider or therapist will ask about " your symptoms. He or she will make sure you do not have a medical illness or drug or alcohol problem that could cause the symptoms. Some medicines can cause anxiety or make it worse. These include asthma medicines, stimulants, and steroids such as prednisone.  If you have had the symptoms for at least 6 months, if you have had to cut back on your activities, and if you find it difficult to get things done, you may be diagnosed with generalized anxiety disorder.  How is it treated?   Different types of approaches have proven helpful in treating CRISTOFER. These include medicine, behavior therapy, relaxation therapy, cognitive therapy, and stress management techniques. Which treatments your healthcare provider or therapist uses may depend upon how much the disorder interferes with your day-to-day life.  Several types of medicines can help treat CRISTOFER. Your healthcare provider will work with you to carefully select the best one for you.  How long will the effects last?   CRISTOFER can last many years and sometimes an entire lifetime.   How can I take care of myself?   Get support. Talk with family and friends. Consider joining a support group in your area. Go to a stress management class in your local community.   Learn to manage stress. Ask for help at home and work when the load is too great to handle. Find ways to relax, for example take up a hobby, listen to music, watch movies, take walks. Try deep breathing exercises when you feel stressed.   Take care of your physical health. Try to get at least 7 to 9 hours of sleep each night. Eat a healthy diet. Limit caffeine. If you smoke, quit. Avoid alcohol and drugs, because they can make your symptoms worse. Exercise according to your healthcare provider's instructions.   Check your medicines. To help prevent problems, tell your healthcare provider and pharmacist about all the medicines, natural remedies, vitamins, and other supplements that you take.   Contact your healthcare  provider or therapist if you have any questions or your symptoms seem to be getting worse.  You may also want to contact [facility] (formerly the National Mental Health Association or [facility]). [facility]'s toll-free Information Center number is 2-357-052-[facility]. Its web site address is [email or web address]        Sleep and Women  Do you have trouble sleeping? Many women do. Some life changes are unique to women, such as pregnancy or menopause. These changes, along with the demands of family and work, can affect your health and your sleep. Talk to your health care provider if your sleep problems last more than a few weeks.    What Affects Your Sleep  Many factors can affect how well you sleep. Hormone changes can cause mood swings, insomnia, and other problems. Balancing many roles such as mother, partner, worker, and caretaker can also take a toll on your sleep. Worries about these competing demands can keep you awake at night. And, of course, with so much to do, who even has time for sleep? But you need to sleep well to be healthy and have energy. The good news is there are steps you can take to sleep better.  Tips for Better Sleep  Here are some steps you can take to sleep better:    Keep a regular sleep schedule. Go to bed and get up at the same time each day.    Exercise regularly. But avoid strenuous exercise 2 to 4 hours before bedtime.    Learn to relax. Try a warm bath, yoga, or meditation. Reading a book or listening to music can help you relax before bedtime.    Create a comfortable setting for sleep. Make sure the room is quiet, dark, and not too hot or too cold.    Use your bed only for sleep and sex.    Avoid or limit caffeine, nicotine, and alcohol.  Resources  American Academy of Sleep Medicine   www.sleepeducation.com  National Sleep Foundation   143.962.4404,   www.sleepfoundation.org     7069-4960 [employer], [address] [place] [address], [place] [address]. All rights reserved. This information is  not intended as a substitute for professional medical care. Always follow your healthcare professional's instructions.Home  Back  SP    Sleep and Women: Life Stages  A woman goes through many stages during her lifetime. These stages are a natural part of being a woman. Physical and emotional changes take place during the menstrual cycle, pregnancy, motherhood, and menopause. These changes can affect sleep, even cause insomnia. But there are ways you can improve your sleep.    Menstrual Cycle  Many women have physical or emotional symptoms before or during their period. These symptoms may include mood swings, cramping, and fatigue. They can affect how you feel and how you sleep. Eating a well-balanced diet low in fat, salt, and sugar may reduce your symptoms. Vitamin and mineral supplements may also help. Regular exercise can reduce stress and relieve some of your symptoms. You will have more energy during the day and be more tired at bedtime. Afternoon exercise is best. Nighttime exercise may affect your sleep.  Pregnancy    Take a warm shower before bed.    Ask your partner to massage your shoulders, neck, or back.    Sleep with pillows under your stomach and back, and between your knees.    Take naps if you feel tired during the day.    Exercise and practice good posture. Sleep on a firm mattress.    To reduce frequent urination at night, drink most of your fluids earlier in the day.    Sleep with your upper body raised 6 inches. Don t lie down for two hours after you eat.    Walk, stretch, or massage restless legs.    Avoid or limit coffee, black tea, and cola. These may keep you awake at night.  Motherhood    Ask for help when you need it. Accept help when it s offered.    Many new mothers feel a little down for a few weeks. Share your feelings with your loved ones. Talk to your health care provider if your feelings get in the way of sleeping or eating.    Try to adjust your baby s sleep to fit a day-night  cycle. At night, have lights dim and the setting quiet. During the day, keep your baby active longer. Then he or she will sleep better at night.    Take a daily walk with your baby. Fresh air and daylight will help you both sleep better.    When your baby sleeps, lie down for a nap or put your feet up and rest.  Menopause  Menopause is when you stop having periods for good. Just before menopause, your body produces fewer female hormones. This can cause physical, mental, or emotional changes that may affect your sleep. Try these tips:    If you have hot flashes and night sweats, avoid caffeine and spicy foods at nighttime. Wear a cotton nightgown and put cotton sheets on your bed. Keep a window open or use a portable fan.    Mood swings can cause insomnia, memory loss, fatigue, or depression. If these affect your sleep, talk to your health care provider. Lift your spirits by exercising and doing things you enjoy.    9992-2615 [facility], [address] [place] [address], [place] [address]. All rights reserved. This information is not intended as a substitute for professional medical care. Always follow your healthcare professional's instructions.Home  Back  SP    Treating Insomnia  Good sleeping habits are a key part of treatment. If needed, some medications may help you sleep better at first. Making healthy lifestyle changes and learning to relax can improve your sleep. Treating insomnia takes commitment, but trust that your efforts will pay off. Talk to your health care provider before taking any medication.    Healthy Lifestyle  Your lifestyle affects your health and your sleep. Here are some healthy habits:    Keep a regular sleep schedule. Go to bed and get up at the same time each day.    Exercise regularly. It may help you reduce stress. Avoid strenuous exercise for two to four hours before bedtime.    Avoid or limit naps.    Use your bed only for sleep and sex.    Don t spend too much time in bed trying to fall  asleep. If you can t fall asleep, get up and do something until you become tired and drowsy.    Avoid or limit caffeine and nicotine. They can keep you awake at night. Also avoid alcohol. It may help you fall asleep at first, but your sleep will not be restful.  Before Bedtime  To sleep better every night, try these tips:    Have a bedtime routine to let your body and mind know when it s time to sleep.    Think of going to bed as relaxing and enjoyable. Sleep will come sooner.    If your worries don t let you sleep, write them down in a diary. Then close it, and go to bed.    Make sure the room is not too hot or too cold. If it s not dark enough, an eye mask can help. If it s noisy, try using earplugs.  Learn to Relax  Stress, anxiety, and body tension may keep you awake at night. To unwind before bedtime, try a warm bath, meditation, or yoga. Also, try the following:    Deep breathing. Sit or lie back in a chair. Take a slow, deep breath. Hold it for 5 counts. Then breathe out slowly through your mouth. Keep doing this until you feel relaxed.    Progressive muscle relaxation. Tense and then relax the muscles in your body as you breathe deeply. Start with your feet and work up your body to your neck and face.    6655-5942 [facility], [address] [place] [address]. All rights reserved. This information is not intended as a substitute for professional medical care. Always follow your healthcare professional's instructions.      What is Genital HPV?  HPV (human papillomavirus) is a very common family of viruses. Some strains of genital HPV can cause abnormal changes (dysplasia) in the cells of a woman s cervix. In a small number of women, these changes can lead to cancer if not treated. Also, certain strains of HPV can cause genital warts (condyloma). Although many people carry HPV, it often causes no symptoms. The virus may first be detected when signs of dysplasia or warts are found during a Pap  test.     Dysplasia develops when cervical cells change in ways that are not normal.             Warts on the cervix can be detected with a Pap test.             Warts around the genitals may be visible. These external warts can affect the vulva, vagina, and anus.      How does HPV spread?  HPV lives inside skin and mucous membranes. It spreads when skin carrying the virus touches other skin. Genital HPV most often spreads during sexual contact. Condoms and other barriers help protect against the spread by preventing skin contact. But condoms may not cover all affected skin, so they may not provide complete protection. There is no cure for HPV. Even if symptoms go away, the virus may remain in the body. Because it often doesn t cause symptoms, many people who have HPV don t even know it.  When dysplasia occurs  The cervix is the narrow canal at the bottom of the uterus. It s made up of layers of cells that normally change as they grow. Dysplasia occurs when HPV causes some cervical cells to change in ways that are not normal. These abnormal cells can be detected with a Pap test. Left  untreated, abnormal cells can develop into cancer.  When warts form  Certain strains of HPV can make skin cells reproduce more often than they should. These extra skin cells build up into warts. Warts can form on the cervix. They can also form around or inside the genitals. Treating warts helps keep HPV from spreading to sexual partners.     A vaccine is available that protects against certain strains of HPV. Your health care provider can tell you more.   Date Last Reviewed: 1/1/2017 2000-2017 [employer]. [address] [place] [address], [place] [address]. All rights reserved. This information is not intended as a substitute for professional medical care. Always follow your healthcare professional's instructions.        Genital HPV: Diagnosis and Treatment  Genital HPV is often detected during a routine exam. Your healthcare  provider may ask if you are sexually active, and if you have had dysplasia or genital warts before. You may also be checked for signs of other sexually transmitted infections. Genital HPV can t be cured, but its effects can be treated.  Your exam    A Pap test can show signs of dysplasia or warts on the cervix. A sample of cells is taken from the cervix and viewed under a microscope.    A colposcopy may be done to see dysplasia or warts more clearly. A magnifying scope (colposcope) is used to look at the cervix through the vagina.    An acetowhite test makes warts easier to see. Vinegar is applied to the cervix or other skin that may be affected. If warts are present, they turn white. This test may be done during a colposcopy.    A DNA test can find out which strain of HPV you have. Abnormal cells are studied to see if you are at higher risk of cancer. This may affect your treatment plan.  If you have genital warts  The strains of HPV that cause warts are often not the same strains that lead to cancer. If you have genital warts, report them to your healthcare provider. Be aware that genital warts:    Can appear alone or in groups, and may be hard to see    May feel like dry, firm bumps and look like a rash    May look different on skin than on mucous membrane    May look different on a woman than on a man  Your treatment  There is no cure for genital HPV. However, the effects of HPV can be treated. Treating dysplasia removes the cells that can lead to cancer. Treating warts may help keep you from spreading the virus to others.  Types of treatment    Dysplasia or warts can be removed with heat (cautery), freezing, or laser. The procedure is done by your healthcare provider, usually in the doctor's office. The number of treatments you need depends on how much tissue must be removed.    Medicines can be applied to treat external warts. Some medicines prompt your immune system to fight HPV. Others are caustic agents  that destroy warts. Medicines may be applied at the healthcare provider's office or at home.    Other treatments are being developed as more is learned about HPV. An HPV vaccine is available to prevent infection with HPV. Talk to your healthcare provider about whether this vaccine is right for you.  Ways to stay healthy  Although some strains of HPV are linked to cervical cancer, most people with HPV don t develop cancer. Following up with your healthcare provider helps reduce the cancer risk even more. If you or your child has never had HPV,  ask your healthcare provider about receiving the HPV vaccine.  Follow-up care    Schedule follow-up visits as instructed. See your healthcare provider if you notice any new warts.    Have Pap tests as often as your healthcare provider tells you to. This way any dysplasia is found early, when treatment works best.  Keep your immune system strong    Don t smoke. Smoking weakens the immune system, which makes you more susceptible to HPV. Smoking also increases the risk of cervical and other types of cancer.  To protect against HPV  If you have sex, the best way to prevent the spread of HPV is to use a latex condom every time. But remember that condoms and other barriers only protect the skin they cover. If you re with someone new, talk about HPV and other sexually transmitted diseases before you have sex. If you re in a committed relationship and aren t currently using condoms, you may not need to change your habits. Talk to your partner and make a choice that feels right to both of you.     A vaccine can help prevent HPV in young men and women. Ask your healthcare provider whether this vaccine may be right for you.  Date Last Reviewed: 1/1/2017 2000-2017 [employer]. [address] [place] [address], [place] [address]. All rights reserved. This information is not intended as a substitute for professional medical care. Always follow your healthcare professional's  instructions.        HPV and Genital Warts: Taking Care of Yourself  HPV (human papillomavirus) is the virus that causes genital warts. Other types of HPV may also increase your risk for cancer of the cervix or genitals. By taking care of yourself, you can help your body fight against HPV. Regular visits with your healthcare provider, a healthy immune system, and being aware of risks help you stay in control.    Make healthy choices    Eat a nutritious diet. Foods high in beta-carotene (such as tomatoes, squash, and enedelia greens) may help prevent cervical and other cancers. So do foods high in folic acid (such as whole grains, beans, and broccoli).    Quit smoking. Smoking weakens the immune system. It also increases the risk of many cancers, including cervical cancer in women.  Visit your healthcare provider    Treatment for genital warts may take several trips to your healthcare provider. Stick with it. You may need to try a few treatments before you find the one that works best.    Once the warts are removed, schedule follow-up visits as instructed. Use a mirror to perform self-exams between visits. See your healthcare provider right away if you notice any new warts.    Women should have Pap tests as often as their healthcare provider suggests.  Consider your needs    Pregnant women shouldn t use certain treatments for genital warts. Your healthcare provider can tell you which ones are safe. If you become pregnant, make sure your provider knows that you have HPV.    People with weak immune systems may have more frequent outbreaks and may also not respond as well to treatment. Your healthcare provider can help find the best treatment plan for you.   Date Last Reviewed: 1/1/2017 2000-2017 [employer]. [address] [place] [address], [place] [address]. All rights reserved. This information is not intended as a substitute for professional medical care. Always follow your healthcare professional's  instructions.        HPV and Genital Warts: Understanding Your Diagnosis  HPV (human papillomavirus) is the virus that causes genital warts. If you have HPV, you re not alone. Millions of people carry this virus. Finding out you have HPV may be upsetting for you and your partner. But learning about HPV and its treatments can make you both feel better. Then you can go on with your lives together.  Accepting your diagnosis  At first, it may be hard to respond to what you ve learned. Take time to let everything sink in. Here are some things to think about:    How your body looks. Remember that genital warts can be removed. You may feel better if you share any concerns about your body with your partner.    Long-term health issues. Some types (strains) of HPV are linked with cervical and other cancers. Some HPV types are much more linked to cancers than others. Taking care of yourself and seeing your healthcare provider as directed reduces the cancer risk even more.    Protecting your partner. Being honest about HPV will protect your partner s health. You and your partner can take steps to keep HPV from spreading. If you re with someone new, talk about HPV before you have sex.  Talking to your partner     When you re ready, talk to your partner about your diagnosis.     If you re calm, your partner may find it easier to stay calm. Remember, HPV can take months or years to produce warts. It s nearly impossible to know who was infected first. Try not to blame each other.    Suggest that your partner get checked. Even if no warts are present, visiting a healthcare provider may make your partner feel better.    When you both feel ready, it s OK to have sex. It s safest to use a latex condom every time. But know that condoms and other barriers only protect the skin they cover. Warts are contagious, so avoid touching them. (This includes oral sex.)    If you re in a committed relationship and are not currently using condoms,  discuss whether you want to change your habits. Remember that condoms are the only effective way to protect against many diseases.    Suggest that your partner ask his or her healthcare provider about the HPV vaccine. And ask your own healthcare provider whether this vaccine is right for you.    If you have children, talk with your provider about giving them the HPV vaccine  Date Last Reviewed: 1/1/2017 2000-2017 [employer]. [address] [place] [address]. All rights reserved. This information is not intended as a substitute for professional medical care. Always follow your healthcare professional's instructions.               Thank you for choosing [facility]  for your Health Care. It was a pleasure seeing you at your visit today. Please contact us with any questions or concerns you may have.                   Mey Mcleod MD                                  To reach your [facility] care team after hours call:   [phone]    Our clinic hours are:     Monday- 7:30 am - 7:00 pm                             Tuesday through Friday- 7:30 am - 5:00 pm                                        Saturday- 8:00 am - 12:00 pm                  Phone:  [phone]    Our pharmacy hours are:     Monday  8:00 am to 7:00 pm      Tuesday through Friday 8:00am to 6:00pm                        Saturday - 9:00 am to 1:00 pm      Sunday : Closed.              Phone:  [phone]      There is also information available at our web site:  [email or web address]    If your provider ordered any lab tests and you do not receive the results within 10 business days, please call the clinic.    If you need a medication refill please contact your pharmacy.  Please allow 2 business days for your refill to be completed.    Our clinic offers telephone visits and e visits.  Please ask one of your team members to explain more.      Use [employer] (secure email communication and  access to your chart) to send your primary care provider a message or make an appointment. Ask someone on your Team how to sign up for [facility].

## 2018-08-03 LAB
FERRITIN SERPL-MCNC: 21 NG/ML (ref 12–150)
TSH SERPL DL<=0.005 MIU/L-ACNC: 1.38 MU/L (ref 0.4–4)

## 2018-08-03 ASSESSMENT — PATIENT HEALTH QUESTIONNAIRE - PHQ9: SUM OF ALL RESPONSES TO PHQ QUESTIONS 1-9: 4

## 2018-08-03 ASSESSMENT — ANXIETY QUESTIONNAIRES: GAD7 TOTAL SCORE: 21

## 2018-08-06 LAB
COPATH REPORT: NORMAL
PAP: NORMAL

## 2018-08-08 LAB
FINAL DIAGNOSIS: NORMAL
HPV HR 12 DNA CVX QL NAA+PROBE: NEGATIVE
HPV16 DNA SPEC QL NAA+PROBE: NEGATIVE
HPV18 DNA SPEC QL NAA+PROBE: NEGATIVE
SPECIMEN DESCRIPTION: NORMAL
SPECIMEN SOURCE CVX/VAG CYTO: NORMAL

## 2018-08-15 ENCOUNTER — OFFICE VISIT (OUTPATIENT)
Dept: FAMILY MEDICINE | Facility: CLINIC | Age: 33
End: 2018-08-15
Payer: COMMERCIAL

## 2018-08-15 DIAGNOSIS — D48.5 NEOPLASM OF UNCERTAIN BEHAVIOR OF SKIN: ICD-10-CM

## 2018-08-15 DIAGNOSIS — Z12.83 SCREENING FOR MALIGNANT NEOPLASM OF SKIN: Primary | ICD-10-CM

## 2018-08-15 DIAGNOSIS — D22.72 MELANOCYTIC NEVUS OF LEFT LOWER EXTREMITY: ICD-10-CM

## 2018-08-15 DIAGNOSIS — D18.01 CAPILLARY HEMANGIOMA OF SKIN: ICD-10-CM

## 2018-08-15 DIAGNOSIS — D22.9 MULTIPLE BENIGN MELANOCYTIC NEVI: ICD-10-CM

## 2018-08-15 DIAGNOSIS — D22.30 COMPOUND NEVUS OF FACE: ICD-10-CM

## 2018-08-15 PROCEDURE — 99214 OFFICE O/P EST MOD 30 MIN: CPT | Performed by: FAMILY MEDICINE

## 2018-08-15 NOTE — PATIENT INSTRUCTIONS
"FUTURE APPOINTMENTS    Follow up for shave removal of moles.    Follow up in 1 year(s) for a full-body skin cancer screening.    SUN PROTECTION INSTRUCTIONS  Sun damage can lead to skin cancer and premature aging of the skin.      The best way to protect from sun damage to your skin is to avoid the sun during peak hours (10 am - 2 pm) even on overcast days.      Use UPF sun-protective clothing, which while more expensive initially provides longer lasting coverage without having to worry about remembering to re-apply.  1. Wear a wide-brimmed hat and sunglasses.   2. Wear sun-protective clothing.  [employer] and other [employer] make sun protective clothing that are stylish, comfortable and cool. [employer] and other [employer] make UV arm sleeves suitable for golfing, gardening and other activities.      Sunscreen instructions:  1. Use sunscreens with Zinc Oxide, Titanium Dioxide or Avobenzone to protect from UVA rays.  2. Use SPF 30-50+ to protect from UVB rays.  3. Re-apply every 2 hours even if water resistant.  4. Apply on your face every day even when cloudy and even in the winter. UVA \"aging rays\" penetrate window glass and are just as strong in the winter as in the summer.    Product Recommendations:    Good examples include: Blue Giuseppe, EltaMD, Solbar    Good daily moisturizers with SPF: Vanicream, CeraVe.    For sensitive skin, consider : SkinMedica Essential Defense Mineral Shield Broad Spectrum SPF 35      Never use tanning beds. Tanning beds are associated with much higher risks of skin cancer.    All tanning damages the skin. Aim for ivory skin year round and you will have less trouble with your skin in years to come. There is no merit in getting \"a base tan\" before a warm weather vacation, as any tanning indicates your body's response to sun damage.    Stop smoking. Smokers have higher rates of skin cancer and also have premature skin wrinkling.    FYI  You should use about 3 tablespoons of " sunscreen to protect your whole body. Thus a typical eight ounce bottle of sunscreen should last 4 applications. Remember, that the SPF rating is compromised if you don t apply enough. Most people only apply 1/2 - 1/3 of the amount they need. Also don t forget areas such as your ears, feet, upper back and harder to reach places. Keep in mind that these amounts should be increased for larger body sizes.    Sunscreens with titanium dioxide and/or zinc oxide in the active ingredients are physical blockers as opposed to chemical blockers. Chemical-free sunscreens should not irritate the skin.    Spray-on sunscreens may be used for touch-up application only, not as a base layer. Also, use with caution around small children due to inhalation risk.    Avoid retinyl palmitate products.    Avoid combination products that include both sunscreen and insect repellant, as sunscreen should be applied every 2 hours, but insect repellant should not be applied as frequently.    SPF means sun protection factor, which is just the degree to which the sunscreen can protect against UVB rays. There is no rating system for UVA rays. SPF is calculated as the time skin will burn when sunscreen is applied vs. skin without sunscreen.    Water resistant sunscreens should be re-applied every 1-2 hours.    For more information:  http://www.skincancer.org/prevention/sun-protection/sunscreen/sunscreens-safe-and-effective

## 2018-08-15 NOTE — LETTER
8/15/2018         RE: Mandy Novak  [address] [place] [address]        Dear Colleague,    Thank you for [place] [facility] [place] see a copy of my visit note below.    [facility] - PRIMARY CARE SKIN    CC : skin cancer screening (full-body)  SUBJECTIVE:                                                    Mandy Novak is a 33 year old female who presents to clinic today for a full-body skin exam.    Mole on abdomen is of uncertain duration.  Moles on trunk have changed in shape after 2 pregnancies.    Personal history of skin cancer : NO - has had history of excised nevi, all without dysplasia.  Family history of skin cancer : YES - ?non-melanoma in maternal grandmother.     Sun Exposure History  Previous history of significant sun exposure:  Blistering sunburns : NO  Tanning beds : YES - when younger.  Sunscreen Use : YES, frequency : daily on face and when outdoors on body.  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    Refer to electronic medical record (EMR) for past medical history and medications.    INTEGUMENTARY/SKIN: NEGATIVE for worrisome rashes, moles or lesions  ROS : 14 point review of systems was negative except the symptoms listed above in the HPI.    This document serves as a record of the services and decisions personally performed and made by Eloina Betts MD. It was created on her behalf by Colby Lake, a trained medical scribe.  The creation of this document is based on the scribe's personal observations and the provider's statements to the medical scribe.  Colby Lake, August 15, 2018 10:22 AM      OBJECTIVE:                                                    GENERAL: healthy, alert and no distress  SKIN: Paul Skin Type - I.  This patient was examined from the top  of the head to the bottom of the feet  including scalp, face, neck, back, chest, breasts, buttocks, both arms, both legs, both hands, both feet, all 10 fingers and all 10 toes. The dermatoscope was used to help evaluate pigmented lesions.  Skin Pertinent Findings:  Face : Scattered lesions of various sizes and shapes most consistent with compound (benign) nevi.    Right arm(s) and left arm(s) : brown macules of various sizes and shapes most consistent with (benign) melanocytic nevi.    Chest, Abdomen : Scattered brown macules of various sizes and shapes most consistent with (benign) melanocytic nevi.    Back : Multiple brown macules of various sizes and shapes most consistent with (benign) melanocytic nevi. slightly raised, red lesion(s) consistent with capillary hemangioma.    Significant Findings:  Right abdomen, 2 cm superior to umbilicus at 11 o'clock position : 10 mm in size brown macule with irregular structure and pigmentation. ? Atypical nevus ? Other. Patient to return for shave removal.    Back, 8 cm left of T8 : 2 mm in size dark brown lesion with irregular pigmentation. ? Atypical nevus ? Other. Patient to return for shave removal.    Left anterolateral mid-neck : 6 x 4 mm compound nevus. Patient to return for shave removal.    Right upper chest, 2 cm proximal to mid-clavicle : 10 x 8 mm brown raised lesion. Patient to return for shave removal.    Right superior labia : 4 mm in size compound nevus.    Left foot, webspace between 2nd and 3rd toe : brown macule(s) most consistent with benign nevus(i), 3 mm in size .    Left proximal medial thigh : 8 x 4 mm in size shave excision site with ring of post-inflammatory hyperpigmentation  3 x 1 mm area of brown pigmentation in the center most consistent with recurrent nevus.    Diagnostic Test Results:  none           ASSESSMENT:                                                      Encounter Diagnoses   Name Primary?     Screening for malignant neoplasm of skin  "Yes     Compound nevus of face      Multiple benign melanocytic nevi      Neoplasm of uncertain behavior of skin      Melanocytic nevus of left lower extremity      Capillary hemangioma of skin          PLAN:                                                    Patient Instructions   FUTURE APPOINTMENTS    Follow up for shave removal of moles.    Follow up in 1 year(s) for a full-body skin cancer screening.    SUN PROTECTION INSTRUCTIONS  Sun damage can lead to skin cancer and premature aging of the skin.      The best way to protect from sun damage to your skin is to avoid the sun during peak hours (10 am - 2 pm) even on overcast days.      Use UPF sun-protective clothing, which while more expensive initially provides longer lasting coverage without having to worry about remembering to re-apply.  1. Wear a wide-brimmed hat and sunglasses.   2. Wear sun-protective clothing.  [employer] and other [employer] make sun protective clothing that are stylish, comfortable and cool. [employer] and other [employer] make UV arm sleeves suitable for golfing, gardening and other activities.      Sunscreen instructions:  1. Use sunscreens with Zinc Oxide, Titanium Dioxide or Avobenzone to protect from UVA rays.  2. Use SPF 30-50+ to protect from UVB rays.  3. Re-apply every 2 hours even if water resistant.  4. Apply on your face every day even when cloudy and even in the winter. UVA \"aging rays\" penetrate window glass and are just as strong in the winter as in the summer.    Product Recommendations:    Good examples include: Blue Lizard, EltaMD, Solbar    Good daily moisturizers with SPF: Vanicream, CeraVe.    For sensitive skin, consider : SkinMedica Essential Defense Mineral Shield Broad Spectrum SPF 35      Never use tanning beds. Tanning beds are associated with much higher risks of skin cancer.    All tanning damages the skin. Aim for ivory skin year round and you will have less trouble with your skin in years to " "come. There is no merit in getting \"a base tan\" before a warm weather vacation, as any tanning indicates your body's response to sun damage.    Stop smoking. Smokers have higher rates of skin cancer and also have premature skin wrinkling.    FYI  You should use about 3 tablespoons of sunscreen to protect your whole body. Thus a typical eight ounce bottle of sunscreen should last 4 applications. Remember, that the SPF rating is compromised if you don t apply enough. Most people only apply 1/2 - 1/3 of the amount they need. Also don t forget areas such as your ears, feet, upper back and harder to reach places. Keep in mind that these amounts should be increased for larger body sizes.    Sunscreens with titanium dioxide and/or zinc oxide in the active ingredients are physical blockers as opposed to chemical blockers. Chemical-free sunscreens should not irritate the skin.    Spray-on sunscreens may be used for touch-up application only, not as a base layer. Also, use with caution around small children due to inhalation risk.    Avoid retinyl palmitate products.    Avoid combination products that include both sunscreen and insect repellant, as sunscreen should be applied every 2 hours, but insect repellant should not be applied as frequently.    SPF means sun protection factor, which is just the degree to which the sunscreen can protect against UVB rays. There is no rating system for UVA rays. SPF is calculated as the time skin will burn when sunscreen is applied vs. skin without sunscreen.    Water resistant sunscreens should be re-applied every 1-2 hours.    For more information:  http://www.skincancer.org/prevention/sun-protection/sunscreen/sunscreens-safe-and-effective    The patient was counseled about sunscreens and sun avoidance. The patient was counseled to check the skin regularly and report any lesion that is new, changing, itching, scabbing, bleeding or otherwise bothersome. The patient was discharged " ambulatory and in stable condition.      PROCEDURES:                                                    None.    TT : 25 minutes.  CT : 15 minutes.      The information in this document, created by the medical scribe for me, accurately reflects the services I personally performed and the decisions made by me. I have reviewed and approved this document for accuracy prior to leaving the patient care area.  Eloina Betts MD August 15, 2018 10:22 AM  [facility] [place]    Again, thank you for allowing me to participate in the care of your patient.        Sincerely,        Yolande Betts MD

## 2018-08-15 NOTE — MR AVS SNAPSHOT
"              After Visit Summary   8/15/2018    Mandy Novak    MRN: [id number]           Patient Information     Date Of Birth          1985        Visit Information        Provider Department      8/15/2018 10:20 AM Yolande Betts MD [facility] [place]        Today's Diagnoses     Screening for malignant neoplasm of skin    -  1    Compound nevus of face        Multiple benign melanocytic nevi        Neoplasm of uncertain behavior of skin        Melanocytic nevus of left lower extremity        Capillary hemangioma of skin          Care Instructions    FUTURE APPOINTMENTS    Follow up for shave removal of moles.    Follow up in 1 year(s) for a full-body skin cancer screening.    SUN PROTECTION INSTRUCTIONS  Sun damage can lead to skin cancer and premature aging of the skin.      The best way to protect from sun damage to your skin is to avoid the sun during peak hours (10 am - 2 pm) even on overcast days.      Use UPF sun-protective clothing, which while more expensive initially provides longer lasting coverage without having to worry about remembering to re-apply.  1. Wear a wide-brimmed hat and sunglasses.   2. Wear sun-protective clothing.  [employer] and other [employer] make sun protective clothing that are stylish, comfortable and cool. [employer] and other [employer] make UV arm sleeves suitable for golfing, gardening and other activities.      Sunscreen instructions:  1. Use sunscreens with Zinc Oxide, Titanium Dioxide or Avobenzone to protect from UVA rays.  2. Use SPF 30-50+ to protect from UVB rays.  3. Re-apply every 2 hours even if water resistant.  4. Apply on your face every day even when cloudy and even in the winter. UVA \"aging rays\" penetrate window glass and are just as strong in the winter as in the summer.    Product Recommendations:    Good examples include: Blue Lizard, EltaMD, Solbar    Good daily moisturizers with SPF: Vanicream, CeraVe.    For " "sensitive skin, consider : SkinMedica Essential Defense Mineral Shield Broad Spectrum SPF 35      Never use tanning beds. Tanning beds are associated with much higher risks of skin cancer.    All tanning damages the skin. Aim for ivory skin year round and you will have less trouble with your skin in years to come. There is no merit in getting \"a base tan\" before a warm weather vacation, as any tanning indicates your body's response to sun damage.    Stop smoking. Smokers have higher rates of skin cancer and also have premature skin wrinkling.    FYI  You should use about 3 tablespoons of sunscreen to protect your whole body. Thus a typical eight ounce bottle of sunscreen should last 4 applications. Remember, that the SPF rating is compromised if you don t apply enough. Most people only apply 1/2 - 1/3 of the amount they need. Also don t forget areas such as your ears, feet, upper back and harder to reach places. Keep in mind that these amounts should be increased for larger body sizes.    Sunscreens with titanium dioxide and/or zinc oxide in the active ingredients are physical blockers as opposed to chemical blockers. Chemical-free sunscreens should not irritate the skin.    Spray-on sunscreens may be used for touch-up application only, not as a base layer. Also, use with caution around small children due to inhalation risk.    Avoid retinyl palmitate products.    Avoid combination products that include both sunscreen and insect repellant, as sunscreen should be applied every 2 hours, but insect repellant should not be applied as frequently.    SPF means sun protection factor, which is just the degree to which the sunscreen can protect against UVB rays. There is no rating system for UVA rays. SPF is calculated as the time skin will burn when sunscreen is applied vs. skin without sunscreen.    Water resistant sunscreens should be re-applied every 1-2 hours.    For more " information:  http://www.skincancer.org/prevention/sun-protection/sunscreen/sunscreens-safe-and-effective          Follow-ups after your visit        Who to contact     If you have questions or need follow up information about today's clinic visit or your schedule please contact [facility] directly at [phone].  Normal or non-critical lab and imaging results will be communicated to you by MyChart, letter or phone within 4 business days after the clinic has received the results. If you do not hear from us within 7 days, please contact the clinic through [employer] or phone. If you have a critical or abnormal lab result, we will notify you by phone as soon as possible.  Submit refill requests through [facility] or call your pharmacy and they will forward the refill request to us. Please allow 3 business days for your refill to be completed.          Additional Information About Your Visit        MyChart Information     [facility] gives you secure access to your electronic health record. If you see a primary care provider, you can also send messages to your care team and make appointments. If you have questions, please call your primary care clinic.  If you do not have a primary care provider, please call [phone] and they will assist you.        Care EveryWhere ID     This is your Care EveryWhere ID. This could be used by other organizations to access your [place] medical records  [id number]        Your Vitals Were     Last Period                   08/08/2018            Blood Pressure from Last 3 Encounters:   08/15/18 (P) 110/73   08/02/18 112/66   05/21/18 118/64    Weight from Last 3 Encounters:   08/02/18 146 lb (66.2 kg)   05/21/18 149 lb (67.6 kg)   06/28/17 156 lb 6.4 oz (70.9 kg)              Today, you had the following     No orders found for display       Primary Care Provider Office Phone # Fax #    Mey Mcleod [phone]       [address] [place] [address]  [facility] [address]        Equal Access to Services     [facility] : Hadii aad emil kristy Iraheta, waaxda luqadaha, qaybta kaalmada you, miroslava ramirez. So [facility] [phone].    ATENCIÓN: Si habla español, tiene a loco disposición servicios gratuitos de asistencia lingüística. Cheryl al [phone].    We comply with applicable federal civil rights laws and [place] laws. We do not discriminate on the basis of race, color, national origin, age, disability, sex, sexual orientation, or gender identity.            Thank you!     Thank you for choosing [facility]  for your care. Our goal is always to provide you with excellent care. Hearing back from our patients is one way we can continue to improve our services. Please take a few minutes to complete the written survey that you may receive in the mail after your visit with us. Thank you!             Your Updated Medication List - Protect others around you: Learn how to safely use, store and throw away your medicines at www.disposemymeds.org.          This list is accurate as of 8/15/18 10:43 AM.  Always use your most recent med list.                   Brand Name Dispense Instructions for use Diagnosis    cholecalciferol 1000 UNIT tablet    vitamin D3    100 tablet    Take 1 tablet (1,000 Units) by mouth daily        citalopram 20 MG tablet    celeXA    30 tablet    Take 1/2 tablet (10 mg) for 1-2 weeks, then increase to 1 tablet orally daily    Anxiety       clindamycin 1 % lotion    CLEOCIN T    60 mL    Apply topically 2 times daily After cleansing    Perioral dermatitis       norgestimate-ethinyl estradiol 0.25-35 MG-MCG per tablet    ORTHO-CYCLEN, SPRINTEC    84 tablet    Take 1 tablet by mouth daily    Breakthrough bleeding on birth control pills       Prenatal Vitamins 28-0.8 MG Tabs      Take 1 tablet by mouth daily    Absence of menstruation

## 2018-08-15 NOTE — PROGRESS NOTES
[facility] - PRIMARY CARE SKIN    CC : skin cancer screening (full-body)  SUBJECTIVE:                                                    Mandy Novak is a 33 year old female who presents to clinic today for a full-body skin exam.    Mole on abdomen is of uncertain duration.  Moles on trunk have changed in shape after 2 pregnancies.    Personal history of skin cancer : NO - has had history of excised nevi, all without dysplasia.  Family history of skin cancer : YES - ?non-melanoma in maternal grandmother.     Sun Exposure History  Previous history of significant sun exposure:  Blistering sunburns : NO  Tanning beds : YES - when younger.  Sunscreen Use : YES, frequency : daily on face and when outdoors on body.  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    Refer to electronic medical record (EMR) for past medical history and medications.    INTEGUMENTARY/SKIN: NEGATIVE for worrisome rashes, moles or lesions  ROS : 14 point review of systems was negative except the symptoms listed above in the HPI.    This document serves as a record of the services and decisions personally performed and made by Eloina Betts MD. It was created on her behalf by Colby Lake, a trained medical scribe.  The creation of this document is based on the scribe's personal observations and the provider's statements to the medical scribe.  Colby Lake, August 15, 2018 10:22 AM      OBJECTIVE:                                                    GENERAL: healthy, alert and no distress  SKIN: Paul Skin Type - I.  This patient was examined from the top of the head to the bottom of the feet  including scalp, face, neck, back, chest, breasts, buttocks, both arms, both legs, both hands, both feet, all 10 fingers and all 10 toes. The dermatoscope was used to help evaluate pigmented lesions.  Skin  Pertinent Findings:  Face : Scattered lesions of various sizes and shapes most consistent with compound (benign) nevi.    Right arm(s) and left arm(s) : brown macules of various sizes and shapes most consistent with (benign) melanocytic nevi.    Chest, Abdomen : Scattered brown macules of various sizes and shapes most consistent with (benign) melanocytic nevi.    Back : Multiple brown macules of various sizes and shapes most consistent with (benign) melanocytic nevi. slightly raised, red lesion(s) consistent with capillary hemangioma.    Significant Findings:  Right abdomen, 2 cm superior to umbilicus at 11 o'clock position : 10 mm in size brown macule with irregular structure and pigmentation. ? Atypical nevus ? Other. Patient to return for shave removal.    Back, 8 cm left of T8 : 2 mm in size dark brown lesion with irregular pigmentation. ? Atypical nevus ? Other. Patient to return for shave removal.    Left anterolateral mid-neck : 6 x 4 mm compound nevus. Patient to return for shave removal.    Right upper chest, 2 cm proximal to mid-clavicle : 10 x 8 mm brown raised lesion. Patient to return for shave removal.    Right superior labia : 4 mm in size compound nevus.    Left foot, webspace between 2nd and 3rd toe : brown macule(s) most consistent with benign nevus(i), 3 mm in size .    Left proximal medial thigh : 8 x 4 mm in size shave excision site with ring of post-inflammatory hyperpigmentation  3 x 1 mm area of brown pigmentation in the center most consistent with recurrent nevus.    Diagnostic Test Results:  none           ASSESSMENT:                                                      Encounter Diagnoses   Name Primary?     Screening for malignant neoplasm of skin Yes     Compound nevus of face      Multiple benign melanocytic nevi      Neoplasm of uncertain behavior of skin      Melanocytic nevus of left lower extremity      Capillary hemangioma of skin          PLAN:                                       "              Patient Instructions   FUTURE APPOINTMENTS    Follow up for shave removal of moles.    Follow up in 1 year(s) for a full-body skin cancer screening.    SUN PROTECTION INSTRUCTIONS  Sun damage can lead to skin cancer and premature aging of the skin.      The best way to protect from sun damage to your skin is to avoid the sun during peak hours (10 am - 2 pm) even on overcast days.      Use UPF sun-protective clothing, which while more expensive initially provides longer lasting coverage without having to worry about remembering to re-apply.  1. Wear a wide-brimmed hat and sunglasses.   2. Wear sun-protective clothing.  [employer] and other [employer] make sun protective clothing that are stylish, comfortable and cool. [employer] and other [employer] make UV arm sleeves suitable for golfing, gardening and other activities.      Sunscreen instructions:  1. Use sunscreens with Zinc Oxide, Titanium Dioxide or Avobenzone to protect from UVA rays.  2. Use SPF 30-50+ to protect from UVB rays.  3. Re-apply every 2 hours even if water resistant.  4. Apply on your face every day even when cloudy and even in the winter. UVA \"aging rays\" penetrate window glass and are just as strong in the winter as in the summer.    Product Recommendations:    Good examples include: Blue Giuseppe, EltaMD, Solbar    Good daily moisturizers with SPF: Vanicream, CeraVe.    For sensitive skin, consider : SkinMedica Essential Defense Mineral Shield Broad Spectrum SPF 35      Never use tanning beds. Tanning beds are associated with much higher risks of skin cancer.    All tanning damages the skin. Aim for ivory skin year round and you will have less trouble with your skin in years to come. There is no merit in getting \"a base tan\" before a warm weather vacation, as any tanning indicates your body's response to sun damage.    Stop smoking. Smokers have higher rates of skin cancer and also have premature skin " wrinkling.    FYI  You should use about 3 tablespoons of sunscreen to protect your whole body. Thus a typical eight ounce bottle of sunscreen should last 4 applications. Remember, that the SPF rating is compromised if you don t apply enough. Most people only apply 1/2 - 1/3 of the amount they need. Also don t forget areas such as your ears, feet, upper back and harder to reach places. Keep in mind that these amounts should be increased for larger body sizes.    Sunscreens with titanium dioxide and/or zinc oxide in the active ingredients are physical blockers as opposed to chemical blockers. Chemical-free sunscreens should not irritate the skin.    Spray-on sunscreens may be used for touch-up application only, not as a base layer. Also, use with caution around small children due to inhalation risk.    Avoid retinyl palmitate products.    Avoid combination products that include both sunscreen and insect repellant, as sunscreen should be applied every 2 hours, but insect repellant should not be applied as frequently.    SPF means sun protection factor, which is just the degree to which the sunscreen can protect against UVB rays. There is no rating system for UVA rays. SPF is calculated as the time skin will burn when sunscreen is applied vs. skin without sunscreen.    Water resistant sunscreens should be re-applied every 1-2 hours.    For more information:  http://www.skincancer.org/prevention/sun-protection/sunscreen/sunscreens-safe-and-effective    The patient was counseled about sunscreens and sun avoidance. The patient was counseled to check the skin regularly and report any lesion that is new, changing, itching, scabbing, bleeding or otherwise bothersome. The patient was discharged ambulatory and in stable condition.      PROCEDURES:                                                    None.    TT : 25 minutes.  CT : 15 minutes.      The information in this document, created by the medical scribe for me, accurately  reflects the services I personally performed and the decisions made by me. I have reviewed and approved this document for accuracy prior to leaving the patient care area.  Eloina Betts MD August 15, 2018 10:22 AM  [facility]

## 2018-09-13 ENCOUNTER — HOSPITAL ENCOUNTER (EMERGENCY)
Facility: CLINIC | Age: 33
Discharge: HOME | End: 2018-09-14
Attending: EMERGENCY MEDICINE | Admitting: EMERGENCY MEDICINE
Payer: COMMERCIAL

## 2018-09-13 ENCOUNTER — NURSE TRIAGE (OUTPATIENT)
Dept: NURSING | Facility: CLINIC | Age: 33
End: 2018-09-13

## 2018-09-13 VITALS
RESPIRATION RATE: 18 BRPM | DIASTOLIC BLOOD PRESSURE: 90 MMHG | SYSTOLIC BLOOD PRESSURE: 134 MMHG | TEMPERATURE: 98.2 F | OXYGEN SATURATION: 100 %

## 2018-09-13 DIAGNOSIS — N30.91 HEMORRHAGIC CYSTITIS: ICD-10-CM

## 2018-09-13 LAB
ALBUMIN UR-MCNC: 100 MG/DL
APPEARANCE UR: ABNORMAL
BACTERIA #/AREA URNS HPF: ABNORMAL /HPF
BILIRUB UR QL STRIP: NEGATIVE
COLOR UR AUTO: ABNORMAL
GLUCOSE UR STRIP-MCNC: NEGATIVE MG/DL
HCG UR QL: NEGATIVE
HGB UR QL STRIP: ABNORMAL
KETONES UR STRIP-MCNC: NEGATIVE MG/DL
LEUKOCYTE ESTERASE UR QL STRIP: ABNORMAL
MUCOUS THREADS #/AREA URNS LPF: PRESENT /LPF
NITRATE UR QL: NEGATIVE
PH UR STRIP: 8 PH (ref 5–7)
RBC #/AREA URNS AUTO: >182 /HPF (ref 0–2)
SOURCE: ABNORMAL
SP GR UR STRIP: 1.01 (ref 1–1.03)
UROBILINOGEN UR STRIP-MCNC: 0 MG/DL (ref 0–2)
WBC #/AREA URNS AUTO: >182 /HPF (ref 0–5)
WBC CLUMPS #/AREA URNS HPF: PRESENT /HPF

## 2018-09-13 PROCEDURE — 99283 EMERGENCY DEPT VISIT LOW MDM: CPT

## 2018-09-13 PROCEDURE — 81001 URINALYSIS AUTO W/SCOPE: CPT | Performed by: EMERGENCY MEDICINE

## 2018-09-13 PROCEDURE — 81025 URINE PREGNANCY TEST: CPT | Performed by: EMERGENCY MEDICINE

## 2018-09-13 RX ORDER — CEPHALEXIN 500 MG/1
500 CAPSULE ORAL ONCE
Status: COMPLETED | OUTPATIENT
Start: 2018-09-13 | End: 2018-09-14

## 2018-09-13 RX ORDER — CEPHALEXIN 500 MG/1
500 CAPSULE ORAL 3 TIMES DAILY
Qty: 21 CAPSULE | Refills: 0 | Status: SHIPPED | OUTPATIENT
Start: 2018-09-13 | End: 2018-09-20

## 2018-09-13 ASSESSMENT — ENCOUNTER SYMPTOMS
FEVER: 0
DYSURIA: 1
BACK PAIN: 0
VOMITING: 0
NAUSEA: 0
FREQUENCY: 1
ABDOMINAL PAIN: 0
HEMATURIA: 1

## 2018-09-13 NOTE — ED AVS SNAPSHOT
[facility] Emergency Department    [address]    [facility] [address]    Phone:  [phone]    Fax:  [phone]                                       Mandy Novak   MRN: [id number]    Department:  [facility] Emergency Department   Date of Visit:  9/13/2018           Patient Information     Date Of Birth          1985        Your diagnoses for this visit were:     Hemorrhagic cystitis        You were seen by Yordan Jordan MD.        Discharge Instructions       Please make an appointment to follow up with your primary care provider in 4-5 days if not improving.    Discharge Instructions  Urinary Tract Infection  You or your child have been diagnosed with a urinary tract infection, or UTI. The urinary tract includes the kidneys (which make urine/pee), ureters (the tubes that carry urine/pee from the kidneys to the bladder), the bladder (which stores urine/pee), and urethra (the tube that carries urine/pee out of the bladder). Urinary tract infections occur when bacteria travel up the urethra into the bladder (bladder infection) and, in some cases, from there into the kidneys (kidney infection).  Generally, every Emergency Department visit should have a follow-up clinic visit with either a primary or a specialty clinic/provider. Please follow-up as instructed by your emergency provider today.  Return to the Emergency Department if:    You or your child have severe back pain.    You or your child are vomiting (throwing up) so that you cannot take your medicine.    You or your child have a new fever (had not previously had a fever) over 101 F.    You or your child have confusion or are very weak, or feel very ill.    Your child seems much more ill, will not wake up, will not respond right, or is crying for a long time and will not calm down.    You or your child are showing signs of dehydration. These signs may include decreased urination (pee), dry  mouth/gums/tongue, or decreased activity.    Follow-up with your provider:     Children under 24 months need to be seen by their regular provider within one week after a diagnosis of a UTI. It may be necessary to do some more tests to look at the child s kidney or bladder.    You should begin to feel better within 24 - 48 hours of starting your antibiotic; follow-up with your regular clinic/doctor/provider if this is not the case.    Treatment:     You will be treated with an antibiotic to kill the bacteria. We have to make an educated guess, based on what we know about common bacteria and antibiotics, as to which antibiotic will work for your infection. We will be correct most times but there will be some cases where the antibiotic chosen is not correct (see urine cultures below).    Take a pain medication such as acetaminophen (Tylenol ) or ibuprofen (Advil , Motrin , Nuprin ).    Phenazopyridine (Pyridium , Uristat ) is a prescription medication that numbs the bladder to reduce the burning pain of some UTIs.  The same medication is available in a non-prescription version (Azo-Standard , Urodol ). This medication will change the color of the urine and tears (usually blue or orange). If you wear contacts, do not wear them while taking this medication as they may be stained by the medication.    Urine Cultures:    If indicated, a urine culture may have been performed today. This test generally takes 24-48 hours to complete so the results are not known at this time. The results can confirm that an infection is present but also determine which antibiotic is effective for the specific bacteria that is causing the infection. If your urine culture shows that the antibiotic you were given today will not work to treat your infection, we will attempt to contact you to make arrangements to change the antibiotic. If the culture confirms that the antibiotic is effective for your infection, you will not be contacted. We often  "recommend follow-up with your regular physician/provider on the culture results regardless of this process.    Antibiotic Warning:     If you have been placed on antibiotics - watch for signs of allergic reaction.  These include rash, lip swelling, difficulty breathing, wheezing, and dizziness.  If you develop any of these symptoms, stop the antibiotic immediately and go to an emergency room or urgent care for evaluation.    Probiotics: If you have been given an antibiotic, you may want to also take a probiotic pill or eat yogurt with live cultures. Probiotics have \"good bacteria\" to help your intestines stay healthy. Studies have shown that probiotics help prevent diarrhea and other intestine problems (including C. diff infection) when you take antibiotics. You can buy these without a prescription in the pharmacy section of the store.   If you were given a prescription for medicine here today, be sure to read all of the information (including the package insert) that comes with your prescription.  This will include important information about the medicine, its side effects, and any warnings that you need to know about.  The pharmacist who fills the prescription can provide more information and answer questions you may have about the medicine.  If you have questions or concerns that the pharmacist cannot address, please call or return to the Emergency Department.   Remember that you can always come back to the Emergency Department if you are not able to see your regular provider in the amount of time listed above, if you get any new symptoms, or if there is anything that worries you.        24 Hour Appointment Hotline       To make an appointment at any [facility], call [phone] ([phone]). If you don't have a family doctor or clinic, we will help you find one. [facility] are conveniently located to serve the needs of you and your family.             Review of your medicines      START taking  "       Dose / Directions Last dose taken    cephALEXin 500 MG capsule   Commonly known as:  KEFLEX   Dose:  500 mg   Quantity:  21 capsule        Take 1 capsule (500 mg) by mouth 3 times daily for 7 days   Refills:  0          Our records show that you are taking the medicines listed below. If these are incorrect, please call your family doctor or clinic.        Dose / Directions Last dose taken    cholecalciferol 1000 UNIT tablet   Commonly known as:  vitamin D3   Dose:  1000 Units   Quantity:  100 tablet        Take 1 tablet (1,000 Units) by mouth daily   Refills:  3        citalopram 20 MG tablet   Commonly known as:  celeXA   Quantity:  30 tablet        Take 1/2 tablet (10 mg) for 1-2 weeks, then increase to 1 tablet orally daily   Refills:  1        clindamycin 1 % lotion   Commonly known as:  CLEOCIN T   Quantity:  60 mL        Apply topically 2 times daily After cleansing   Refills:  1        norgestimate-ethinyl estradiol 0.25-35 MG-MCG per tablet   Commonly known as:  ORTHO-CYCLEN, SPRINTEC   Dose:  1 tablet   Quantity:  84 tablet        Take 1 tablet by mouth daily   Refills:  3        Prenatal Vitamins 28-0.8 MG Tabs   Dose:  1 tablet        Take 1 tablet by mouth daily   Refills:  0                Prescriptions were sent or printed at these locations (1 Prescription)                   Other Prescriptions                Printed at Department/Unit printer (1 of 1)         cephALEXin (KEFLEX) 500 MG capsule                Procedures and tests performed during your visit     HCG qualitative urine (UPT)    UA with Microscopic      Orders Needing Specimen Collection     None      Pending Results     No orders found from 9/11/2018 to 9/14/2018.            Pending Culture Results     No orders found from 9/11/2018 to 9/14/2018.            Pending Results Instructions     If you had any lab results that were not finalized at the time of your Discharge, you can call the ED Lab Result RN at [phone]. You will be  contacted by this team for any positive Lab results or changes in treatment. The nurses are available 7 days a week from 10A to 6:30P.  You can leave a message 24 hours per day and they will return your call.        Test Results From Your Hospital Stay        9/13/2018 11:44 PM      Component Results     Component Value Ref Range & Units Status    Color Urine Red  Final    Appearance Urine Cloudy  Final    Glucose Urine Negative NEG^Negative mg/dL Final    Bilirubin Urine Negative NEG^Negative Final    Ketones Urine Negative NEG^Negative mg/dL Final    Specific Gravity Urine 1.009 1.003 - 1.035 Final    Blood Urine Large (A) NEG^Negative Final    pH Urine 8.0 (H) 5.0 - 7.0 pH Final    Protein Albumin Urine 100 (A) NEG^Negative mg/dL Final    Urobilinogen mg/dL 0.0 0.0 - 2.0 mg/dL Final    Nitrite Urine Negative NEG^Negative Final    Leukocyte Esterase Urine Large (A) NEG^Negative Final    Source Midstream Urine  Final    WBC Urine >182 (H) 0 - 5 /HPF Final    RBC Urine >182 (H) 0 - 2 /HPF Final    WBC Clumps Present (A) NEG^Negative /HPF Final    Bacteria Urine Many (A) NEG^Negative /HPF Final    Mucous Urine Present (A) NEG^Negative /LPF Final         9/13/2018 11:45 PM      Component Results     Component Value Ref Range & Units Status    HCG Qual Urine Negative NEG^Negative Final    This test is for screening purposes.  Results should be interpreted along with   the clinical picture.  Confirmation testing is available if warranted by   ordering [id number], HCG Quantitative Pregnancy.                  Clinical Quality Measure: Blood Pressure Screening     Your blood pressure was checked while you were in the emergency department today. The last reading we obtained was  BP: 134/90 . Please read the guidelines below about what these numbers mean and what you should do about them.  If your systolic blood pressure (the top number) is less than 120 and your diastolic blood pressure (the bottom number) is less than 80, then  your blood pressure is normal. There is nothing more that you need to do about it.  If your systolic blood pressure (the top number) is 120-139 or your diastolic blood pressure (the bottom number) is 80-89, your blood pressure may be higher than it should be. You should have your blood pressure rechecked within a year by a primary care provider.  If your systolic blood pressure (the top number) is 140 or greater or your diastolic blood pressure (the bottom number) is 90 or greater, you may have high blood pressure. High blood pressure is treatable, but if left untreated over time it can put you at risk for heart attack, stroke, or kidney failure. You should have your blood pressure rechecked by a primary care provider within the next 4 weeks.  If your provider in the emergency department today gave you specific instructions to follow-up with your doctor or provider even sooner than that, you should follow that instruction and not wait for up to 4 weeks for your follow-up visit.        Thank you for choosing [facility]       Thank you for choosing [facility] for your care. Our goal is always to provide you with excellent care. Hearing back from our patients is one way we can continue to improve our services. Please take a few minutes to complete the written survey that you may receive in the mail after you visit with us. Thank you!        [employer] Information     [employer] gives you secure access to your electronic health record. If you see a primary care provider, you can also send messages to your care team and make appointments. If you have questions, please call your primary care clinic.  If you do not have a primary care provider, please call [phone] and they will assist you.        Care EveryWhere ID     This is your Care EveryWhere ID. This could be used by other organizations to access your [facility] medical records  [id number]        Equal Access to Services     RODRIGO KAYE AH: Mercedez Iraheta  raymond jefferson, miroslava botello. So [facility] [phone].    ATENCIÓN: Si habla español, tiene a loco disposición servicios gratuitos de asistencia lingüística. Llame al [phone].    We comply with applicable federal civil rights laws and [place] laws. We do not discriminate on the basis of race, color, national origin, age, disability, sex, sexual orientation, or gender identity.            After Visit Summary       This is your record. Keep this with you and show to your community pharmacist(s) and doctor(s) at your next visit.

## 2018-09-13 NOTE — ED AVS SNAPSHOT
[facility] Emergency Department    [address]    [facility] [address]    Phone:  [phone]    Fax:  [phone]                                       Mandy Novak   MRN: [id number]    Department:  [facility] Emergency Department   Date of Visit:  9/13/2018           After Visit Summary Signature Page     I have received my discharge instructions, and my questions have been answered. I have discussed any challenges I see with this plan with the nurse or doctor.    ..........................................................................................................................................  Patient/Patient Representative Signature      ..........................................................................................................................................  Patient Representative Print Name and Relationship to Patient    ..................................................               ................................................  Date                                   Time    ..........................................................................................................................................  Reviewed by Signature/Title    ...................................................              ..............................................  Date                                               Time          22EPIC Rev 08/18

## 2018-09-14 PROCEDURE — 25000132 ZZH RX MED GY IP 250 OP 250 PS 637: Performed by: EMERGENCY MEDICINE

## 2018-09-14 RX ADMIN — CEPHALEXIN 500 MG: 500 CAPSULE ORAL at 00:03

## 2018-09-14 NOTE — DISCHARGE INSTRUCTIONS
Please make an appointment to follow up with your primary care provider in 4-5 days if not improving.    Discharge Instructions  Urinary Tract Infection  You or your child have been diagnosed with a urinary tract infection, or UTI. The urinary tract includes the kidneys (which make urine/pee), ureters (the tubes that carry urine/pee from the kidneys to the bladder), the bladder (which stores urine/pee), and urethra (the tube that carries urine/pee out of the bladder). Urinary tract infections occur when bacteria travel up the urethra into the bladder (bladder infection) and, in some cases, from there into the kidneys (kidney infection).  Generally, every Emergency Department visit should have a follow-up clinic visit with either a primary or a specialty clinic/provider. Please follow-up as instructed by your emergency provider today.  Return to the Emergency Department if:    You or your child have severe back pain.    You or your child are vomiting (throwing up) so that you cannot take your medicine.    You or your child have a new fever (had not previously had a fever) over 101 F.    You or your child have confusion or are very weak, or feel very ill.    Your child seems much more ill, will not wake up, will not respond right, or is crying for a long time and will not calm down.    You or your child are showing signs of dehydration. These signs may include decreased urination (pee), dry mouth/gums/tongue, or decreased activity.    Follow-up with your provider:     Children under 24 months need to be seen by their regular provider within one week after a diagnosis of a UTI. It may be necessary to do some more tests to look at the child s kidney or bladder.    You should begin to feel better within 24 - 48 hours of starting your antibiotic; follow-up with your regular clinic/doctor/provider if this is not the case.    Treatment:     You will be treated with an antibiotic to kill the bacteria. We have to make an  educated guess, based on what we know about common bacteria and antibiotics, as to which antibiotic will work for your infection. We will be correct most times but there will be some cases where the antibiotic chosen is not correct (see urine cultures below).    Take a pain medication such as acetaminophen (Tylenol ) or ibuprofen (Advil , Motrin , Nuprin ).    Phenazopyridine (Pyridium , Uristat ) is a prescription medication that numbs the bladder to reduce the burning pain of some UTIs.  The same medication is available in a non-prescription version (Azo-Standard , Urodol ). This medication will change the color of the urine and tears (usually blue or orange). If you wear contacts, do not wear them while taking this medication as they may be stained by the medication.    Urine Cultures:    If indicated, a urine culture may have been performed today. This test generally takes 24-48 hours to complete so the results are not known at this time. The results can confirm that an infection is present but also determine which antibiotic is effective for the specific bacteria that is causing the infection. If your urine culture shows that the antibiotic you were given today will not work to treat your infection, we will attempt to contact you to make arrangements to change the antibiotic. If the culture confirms that the antibiotic is effective for your infection, you will not be contacted. We often recommend follow-up with your regular physician/provider on the culture results regardless of this process.    Antibiotic Warning:     If you have been placed on antibiotics - watch for signs of allergic reaction.  These include rash, lip swelling, difficulty breathing, wheezing, and dizziness.  If you develop any of these symptoms, stop the antibiotic immediately and go to an emergency room or urgent care for evaluation.    Probiotics: If you have been given an antibiotic, you may want to also take a probiotic pill or eat  "yogurt with live cultures. Probiotics have \"good bacteria\" to help your intestines stay healthy. Studies have shown that probiotics help prevent diarrhea and other intestine problems (including C. diff infection) when you take antibiotics. You can buy these without a prescription in the pharmacy section of the store.   If you were given a prescription for medicine here today, be sure to read all of the information (including the package insert) that comes with your prescription.  This will include important information about the medicine, its side effects, and any warnings that you need to know about.  The pharmacist who fills the prescription can provide more information and answer questions you may have about the medicine.  If you have questions or concerns that the pharmacist cannot address, please call or return to the Emergency Department.   Remember that you can always come back to the Emergency Department if you are not able to see your regular provider in the amount of time listed above, if you get any new symptoms, or if there is anything that worries you.      "

## 2018-09-14 NOTE — ED TRIAGE NOTES
Pt with hematuria, dysuria, and frequency starting a couple of days ago worsening tonight. ABCs intact.

## 2018-09-14 NOTE — ED PROVIDER NOTES
History     Chief Complaint:  Dysuria    HPI   Mandy Novak is a 33 year old female who presents to the emergency department today for evaluation of dysuria and hematuria. The patient reports her urination became painful and frequent this afternoon, as well as hematuria. The patient states her last period was last week. She reports she is currently on birth control.  She denies any abdominal pain, back pain, fever, nausea, vomiting, or history of kidney stones. Of note, the patient is currently breast feeding.     Allergies:  No Known Allergies     Medications:    Celexa  Cleocin     Past Medical History:    ASCUS with positive high risk HPV   Cervical high risk HPV (human papillomavirus) test positive   (normal spontaneous vaginal delivery)  Resolved - Marginal placenta previa with intrapartum hemorrhage in first trimester-repeat   Shoulder dystocia    Past Surgical History:    [place] tooth extraction     Family History:    Maternal grandfather: diabetes  Maternal grandmother: thyroid disease, depression  Sister: anxiety, depression  Brother: anxiety, depression    Social History:  The patient was accompanied to the ED by her brother.  Smoking Status: Never Smoker  Smokeless Tobacco: Never Used  Alcohol Use: Negative   Marital Status:        Review of Systems   Constitutional: Negative for fever.   Gastrointestinal: Negative for abdominal pain, nausea and vomiting.   Genitourinary: Positive for dysuria, frequency and hematuria.   Musculoskeletal: Negative for back pain.   All other systems reviewed and are negative.    Physical Exam     Patient Vitals for the past 24 hrs:   BP Temp Temp src Heart Rate Resp SpO2   18 2315 - 98.2  F (36.8  C) Temporal - - -   18 2313 134/90 - - 63 18 100 %      Physical Exam    HEENT:  mmm  Neck: supple  CV: ppi, regular   Resp: speaking in full sentences with any resp distress  Abd: abdomen is soft without significant tenderness, masses, organomegaly  or guarding, no CVA ttp    Skin: warm dry well perfused  Neuro: Alert, no gross motor or sensory deficits, gait stable        Emergency Department Course     Laboratory:  Laboratory findings were communicated with the patient who voiced understanding of the findings.    UA with micro: blood large (A), pH 8 (H), protein albumin 100 (A), Leukocyte Esterase large (A), WBC/HPF >182 (H), RBC/HPF >182 (H), WBC clumps present (A), bacteria many (A), mucous present (A) o/w negative   HCG Qualitative Urine: negative    Interventions:  0003 Keflex 500 mg PO    Emergency Department Course:    2316 Nursing notes and vitals reviewed.    2327 A urine sample was obtained for laboratory testing as documented above.     2329 I performed an exam of the patient as documented above.     0007 I personally reviewed the laboratory results with the patient and answered all related questions prior to discharge.    Impression & Plan      Medical Decision Making:  Mandy Novak is a 33 year old female who presents to the emergency department today with presentation consistent with hemorrhagic cystitis. No concern for ureteral stone. Will discharge home with antibiotics and supportive measures.     Diagnosis:    ICD-10-CM    1. Hemorrhagic cystitis N30.91      Disposition:   The patient is discharged to home.     Discharge Medications:  New Prescriptions    CEPHALEXIN (KEFLEX) 500 MG CAPSULE    Take 1 capsule (500 mg) by mouth 3 times daily for 7 days     Scribe Disclosure:  I, Aliyah Cosme, am serving as a scribe at 11:16 PM on 9/13/2018 to document services personally performed by Yordan Jordan MD based on my observations and the provider's statements to me.      [facility] EMERGENCY DEPARTMENT       Yordan Jordan MD  09/14/18 0138

## 2018-09-14 NOTE — TELEPHONE ENCOUNTER
Patient says she has had symptoms of a bladder infection since a couple a days ago.  Patient says she feels some burning when she finishes urinating.  Patient has some urgency and frequency and noticed blood in her urine tonight.  Reviewed guideline and care advice with caller.  FNA advised to call back with questions or worsening symptoms.  Caller verbalizes understanding.      Reason for Disposition    Urinating more frequently than usual (i.e., frequency)    Additional Information    Negative: Shock suspected (e.g., cold/pale/clammy skin, too weak to stand, low BP, rapid pulse)    Negative: Sounds like a life-threatening emergency to the triager    Negative: Followed a genital area injury    Negative: Followed a genital area injury (penis, scrotum)    Negative: Vaginal discharge    Negative: Pus (white, yellow) or bloody discharge from end of penis    Negative: [1] Taking antibiotic for urinary tract infection (UTI) AND [2] female    Negative: [1] Taking antibiotic for urinary tract infection (UTI) AND [2] male    Negative: [1] Discomfort (pain, burning or stinging) when passing urine AND [2] pregnant    Negative: [1] Discomfort (pain, burning or stinging) when passing urine AND [2] postpartum < 1 month    Negative: [1] Discomfort (pain, burning or stinging) when passing urine AND [2] female    Negative: [1] Discomfort (pain, burning or stinging) when passing urine AND [2] male    Negative: Pain or itching in the vulvar area    Negative: Pain in scrotum is main symptom    Negative: Blood in the urine is main symptom    Negative: Symptoms arising from use of a urinary catheter ([place] or [place])    Negative: [1] Unable to urinate (or only a few drops) > 4 hours AND     [2] bladder feels very full (e.g., palpable bladder or strong urge to urinate)    Negative: [1] Decreased urination and [2] drinking very little AND [2] dehydration suspected (e.g., dark urine, no urine > 12 hours, very dry mouth, very lightheaded)     Negative: Patient sounds very sick or weak to the triager    Negative: Fever > 100.5 F (38.1 C)    Negative: Side (flank) or lower back pain present    Negative: [1] Can't control passage of urine (i.e., urinary incontinence) AND [2] new onset (< 2 weeks) or worsening    Protocols used: URINARY SYMPTOMS-ADULT-AH

## 2018-10-06 DIAGNOSIS — F41.9 ANXIETY: ICD-10-CM

## 2018-10-06 RX ORDER — CITALOPRAM HYDROBROMIDE 20 MG/1
20 TABLET ORAL DAILY
Qty: 30 TABLET | Refills: 0 | Status: SHIPPED | OUTPATIENT
Start: 2018-10-06 | End: 2018-11-05

## 2018-10-06 NOTE — TELEPHONE ENCOUNTER
"Requested Prescriptions   Pending Prescriptions Disp Refills     citalopram (CELEXA) 20 MG tablet [Pharmacy Med Name: CITALOPRAM HBR 20 MG TABLET] 30 tablet 1     Sig: TAKE 1/2 TABLET (10 MG) FOR 1-2 WEEKS, THEN INCREASE TO 1 TABLET ORALLY DAILY    SSRIs Protocol Passed    10/6/2018 11:34 AM       Passed - Recent (12 mo) or future (30 days) visit within the authorizing provider's specialty    Patient had office visit in the last 12 months or has a visit in the next 30 days with authorizing provider or within the authorizing provider's specialty.  See \"Patient Info\" tab in inbasket, or \"Choose Columns\" in Meds & Orders section of the refill encounter.           Passed - Patient is age 18 or older       Passed - No active pregnancy on record       Passed - No positive pregnancy test in last 12 months        PHQ-9 SCORE 6/28/2017 5/21/2018 8/2/2018   Total Score 8 1 4     CRISTOFER-7 SCORE 6/28/2017 5/21/2018 8/2/2018   Total Score 8 5 21       Medication is being filled for 1 time refill only due to:  pt advised at LOV to schedule a phone or OV for further refills     Charito Junior RN  [facility]      "

## 2018-11-05 DIAGNOSIS — F41.9 ANXIETY: ICD-10-CM

## 2018-11-05 NOTE — LETTER
[address] [address]                                                                                                       [phone]    November 15, 2018    Mandy Novak  [address] [place] [address] [address] [place] [place] [address]      To Whom it May Concern:    My staff have been attempting to reach you in regards to a recent refill request for: citalopram (CELEXA) 20 MG tablet.  After reviewing your chart, you are due for a medication check appointment. You have been given a 1 month supply of medication, but will need an appointment prior to further refills.     Please contact my office at [phone].     Thank you for your time.        Sincerely,       Mey Mcleod M.D./[place] RN

## 2018-11-07 RX ORDER — CITALOPRAM HYDROBROMIDE 20 MG/1
TABLET ORAL
Qty: 30 TABLET | Refills: 0 | Status: SHIPPED | OUTPATIENT
Start: 2018-11-07 | End: 2018-12-04

## 2018-11-07 NOTE — TELEPHONE ENCOUNTER
done x 1 month only. Pt needs recheck telephone  visit before more refills. Please inform pt and  Please assist pt in making appt to be seen.

## 2018-11-07 NOTE — TELEPHONE ENCOUNTER
"    Last Written Prescription Date:  10/6/18  Last Fill Quantity: 30,  # refills: 0   Last office visit: 8/15/2018 with prescribing provider:     Future Office Visit:    Requested Prescriptions   Pending Prescriptions Disp Refills     citalopram (CELEXA) 20 MG tablet [Pharmacy Med Name: CITALOPRAM HBR 20 MG TABLET] 30 tablet 0     Sig: TAKE 1 TABLET BY MOUTH EVERY DAY    SSRIs Protocol Passed    11/5/2018  6:39 PM       Passed - Recent (12 mo) or future (30 days) visit within the authorizing provider's specialty    Patient had office visit in the last 12 months or has a visit in the next 30 days with authorizing provider or within the authorizing provider's specialty.  See \"Patient Info\" tab in inbasket, or \"Choose Columns\" in Meds & Orders section of the refill encounter.             Passed - Patient is age 18 or older       Passed - No active pregnancy on record       Passed - No positive pregnancy test in last 12 months      Dx is anxiety but depression on problem list and patient given a one time refill and told to schedule per last refill note  PHQ-9 SCORE 6/28/2017 5/21/2018 8/2/2018   Total Score 8 1 4     Routing refill request to provider for review/approval because:  Drug not on the [facility] refill protocol         "

## 2018-11-08 NOTE — TELEPHONE ENCOUNTER
Attempt #1  Called patient @ [phone] (home) - Left a non-detailed message to call back.    If patient calls back, please schedule a TELEPHONE VISIT (Med Check) within 1 month and close encounter.       Leonora Bravo RN  [facility]

## 2018-11-09 ENCOUNTER — NURSE TRIAGE (OUTPATIENT)
Dept: NURSING | Facility: CLINIC | Age: 33
End: 2018-11-09

## 2018-11-10 NOTE — TELEPHONE ENCOUNTER
"C/o sore throat and nasal congestion starting 11/5 (4 days ago) then cough starting 3 days ago. Cough is bothering her most currently. Coughing up clear to light yellow sputum.  No breathing or swallowing difficulty. Mild chest soreness only w/ coughing. No fever. Denies sinus or ear pain. She is breast feeding and asks what OTC meds are safe to use w/ this (18 mo old). Advised home care per guideline. Disc'd guideline care advice. Disc'd meds on \"Pregnancy meds\" list in One Note under \"Cough\". Pt voiced understanding and agreement. Dorothy Amanda RN/FNA    Additional Information    Negative: Severe difficulty breathing (e.g., struggling for each breath, speaks in single words)    Negative: Bluish lips, tongue, or face now    Negative: [1] Difficulty breathing AND [2] exposure to flames, smoke, or fumes    Negative: [1] Stridor AND [2] difficulty breathing    Negative: Sounds like a life-threatening emergency to the triager    Negative: [1] Previous asthma attacks AND [2] this feels like asthma attack    Negative: Dry (non-productive) cough (i.e., no sputum or minimal clear sputum)    Negative: Chest pain  (Exception: MILD central chest pain, present only when coughing)    Negative: Difficulty breathing    Negative: Patient sounds very sick or weak to the triager    Negative: [1] Coughed up blood AND [2] > 1 tablespoon (15 ml) (Exception: blood-tinged sputum)    Negative: Fever > 103 F (39.4 C)    Negative: [1] Fever > 101 F (38.3 C) AND [2] age > 60    Negative: [1] Fever > 101 F (38.3 C) AND [2] bedridden (e.g., nursing home patient, CVA, chronic illness, recovering from surgery)    Negative: [1] Fever > 100.5 F (38.1 C) AND [2] diabetes mellitus or weak immune system (e.g., HIV positive, cancer chemo, splenectomy, organ transplant, chronic steroids)    Negative: Wheezing is present    Negative: SEVERE coughing spells (e.g., whooping sound after coughing, vomiting after coughing)    Negative: [1] Continuous " (nonstop) coughing interferes with work or school AND [2] no improvement using cough treatment per Care Advice    Negative: Coughing up benitez-colored (reddish-brown) sputum    Negative: Fever present > 3 days (72 hours)    Negative: [1] Fever returns after gone for over 24 hours AND [2] symptoms worse or not improved    Negative: [1] Sinus pain (around cheekbone or eye) AND [2] present > 24 hours using nasal washes and pain meds    Negative: Earache    Negative: [1] Known COPD or other severe lung disease (i.e., bronchiectasis, cystic fibrosis, lung surgery) AND [2] worsening symptoms (i.e., increased sputum purulence or amount, increased breathing difficulty    Negative: [1] Coughed up blood-tinged sputum AND [2] more than once    Negative: Cough present > 10 days    Negative: [1] Nasal discharge AND [2] present > 10 days    Negative: Exposure to TB (Tuberculosis)    Cough (all triage questions negative)    Protocols used: COUGH - ACUTE PRODUCTIVE-ADULT-

## 2018-11-12 NOTE — TELEPHONE ENCOUNTER
Attempt #2  Called patient @ [phone] (home) - Left a non-detailed message to call back.     If patient calls back, please schedule a TELEPHONE VISIT (Med Check) within 1 month with MD DAVID and close encounter.         Leonora Bravo RN  [facility]

## 2018-11-15 NOTE — TELEPHONE ENCOUNTER
Attempt #3  Called # below - Left a non-detailed message to call back.      If patient calls back, please schedule a TELEPHONE VISIT (Med Check) within 1 month with MD DAVID and close encounter.     Letter sent  Closing encounter    Leonora Bravo RN  [facility]

## 2018-11-21 ENCOUNTER — TRANSFERRED RECORDS (OUTPATIENT)
Dept: HEALTH INFORMATION MANAGEMENT | Facility: CLINIC | Age: 33
End: 2018-11-21

## 2018-12-01 ENCOUNTER — MYC MEDICAL ADVICE (OUTPATIENT)
Dept: FAMILY MEDICINE | Facility: CLINIC | Age: 33
End: 2018-12-01

## 2018-12-03 NOTE — TELEPHONE ENCOUNTER
Sofyd with alisha Gan to schedule for a telephone visit.  Called patient and scheduled a telephone visit for 12/10/18 at 4:40pm.    Jeanette Saul CMA

## 2018-12-03 NOTE — TELEPHONE ENCOUNTER
Routing to [facility] for appt time frame. Sent surveys over [place].   Charito Junior RN  [facility]

## 2018-12-04 DIAGNOSIS — F41.9 ANXIETY: ICD-10-CM

## 2018-12-04 NOTE — TELEPHONE ENCOUNTER
"Requested Prescriptions   Pending Prescriptions Disp Refills     citalopram (CELEXA) 20 MG tablet [Pharmacy Med Name: CITALOPRAM HBR 20 MG TABLET] 30 tablet 0      Last Written Prescription Date:  11.7.18  Last Fill Quantity: 30,  # refills: 0   Last Office Visit: 8/15/2018   Future Office Visit:    Next 5 appointments (look out 90 days)     Dec 10, 2018  4:40 PM CST   Telephone Visit with Mey Mcleod MD   [facility]    [address] [place] [id number] [phone]                 PHQ-9 SCORE 6/28/2017 5/21/2018 8/2/2018   PHQ-9 Total Score 8 1 4     CRISTOFER-7 SCORE 6/28/2017 5/21/2018 8/2/2018   Total Score 8 5 21          Sig: TAKE 1 TABLET BY MOUTH EVERY DAY    SSRIs Protocol Passed    12/4/2018  5:29 AM       Passed - Recent (12 mo) or future (30 days) visit within the authorizing provider's specialty    Patient had office visit in the last 12 months or has a visit in the next 30 days with authorizing provider or within the authorizing provider's specialty.  See \"Patient Info\" tab in inbasket, or \"Choose Columns\" in Meds & Orders section of the refill encounter.             Passed - Patient is age 18 or older       Passed - No active pregnancy on record       Passed - No positive pregnancy test in last 12 months          "

## 2018-12-05 RX ORDER — CITALOPRAM HYDROBROMIDE 20 MG/1
TABLET ORAL
Qty: 30 TABLET | Refills: 0 | Status: SHIPPED | OUTPATIENT
Start: 2018-12-05 | End: 2018-12-13

## 2018-12-05 NOTE — TELEPHONE ENCOUNTER
Failed protocol- routing to provider- has telephone visit pending    Sarahi Garcia,RN BSN  [facility]  [phone]

## 2018-12-05 NOTE — TELEPHONE ENCOUNTER
Due for an Office visit for further refills, only fill for 30 days     Deedee Alvarado RN, BSN  [facility]

## 2018-12-06 ENCOUNTER — OFFICE VISIT (OUTPATIENT)
Dept: FAMILY MEDICINE | Facility: CLINIC | Age: 33
End: 2018-12-06
Payer: COMMERCIAL

## 2018-12-06 VITALS
TEMPERATURE: 98.5 F | DIASTOLIC BLOOD PRESSURE: 66 MMHG | HEART RATE: 84 BPM | OXYGEN SATURATION: 99 % | HEIGHT: 68 IN | WEIGHT: 157 LBS | BODY MASS INDEX: 23.79 KG/M2 | SYSTOLIC BLOOD PRESSURE: 102 MMHG

## 2018-12-06 DIAGNOSIS — R05.9 COUGH: ICD-10-CM

## 2018-12-06 DIAGNOSIS — M94.0 COSTOCHONDRITIS: Primary | ICD-10-CM

## 2018-12-06 PROCEDURE — 99213 OFFICE O/P EST LOW 20 MIN: CPT | Performed by: NURSE PRACTITIONER

## 2018-12-06 NOTE — MR AVS SNAPSHOT
After Visit Summary   12/6/2018    Mandy Novak    MRN: [id number]           Patient Information     Date Of Birth          1985        Visit Information        Provider Department      12/6/2018 2:40 PM Sarah Somers APRN CNP [facility] (formerly [facility])        Today's Diagnoses     Costochondritis    -  1      Care Instructions    Mandy was seen today for uri.    Diagnoses and all orders for this visit:    Costochondritis  Left lower rib pain  Schedule Ibuprofen, 600 mg (take with food) 3 times daily for the next 5 days, then use as needed.   Heat application to left lower rib cage 2-3 times daily for 15 minutes.     Follow-up with no improvement or worsening of symptoms.     Monitor for return of cough, new fever/chills or shortness of breath.                Follow-ups after your visit        Follow-up notes from your care team     Return in about 2 weeks (around 12/20/2018) for No improvement or worsening of symptoms.      Your next 10 appointments already scheduled     Dec 10, 2018  4:40 PM CST   Telephone Visit with Mey Mcleod MD   [facility]    [address] [place] [id number] [phone]           Note: this is not an onsite visit; there is no need to come to the facility.              Who to contact     If you have questions or need follow up information about today's clinic visit or your schedule please contact [facility] directly at [phone].  Normal or non-critical lab and imaging results will be communicated to you by MyChart, letter or phone within 4 business days after the clinic has received the results. If you do not hear from us within 7 days, please contact the clinic through MyChart or phone. If you have a critical or abnormal lab result, we will notify you by phone as soon as possible.  Submit refill requests through [employer] or call your pharmacy and they will forward the  "refill request to us. Please allow 3 business days for your refill to be completed.          Additional Information About Your Visit        [employer] Information     [employer] gives you secure access to your electronic health record. If you see a primary care provider, you can also send messages to your care team and make appointments. If you have questions, please call your primary care clinic.  If you do not have a primary care provider, please call [phone] and they will assist you.        Care EveryWhere ID     This is your Care EveryWhere ID. This could be used by other organizations to access your Central medical records  [id number]        Your Vitals Were     Pulse Temperature Height Last Period Pulse Oximetry BMI (Body Mass Index)    84 98.5  F (36.9  C) (Oral) 5' 7.5\" (1.715 m) 11/24/2018 99% 24.23 kg/m2       Blood Pressure from Last 3 Encounters:   12/06/18 102/66   09/13/18 134/90   08/15/18 (P) 110/73    Weight from Last 3 Encounters:   12/06/18 157 lb (71.2 kg)   08/02/18 146 lb (66.2 kg)   05/21/18 149 lb (67.6 kg)              Today, you had the following     No orders found for display       Primary Care Provider Office Phone # Fax #    Mey Mcleod [phone] [address] [facility] [address]        Equal Access to Services     [facility]: Hadii aad ku hadasho Soomaali, waaxda luqadaha, qaybta kaalmada adeegyada, miroslava vega . So [facility] [phone].    ATENCIÓN: Si habla español, tiene a loco disposición servicios gratuitos de asistencia lingüística. Llame al [phone].    We comply with applicable federal civil rights laws and [place] laws. We do not discriminate on the basis of race, color, national origin, age, disability, sex, sexual orientation, or gender identity.            Thank you!     Thank you for choosing [facility]  for your care. Our goal is always to provide you with excellent care. Hearing " back from our patients is one way we can continue to improve our services. Please take a few minutes to complete the written survey that you may receive in the mail after your visit with us. Thank you!             Your Updated Medication List - Protect others around you: Learn how to safely use, store and throw away your medicines at www.disposemymeds.org.          This list is accurate as of 12/6/18  3:13 PM.  Always use your most recent med list.                   Brand Name Dispense Instructions for use Diagnosis    citalopram 20 MG tablet    celeXA    30 tablet    TAKE 1 TABLET BY MOUTH EVERY DAY    Anxiety       clindamycin 1 % external lotion    CLEOCIN T    60 mL    Apply topically 2 times daily After cleansing    Perioral dermatitis       norgestimate-ethinyl estradiol 0.25-35 MG-MCG tablet    ORTHO-CYCLEN/SPRINTEC    84 tablet    Take 1 tablet by mouth daily    Breakthrough bleeding on birth control pills       Prenatal Vitamins 28-0.8 MG Tabs      Take 1 tablet by mouth daily    Absence of menstruation       vitamin D3 1000 units (25 mcg) tablet    CHOLECALCIFEROL    100 tablet    Take 1 tablet (1,000 Units) by mouth daily

## 2018-12-06 NOTE — PROGRESS NOTES
SUBJECTIVE:   Mandy Novak is a 33 year old female who presents to clinic today for the following health issues:      Acute Illness   Acute illness concerns: Cold Symptoms- was seen at [facility] clinic x 2 weeks- was told it was a virus- started off getting sick about a month ago  Onset: Has been coughing for a month, has had different symptoms for about a month    Fever: no     Chills/Sweats: no     Headache (location?): no     Sinus Pressure:YES- x 1 1-2 days     Conjunctivitis:  no    Ear Pain: no    Rhinorrhea: YES    Congestion: YES    Sore Throat: no      Cough: YES-waxing and waning over time    Wheeze: no     Decreased Appetite: YES- a little less    Nausea: no     Vomiting: no     Diarrhea:  no     Dysuria/Freq.: no     Fatigue/Achiness: YES- left side rib pain    Sick/Strep Exposure: YES- kids were sick- it seems to be going around in house, patient is also a teacher     Therapies Tried and outcome: nothing, ibuprofen last two days for rib pain    Onset of chest cold on 11/5/18, missed work due to symptoms, febrile/body aches/cough.  Noted improvement in URI symptoms.   Cough has continued.  Slow improvement in cough.  Only intermittent cough.  Left lower rib cage with worsening discomfort with taking a deep breath.    Left rib cage feels like a spasm.    Took Ibuprofen last evening and this morning.    New onset of URI symptoms 2 days ago.        Problem list and histories reviewed & adjusted, as indicated.  Additional history: as documented    Patient Active Problem List   Diagnosis     CARDIOVASCULAR SCREENING; LDL GOAL LESS THAN 160     Situational syncope- with any blood draws or shots     ASCUS with positive high risk HPV- while pregnant 4/13/2016 = normal - recheck pap and cotest postpartum     Resolved - Marginal placenta previa with intrapartum hemorrhage in first trimester-repeat 1/15/2015= resolved      Multiple pigmented nevi     Supervision of other normal pregnancy, antepartum     Type  O blood, Rh positive     Cecilio breech presentation - seen on 31 week US - resolved with external version on 3/24/2017     Large for gestational age     Indication for care in labor or delivery     History of shoulder dystocia in prior pregnancy- with first baby 7lbs 14oz 2015       (normal spontaneous vaginal delivery)     Immediate postpartum hemorrhage, postpartum     Elevated d-dimer     Acute posthemorrhagic anemia     Anxiety     Postpartum depression associated with second pregnancy     Family history of nonmelanoma skin cancer     Difficulty falling asleep at night until early morning hours     Exposure to human papillomavirus-  with genital warts - recently diagnosed     Past Surgical History:   Procedure Laterality Date     HC TOOTH EXTRACTION W/FORCEP      wisdom teeth        Social History   Substance Use Topics     Smoking status: Never Smoker     Smokeless tobacco: Never Used     Alcohol use No      Comment: Rarely - none since becoming pregnant     Family History   Problem Relation Age of Onset     Diabetes Maternal Grandfather      diet controlled     Thyroid Disease Maternal Grandmother      Depression Maternal Grandmother      Family History Negative Mother      Family History Negative Father      Anxiety Disorder Sister      Depression Sister      Anxiety Disorder Brother      Depression Brother          Current Outpatient Prescriptions   Medication Sig Dispense Refill     cholecalciferol (VITAMIN D3) 1000 UNIT tablet Take 1 tablet (1,000 Units) by mouth daily 100 tablet 3     citalopram (CELEXA) 20 MG tablet TAKE 1 TABLET BY MOUTH EVERY DAY 30 tablet 0     clindamycin (CLEOCIN T) 1 % lotion Apply topically 2 times daily After cleansing 60 mL 1     norgestimate-ethinyl estradiol (ORTHO-CYCLEN, SPRINTEC) 0.25-35 MG-MCG per tablet Take 1 tablet by mouth daily 84 tablet 3     Prenatal Vit-Fe Fumarate-FA (PRENATAL VITAMINS) 28-0.8 MG TABS Take 1 tablet by mouth daily       No Known  "Allergies    Reviewed and updated as needed this visit by clinical staff       Reviewed and updated as needed this visit by Provider         ROS:  Constitutional, HEENT, cardiovascular, pulmonary, gi and gu systems are negative, except as otherwise noted.    OBJECTIVE:     /66 (BP Location: Right arm, Patient Position: Sitting, Cuff Size: Adult Regular)  Pulse 84  Temp 98.5  F (36.9  C) (Oral)  Ht 5' 7.5\" (1.715 m)  Wt 157 lb (71.2 kg)  LMP 11/24/2018  SpO2 99%  BMI 24.23 kg/m2  Body mass index is 24.23 kg/(m^2).    GENERAL: healthy, alert and no distress  EYES: Eyes grossly normal to inspection, PERRL and conjunctivae and sclerae normal  HENT: ear canals and TM's normal, nose and mouth without ulcers or lesions  NECK: no adenopathy, no asymmetry  RESP: lungs clear to auscultation - no rales, rhonchi or wheezes  CV: regular rate and rhythm, normal S1 S2, no S3 or S4, no murmur  Chest wall:  Left lower rib cage with tenderness to palpation, no erythema, swelling or warmth  ABDOMEN: soft, nontender, bowel sounds normal  NEURO: Normal strength and tone, mentation intact and speech normal  PSYCH: mentation appears normal, affect normal/bright      ASSESSMENT/PLAN:     Mandy was seen today for uri.    Diagnoses and all orders for this visit:    Costochondritis  Left lower rib pain  Schedule Ibuprofen, 600 mg (take with food) 3 times daily for the next 5 days, then use as needed.   Heat application to left lower rib cage 2-3 times daily for 15 minutes.     Cough  Patient education regarding post-viral cough, reassurance regarding PE findings and slow resolution.        Follow-up with no improvement or worsening of symptoms.     Monitor for return of cough, new fever/chills or shortness of breath.          SHASHANK [facility]  "

## 2018-12-06 NOTE — PATIENT INSTRUCTIONS
Mandy was seen today for uri.    Diagnoses and all orders for this visit:    Costochondritis  Left lower rib pain  Schedule Ibuprofen, 600 mg (take with food) 3 times daily for the next 5 days, then use as needed.   Heat application to left lower rib cage 2-3 times daily for 15 minutes.     Follow-up with no improvement or worsening of symptoms.     Monitor for return of cough, new fever/chills or shortness of breath.

## 2018-12-13 ENCOUNTER — VIRTUAL VISIT (OUTPATIENT)
Dept: FAMILY MEDICINE | Facility: CLINIC | Age: 33
End: 2018-12-13
Payer: COMMERCIAL

## 2018-12-13 DIAGNOSIS — F41.9 ANXIETY: ICD-10-CM

## 2018-12-13 PROCEDURE — 99441 ZZC PHYSICIAN TELEPHONE EVALUATION 5-10 MIN: CPT | Performed by: FAMILY MEDICINE

## 2018-12-13 RX ORDER — CITALOPRAM HYDROBROMIDE 20 MG/1
20 TABLET ORAL DAILY
Qty: 90 TABLET | Refills: 1 | Status: SHIPPED | OUTPATIENT
Start: 2018-12-13 | End: 2019-07-02

## 2018-12-13 ASSESSMENT — PATIENT HEALTH QUESTIONNAIRE - PHQ9
5. POOR APPETITE OR OVEREATING: MORE THAN HALF THE DAYS
SUM OF ALL RESPONSES TO PHQ QUESTIONS 1-9: 0

## 2018-12-13 ASSESSMENT — ANXIETY QUESTIONNAIRES
IF YOU CHECKED OFF ANY PROBLEMS ON THIS QUESTIONNAIRE, HOW DIFFICULT HAVE THESE PROBLEMS MADE IT FOR YOU TO DO YOUR WORK, TAKE CARE OF THINGS AT HOME, OR GET ALONG WITH OTHER PEOPLE: NOT DIFFICULT AT ALL
2. NOT BEING ABLE TO STOP OR CONTROL WORRYING: NOT AT ALL
5. BEING SO RESTLESS THAT IT IS HARD TO SIT STILL: NOT AT ALL
3. WORRYING TOO MUCH ABOUT DIFFERENT THINGS: NOT AT ALL
1. FEELING NERVOUS, ANXIOUS, OR ON EDGE: NOT AT ALL
GAD7 TOTAL SCORE: 3
6. BECOMING EASILY ANNOYED OR IRRITABLE: SEVERAL DAYS
7. FEELING AFRAID AS IF SOMETHING AWFUL MIGHT HAPPEN: NOT AT ALL

## 2018-12-13 NOTE — PROGRESS NOTES
"  SUBJECTIVE:                                                    Mandy Novak is a 33 year old female who is being evaluated via a telephone visit.      The patient has been notified of following:     \"This telephone visit will be conducted via a call between you and your physician/provider. We have found that certain health care needs can be provided without the need for a physical exam.  This service lets us provide the care you need with a short phone conversation.  If a prescription is necessary we can send it directly to your pharmacy.  If lab work is needed we can place an order for that and you can then stop by our lab to have the test done at a later time.    We will bill your insurance company for this service.  Please check with your medical insurance if this type of visit is covered. You may be responsible for the cost of this type of visit if insurance coverage is denied.  The typical cost is $30 (10min), $59 (11-20min) and $85 (21-30min).  Most often these visits are shorter than 10 minutes.    If during the course of the call the physician/provider feels a telephone visit is not appropriate, you will not be charged for this service.\"       Consent has been obtained for this service by care team member: yes.   See the scanned image in the medical record.    Mandy Novak complains of  Recheck Medication      I have reviewed and updated the patient's Past Medical History, Social History, Family History and Medication List.    Patient Active Problem List   Diagnosis     CARDIOVASCULAR SCREENING; LDL GOAL LESS THAN 160     Situational syncope- with any blood draws or shots     ASCUS with positive high risk HPV- while pregnant 4/13/2016 = normal - recheck pap and cotest postpartum     Resolved - Marginal placenta previa with intrapartum hemorrhage in first trimester-repeat 1/15/2015= resolved      Multiple pigmented nevi     Supervision of other normal pregnancy, antepartum     Type O blood, Rh " positive     Cecilio breech presentation - seen on 31 week US - resolved with external version on 3/24/2017     Large for gestational age     Indication for care in labor or delivery     History of shoulder dystocia in prior pregnancy- with first baby 7lbs 14oz 2015       (normal spontaneous vaginal delivery)     Immediate postpartum hemorrhage, postpartum     Elevated d-dimer     Acute posthemorrhagic anemia     Anxiety     Postpartum depression associated with second pregnancy     Family history of nonmelanoma skin cancer     Difficulty falling asleep at night until early morning hours     Exposure to human papillomavirus-  with genital warts - recently diagnosed     Current Outpatient Medications   Medication Sig Dispense Refill     cholecalciferol (VITAMIN D3) 1000 UNIT tablet Take 1 tablet (1,000 Units) by mouth daily 100 tablet 3     citalopram (CELEXA) 20 MG tablet TAKE 1 TABLET BY MOUTH EVERY DAY 30 tablet 0     clindamycin (CLEOCIN T) 1 % lotion Apply topically 2 times daily After cleansing 60 mL 1     norgestimate-ethinyl estradiol (ORTHO-CYCLEN, SPRINTEC) 0.25-35 MG-MCG per tablet Take 1 tablet by mouth daily 84 tablet 3     Prenatal Vit-Fe Fumarate-FA (PRENATAL VITAMINS) 28-0.8 MG TABS Take 1 tablet by mouth daily           ALLERGIES  Patient has no known allergies.    Jeanette Saul CMA (MA signature)    Additional provider notes:     Anxiety Follow-Up    Status since last visit: Improved - with the Celexa, she has started to sleep better    Feels able to control her anxiety and stress levels better.     Other associated symptoms:None    Complicating factors:   Significant life event: No   Current substance abuse: None  Depression symptoms: No  CRISTOFER-7 SCORE 2018   Total Score 5 21 3   CRISTOFER-7        Amount of exercise or physical activity: None    Problems taking medications regularly: No    Medication side effects: none    Diet: regular (no restrictions)    Still some  mild difficulty relaxing at times and just several days /week of becoming easily annoyed or irritable ,but feels much better overall.  No symptoms of depression at all.   PHQ-9 score:    PHQ-9 SCORE 12/13/2018   PHQ-9 Total Score 0         Assessment/Plan:    ICD-10-CM    1. Anxiety F41.9 citalopram (CELEXA) 20 MG tablet      Keep dose of citalopram the same at 20mg po daily and consider tapering down to 10mg in the spring.   I have reviewed the note as documented above.  This accurately captures the substance of my conversation with the patient, Mandy Novak .     Total time of call between patient and provider was 6 minutes 40 minutes.            Mey Mcleod MD    [facility]

## 2018-12-14 ASSESSMENT — ANXIETY QUESTIONNAIRES: GAD7 TOTAL SCORE: 3

## 2019-07-02 DIAGNOSIS — F41.9 ANXIETY: ICD-10-CM

## 2019-07-02 RX ORDER — CITALOPRAM HYDROBROMIDE 20 MG/1
TABLET ORAL
Qty: 90 TABLET | Refills: 0 | Status: SHIPPED | OUTPATIENT
Start: 2019-07-02 | End: 2019-09-24

## 2019-07-02 NOTE — TELEPHONE ENCOUNTER
A 90 day supply is given, patient is due for an office visit.  Please call to  assist the patient in scheduling an appointment.  APOORVA Taylor, RN  [employer] [facility]

## 2019-07-02 NOTE — TELEPHONE ENCOUNTER
"Requested Prescriptions   Pending Prescriptions Disp Refills     citalopram (CELEXA) 20 MG tablet [Pharmacy Med Name: CITALOPRAM HBR 20 MG TABLET]        Last Written Prescription Date:  12.13.18  Last Fill Quantity: 90 tablet,  # refills: 1   Last office visit: 12/13/2018 with prescribing provider:  Mey Mcleod MD             Future Office Visit:       90 tablet 1     Sig: TAKE 1 TABLET BY MOUTH EVERY DAY       SSRIs Protocol Passed - 7/2/2019  1:16 AM         PHQ-9 SCORE 5/21/2018 8/2/2018 12/13/2018   PHQ-9 Total Score 1 4 0     CRISTOFER-7 SCORE 5/21/2018 8/2/2018 12/13/2018   Total Score 5 21 3              Passed - Recent (12 mo) or future (30 days) visit within the authorizing provider's specialty     Patient had office visit in the last 12 months or has a visit in the next 30 days with authorizing provider or within the authorizing provider's specialty.  See \"Patient Info\" tab in inbasket, or \"Choose Columns\" in Meds & Orders section of the refill encounter.              Passed - Medication is active on med list        Passed - Patient is age 18 or older        Passed - No active pregnancy on record        Passed - No positive pregnancy test in last 12 months        "

## 2019-07-03 NOTE — TELEPHONE ENCOUNTER
Left non-detailed message for patient to call back.  Please schedule follow up when patient calls back.  (see previous notes for details)    Thanks Sarahi

## 2019-07-06 DIAGNOSIS — N92.1 BREAKTHROUGH BLEEDING ON BIRTH CONTROL PILLS: ICD-10-CM

## 2019-07-08 NOTE — TELEPHONE ENCOUNTER
"Requested Prescriptions   Pending Prescriptions Disp Refills     SPRINTEC 28 0.25-35 MG-MCG tablet [Pharmacy Med Name: SPRINTEC 28 DAY TABLET]        Last Written Prescription Date:  8.2.18  Last Fill Quantity: 84 tablet,  # refills: 3   Last office visit: 12/13/2018 with prescribing provider:  Mey Mcleod MD             Future Office Visit:       84 tablet 3     Sig: TAKE 1 TABLET BY MOUTH EVERY DAY       Contraceptives Protocol Passed - 7/6/2019  8:27 AM        Passed - Patient is not a current smoker if age is 35 or older        Passed - Recent (12 mo) or future (30 days) visit within the authorizing provider's specialty     Patient had office visit in the last 12 months or has a visit in the next 30 days with authorizing provider or within the authorizing provider's specialty.  See \"Patient Info\" tab in inbasket, or \"Choose Columns\" in Meds & Orders section of the refill encounter.              Passed - Medication is active on med list        Passed - No active pregnancy on record        Passed - No positive pregnancy test in past 12 months        "

## 2019-07-10 RX ORDER — NORGESTIMATE AND ETHINYL ESTRADIOL 0.25-0.035
KIT ORAL
Qty: 84 TABLET | Refills: 1 | Status: SHIPPED | OUTPATIENT
Start: 2019-07-10 | End: 2019-10-02

## 2019-07-10 NOTE — TELEPHONE ENCOUNTER
BP Readings from Last 3 Encounters:   12/06/18 102/66   09/13/18 134/90   08/15/18 (P) 110/73     Last px 5/21/2018.  Needs appt for px. Please assist pt in making appt to be seen. In the next 3 months for this. Ok for 20 min appt.

## 2019-09-24 DIAGNOSIS — F41.9 ANXIETY: ICD-10-CM

## 2019-09-26 RX ORDER — CITALOPRAM HYDROBROMIDE 20 MG/1
TABLET ORAL
Qty: 90 TABLET | Refills: 0 | Status: SHIPPED | OUTPATIENT
Start: 2019-09-26 | End: 2020-03-19

## 2019-09-26 NOTE — TELEPHONE ENCOUNTER
"Requested Prescriptions   Pending Prescriptions Disp Refills     citalopram (CELEXA) 20 MG tablet [Pharmacy Med Name: CITALOPRAM HBR 20 MG TABLET] 90 tablet 0     Sig: TAKE 1 TABLET BY MOUTH EVERY DAY       SSRIs Protocol Passed - 9/24/2019  1:37 AM        Passed - Recent (12 mo) or future (30 days) visit within the authorizing provider's specialty     Patient had office visit in the last 12 months or has a visit in the next 30 days with authorizing provider or within the authorizing provider's specialty.  See \"Patient Info\" tab in inbasket, or \"Choose Columns\" in Meds & Orders section of the refill encounter.              Passed - Medication is active on med list        Passed - Patient is age 18 or older        Passed - No active pregnancy on record        Passed - No positive pregnancy test in last 12 months        Last Written Prescription Date:  7/2/19  Last Fill Quantity: 90,  # refills: 0   Last office visit: 12/13/2018 with prescribing provider:  Sarah ENCARNACION CNP   Future Office Visit:   Next 5 appointments (look out 90 days)    Oct 02, 2019  4:00 PM CDT  MyChart Physical Adult with Mey Mcleod MD  [facility] [address] [place] [id number]  [phone]   Oct 09, 2019  8:40 AM CDT  Return Visit with Yolande Betts MD  [facility] ([address] [id number]  [phone]   Oct 17, 2019 10:00 AM CDT  Return Visit with Yolande Betts MD  [facility] ([address] [address]  [phone]         Prescription approved per [facility] Refill Protocol.  Jaz Murphy RN    "

## 2019-10-02 ENCOUNTER — OFFICE VISIT (OUTPATIENT)
Dept: FAMILY MEDICINE | Facility: CLINIC | Age: 34
End: 2019-10-02
Payer: COMMERCIAL

## 2019-10-02 VITALS
WEIGHT: 163 LBS | BODY MASS INDEX: 24.71 KG/M2 | HEART RATE: 108 BPM | DIASTOLIC BLOOD PRESSURE: 67 MMHG | HEIGHT: 68 IN | SYSTOLIC BLOOD PRESSURE: 108 MMHG | OXYGEN SATURATION: 99 % | TEMPERATURE: 98.7 F

## 2019-10-02 DIAGNOSIS — N92.1 BREAKTHROUGH BLEEDING ON BIRTH CONTROL PILLS: ICD-10-CM

## 2019-10-02 DIAGNOSIS — M25.561 PATELLOFEMORAL ARTHRALGIA OF RIGHT KNEE: ICD-10-CM

## 2019-10-02 DIAGNOSIS — Z13.6 CARDIOVASCULAR SCREENING; LDL GOAL LESS THAN 160: ICD-10-CM

## 2019-10-02 DIAGNOSIS — B36.0 TINEA VERSICOLOR: ICD-10-CM

## 2019-10-02 DIAGNOSIS — Z00.01 ENCOUNTER FOR ROUTINE ADULT HEALTH EXAMINATION WITH ABNORMAL FINDINGS: Primary | ICD-10-CM

## 2019-10-02 DIAGNOSIS — N39.46 MIXED INCONTINENCE URGE AND STRESS (MALE)(FEMALE): ICD-10-CM

## 2019-10-02 DIAGNOSIS — N39.3 URINARY, INCONTINENCE, STRESS FEMALE: ICD-10-CM

## 2019-10-02 LAB
ALBUMIN UR-MCNC: NEGATIVE MG/DL
APPEARANCE UR: CLEAR
BACTERIA #/AREA URNS HPF: ABNORMAL /HPF
BILIRUB UR QL STRIP: NEGATIVE
COLOR UR AUTO: YELLOW
GLUCOSE UR STRIP-MCNC: NEGATIVE MG/DL
HCG UR QL: NEGATIVE
HGB UR QL STRIP: ABNORMAL
KETONES UR STRIP-MCNC: NEGATIVE MG/DL
LEUKOCYTE ESTERASE UR QL STRIP: NEGATIVE
NITRATE UR QL: NEGATIVE
NON-SQ EPI CELLS #/AREA URNS LPF: ABNORMAL /LPF
PH UR STRIP: 7 PH (ref 5–7)
RBC #/AREA URNS AUTO: ABNORMAL /HPF
SOURCE: ABNORMAL
SP GR UR STRIP: 1.01 (ref 1–1.03)
UROBILINOGEN UR STRIP-ACNC: 0.2 EU/DL (ref 0.2–1)
WBC #/AREA URNS AUTO: ABNORMAL /HPF

## 2019-10-02 PROCEDURE — 81001 URINALYSIS AUTO W/SCOPE: CPT | Performed by: FAMILY MEDICINE

## 2019-10-02 PROCEDURE — 90471 IMMUNIZATION ADMIN: CPT | Performed by: FAMILY MEDICINE

## 2019-10-02 PROCEDURE — 99395 PREV VISIT EST AGE 18-39: CPT | Mod: 25 | Performed by: FAMILY MEDICINE

## 2019-10-02 PROCEDURE — 81025 URINE PREGNANCY TEST: CPT | Performed by: FAMILY MEDICINE

## 2019-10-02 PROCEDURE — 90686 IIV4 VACC NO PRSV 0.5 ML IM: CPT | Performed by: FAMILY MEDICINE

## 2019-10-02 PROCEDURE — 99214 OFFICE O/P EST MOD 30 MIN: CPT | Mod: 25 | Performed by: FAMILY MEDICINE

## 2019-10-02 RX ORDER — NORGESTIMATE AND ETHINYL ESTRADIOL 0.25-0.035
1 KIT ORAL DAILY
Qty: 84 TABLET | Refills: 3 | Status: SHIPPED | OUTPATIENT
Start: 2019-10-02 | End: 2020-10-19

## 2019-10-02 RX ORDER — KETOCONAZOLE 20 MG/G
CREAM TOPICAL 2 TIMES DAILY
Qty: 30 G | Refills: 1 | Status: SHIPPED | OUTPATIENT
Start: 2019-10-02 | End: 2020-10-19

## 2019-10-02 ASSESSMENT — MIFFLIN-ST. JEOR: SCORE: 1479.92

## 2019-10-02 NOTE — PROGRESS NOTES
SUBJECTIVE:   CC: Mandy Novak is an 34 year old woman who presents for preventive health visit.     Healthy Habits:     Getting at least 3 servings of Calcium per day:  Yes    Bi-annual eye exam:  Yes    Dental care twice a year:  Yes    Sleep apnea or symptoms of sleep apnea:  None    Diet:  Regular (no restrictions)    Frequency of exercise:  None    Taking medications regularly:  Yes    Medication side effects:  Not applicable    PHQ-2 Total Score: 0    Additional concerns today:  Yes  Rash on bra line  Left UA for pregnancy test - ordered    Diastasis Recti -- muscles really stretched out. Wonders if it is worse.   Some urinary incontinence when walking fast or running - mild urgency as well. No dysuria.     Knee pain - right lower lateral patellar area - no known injury. No fall.  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     Needs refill on her ocp's.      Depression and Anxiety Follow-Up - discuss coming off of medication    How are you doing with your depression since your last visit? Stable - post partum    How are you doing with your anxiety since your last visit?  Stable - post partum    Are you having other symptoms that might be associated with depression or anxiety? No    Have you had a significant life event? No     Do you have any concerns with your use of alcohol or other drugs? No    Social History     Tobacco Use     Smoking status: Never Smoker     Smokeless tobacco: Never Used   Substance Use Topics     Alcohol use: Yes     Comment: 0-1 drinks per week     Drug use: No     Comment: no herbal meds either      PHQ 5/21/2018 8/2/2018 12/13/2018   PHQ-9 Total Score 1 4 0   Q9: Thoughts of better off dead/self-harm past 2 weeks Not at all Not at all Not at all     CRISTOFER-7 SCORE 5/21/2018 8/2/2018 12/13/2018   Total Score 5 21 3           Suicide Assessment Five-step Evaluation and Treatment (SAFE-T)    Today's PHQ-2 Score:   PHQ-2 ( 1999 [employer]) 10/1/2019   Q1: Little interest or pleasure in  doing things 0   Q2: Feeling down, depressed or hopeless 0   PHQ-2 Score 0   Q1: Little interest or pleasure in doing things Not at all   Q2: Feeling down, depressed or hopeless Not at all   PHQ-2 Score 0       Abuse: Current or Past(Physical, Sexual or Emotional)- No  Do you feel safe in your environment? Yes    Social History     Tobacco Use     Smoking status: Never Smoker     Smokeless tobacco: Never Used   Substance Use Topics     Alcohol use: Yes     Comment: 0-1 drinks per week     If you drink alcohol do you typically have >3 drinks per day or >7 drinks per week? No    Alcohol Use 10/2/2019   Prescreen: >3 drinks/day or >7 drinks/week? -   Prescreen: >3 drinks/day or >7 drinks/week? No       Reviewed orders with patient.  Reviewed health maintenance and updated orders accordingly - Yes  Lab work is in process  Labs reviewed in EPIC  BP Readings from Last 3 Encounters:   10/02/19 108/67   18 102/66   18 134/90    Wt Readings from Last 3 Encounters:   10/02/19 73.9 kg (163 lb)   18 71.2 kg (157 lb)   18 66.2 kg (146 lb)                  Patient Active Problem List   Diagnosis     CARDIOVASCULAR SCREENING; LDL GOAL LESS THAN 160     Situational syncope- with any blood draws or shots     ASCUS with positive high risk HPV- while pregnant 2016 = normal - recheck pap and cotest postpartum     Resolved - Marginal placenta previa with intrapartum hemorrhage in first trimester-repeat 1/15/2015= resolved      Multiple pigmented nevi     Supervision of other normal pregnancy, antepartum     Type O blood, Rh positive     Cecilio breech presentation - seen on 31 week US - resolved with external version on 3/24/2017     Large for gestational age     Indication for care in labor or delivery     History of shoulder dystocia in prior pregnancy- with first baby 7lbs 14oz 2015       (normal spontaneous vaginal delivery)     Immediate postpartum hemorrhage, postpartum     Elevated d-dimer      Acute posthemorrhagic anemia     Anxiety     Postpartum depression associated with second pregnancy     Family history of nonmelanoma skin cancer     Difficulty falling asleep at night until early morning hours     Exposure to human papillomavirus-  with genital warts - recently diagnosed     Past Surgical History:   Procedure Laterality Date     BIOPSY  August 2015, mulitple others    colposcopy, several other mole removals     HC TOOTH EXTRACTION W/FORCEP      wisdom teeth        Social History     Tobacco Use     Smoking status: Never Smoker     Smokeless tobacco: Never Used   Substance Use Topics     Alcohol use: Yes     Comment: 0-1 drinks per week     Family History   Problem Relation Age of Onset     Diabetes Maternal Grandfather         diet controlled     Thyroid Disease Maternal Grandmother      Depression Maternal Grandmother      Family History Negative Mother      Family History Negative Father      Hyperlipidemia Father      Anxiety Disorder Sister      Depression Sister      Anxiety Disorder Brother      Depression Brother      Depression Sister      Depression Brother          Current Outpatient Medications   Medication Sig Dispense Refill     cholecalciferol (VITAMIN D3) 1000 UNIT tablet Take 1 tablet (1,000 Units) by mouth daily 100 tablet 3     citalopram (CELEXA) 20 MG tablet TAKE 1 TABLET BY MOUTH EVERY DAY 90 tablet 0     norgestimate-ethinyl estradiol (SPRINTEC 28) 0.25-35 MG-MCG tablet Take 1 tablet by mouth daily 84 tablet 3     Prenatal Vit-Fe Fumarate-FA (PRENATAL VITAMINS) 28-0.8 MG TABS Take 1 tablet by mouth daily       No Known Allergies  Recent Labs   Lab Test 08/02/18  0947 07/10/18  1650 06/28/17  1122   LDL  --  58  --    HDL  --  56  --    TRIG  --  98  --    TSH 1.38  --  1.06        Mammogram not appropriate for this patient based on age.    Pertinent mammograms are reviewed under the imaging tab.  History of abnormal Pap smear: NO - age 30- 65 PAP every 3 years  recommended  PAP / HPV Latest Ref Rng & Units 2018   PAP - NIL NIL NIL   HPV 16 DNA NEG:Negative Negative Negative -   HPV 18 DNA NEG:Negative Negative Negative -   OTHER HR HPV NEG:Negative Negative Negative -     Reviewed and updated as needed this visit by clinical staff  Tobacco  Allergies  Meds  Med Hx  Surg Hx  Fam Hx  Soc Hx        Reviewed and updated as needed this visit by Provider        OB History    Para Term  AB Living   2 2 2 0 0 2   SAB TAB Ectopic Multiple Live Births   0 0 0 0 2      # Outcome Date GA Lbr Eh/2nd Weight Sex Delivery Anes PTL Lv   2 Term 17 40w0d 05:32 / 01:27 4.36 kg (9 lb 9.8 oz) F Vag-Spont EPI N NAZANIN      Birth Comments: none      Complications: Excessive Vaginal Bleeding, Dysfunctional Labor      Name: Sherice Galvan       Apgar1: 8  Apgar5: 9   1 Term 06/19/15 38w5d 16:00 / 02:37 3.569 kg (7 lb 13.9 oz) M Vag-Spont EPI  NAZANIN      Birth Comments: hand next to face       Complications: Shoulder Dystocia      Name: Maik      Apgar1: 8  Apgar5: 9      Obstetric Comments   Son Maik - born in    Daughter, Sherice, born in 2017        Review of Systems  CONSTITUTIONAL: NEGATIVE for fever, chills, change in weight  INTEGUMENTARU/SKIN: NEGATIVE for worrisome rashes, moles or lesions  EYES: NEGATIVE for vision changes or irritation  ENT: NEGATIVE for ear, mouth and throat problems  RESP: NEGATIVE for significant cough or SOB  BREAST: NEGATIVE for masses, tenderness or discharge  CV: NEGATIVE for chest pain, palpitations or peripheral edema  GI: NEGATIVE for nausea, abdominal pain, heartburn, or change in bowel habits  : NEGATIVE for unusual urinary or vaginal symptoms. Periods are regular.  MUSCULOSKELETAL: NEGATIVE for significant arthralgias or myalgia  NEURO: NEGATIVE for weakness, dizziness or paresthesias  ENDOCRINE: NEGATIVE for temperature intolerance, skin/hair changes  HEME/ALLERGY/IMMUNE: NEGATIVE for bleeding  "problems  PSYCHIATRIC: NEGATIVE for changes in mood or affect     OBJECTIVE:   /67 (BP Location: Left arm, Patient Position: Chair, Cuff Size: Adult Regular)   Pulse 108   Temp 98.7  F (37.1  C) (Oral)   Ht 1.715 m (5' 7.5\")   Wt 73.9 kg (163 lb)   LMP 09/18/2019 (Approximate)   SpO2 99%   Breastfeeding? No   BMI 25.15 kg/m    Physical Exam  GENERAL: healthy, alert and no distress  EYES: Eyes grossly normal to inspection, PERRL and conjunctivae and sclerae normal  HENT: ear canals and TM's normal, nose and mouth without ulcers or lesions  NECK: no adenopathy, no asymmetry, masses, or scars and thyroid normal to palpation  RESP: lungs clear to auscultation - no rales, rhonchi or wheezes  BREAST: normal without masses, tenderness or nipple discharge and no palpable axillary masses or adenopathy  CV: regular rate and rhythm, normal S1 S2, no S3 or S4, no murmur, click or rub, no peripheral edema and peripheral pulses strong  ABDOMEN: soft, nontender, no hepatosplenomegaly, no masses and bowel sounds normal   (female): normal female external genitalia, normal urethral meatus, vaginal mucosa pink, moist, well rugated, and normal cervix/adnexa/uterus without masses or discharge  MS: no gross musculoskeletal defects noted, no edema  SKIN: no suspicious lesions or rashes, but there is a 3cm x 2cm patch that  Is tan colored and slightly ovoid - consistent with a tinea versicolor rash.   NEURO: Normal strength and tone, mentation intact and speech normal  PSYCH: mentation appears normal, affect normal/bright. Alert and oriented. No acute distress. Appears well-groomed and casually dressed. Affect is normal, not particularly depressed. In good humor and laughs appropriately. Not particularly anxious. No evidence of psychosis.     Diagnostic Test Results:  Labs reviewed in Epic  UA RESULTS:  Recent Labs   Lab Test 10/02/19  1615   COLOR Yellow   APPEARANCE Clear   URINEGLC Negative   URINEBILI Negative " "  URINEKETONE Negative   SG 1.010   UBLD Trace*   URINEPH 7.0   PROTEIN Negative   UROBILINOGEN 0.2   NITRITE Negative   LEUKEST Negative   RBCU O - 2   WBCU 0 - 5     Urine HCG = negative.     ASSESSMENT/PLAN:       ICD-10-CM    1. Encounter for routine adult health examination with abnormal findings Z00.01 Urine Microscopic   2. Breakthrough bleeding on birth control pills N92.1 norgestimate-ethinyl estradiol (SPRINTEC 28) 0.25-35 MG-MCG tablet     OFFICE/OUTPT VISIT,EST,LEVL IV   3. Urinary, incontinence, stress female N39.3 [facility] PT, HAND, AND CHIROPRACTIC REFERRAL     OFFICE/OUTPT VISIT,EST,LEVL IV   4. Mixed incontinence urge and stress (male)(female) N39.46 UA reflex to Microscopic and Culture     [facility] PT, HAND, AND CHIROPRACTIC REFERRAL     OFFICE/OUTPT VISIT,EST,LEVL IV   5. Patellofemoral arthralgia of right knee M25.561 OFFICE/OUTPT VISIT,EST,LEVL IV   6. Tinea versicolor- left lower medial chest  B36.0 ketoconazole (NIZORAL) 2 % external cream     OFFICE/OUTPT VISIT,EST,LEVL IV   7. CARDIOVASCULAR SCREENING; LDL GOAL LESS THAN 160 Z13.6 CBC with platelets     Lipid panel reflex to direct LDL Fasting     Pt also complaining of right anterior knee pain in the area of the inferolateral patellar/medial joint line area- no known injury.    No swelling. Rest the affected painful area as much as possible.  Apply ice for 15-20 minutes intermittently as needed and especially after any offending activity. Daily stretching.  As pain recedes, begin normal activities slowly as tolerated.  Consider Physical Therapy if symptoms not better with symptomatic care.     Discussed pelvic floor PT for stress urinary incontinence.     Discussed in detail patellofemoral knee pain as well.     COUNSELING:  Reviewed preventive health counseling, as reflected in patient instructions    Estimated body mass index is 25.15 kg/m  as calculated from the following:    Height as of this encounter: 1.715 m (5' 7.5\").    Weight as of this " encounter: 73.9 kg (163 lb).    Weight management plan: Discussed healthy diet and exercise guidelines     reports that she has never smoked. She has never used smokeless tobacco.      Counseling Resources:  ATP IV Guidelines  Pooled Cohorts Equation Calculator  Breast Cancer Risk Calculator  FRAX Risk Assessment  ICSI Preventive Guidelines  Dietary Guidelines for Americans, 2010  USDA's MyPlate  ASA Prophylaxis  Lung CA Screening    Mey Mcleod MD  [facility]

## 2019-10-02 NOTE — PATIENT INSTRUCTIONS
To help prevent yeast infections - dry the area fully with a blow dryer on a cool setting daily or after bathing.     try an  oral probiotic otc such as Culturelle, Align. IbSium, or UltraFlora  Intensive Care  to help restore your natural gut and/or vaginal  bacteria to hopefully prevent yeast infections.          Preventive Health Recommendations  Female Ages 26 - 39  Yearly exam:   See your health care provider every year in order to    Review health changes.     Discuss preventive care.      Review your medicines if you your doctor has prescribed any.    Until age 30: Get a Pap test every three years (more often if you have had an abnormal result).    After age 30: Talk to your doctor about whether you should have a Pap test every 3 years or have a Pap test with HPV screening every 5 years.   You do not need a Pap test if your uterus was removed (hysterectomy) and you have not had cancer.  You should be tested each year for STDs (sexually transmitted diseases), if you're at risk.   Talk to your provider about how often to have your cholesterol checked.  If you are at risk for diabetes, you should have a diabetes test (fasting glucose).  Shots: Get a flu shot each year. Get a tetanus shot every 10 years.   Nutrition:     Eat at least 5 servings of fruits and vegetables each day.    Eat whole-grain bread, whole-wheat pasta and brown rice instead of white grains and rice.    Get adequate Calcium and Vitamin D.     Lifestyle    Exercise at least 150 minutes a week (30 minutes a day, 5 days of the week). This will help you control your weight and prevent disease.    Limit alcohol to one drink per day.    No smoking.     Wear sunscreen to prevent skin cancer.    See your dentist every six months for an exam and cleaning.      For bladder urgency - Avoid /eliminate citrus juices, citric acid or vinegars ( acidic substances - even those in salad dressing), all caffeine, even decaf coffee, and teas; alcohol, and  artificial sweeteners, chocolate, tomato products and carbonated beverages ( even sparkling neal).   1 serving of any of the above can irritate bladder for 3 days.     If you eliminate all the above and are still having problems, please contact our office.             Patient Education     Treating Incontinence in Women: Nonsurgical Methods    The best treatment for you will depend on the type of incontinence you have. Your symptoms, age, and any underlying problems that are found also affect your treatment. While some types of incontinence may eventually require surgery, nonsurgical treatments may be effective in many cases. Nonsurgical treatments include lifestyle changes, muscle-strengthening exercises, and medicines.  Nonsurgical Treatments  Treatment for stress urinary incontinence includes:    Bladder training    Lifestyle changes such as weight loss and increased activity if incontinence is due to being overweight    Medicines, if bladder training has not helped    Pelvic floor muscle exercises  Lifestyle changes    Losing weight. Excess weight puts extra pressure on the pelvic floor muscles. Exercising and eating right can help you lose weight. This helps other treatments work better.    Making certain diet changes. Some foods may make you need to urinate more, so it may be good to avoid them. These include caffeinated drinks and alcohol. Ask your healthcare provider whether these or other diet changes might be helpful.    Quitting smoking. Smoking can lead to a chronic cough that strains pelvic floor muscles. Smoking may also damage the bladder and urethra.  Pelvic floor muscle exercises  There are exercises you can do to help strengthen your pelvic floor muscles. The pelvic floor muscles act as a sling to help hold the bladder and urethra in place. These muscles also help keep the urethra closed. Weak pelvic floor muscles may allow urine to leak. To strengthen the pelvic floor muscles, do the exercises  daily. In a few months, the muscles will be stronger and tighter. This can help prevent urine leakage.  Date Last Reviewed: 1/1/2017 2000-2018 [email or web address]. [address] [place] [address], [place] [address]. All rights reserved. This information is not intended as a substitute for professional medical care. Always follow your healthcare professional's instructions.           Patient Education     Kegel Exercises  Kegel exercises don t need special clothing or equipment. They re easy to learn and simple to do. And if you do them right, no one can tell you re doing them, so they can be done almost anywhere. Your healthcare provider, nurse, or physical therapist can answer any questions you have and help you get started.    A weak pelvic floor  The pelvic floor muscles may weaken due to aging, pregnancy and vaginal childbirth, injury, surgery, chronic cough, or lack of exercise. If the pelvic floor is weak, your bladder and other pelvic organs may sag out of place. The urethra may also open too easily and allow urine to leak out. Kegel exercises can help you strengthen your pelvic floor muscles. Then they can better support the pelvic organs and control urine flow.  How Kegel exercises are done  Try each of the Kegel exercises described below. When you re doing them, try not to move your leg, buttock, or stomach muscles:    Contract as if you were stopping your urine stream. But do it when you re not urinating.    Tighten your rectum as if trying not to pass gas. Contract your anus, but don t move your buttocks.    You may place a finger or 2 in the vagina and squeeze your finger with your vagina to learn which muscles to tighten.  Try to hold each Kegel for a slow count to 5. You probably won t be able to hold them for that long at first. But keep practicing. It will get easier as your pelvic floor gets stronger. Eventually, special weights that you place in your vagina may be recommended to help make your  Kegels even more effective. Visit your healthcare provider if you have difficulties doing Kegel exercises.  Helpful hints  Here are some tips to follow:    Do your Kegels as often as you can. The more you do them, the faster you ll feel the results.    Pick an activity you do often as a reminder. For instance, do your Kegels every time you sit down.    Tighten your pelvic floor before you sneeze, get up from a chair, cough, laugh, or lift. This protects your pelvic floor from injury and can help prevent urine leakage.   Date Last Reviewed: 10/1/2017    2514-6674 [employer]. [address] [place] [address], [place] [address]. All rights reserved. This information is not intended as a substitute for professional medical care. Always follow your healthcare professional's instructions.           Patient Education     Types of Urinary Incontinence  Urinary incontinence is the inability to control the release of urine. You may leak urine. Or you may not be able to hold urine until you can get to a bathroom. Read on to learn more about the types of incontinence.              Stress Urinary Incontinence (LANCE)  If you have LANCE, urine leaks out of the bladder during activity. Symptoms of LANCE include leaking when coughing, sneezing, or laughing. This may occur because muscles under the bladder are weak. It also sometimes happens in men for a time after prostate surgery.  Urge Incontinence  Urge incontinence is also called an  overactive bladder.  With this type, the bladder feels full even when it s almost empty. The main symptom is a sudden urge to urinate that can t be controlled. The urge is felt often. This type can be caused by infection or by a nerve problem. It can also be caused by a growth in the bladder.  Overflow Incontinence  With overflow incontinence, the bladder doesn t empty when it should. It then gets very full. Urine may leak out in small amounts. Or the urge to urinate is felt often, but urine  trickles instead of flowing freely. The bladder may never feel empty. Blockage of the opening to the bladder or the urethra may cause this type. Or it may be caused by nerve or muscle problems that stop the bladder from malcolm.  Mixed Incontinence  If you have mixed incontinence, you have more than one type of incontinence at the same time.     0497-8604 [employer]. [address] [place] [address]. All rights reserved. This information is not intended as a substitute for professional medical care. Always follow your healthcare professional's instructions.           Patient Education     Overactive Bladder Syndrome (OAB)     Normally, urine stays in the bladder until a person decides to release it. With OAB, the bladder muscles contract involuntarily, causing a sudden urge to urinate and even urine leakage.   When the bladder muscle contract or squeeze involuntarily, it is called overactive bladder syndrome. This causes an intense urge to urinate, known as urgency. Urgency can occur many times during the day and night. If urine leaks with the urgency, it is called urge incontinence.  A disease that affects the bladder nerves, such as multiple sclerosis, can cause overactive bladder syndrome. Other conditions, such as urinary tract infection (UTI) or prostate problems in men, can also lead to OAB. But the exact cause is often not known.  How is overactive bladder syndrome diagnosed?  Your healthcare provider will examine you and ask about your symptoms and health history. You may also have one or more of the following:    Urine test to take samples of urine and have them checked for problems.    Urinary diary to record how much fluid you take in and urinate out in a 3 day period.    Bladder ultrasound to study the bladder as it empties. Ultrasound uses sound waves to create detailed images of the inside of the body.    Cystoscopy to allow the healthcare provider to look for problems in  the urinary tract. The test uses a thin, flexible scope called a cystoscope with a light and camera on the end. The scope is inserted into the urethra (the tube that carries urine out of the body).    Urodynamic studies, a battery of tests designed to measure and record many aspects of urinary bladder function, including pressures, volume, and urine flow.  How is overactive bladder syndrome treated?  Treatment depends on the cause and severity of your OAB. Treatments may include the following:    Changing urination habits may be suggested. For instance, your healthcare provider may suggest that you urinate as soon as you feel the urge. You may also need to limit how much fluid you have during the day.    Exercising your pelvic muscles can help strengthen muscles used during urination. These exercises are called Kegels. They involve malcolm as if you were stopping your urine stream and tightening your rectum as if trying not to pass gas. Your healthcare provider can help you learn how to do Kegels.    Biofeedback to help you learn to control the movement of your bladder muscles. Sensors are placed on your abdomen. They turn signals given off by your muscles into lines on a computer screen.    Medicine may be given to relax the bladder muscle. Medicine can also help ease bladder contractions, which reduces the urge to urinate.    Neuromodulation may be done if medicine and behavioral changes don t work. Electrical pulses are sent to the sacral nerves (nerves that affect the pelvic area). These pulses help relieve OAB and urge incontinence.    Surgery to make the bladder larger may be done in severe cases.  With treatment, OAB can be managed. A condition, such as UTI, that has caused you to have OAB will be treated. Treatment may involve taking medicine for months or years. You may also need to make changes in your daily routine. This may include going to the bathroom more often than you think you need to. Or, you  may need to cut back on caffeine and alcohol because these can make symptoms worse. Your healthcare provider can tell you more.     When to call your healthcare provider  Call the healthcare provider right away if you have any of the following:    Fever of 100.4 F (38.0  C) or higher     No improvement with treatment    Trouble urinating because of pain    Back or abdominal pain   Date Last Reviewed: 1/1/2017 2000-2018 [employer]. [address] [place] [address], [place] [address]. All rights reserved. This information is not intended as a substitute for professional medical care. Always follow your healthcare professional's instructions.           Patient Education     Stress Urinary Incontinence: Having Retropubic Suspension Surgery  To help treat stress urinary incontinence (LANCE), your surgeon may perform a procedure called retropubic suspension. It is done by making an incision in the lower part of your belly (abdomen). During this surgery, the surgeon puts stitches through the tissue next to the vagina to help support the urethra and bladder firmly in place. This helps keep the urethra closed to prevent urine leakage. Your surgery will take about 2 hour(s). You will be asked to do some things at home to prepare for surgery. Below are guidelines to help you get ready. If you have any questions, call your nurse or doctor.      An incision is made in your lower belly.     How should I prepare for surgery?  The weeks before surgery    Have any tests that your doctor orders.    Tell your doctor about aspirin and other medicines, vitamins, or herbs you take. Ask if you should stop taking them before surgery.    Stop smoking to help reduce your risks during surgery.    If you have been given any prescriptions to fill, do this before surgery.  The night before surgery    You may be asked to give yourself an enema. This cleans out your bowels for surgery. You ll be told how to do it.    Follow any directions  you are given for taking medicines and for not eating or drinking before surgery.  The day of surgery  Arrive at the hospital a few hours before surgery as directed. Have someone drive you there who can also stay during the surgery, and drive you home. At the hospital, your temperature and blood pressure will be taken. In some cases, tests may be done. Then, you will receive one or more IV (intravenous) lines. These lines give you fluids and medicines before, during, and after surgery. Some of your pubic hair may be removed. Tight stockings (compression stockings) may be put on your legs to help prevent blood clots.  About anesthesia  To keep you pain-free during surgery, you ll receive anesthesia. General anesthesia allows you to sleep. Regional anesthesia numbs the lower part of your body. Local anesthesia numbs the area that will be operated on. Before surgery, you ll meet with the anesthesiologist or nurse anesthetist. He or she can tell you what kind of anesthesia you will receive and answer questions you may have.  What happens during the surgery?    An incision about 4 inches long is made in the lower part of the abdomen, near the pubic hairline.    Through the incision, the surgeon places sutures into the tissue next to the vagina. Or sutures may be placed into the outside walls of the vagina behind the urethra. The ends of the sutures are tied to strong tissues or bone nearby.    The incision is closed with sutures, staples, or strips of tape (Steri-Strips).  What are the risks and complications?  The risks and complications of this procedure may include:    Infection    Bleeding    Risks of anesthesia    Blood clots    Damage to nerves, muscles, bladder, or nearby pelvic structures    Trouble urinating    Urinary urgency   Date Last Reviewed: 1/1/2017 2000-2018 [employer]. [address] [place] [address]. All rights reserved. This information is not intended as a substitute for  professional medical care. Always follow your healthcare professional's instructions.           Patient Education     What Is Stress Urinary Incontinence?  Stress urinary incontinence is a common type of bladder control problem in women (although it may also occur in men). It refers to leakage of urine during events that result in increased abdominal pressure, such as sneezing, coughing, physical exercise, lifting, bending, and even changing positions. Stress incontinence may occur when the structures that help hold urine in your bladder become weak.  What are the symptoms of stress incontinence?  If you have stress incontinence, you may leak urine when you:    Cough, sneeze, or laugh    Lift something heavy    Exercise     Strong pelvic floor muscles and connective tissue, and a strong urethral sphincter, help keep urine in the bladder.       Weak or torn pelvic floor muscles and connective tissue, or a weak urethral sphincter, can let urine leak out of the bladder.      Normal urine control  The bladder holds urine until you are ready to let it flow out. These structures help:    The pelvic floor muscles and connective tissue help hold the pelvic organs in place. When the muscles and connective tissue are strong, the urethra and bladder are well supported. This helps keep the urethra closed, so urine doesn t leak.    The urethral sphincter is a band of muscles around the urethra. When these muscles are strong, they keep urine in the bladder. These muscles relax when you want urine to flow out.  If urine leaks out  The pelvic floor muscles and connective tissue may stretch, weaken, or tear. Weak or torn structures can t support the urethra and bladder. The urethral sphincter may also weaken. These changes can cause urine to leak. The changes may be caused by:    Pregnancy and vaginal childbirth or  (surgery to deliver a baby)    Constant coughing (such as with bronchitis or chronic cough from  smoking)    Being overweight or obese    Hysterectomy or other pelvic surgery    Nerve damage  Treatment  Different treatments are available for stress incontinence including:    Lifestyle changes such as reducing weight, quitting smoking, reducing drinks with caffeine or alcohol and bladder strengthening exercises    Surgery of various types    Various medical devices such as pessaries  Date Last Reviewed: 1/1/2017 2000-2018 [employer]. [address] [place] [id number]. All rights reserved. This information is not intended as a substitute for professional medical care. Always follow your healthcare professional's instructions.           Patient Education     Urinary Incontinence, Female (Adult)  Urinary incontinence means loss of control of the bladder. This problem affects many women, especially as they get older. If you have incontinence, you may be embarrassed to ask for help. But know that this problem can be treated.  Types of Incontinence  There are different types of incontinence. Two of the main types are described here. You can have more than one type.    Stress incontinence. With this type, urine leaks when pressure (stress) is put on the bladder. This may happen when you cough, sneeze, or laugh. Stress incontinence most often occurs because the pelvic floor muscles that support the bladder and urethra are weak. This can happen after pregnancy and vaginal childbirth or a hysterectomy. It can also be due to excess body weight or hormone changes.    Urge incontinence (also called overactive bladder). With this type, a sudden urge to urinate is felt often. This may happen even though there may not be much urine in the bladder. The need to urinate often during the night is common. Urge incontinence most often occurs because of bladder spasms. This may be due to bladder irritation or infection. Damage to bladder nerves or pelvic muscles, constipation, and certain medicines can also  lead to urge incontinence.  Treatment of urinary incontinence depends on the cause. Further evaluation is needed to find the type you have. This will likely include an exam and certain tests. Based on the results, you and your healthcare provider can then plan treatment. Until a diagnosis is made, the home care tips below can help relieve symptoms.  Home care    Do pelvic floor muscle exercises, if they are prescribed. The pelvic floor muscles help support the bladder and urethra. Many women find that their symptoms improve when doing special exercises that strengthen these muscles. To do the exercises contract the muscles you would use to stop your stream of urine, but do this when you re not urinating. Hold for 10 seconds, then relax. Repeat 10 to 20 times in a row, at least 3 times a day. Your provider may give you other instructions for how to do the exercises and how often.    Keep a bladder diary. This helps track how often and how much you urinate over a set period of time. Bring this diary with you to your next visit with the provider. The information can help your provider learn more about your bladder problem.    Lose weight, if advised to by your provider. Excess weight puts pressure on the bladder. Your provider can help you create a weight-loss plan that s right for you. This may include exercising more and making certain diet changes.    Don't consume foods and drinks that may irritate the bladder. These can include alcohol and caffeinated drinks.    Quit smoking. Smoking and other tobacco use can lead to chronic cough that strains the pelvic floor muscles. Smoking may also damage the bladder and urethra. Talk with your provider about treatments or methods you can use to quit smoking.    If drinking large amounts of fluid causes you to have symptoms, you may be advised to limit your fluid intake. You may also be advised to drink most of your fluids during the day and to limit fluids at night.    If  you re worried about urine leakage or accidents, you may wear absorbent pads to catch urine. Change the pads often. This helps reduce discomfort. It may also reduce the risk of skin or bladder infections.  Follow-up care  Follow up with your healthcare provider, or as directed. It may take some to find the right treatment for your problem. Your treatment plan may include special therapies or medicines. Certain procedures or surgery may also be options. Be sure to discuss any questions you have with your provider.  When to seek medical advice  Call the healthcare provider right away if any of these occur:    Fever of 100.4 F (38 C) or higher, or as directed by your provider    Bladder pain or fullness    Abdominal swelling    Nausea or vomiting    Back pain    Weakness, dizziness or fainting  Date Last Reviewed: 10/1/2017    0990-1688 [employer]. [address] [place] [address]. All rights reserved. This information is not intended as a substitute for professional medical care. Always follow your healthcare professional's instructions.                    Thank you for choosing [facility]  for your Health Care. It was a pleasure seeing you at your visit today. Please contact us with any questions or concerns you may have.                   Mey Mcleod MD                                  To reach your [facility] care team after hours call:   [phone]    Our clinic hours are:     Monday- 7:30 am - 7:00 pm                             Tuesday through Friday- 7:30 am - 5:00 pm                                        Saturday- 8:00 am - 12:00 pm                  Phone:  [phone]    Our pharmacy hours are:     Monday  8:00 am to 7:00 pm      Tuesday through Friday 8:00am to 6:00pm                        Saturday - 9:00 am to 1:00 pm      Sunday : Closed.              Phone:  [phone]      There is also information available at our web site:   [email or web address]    If your provider ordered any lab tests and you do not receive the results within 10 business days, please call the clinic.    If you need a medication refill please contact your pharmacy.  Please allow 2 business days for your refill to be completed.    Our clinic offers telephone visits and e visits.  Please ask one of your team members to explain more.      Use [facility] (secure email communication and access to your chart) to send your primary care provider a message or make an appointment. Ask someone on your Team how to sign up for TBSt.                 Patient Education     Quadruped Hip Abduction (Strength)    These instructions are for your right leg. Switch sides for your left leg.  1. Get down on the floor on your hands and knees.  2. Lift your right leg up and out to the side. Keep the knee bent. Raise your leg as high as is comfortable. Hold for 5 seconds.  3. Slowly lower your leg back to the floor.  4. Repeat 10 times, or as instructed.  Date Last Reviewed: 5/1/2016 2000-2018 [employer]. [address] [place] [address]. All rights reserved. This information is not intended as a substitute for professional medical care. Always follow your healthcare professional's instructions.           Patient Education     Knee Pain  Knee pain is very common. It s especially common in active people who put a lot of pressure on their knees, like runners. It affects women more often than men.  Your kneecap (patella) is a thick, round bone. It covers and protects the front portion of your knee joint. It moves along a groove in your thighbone (femur) as part of the patellofemoral joint. A layer of cartilage surrounds the underside of your kneecap. This layer protects it from grinding against your femur.  When this cartilage softens and breaks down, it can cause knee pain. This is partly because of repetitive stress. The stress irritates the lining of the joint. This causes  pain in the underlying bone.  What causes knee pain?  Many things can cause knee pain. You may have more than one cause. Some of these include:    Overuse of the knee joint    The kneecap doesn t line up with the tissue around it    Damage to small nerves in the area    Damage to the ligament-like structure that holds the kneecap in place (retinaculum)    Breakdown of the bone under the cartilage    Swelling in the soft tissues around the kneecap    Injury  You might be more likely to have knee pain if you:    Exercise a lot    Recently increased the intensity of your workouts    Have a body mass index (BMI) greater than 25    Have poor alignment of your kneecap    Walk with your feet turned overly outward or inward    Have weakness in surrounding muscle groups (inner quad or hip adductor muscles)    Have too much tightness in surrounding muscle groups (hamstrings or iliotibial band)    Have a recent history of injury to the area    Are female  Symptoms of knee pain  This type of knee pain is a dull, aching pain in the front of the knee in the area under and around the kneecap. This pain may start quickly or slowly. Your pain might be worse when you squat, run, or sit for a long time. You might also sometimes feel like your knee is giving out. You may have symptoms in one or both of your knees.  Diagnosing knee pain  Your healthcare provider will ask about your medical history and your symptoms. Be sure to describe any activities that make your knee pain worse. He or she will look at your knee. This will include tests of your range of motion, strength, and areas of pain of your knee. Your knee alignment will be checked.  Your healthcare provider will need to rule out other causes of your knee pain, such as arthritis. You may need an imaging test, such as an X-ray or MRI.  Treatment for knee pain  Treatments that can help ease your symptoms may include:    Avoiding activities for a while that make your pain worse,  returning to activity over time    Icing the outside of your knee when it causes you pain    Taking over-the-counter pain medicine    Wearing a knee brace or taping your knee to support it    Wearing special shoe inserts to help keep your feet in the proper alignment    Doing special exercises to stretch and strengthen the muscles around your hip and your knee  These steps help most people manage knee pain. But some cases of knee pain need to be treated with surgery. You may need surgery right away. Or you may need it later if other treatments don t work. Your healthcare provider may refer you to an orthopedic surgeon. He or she will talk with you about your choices.  Preventing knee pain  Losing weight and correcting excess muscle tightness or muscle weakness may help lower your risk.  In some cases, you can prevent knee pain. To help prevent a flare-up of knee pain, you do these things:    Regularly do all the exercises your doctor or physical therapist advises    Support your knee as advised by your doctor or physical therapist    Increase training gradually, and ease up on training when needed    Have an expert check your gait for running or other sporting activities    Stretch properly before and after exercise    Replace your running shoes regularly    Lose excess weight     When to call your healthcare provider  Call your healthcare provider right away if:    Your symptoms don t get better after a few weeks of treatment    You have any new symptoms   Date Last Reviewed: 4/1/2017 2000-2018 [employer]. [address] [place] [address], [place] [address]. All rights reserved. This information is not intended as a substitute for professional medical care. Always follow your healthcare professional's instructions.           Patient Education     Knee Pain with Uncertain Cause    There are several common causes for knee pain. These can include:    A sprain of the ligaments that support the joint    An  injury to the cartilage lining of the joint    Arthritis from wear-and-tear or inflammation  There are other causes as well. There may also be swelling, reduced movement of the knee joint, and pain with walking. A definite diagnosis will still need to be made. If your symptoms don't improve, further follow-up and testing may be needed.  Home care    Stay off the injured leg as much as possible until pain improves.    Apply an ice pack over the injured area for 15 to 20 minutes every 3 to 6 hours. You should do this for the first 24 to 48 hours. You can make an ice pack by filling a plastic bag that seals at the top with ice cubes and then wrapping it with a thin towel. Continue to use ice packs for relief of pain and swelling as needed. As the ice melts, be careful not to get your wrap, splint, or cast wet. After 48 hours, apply heat (warm shower or warm bath) for 15 to 20 minutes several times a day, or alternate ice and heat. If you have to wear a hook-and-loop knee brace, you can open it to apply the ice pack, or heat, directly to the knee. Never put ice directly on the skin. Always wrap the ice in a towel or other type of cloth.    You may use over-the-counter pain medicine to control pain, unless another pain medicine was prescribed. If you have chronic liver or kidney disease or ever had a stomach ulcer or gastrointestinal bleeding, talk with your healthcare provider before using these medicines.    If crutches or a walker have been recommended, don't put weight on the injured leg until you can do so without pain. Check with your healthcare provider before returning to sports or full work duties.    If you have a hook-and-loop knee brace, you can remove it to bathe and sleep, unless told otherwise.  Follow-up care  Follow up with your healthcare provider as advised. This is usually within 1 to 2 weeks.  If X-rays were taken, you will be told of any new findings that may affect your care  Call 911  Call 911 if  you have:    Shortness of breath    Chest pain  When to seek medical advice  Call your healthcare provider right away if any of these occur:    Toes or foot becomes swollen, cold, blue, numb, or tingly    Pain or swelling spreads over the knee or calf    Warmth or redness appears over the knee or calf    Other joints become painful    Rash appears    Fever of 100.4 F (38 C) or higher, or as directed by your healthcare provider    Chills  Date Last Reviewed: 5/1/2018 2000-2018 [employer]. [address] [place] [address]. All rights reserved. This information is not intended as a substitute for professional medical care. Always follow your healthcare professional's instructions.           Patient Education     Understanding Patellofemoral Syndrome    Patellofemoral syndrome is a condition that causes pain on the front of the knee. The large bones of the upper and lower leg meet at the knee. This joint also includes a small triangle-shaped bone that rests on top of the leg bones. This is the kneecap (patella). Patellofemoral refers to the patella and the thigh bone (femur). These bones are surrounded by connective tissue and muscles. Patellofemoral pain is believed to come from stress on the tissues of and around the knee.  What causes patellofemoral syndrome?  No single cause for patellofemoral pain has been found. But many things are likely to contribute to this type of knee pain. These include:    Actions that put repeated stress on the knee, such as running and squatting    Overtraining at a sport    Weak hip or thigh muscle    Normal variations in the way body parts fit together    Poor form during activities that stress the knee, such as running    A fall or blow to the knee  Symptoms of patellofemoral syndrome  Pain is a common symptom. It s often on the front of the knee, but can be around the kneecap. Pain can occur at certain times, such as when you are:    Running    Sitting for a  long time with your knees bent, such as at a movie    Walking up or down stairs    Squatting  Other symptoms may include:    A feeling of the knee catching or locking    A grinding or crackling noise in your knee  Treatment for patellofemoral syndrome  Treatment focuses on reducing pain and avoiding further injury. Treatments may include:    Rest your leg. This gives your knee time to recover. You may need to avoid or change the activity that caused the problem, such as not running for a while.    Prescription or over-the-counter pain medicines. These help reduce inflammation, swelling, and pain.    Cold packs. These help reduce pain.    Stretches and other exercises. These can improve balance, flexibility, and strength.    A shoe insert (orthotic). This can make your knee more stable.    Elastic tape or a brace. These can make your knee more stable.    Physical therapy. This may include exercises or other treatments.    Surgery. In rare cases, if other treatments don t relieve symptoms, you may need surgery.  Possible complications of patellofemoral syndrome  If you don t give your knee time to heal, symptoms may return or get worse. Follow your healthcare provider s instructions on resting and treating your knee.  When to call your healthcare provider  Call your healthcare provider right away if you have any of these:    Fever of 100.4 F (38 C) or higher, or as directed    Pain that gets worse    Symptoms that don t get better, or get worse    New symptoms   Date Last Reviewed: 3/10/2016    0705-7467 [employer]. [address] [place], [place] [id number]. All rights reserved. This information is not intended as a substitute for professional medical care. Always follow your healthcare professional's instructions.           Patient Education     Quadriceps Stretch (Flexibility)    1. Stand up straight and hold onto a wall, sturdy chair, railing, or table with your right hand.  2. Bend your left leg at  the knee behind you, and grab your ankle with your left hand. Pull your left heel toward your buttocks. Don t arch your back.  3. Hold for 30 to 60 seconds. Repeat 2 times.  4. Switch legs and repeat.  Date Last Reviewed: 5/1/2016 2000-2018 [employer]. [address] [place] [address]. All rights reserved. This information is not intended as a substitute for professional medical care. Always follow your healthcare professional's instructions.         Bent-Knee Calf Stretch    This exercise is designed to stretch and strengthen your feet and ankles. Before beginning the exercise, read through all the instructions. While exercising, breathe normally and don t bounce. If you feel any pain, stop the exercise. If pain persists, inform your healthcare provider:    Stand an arm s length away from a wall. Place the palms of your hands on the wall. Step forward about 12 inches with your ______ foot.    Keeping toes pointed forward and both heels on the floor, bend both knees and lean forward. Hold for ______ seconds. Relax.    Repeat ______ times. Do ______ sets a day.  Date Last Reviewed: 3/1/2018    9793-0066 [employer]. [address] [place] [address]. All rights reserved. This information is not intended as a substitute for professional medical care. Always follow your healthcare professional's instructions.         Patient Education   Back Exercises: Knee Lift         To start, lie on your back with your knees bent and feet flat on the floor. Don t press your neck or lower back to the floor. Breathe deeply. You should feel comfortable and relaxed in this position:    Start by tightening your abdominal muscles.    Lift one bent knee off the floor 2 to 4 inches.    Hold for 10 seconds. Return to start position.    Repeat 3 times.    Switch legs.  Date Last Reviewed: 3/1/2018    2667-2785 [employer]. [address] [place] [address]. All rights  reserved. This information is not intended as a substitute for professional medical care. Always follow your healthcare professional's instructions.         Patient Education   Quad Set for Leg and Knee    This exercise is designed to stretch and strengthen your knee. Before beginning, read through all the instructions. While exercising, breathe normally and use smooth movements. If you feel any pain, stop the exercise. If pain continues, call your healthcare provider.  1. Sit on the floor with one leg straight, the other bent.  2. Flex the foot of your straight leg by pointing your toes toward you. Press the back of your knee into the floor while tightening the muscle on the top of your thigh. Hold for 3 to 5 seconds. Then relax.  3. Repeat 10 to 15 times. Do 3 to 5 sets a day.  Caution    Don t arch your back.    Don t hunch your shoulders.  Date Last Reviewed: 2/1/2018 2000-2018 [employer]. [address] [place] [address]. All rights reserved. This information is not intended as a substitute for professional medical care. Always follow your healthcare professional's instructions.         Patient Education   Leg and Knee Exercises: Hip Pulls    This exercise helps build strong, balanced leg muscles. Make sure to adjust exercise bands as advised by your physical therapist. To do the exercise:    Stand with 1 leg about 1 foot away from a wall. The ankle of your other leg should be attached to a pulley or rubber tubing. Put that leg1 step behind.    Pull your attached foot forward. Keep your knees straight but not locked. (Point your toe straight forward unless told otherwise by your therapist.)    Return your leg slowly and steadily to the starting position. Do this as many times as advised by your physical therapist.    Repeat the steps with your other leg.  Note: To prevent injury, always warm up and stretch before your strength exercises. Stop any exercise that causes pain. Discuss it  with your physical therapist or healthcare provider.  Date Last Reviewed: 3/1/2018    [id number] [employer]. [address] [place] [address]. All rights reserved. This information is not intended as a substitute for professional medical care. Always follow your healthcare professional's instructions.         Patient Education   Leg and Knee Exercises: Heel Raise    This exercise is designed to strengthen your knee and calf. Before beginning, read through all the instructions. While exercising, breathe normally and use smooth movements. If you feel any pain, stop the exercise. If pain persists, inform your healthcare provider.  Caution    Don t lock your knees.    Don t arch your back.     Stand with both feet flat on the floor, shoulder-width apart.    If you need support, steady yourself with your hand on a ledge, wall, or table.    Raise both heels so you re standing on your toes.    Hold for ______ seconds. Slowly lower your heels to the floor.    Repeat ______ times. Do ______ sets a day.  Note: As you become stronger, stand on one foot at a time, and raise that heel off the floor.  Date Last Reviewed: 11/1/2017 2000-2018 [employer]. [address] [place] [id number]. All rights reserved. This information is not intended as a substitute for professional medical care. Always follow your healthcare professional's instructions.         Patient Education   Leg and Knee Exercises: Leg Lunge     This exercise is designed to stretch and strengthen your knee. Before beginning, read through all the instructions. Start with a warm-up, which can help prevent muscle soreness and improve coordination so you do not fall. While exercising, breathe normally and use smooth movements. If you feel any pain, stop the exercise. If pain persists, call your healthcare provider.  4. After a brief warm-up, such as brisk walking for a few minutes, stand with your feet shoulder-width  apart.  5. With one foot, step out and lower yourself into a comfortable position. Keep your back straight and your feet pointing straight ahead. As you step, the heel of the other foot lifts off the floor.  6. Return smoothly to your starting position.  7. Repeat ______ times on each side. Do ______ sets a day.  Caution    Don t let your forward knee go past your toes.    Don t lunge so far that your rear knee touches the floor.    Keep knees in line with the second toe.   Date Last Reviewed: 12/1/2017 2000-2018 [employer]. [address] [place] [address]. All rights reserved. This information is not intended as a substitute for professional medical care. Always follow your healthcare professional's instructions.         Patient Education   Leg and Knee Exercises: Step-Ups    This exercise is designed to stretch and strengthen your knee. Before starting, read through all the instructions. While exercising, breathe normally and use smooth movements. If you feel any pain, stop the exercise. If pain persists, tell your healthcare provider.  8. After a brief warm-up, such as brisk walking for a few minutes, stand with one foot on a 3-inch to 5-inch support (such as a block of wood) and the other foot flat on the floor.  9. Shift your weight onto the foot on the block, straightening that knee and raising your other foot off the floor. Then slowly lower the foot until only the heel touches the floor.  10. Return to starting position.  11. Repeat ______ times on each side. Do ______ sets a day.    Don t lock your knees.    Keep your weight on the foot that s on the block-- don t push off from the floor.  Date Last Reviewed: 2/1/2018 2000-2018 [employer]. [address] [place] [address]. All rights reserved. This information is not intended as a substitute for professional medical care. Always follow your healthcare professional's instructions.         Patient  Education   Quadruped Hip Abduction (Strength)    These instructions are for your right leg. Switch sides for your left leg.  1. Get down on the floor on your hands and knees.  2. Lift your right leg up and out to the side. Keep the knee bent. Raise your leg as high as is comfortable. Hold for 5 seconds.  3. Slowly lower your leg back to the floor.  4. Repeat 10 times, or as instructed.  Date Last Reviewed: 5/1/2016 2000-2018 [employer]. [address] [place] [address]. All rights reserved. This information is not intended as a substitute for professional medical care. Always follow your healthcare professional's instructions.         Patient Education   Pre-Knee Replacement: Ankle Pumps, Quad Sets, Leg Raises  Doing these exercises before your knee replacement can help speed your recovery. Ask your physical therapist (PT) whether you should exercise one or both legs. Unless you re told otherwise, start by doing each exercise 5 to 10 times (5 to 10 reps) twice a day (2 sets). As you get stronger, slowly increase the number of reps and sets.  Stop any exercise that causes sharp or increased knee pain, dizziness, shortness of breath, or chest pain.  Ankle pumps    Ankle pumps can help prevent circulation problems, such as blood clots. Do ankle pumps by pointing and flexing your feet.  Quadriceps sets      Lie on your back in bed, legs straight.    Tighten the muscle at the front of the thigh as you press the back of your knee down toward the bed. Hold for a few seconds. Then relax the leg.  Straight leg raises      Lie in bed. Bend one leg. Keep your other leg straight on the bed.    Lift your straight leg as high as you comfortably can, but not higher than 12 inches. Hold for a few seconds. Then slowly lower the leg.  Date Last Reviewed: 5/1/2018 2000-2018 [employer]. [address] [place] [address]. All rights reserved. This information is not intended as a  substitute for professional medical care. Always follow your healthcare professional's instructions.

## 2019-10-08 NOTE — PROGRESS NOTES
[facility] - PRIMARY CARE SKIN    CC : skin cancer screening (full-body)  SUBJECTIVE:                                                    Mandy Novak is a 34 year old female who presents to clinic today for a full-body skin exam.    No concerns regarding skin lesions.    Personal history of skin cancer : NO - has had history of excised nevi, all without dysplasia.  Family history of skin cancer : YES - ?non-melanoma in maternal grandmother.     Sun Exposure History  Previous history of significant sun exposure:  Blistering sunburns : NO  Tanning beds : YES - when younger.  Sunscreen Use : YES, frequency : daily on face and when outdoors on body.  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    Refer to electronic medical record (EMR) for past medical history and medications.    INTEGUMENTARY/SKIN: NEGATIVE for worrisome rashes, moles or lesions  ROS : 14 point review of systems was negative except the symptoms listed above in the HPI.          OBJECTIVE:                                                    GENERAL: healthy, alert and no distress  SKIN: Paul Skin Type - I.  This patient was examined from the top of the head to the bottom of the feet  including scalp, face, neck, back, chest, breasts, buttocks, both arms, both legs, both hands, both feet, all 10 fingers and all 10 toes. The dermatoscope was used to help evaluate pigmented lesions.  Skin Pertinent Findings:  Face : Scattered lesions of various sizes and shapes most consistent with compound (benign) nevi.    Right arm(s) and left arm(s) : brown macules of various sizes and shapes most consistent with (benign) melanocytic nevi.    Chest, Abdomen : Scattered brown macules of various sizes and shapes most consistent with (benign) melanocytic nevi.    Back : Multiple brown macules of various sizes and shapes  "most consistent with (benign) melanocytic nevi. slightly raised, red lesion(s) consistent with capillary hemangioma.    Significant Findings:  Right abdomen, 2 cm superior to umbilicus at 11 o'clock position : 10 mm in size brown macule with irregular structure and pigmentation. ? Atypical nevus ? Other. Patient to return for shave removal.    Back, 8 cm left of T8 : 2 mm in size dark brown lesion with irregular pigmentation. ? Atypical nevus ? Other. Patient to return for shave removal.    Left anterolateral mid-neck : 6 x 4 mm compound nevus. Patient to return for shave removal.    Right upper chest, 2 cm proximal to mid-clavicle : 10 x 8 mm brown raised lesion. Patient to return for shave removal.  Right mid cheek -  6 mm raised, flesh colored lesion ? Benign compound nevus ? other    Diagnostic Test Results:  none           ASSESSMENT:                                                      Encounter Diagnoses   Name Primary?     Skin exam for malignant neoplasm Yes     Melanocytic nevi of trunk      Solar lentiginosis          PLAN:                                                    Patient Instructions   Annual skin exams  Removal of lesions scheduled for next week    SUN PROTECTION INSTRUCTIONS  Sun damage can lead to skin cancer and premature aging of the skin.      The best way to protect from sun damage to your skin is to avoid the sun during peak hours (10 am - 2 pm) even on overcast days.    Never use tanning beds. Tanning beds are associated with much higher risks of skin cancer.    All tanning damages the skin. Aim for ivory skin year round and you will have less trouble with your skin in years to come. There is no merit in getting \"a base tan\" before a warm weather vacation, as any tanning indicates your body's response to sun damage.    Stop smoking. Smokers have higher rates of skin cancer and also have premature skin wrinkling.    Use UPF sun-protective clothing, which while more expensive initially " "provides longer lasting coverage without having to worry about remembering to re-apply.  1. Wear a wide-brimmed hat and sunglasses.   2. Wear sun-protective clothing.  [employer] and other [employer] make sun protective clothing that are stylish, comfortable and cool.   [employer] and other [employer] make UV arm sleeves suitable for golfing, gardening and other activities.    Sunscreen instructions:  1. Use sunscreens with Zinc Oxide, Titanium Dioxide or Avobenzone to protect from UVA rays.  2. Use SPF 30-50+ to protect from UVB rays.  3. Re-apply every 2 hours even if water resistant.  4. Apply on your face every day even when cloudy and even in the winter. UVA \"aging rays\" penetrate window glass and are just as strong in the winter as in the summer.    FYI  You should use about 3 tablespoons of sunscreen to protect your whole body. Thus a typical eight ounce bottle of sunscreen should last 4 applications. Remember, that the SPF rating is compromised if you don t apply enough. Most people only apply 1/2 - 1/3 of the amount they need. Also don t forget areas such as your ears, feet, upper back and harder to reach places. Keep in mind that these amounts should be increased for larger body sizes.    Sunscreens with titanium dioxide and/or zinc oxide in the active ingredients are physical blockers as opposed to chemical blockers. Chemical-free sunscreens should not irritate the skin.    Spray-on sunscreens may be used for touch-up application only, not as a base layer. Also, use with caution around small children due to inhalation risk.    SPF means sun protection factor, which is just the degree to which the sunscreen can protect against UVB rays. There is no rating system for UVA rays. SPF is calculated as the time skin will burn when sunscreen is applied vs. skin without sunscreen.    Water resistant sunscreens should be re-applied every 1-2 hours.    Product Recommendations:    Consider use of sunscreen " sticks with Zinc Oxide and Titanium Dioxide active ingredients such as Neutrogena Pure&Free Baby Sunscreen Stick.    Good examples include: Blue Lizard, EltaMD, Solbar    Good daily moisturizers with SPF: Vanicream, CeraVe.    For sensitive skin, consider : SkinMedica Essential Defense Mineral Shield Broad Spectrum SPF 35    Men: consider use of Neutrogena Triple Protect Facial Lotion    Avoid retinyl palmitate products.  Avoid combination products that include both sunscreen and insect repellant, as sunscreen should be applied every 2 hours, but insect repellant should not be applied as frequently.    For more information:  https://www.skincancer.org/prevention/sun-protection/sunscreen/sunscreens-safe-and-effective    CERAVE FACIAL LOTION     The patient was counseled about sunscreens and sun avoidance. The patient was counseled to check the skin regularly and report any lesion that is new, changing, itching, scabbing, bleeding or otherwise bothersome. The patient was discharged ambulatory and in stable condition.      PROCEDURES:                                                    None.    TT : 25 minutes.  CT : 15 minutes.

## 2019-10-09 ENCOUNTER — OFFICE VISIT (OUTPATIENT)
Dept: FAMILY MEDICINE | Facility: CLINIC | Age: 34
End: 2019-10-09
Payer: COMMERCIAL

## 2019-10-09 VITALS — SYSTOLIC BLOOD PRESSURE: 122 MMHG | DIASTOLIC BLOOD PRESSURE: 60 MMHG

## 2019-10-09 DIAGNOSIS — Z12.83 SKIN EXAM FOR MALIGNANT NEOPLASM: Primary | ICD-10-CM

## 2019-10-09 DIAGNOSIS — D22.5 MELANOCYTIC NEVI OF TRUNK: ICD-10-CM

## 2019-10-09 DIAGNOSIS — L81.4 SOLAR LENTIGINOSIS: ICD-10-CM

## 2019-10-09 PROCEDURE — 99213 OFFICE O/P EST LOW 20 MIN: CPT | Performed by: FAMILY MEDICINE

## 2019-10-09 NOTE — LETTER
10/9/2019         RE: Mandy Novak  [address] [place] [address]        Dear Colleague,    Thank you for referring your [place], [place]ak, [place] [facility] Please see a copy of my visit note below.    [facility] - PRIMARY CARE SKIN    CC : skin cancer screening (full-body)  SUBJECTIVE:                                                    Mandy Novak is a 34 year old female who presents to clinic today for a full-body skin exam.    No concerns regarding skin lesions.    Personal history of skin cancer : NO - has had history of excised nevi, all without dysplasia.  Family history of skin cancer : YES - ?non-melanoma in maternal grandmother.     Sun Exposure History  Previous history of significant sun exposure:  Blistering sunburns : NO  Tanning beds : YES - when younger.  Sunscreen Use : YES, frequency : daily on face and when outdoors on body.  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    Refer to electronic medical record (EMR) for past medical history and medications.    INTEGUMENTARY/SKIN: NEGATIVE for worrisome rashes, moles or lesions  ROS : 14 point review of systems was negative except the symptoms listed above in the HPI.          OBJECTIVE:                                                    GENERAL: healthy, alert and no distress  SKIN: Paul Skin Type - I.  This patient was examined from the top of the head to the bottom of the feet  including scalp, face, neck, back, chest, breasts, buttocks, both arms, both legs, both hands, both feet, all 10 fingers and all 10 toes. The dermatoscope was used to help evaluate pigmented lesions.  Skin Pertinent Findings:  Face : Scattered lesions of various sizes and shapes most consistent with compound (benign) nevi.    Right arm(s) and left arm(s) : brown macules of  "various sizes and shapes most consistent with (benign) melanocytic nevi.    Chest, Abdomen : Scattered brown macules of various sizes and shapes most consistent with (benign) melanocytic nevi.    Back : Multiple brown macules of various sizes and shapes most consistent with (benign) melanocytic nevi. slightly raised, red lesion(s) consistent with capillary hemangioma.    Significant Findings:  Right abdomen, 2 cm superior to umbilicus at 11 o'clock position : 10 mm in size brown macule with irregular structure and pigmentation. ? Atypical nevus ? Other. Patient to return for shave removal.    Back, 8 cm left of T8 : 2 mm in size dark brown lesion with irregular pigmentation. ? Atypical nevus ? Other. Patient to return for shave removal.    Left anterolateral mid-neck : 6 x 4 mm compound nevus. Patient to return for shave removal.    Right upper chest, 2 cm proximal to mid-clavicle : 10 x 8 mm brown raised lesion. Patient to return for shave removal.  Right mid cheek -  6 mm raised, flesh colored lesion ? Benign compound nevus ? other    Diagnostic Test Results:  none           ASSESSMENT:                                                      Encounter Diagnoses   Name Primary?     Skin exam for malignant neoplasm Yes     Melanocytic nevi of trunk      Solar lentiginosis          PLAN:                                                    Patient Instructions   Annual skin exams  Removal of lesions scheduled for next week    SUN PROTECTION INSTRUCTIONS  Sun damage can lead to skin cancer and premature aging of the skin.      The best way to protect from sun damage to your skin is to avoid the sun during peak hours (10 am - 2 pm) even on overcast days.    Never use tanning beds. Tanning beds are associated with much higher risks of skin cancer.    All tanning damages the skin. Aim for ivory skin year round and you will have less trouble with your skin in years to come. There is no merit in getting \"a base tan\" before a " "warm weather vacation, as any tanning indicates your body's response to sun damage.    Stop smoking. Smokers have higher rates of skin cancer and also have premature skin wrinkling.    Use UPF sun-protective clothing, which while more expensive initially provides longer lasting coverage without having to worry about remembering to re-apply.  1. Wear a wide-brimmed hat and sunglasses.   2. Wear sun-protective clothing.  [employer] and other [employer] make sun protective clothing that are stylish, comfortable and cool.   [employer] and other [employer] make UV arm sleeves suitable for golfing, gardening and other activities.    Sunscreen instructions:  1. Use sunscreens with Zinc Oxide, Titanium Dioxide or Avobenzone to protect from UVA rays.  2. Use SPF 30-50+ to protect from UVB rays.  3. Re-apply every 2 hours even if water resistant.  4. Apply on your face every day even when cloudy and even in the winter. UVA \"aging rays\" penetrate window glass and are just as strong in the winter as in the summer.    FYI  You should use about 3 tablespoons of sunscreen to protect your whole body. Thus a typical eight ounce bottle of sunscreen should last 4 applications. Remember, that the SPF rating is compromised if you don t apply enough. Most people only apply 1/2 - 1/3 of the amount they need. Also don t forget areas such as your ears, feet, upper back and harder to reach places. Keep in mind that these amounts should be increased for larger body sizes.    Sunscreens with titanium dioxide and/or zinc oxide in the active ingredients are physical blockers as opposed to chemical blockers. Chemical-free sunscreens should not irritate the skin.    Spray-on sunscreens may be used for touch-up application only, not as a base layer. Also, use with caution around small children due to inhalation risk.    SPF means sun protection factor, which is just the degree to which the sunscreen can protect against UVB rays. There is " no rating system for UVA rays. SPF is calculated as the time skin will burn when sunscreen is applied vs. skin without sunscreen.    Water resistant sunscreens should be re-applied every 1-2 hours.    Product Recommendations:    Consider use of sunscreen sticks with Zinc Oxide and Titanium Dioxide active ingredients such as Neutrogena Pure&Free Baby Sunscreen Stick.    Good examples include: Blue Lizard, EltaMD, Solbar    Good daily moisturizers with SPF: Vanicream, CeraVe.    For sensitive skin, consider : SkinMedica Essential Defense Mineral Shield Broad Spectrum SPF 35    Men: consider use of Neutrogena Triple Protect Facial Lotion    Avoid retinyl palmitate products.  Avoid combination products that include both sunscreen and insect repellant, as sunscreen should be applied every 2 hours, but insect repellant should not be applied as frequently.    For more information:  https://www.skincancer.org/prevention/sun-protection/sunscreen/sunscreens-safe-and-effective    CERAVE FACIAL LOTION     The patient was counseled about sunscreens and sun avoidance. The patient was counseled to check the skin regularly and report any lesion that is new, changing, itching, scabbing, bleeding or otherwise bothersome. The patient was discharged ambulatory and in stable condition.      PROCEDURES:                                                    None.    TT : 25 minutes.  CT : 15 minutes.          Again, thank you for allowing me to participate in the care of your patient.        Sincerely,        Yolande Betts MD

## 2019-10-09 NOTE — PATIENT INSTRUCTIONS
"Annual skin exams  Removal of lesions scheduled for next week    SUN PROTECTION INSTRUCTIONS  Sun damage can lead to skin cancer and premature aging of the skin.      The best way to protect from sun damage to your skin is to avoid the sun during peak hours (10 am - 2 pm) even on overcast days.    Never use tanning beds. Tanning beds are associated with much higher risks of skin cancer.    All tanning damages the skin. Aim for ivory skin year round and you will have less trouble with your skin in years to come. There is no merit in getting \"a base tan\" before a warm weather vacation, as any tanning indicates your body's response to sun damage.    Stop smoking. Smokers have higher rates of skin cancer and also have premature skin wrinkling.    Use UPF sun-protective clothing, which while more expensive initially provides longer lasting coverage without having to worry about remembering to re-apply.  1. Wear a wide-brimmed hat and sunglasses.   2. Wear sun-protective clothing.  [employer] and other [employer] make sun protective clothing that are stylish, comfortable and cool.   [employer] and other [employer] make UV arm sleeves suitable for golfing, gardening and other activities.    Sunscreen instructions:  1. Use sunscreens with Zinc Oxide, Titanium Dioxide or Avobenzone to protect from UVA rays.  2. Use SPF 30-50+ to protect from UVB rays.  3. Re-apply every 2 hours even if water resistant.  4. Apply on your face every day even when cloudy and even in the winter. UVA \"aging rays\" penetrate window glass and are just as strong in the winter as in the summer.    FYI  You should use about 3 tablespoons of sunscreen to protect your whole body. Thus a typical eight ounce bottle of sunscreen should last 4 applications. Remember, that the SPF rating is compromised if you don t apply enough. Most people only apply 1/2 - 1/3 of the amount they need. Also don t forget areas such as your ears, feet, upper back and " harder to reach places. Keep in mind that these amounts should be increased for larger body sizes.    Sunscreens with titanium dioxide and/or zinc oxide in the active ingredients are physical blockers as opposed to chemical blockers. Chemical-free sunscreens should not irritate the skin.    Spray-on sunscreens may be used for touch-up application only, not as a base layer. Also, use with caution around small children due to inhalation risk.    SPF means sun protection factor, which is just the degree to which the sunscreen can protect against UVB rays. There is no rating system for UVA rays. SPF is calculated as the time skin will burn when sunscreen is applied vs. skin without sunscreen.    Water resistant sunscreens should be re-applied every 1-2 hours.    Product Recommendations:    Consider use of sunscreen sticks with Zinc Oxide and Titanium Dioxide active ingredients such as Neutrogena Pure&Free Baby Sunscreen Stick.    Good examples include: Blue Lizard, EltaMD, Solbar    Good daily moisturizers with SPF: Vanicream, CeraVe.    For sensitive skin, consider : SkinMedica Essential Defense Mineral Shield Broad Spectrum SPF 35    Men: consider use of Neutrogena Triple Protect Facial Lotion    Avoid retinyl palmitate products.  Avoid combination products that include both sunscreen and insect repellant, as sunscreen should be applied every 2 hours, but insect repellant should not be applied as frequently.    For more information:  https://www.skincancer.org/prevention/sun-protection/sunscreen/sunscreens-safe-and-effective    CERAVE FACIAL LOTION

## 2019-10-16 NOTE — PROGRESS NOTES
[facility] - PRIMARY CARE SKIN    CC : skin cancer screening (full-body)  SUBJECTIVE:                                                    Mandy Novak is a 34 year old female who presents to clinic today for shave excision of 4 lesions.      Personal history of skin cancer : NO - has had history of excised nevi, all without dysplasia.  Family history of skin cancer : YES - ?non-melanoma in maternal grandmother.     Sun Exposure History  Previous history of significant sun exposure:  Blistering sunburns : NO  Tanning beds : YES - when younger.  Sunscreen Use : YES, frequency : daily on face and when outdoors on body.  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    Refer to electronic medical record (EMR) for past medical history and medications.    INTEGUMENTARY/SKIN: NEGATIVE for worrisome rashes, moles or lesions  ROS : 14 point review of systems was negative except the symptoms listed above in the HPI.          OBJECTIVE:                                                    GENERAL: healthy, alert and no distress  SKIN: Paul Skin Type - I.  This patient was examined from the top of the head to the bottom of the feet  including scalp, face, neck, back, chest, breasts, buttocks, both arms, both legs, both hands, both feet, all 10 fingers and all 10 toes. The dermatoscope was used to help evaluate pigmented lesions.      Significant Findings:  Right abdomen, 2 cm superior to umbilicus at 11 o'clock position : 10 mm in size brown macule with irregular structure and pigmentation. ? Atypical nevus ? Other.     Back, 8 cm left of T8 : 2 mm in size dark brown lesion with irregular pigmentation. ? Atypical nevus ? Other.     Left anterolateral mid-neck : 6 x 4 mm compound nevus. Patient to return for shave removal.    Right upper chest, 2 cm proximal to mid-clavicle : 10 x 8  "mm brown raised lesion.      Right mid cheek -  6 mm raised, flesh colored lesion ? Benign compound nevus ? other    Diagnostic Test Results:  none           ASSESSMENT:                                                      Encounter Diagnosis   Name Primary?     Neoplasm of uncertain behavior of skin Yes         PLAN:                                                    Patient Instructions   FUTURE APPOINTMENTS  Follow up per pathology report.   Vaseline and a bandage for 5-7 days.   Recommendation for a skin exam every 2-3 years.       WOUND CARE INSTRUCTIONS  1. Wash hands before every dressing change.  2. After 24 hours, change dressing daily.  3. Wash the wound area with a mild soap, then rinse.  4. Gently pat dry with a sterile gauze or Q-tip.  5. Using a Q-tip, apply Vaseline or Aquaphor only over entire wound. Do NOT use Neosporin - as many people react to neomycin.  6. Finally, cover with a bandage or sterile non-stick gauze with micropore paper tape.  7. Repeat once daily until wound has healed.      Soap, water and shampoo will not hurt this area.    Do not go swimming or take baths, but showering is encouraged.    Limit use of the area where the procedure was done for a few days to allow for optimal healing.    If you experience bleeding:  Wash hands and hold firm pressure on the area for 10 minutes without checking to see if the bleeding has stopped. \"Checking\" pulls off the protective wound clot and restarts the bleeding all over again. Re-apply pressure for 10 minutes if necessary to stop bleeding.  Use additional sterile gauze and tape to maintain pressure once bleeding has stopped.  If bleeding continues, then call back to clinic at [phone].    Signs of Infection:  Infection can occur in any area where skin has been disrupted.  If you notice persistent redness, swelling, colored drainage, increasing pain, fever or other signs of infection, please call us at: [phone] and ask to have me or " my colleague paged. We will call you back to discuss.    Pathology Results:  You will be notified, generally via letter or MyChart, in approximately 10 days. If there is anything we need to discuss or further treatment needed, I will call you to discuss it.    PATIENT INFORMATION : WOUNDS  During the healing process you will notice a number of changes. All wounds develop a small halo of redness surrounding the wound.  This means healing is occurring. Severe itching with extensive redness usually indicates sensitivity to the ointment or bandage tape used to dress the wound.  You should call our office if this develops.      Swelling  and/or discoloration around your surgical site is common, particularly when performed around the eye.    All wounds normally drain.  The larger the wound the more drainage there will be.  After 7-10 days, you will notice the wound beginning to shrink and new skin will begin to grow.  The wound is healed when you can see skin has formed over the entire area.  A healed wound has a healthy, shiny look to the surface and is red to dark pink in color to normalize.  Wounds may take approximately 4-6 weeks to heal.  Larger wounds may take 6-8 weeks. After the wound is healed you may discontinue dressing changes.    You may experience a sensation of tightness as your wound heals. This is normal and will gradually subside.    Your healed wound may be sensitive to temperature changes. This sensitivity improves with time, but if you re having a lot of discomfort, try to avoid temperature extremes.    Patients frequently experience itching after their wound appears to have healed because of the continue healing under the skin.  Plain Vaseline will help relieve the itching.      SUNSCREEN RECOMMENDATIONS FOR ATHLETES  Regular sunscreens have a lower percentage of Zinc or Titanium oxide, and they may be runny. Consider these products when outdoors extensively or with intense sun exposure:    Vertra  Elemental Resistance SPF 35 Face Stick (Titanium Dioxide 24.15%)    Raw Elements Eco Stick (Non-sarmad Zinc Oxide 22.75%)    SUN BUM Face Stick SPF 30 (Zinc Oxide 7%, Titanium Dioxide 4%)    Zinka Nosecoat Sunblock (Zinc Oxide 25%)    Watermans SPF 50+ Aqua Armor (Zinc Oxide 20%)    For lips:    Vanicream SPF 30 Lip Protectant (15% Zinc Oxide, 1.5% Titanium Dioxide)    Raw Elements SPF 30 Lip Protection (Non-sarmad Zinc Oxide 22.75%)        SKIN CANCER SELF-EXAM INSTRUCTIONS  Check every month in the mirror or with a household member. Be aware of any changes, especially bleeding or tenderness. Also, make sure to check your nails for color changes after removal of nail polish.    For melanoma, check for:  A - Asymmetry. One half unlike the other half.  B - Border. Irregular, scalloped, ragged, notched, blurred or poorly defined borders.  C - Color. Color variations from one area to another, with shades of tan, brown and/or black present. Sometimes white, red or blue.  D - Diameter. Greater than 6 mm (about the size of a pencil eraser). Any new growth of a mole should be concerning and be evaluated.  E - Evolving. A mole or skin lesion that looks different from the rest or is changing in size, shape or color.    For basal cell carcinoma and squamous cell carcinoma, check for:    Sores, shiny bumps, nodules, scaly lesions, or wart-like growths that are itchy, tender, crusting, scabbing, eroding, oozing or bleeding.    Open sores/wounds or reddish/irritated areas that do not heal within 2-3 weeks.    Scar-like areas that are white, yellow or waxy in color.    Also, check your lymph nodes for tenderness or swelling every month. Check front of neck, back of neck, over the collarbone, and in the armpits. (Note that if you have a cold or other illness, they may be swollen)            The patient was counseled about sunscreens and sun avoidance. The patient was counseled to check the skin regularly and report any lesion that is  new, changing, itching, scabbing, bleeding or otherwise bothersome. The patient was discharged ambulatory and in stable condition.      PROCEDURES:                                                    None.    TT : 25 minutes.  CT : 15 minutes.

## 2019-10-17 ENCOUNTER — OFFICE VISIT (OUTPATIENT)
Dept: FAMILY MEDICINE | Facility: CLINIC | Age: 34
End: 2019-10-17
Payer: COMMERCIAL

## 2019-10-17 VITALS — DIASTOLIC BLOOD PRESSURE: 62 MMHG | SYSTOLIC BLOOD PRESSURE: 104 MMHG

## 2019-10-17 DIAGNOSIS — D48.5 NEOPLASM OF UNCERTAIN BEHAVIOR OF SKIN: Primary | ICD-10-CM

## 2019-10-17 PROCEDURE — 88305 TISSUE EXAM BY PATHOLOGIST: CPT | Mod: TC | Performed by: FAMILY MEDICINE

## 2019-10-17 PROCEDURE — 11301 SHAVE SKIN LESION 0.6-1.0 CM: CPT | Mod: 51 | Performed by: FAMILY MEDICINE

## 2019-10-17 PROCEDURE — 11300 SHAVE SKIN LESION 0.5 CM/<: CPT | Mod: 51 | Performed by: FAMILY MEDICINE

## 2019-10-17 PROCEDURE — 11311 SHAVE SKIN LESION 0.6-1.0 CM: CPT | Performed by: FAMILY MEDICINE

## 2019-10-17 PROCEDURE — 11301 SHAVE SKIN LESION 0.6-1.0 CM: CPT | Mod: 59 | Performed by: FAMILY MEDICINE

## 2019-10-17 PROCEDURE — 11305 SHAVE SKIN LESION 0.5 CM/<: CPT | Mod: 51 | Performed by: FAMILY MEDICINE

## 2019-10-17 NOTE — PATIENT INSTRUCTIONS
"FUTURE APPOINTMENTS  Follow up per pathology report.   Vaseline and a bandage for 5-7 days.   Recommendation for a skin exam every 2-3 years.       WOUND CARE INSTRUCTIONS  1. Wash hands before every dressing change.  2. After 24 hours, change dressing daily.  3. Wash the wound area with a mild soap, then rinse.  4. Gently pat dry with a sterile gauze or Q-tip.  5. Using a Q-tip, apply Vaseline or Aquaphor only over entire wound. Do NOT use Neosporin - as many people react to neomycin.  6. Finally, cover with a bandage or sterile non-stick gauze with micropore paper tape.  7. Repeat once daily until wound has healed.      Soap, water and shampoo will not hurt this area.    Do not go swimming or take baths, but showering is encouraged.    Limit use of the area where the procedure was done for a few days to allow for optimal healing.    If you experience bleeding:  Wash hands and hold firm pressure on the area for 10 minutes without checking to see if the bleeding has stopped. \"Checking\" pulls off the protective wound clot and restarts the bleeding all over again. Re-apply pressure for 10 minutes if necessary to stop bleeding.  Use additional sterile gauze and tape to maintain pressure once bleeding has stopped.  If bleeding continues, then call back to clinic at [phone].    Signs of Infection:  Infection can occur in any area where skin has been disrupted.  If you notice persistent redness, swelling, colored drainage, increasing pain, fever or other signs of infection, please call us at: [phone] and ask to have me or my colleague paged. We will call you back to discuss.    Pathology Results:  You will be notified, generally via letter or MyChart, in approximately 10 days. If there is anything we need to discuss or further treatment needed, I will call you to discuss it.    PATIENT INFORMATION : WOUNDS  During the healing process you will notice a number of changes. All wounds develop a small halo of " redness surrounding the wound.  This means healing is occurring. Severe itching with extensive redness usually indicates sensitivity to the ointment or bandage tape used to dress the wound.  You should call our office if this develops.      Swelling  and/or discoloration around your surgical site is common, particularly when performed around the eye.    All wounds normally drain.  The larger the wound the more drainage there will be.  After 7-10 days, you will notice the wound beginning to shrink and new skin will begin to grow.  The wound is healed when you can see skin has formed over the entire area.  A healed wound has a healthy, shiny look to the surface and is red to dark pink in color to normalize.  Wounds may take approximately 4-6 weeks to heal.  Larger wounds may take 6-8 weeks. After the wound is healed you may discontinue dressing changes.    You may experience a sensation of tightness as your wound heals. This is normal and will gradually subside.    Your healed wound may be sensitive to temperature changes. This sensitivity improves with time, but if you re having a lot of discomfort, try to avoid temperature extremes.    Patients frequently experience itching after their wound appears to have healed because of the continue healing under the skin.  Plain Vaseline will help relieve the itching.      SUNSCREEN RECOMMENDATIONS FOR ATHLETES  Regular sunscreens have a lower percentage of Zinc or Titanium oxide, and they may be runny. Consider these products when outdoors extensively or with intense sun exposure:    Vertra Elemental Resistance SPF 35 Face Stick (Titanium Dioxide 24.15%)    Raw Elements Eco Stick (Non-sarmad Zinc Oxide 22.75%)    SUN BUM Face Stick SPF 30 (Zinc Oxide 7%, Titanium Dioxide 4%)    Zinka Nosecoat Sunblock (Zinc Oxide 25%)    Watermans SPF 50+ Aqua Armor (Zinc Oxide 20%)    For lips:    Vanicream SPF 30 Lip Protectant (15% Zinc Oxide, 1.5% Titanium Dioxide)    Raw Elements SPF 30  Lip Protection (Non-sarmad Zinc Oxide 22.75%)        SKIN CANCER SELF-EXAM INSTRUCTIONS  Check every month in the mirror or with a household member. Be aware of any changes, especially bleeding or tenderness. Also, make sure to check your nails for color changes after removal of nail polish.    For melanoma, check for:  A - Asymmetry. One half unlike the other half.  B - Border. Irregular, scalloped, ragged, notched, blurred or poorly defined borders.  C - Color. Color variations from one area to another, with shades of tan, brown and/or black present. Sometimes white, red or blue.  D - Diameter. Greater than 6 mm (about the size of a pencil eraser). Any new growth of a mole should be concerning and be evaluated.  E - Evolving. A mole or skin lesion that looks different from the rest or is changing in size, shape or color.    For basal cell carcinoma and squamous cell carcinoma, check for:    Sores, shiny bumps, nodules, scaly lesions, or wart-like growths that are itchy, tender, crusting, scabbing, eroding, oozing or bleeding.    Open sores/wounds or reddish/irritated areas that do not heal within 2-3 weeks.    Scar-like areas that are white, yellow or waxy in color.    Also, check your lymph nodes for tenderness or swelling every month. Check front of neck, back of neck, over the collarbone, and in the armpits. (Note that if you have a cold or other illness, they may be swollen)

## 2019-10-17 NOTE — LETTER
10/17/2019         RE: Mandy Novak  [address] [place] [address]        Dear Colleague,    Thank you for referring your patient, Mandy Novak, to the [facility]. Please see a copy of my visit note below.    [facility] - PRIMARY CARE SKIN    CC : skin cancer screening (full-body)  SUBJECTIVE:                                                    Mandy Novak is a 34 year old female who presents to clinic today for shave excision of 4 lesions.      Personal history of skin cancer : NO - has had history of excised nevi, all without dysplasia.  Family history of skin cancer : YES - ?non-melanoma in maternal grandmother.     Sun Exposure History  Previous history of significant sun exposure:  Blistering sunburns : NO  Tanning beds : YES - when younger.  Sunscreen Use : YES, frequency : daily on face and when outdoors on body.  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    Refer to electronic medical record (EMR) for past medical history and medications.    INTEGUMENTARY/SKIN: NEGATIVE for worrisome rashes, moles or lesions  ROS : 14 point review of systems was negative except the symptoms listed above in the HPI.          OBJECTIVE:                                                    GENERAL: healthy, alert and no distress  SKIN: Paul Skin Type - I.  This patient was examined from the top of the head to the bottom of the feet  including scalp, face, neck, back, chest, breasts, buttocks, both arms, both legs, both hands, both feet, all 10 fingers and all 10 toes. The dermatoscope was used to help evaluate pigmented lesions.      Significant Findings:  Right abdomen, 2 cm superior to umbilicus at 11 o'clock position : 10 mm in size brown macule with irregular structure and pigmentation. ? Atypical nevus ? Other.     Back, 8 cm  "left of T8 : 2 mm in size dark brown lesion with irregular pigmentation. ? Atypical nevus ? Other.     Left anterolateral mid-neck : 6 x 4 mm compound nevus. Patient to return for shave removal.    Right upper chest, 2 cm proximal to mid-clavicle : 10 x 8 mm brown raised lesion.      Right mid cheek -  6 mm raised, flesh colored lesion ? Benign compound nevus ? other    Diagnostic Test Results:  none           ASSESSMENT:                                                      Encounter Diagnosis   Name Primary?     Neoplasm of uncertain behavior of skin Yes         PLAN:                                                    Patient Instructions   FUTURE APPOINTMENTS  Follow up per pathology report.   Vaseline and a bandage for 5-7 days.   Recommendation for a skin exam every 2-3 years.       WOUND CARE INSTRUCTIONS  1. Wash hands before every dressing change.  2. After 24 hours, change dressing daily.  3. Wash the wound area with a mild soap, then rinse.  4. Gently pat dry with a sterile gauze or Q-tip.  5. Using a Q-tip, apply Vaseline or Aquaphor only over entire wound. Do NOT use Neosporin - as many people react to neomycin.  6. Finally, cover with a bandage or sterile non-stick gauze with micropore paper tape.  7. Repeat once daily until wound has healed.      Soap, water and shampoo will not hurt this area.    Do not go swimming or take baths, but showering is encouraged.    Limit use of the area where the procedure was done for a few days to allow for optimal healing.    If you experience bleeding:  Wash hands and hold firm pressure on the area for 10 minutes without checking to see if the bleeding has stopped. \"Checking\" pulls off the protective wound clot and restarts the bleeding all over again. Re-apply pressure for 10 minutes if necessary to stop bleeding.  Use additional sterile gauze and tape to maintain pressure once bleeding has stopped.  If bleeding continues, then call back to clinic at [phone].    Signs of Infection:  Infection can occur in any area where skin has been disrupted.  If you notice persistent redness, swelling, colored drainage, increasing pain, fever or other signs of infection, please call us at: [phone] and ask to have me or my colleague paged. We will call you back to discuss.    Pathology Results:  You will be notified, generally via letter or MyChart, in approximately 10 days. If there is anything we need to discuss or further treatment needed, I will call you to discuss it.    PATIENT INFORMATION : WOUNDS  During the healing process you will notice a number of changes. All wounds develop a small halo of redness surrounding the wound.  This means healing is occurring. Severe itching with extensive redness usually indicates sensitivity to the ointment or bandage tape used to dress the wound.  You should call our office if this develops.      Swelling  and/or discoloration around your surgical site is common, particularly when performed around the eye.    All wounds normally drain.  The larger the wound the more drainage there will be.  After 7-10 days, you will notice the wound beginning to shrink and new skin will begin to grow.  The wound is healed when you can see skin has formed over the entire area.  A healed wound has a healthy, shiny look to the surface and is red to dark pink in color to normalize.  Wounds may take approximately 4-6 weeks to heal.  Larger wounds may take 6-8 weeks. After the wound is healed you may discontinue dressing changes.    You may experience a sensation of tightness as your wound heals. This is normal and will gradually subside.    Your healed wound may be sensitive to temperature changes. This sensitivity improves with time, but if you re having a lot of discomfort, try to avoid temperature extremes.    Patients frequently experience itching after their wound appears to have healed because of the continue healing under the skin.  Plain  Vaseline will help relieve the itching.      SUNSCREEN RECOMMENDATIONS FOR ATHLETES  Regular sunscreens have a lower percentage of Zinc or Titanium oxide, and they may be runny. Consider these products when outdoors extensively or with intense sun exposure:    Vertra Elemental Resistance SPF 35 Face Stick (Titanium Dioxide 24.15%)    Raw Elements Eco Stick (Non-sarmad Zinc Oxide 22.75%)    SUN BUM Face Stick SPF 30 (Zinc Oxide 7%, Titanium Dioxide 4%)    Zinka Nosecoat Sunblock (Zinc Oxide 25%)    Watermans SPF 50+ Aqua Armor (Zinc Oxide 20%)    For lips:    Vanicream SPF 30 Lip Protectant (15% Zinc Oxide, 1.5% Titanium Dioxide)    Raw Elements SPF 30 Lip Protection (Non-sarmad Zinc Oxide 22.75%)        SKIN CANCER SELF-EXAM INSTRUCTIONS  Check every month in the mirror or with a household member. Be aware of any changes, especially bleeding or tenderness. Also, make sure to check your nails for color changes after removal of nail polish.    For melanoma, check for:  A - Asymmetry. One half unlike the other half.  B - Border. Irregular, scalloped, ragged, notched, blurred or poorly defined borders.  C - Color. Color variations from one area to another, with shades of tan, brown and/or black present. Sometimes white, red or blue.  D - Diameter. Greater than 6 mm (about the size of a pencil eraser). Any new growth of a mole should be concerning and be evaluated.  E - Evolving. A mole or skin lesion that looks different from the rest or is changing in size, shape or color.    For basal cell carcinoma and squamous cell carcinoma, check for:    Sores, shiny bumps, nodules, scaly lesions, or wart-like growths that are itchy, tender, crusting, scabbing, eroding, oozing or bleeding.    Open sores/wounds or reddish/irritated areas that do not heal within 2-3 weeks.    Scar-like areas that are white, yellow or waxy in color.    Also, check your lymph nodes for tenderness or swelling every month. Check front of neck, back of neck,  over the collarbone, and in the armpits. (Note that if you have a cold or other illness, they may be swollen)            The patient was counseled about sunscreens and sun avoidance. The patient was counseled to check the skin regularly and report any lesion that is new, changing, itching, scabbing, bleeding or otherwise bothersome. The patient was discharged ambulatory and in stable condition.      PROCEDURES:                                                    None.    TT : 25 minutes.  CT : 15 minutes.          Again, thank you for allowing me to participate in the care of your patient.        Sincerely,        Yolande Betts MD

## 2019-10-17 NOTE — PROCEDURES
Name : Shave Excision  Indication : Excision of tissue for pathology evaluation.  Location(s) : Right abdomen, 2 cm superior to umbilicus at 11 o'clock position : 10 mm in size brown macule with irregular structure and pigmentation. ? Atypical nevus ? Other.   Completed by : Eloina Betts MD  Photo Taken : yes.  Anesthesia : Patient was anesthetized by infiltrating the area surrounding the lesion with 1% lidocaine.   epinephrine 1:021843 : Yes.  Note : Discussed the risk of pain, infection, scarring, hypo- or hyperpigmentation and recurrence or need for re-treatment. The benefits of treatment and alternative treatments were also discussed.    During this procedure, the universal protocol was utilized. The patient's identity was confirmed by no less than two patient identifiers, correct procedure was verified, correct site was verified and marked as applicable and a final pause was completed.    Sterile technique was used throughout the procedure. The skin was cleaned and prepped with surgical cleanser. Once adequate anesthesia was obtained, the lesion was removed with a deep scallop shave procedure. The specimen was sent to pathology.    Direct pressure and aluminum chloride and monopolar cautery was applied for hemostasis. No bleeding was present upon the completion of the procedure. The wound was coated with antibacterial ointment. A dry sterile dressing was applied. Patient tolerated the procedure well and left in satisfactory condition.    Primary provider and referring provider will be informed regarding the tissue report when it returns.      Name : Shave Excision  Indication : Excision of tissue for pathology evaluation.  Location(s) : Back, 8 cm left of T8 : 2 mm in size dark brown lesion with irregular pigmentation. ? Atypical nevus ? Other.   Completed by : Eloina Betts MD  Photo Taken : yes.  Anesthesia : Patient was anesthetized by infiltrating the area surrounding the lesion with 1% lidocaine.    epinephrine 1:128002 : Yes.  Note : Discussed the risk of pain, infection, scarring, hypo- or hyperpigmentation and recurrence or need for re-treatment. The benefits of treatment and alternative treatments were also discussed.    During this procedure, the universal protocol was utilized. The patient's identity was confirmed by no less than two patient identifiers, correct procedure was verified, correct site was verified and marked as applicable and a final pause was completed.    Sterile technique was used throughout the procedure. The skin was cleaned and prepped with surgical cleanser. Once adequate anesthesia was obtained, the lesion was removed with a deep scallop shave procedure. The specimen was sent to pathology.    Direct pressure and aluminum chloride and monopolar cautery was applied for hemostasis. No bleeding was present upon the completion of the procedure. The wound was coated with antibacterial ointment. A dry sterile dressing was applied. Patient tolerated the procedure well and left in satisfactory condition.    Primary provider and referring provider will be informed regarding the tissue report when it returns.      Name : Shave Excision  Indication : Excision of tissue for pathology evaluation.  Location(s) : Left anterolateral mid-neck : 6 x 4 mm compound nevus.   Completed by : Eloina Betts MD  Photo Taken : yes.  Anesthesia : Patient was anesthetized by infiltrating the area surrounding the lesion with 1% lidocaine.   epinephrine 1:307468 : Yes.  Note : Discussed the risk of pain, infection, scarring, hypo- or hyperpigmentation and recurrence or need for re-treatment. The benefits of treatment and alternative treatments were also discussed.    During this procedure, the universal protocol was utilized. The patient's identity was confirmed by no less than two patient identifiers, correct procedure was verified, correct site was verified and marked as applicable and a final pause was  completed.    Sterile technique was used throughout the procedure. The skin was cleaned and prepped with surgical cleanser. Once adequate anesthesia was obtained, the lesion was removed with a deep scallop shave procedure. The specimen was sent to pathology.    Direct pressure and aluminum chloride and monopolar cautery was applied for hemostasis. No bleeding was present upon the completion of the procedure. The wound was coated with antibacterial ointment. A dry sterile dressing was applied. Patient tolerated the procedure well and left in satisfactory condition.    Primary provider and referring provider will be informed regarding the tissue report when it returns.    Name : Shave Excision  Indication : Excision of tissue for pathology evaluation.  Location(s) : Right upper chest, 2 cm proximal to mid-clavicle : 10 x 8 mm brown raised lesion.    Completed by : Eloina Betts MD  Photo Taken : yes.  Anesthesia : Patient was anesthetized by infiltrating the area surrounding the lesion with 1% lidocaine.   epinephrine 1:935487 : Yes.  Note : Discussed the risk of pain, infection, scarring, hypo- or hyperpigmentation and recurrence or need for re-treatment. The benefits of treatment and alternative treatments were also discussed.    During this procedure, the universal protocol was utilized. The patient's identity was confirmed by no less than two patient identifiers, correct procedure was verified, correct site was verified and marked as applicable and a final pause was completed.    Sterile technique was used throughout the procedure. The skin was cleaned and prepped with surgical cleanser. Once adequate anesthesia was obtained, the lesion was removed with a deep scallop shave procedure. The specimen was sent to pathology.    Direct pressure and aluminum chloride and monopolar cautery was applied for hemostasis. No bleeding was present upon the completion of the procedure. The wound was coated with antibacterial  ointment. A dry sterile dressing was applied. Patient tolerated the procedure well and left in satisfactory condition.      Name : Shave Excision  Indication : Excision of tissue for pathology evaluation.  Location(s) : Right mid cheek -  6 mm raised, flesh colored lesion ? Benign compound nevus ? other  Completed by : Eloina Betts MD  Photo Taken : yes.  Anesthesia : Patient was anesthetized by infiltrating the area surrounding the lesion with 1% lidocaine.   epinephrine 1:701781 : Yes.  Note : Discussed the risk of pain, infection, scarring, hypo- or hyperpigmentation and recurrence or need for re-treatment. The benefits of treatment and alternative treatments were also discussed.    During this procedure, the universal protocol was utilized. The patient's identity was confirmed by no less than two patient identifiers, correct procedure was verified, correct site was verified and marked as applicable and a final pause was completed.    Sterile technique was used throughout the procedure. The skin was cleaned and prepped with surgical cleanser. Once adequate anesthesia was obtained, the lesion was removed with a deep scallop shave procedure. The specimen was sent to pathology.    Direct pressure and aluminum chloride and monopolar cautery was applied for hemostasis. No bleeding was present upon the completion of the procedure. The wound was coated with antibacterial ointment. A dry sterile dressing was applied. Patient tolerated the procedure well and left in satisfactory condition.    Primary provider and referring provider will be informed regarding the tissue report when it returns.        Primary provider and referring provider will be informed regarding the tissue report when it returns.

## 2019-10-17 NOTE — NURSING NOTE
Significant Findings:  Right abdomen, 2 cm superior to umbilicus at 11 o'clock position : 10 mm in size brown macule with irregular structure and pigmentation. ? Atypical nevus ? Other. Patient to return for shave removal.     Back, 8 cm left of T8 : 2 mm in size dark brown lesion with irregular pigmentation. ? Atypical nevus ? Other. Patient to return for shave removal.     Left anterolateral mid-neck : 6 x 4 mm compound nevus. Patient to return for shave removal.     Right upper chest, 2 cm proximal to mid-clavicle : 10 x 8 mm brown raised lesion. Patient to return for shave removal.    Right mid cheek -  6 mm raised, flesh colored lesion ? Benign compound nevus ? other

## 2019-10-21 ENCOUNTER — TELEPHONE (OUTPATIENT)
Dept: FAMILY MEDICINE | Facility: CLINIC | Age: 34
End: 2019-10-21

## 2019-10-21 LAB — COPATH REPORT: NORMAL

## 2019-10-21 NOTE — TELEPHONE ENCOUNTER
Left message on answering machine for patient to call back.      Sarahi HWANGRN BSN  [facility]  [phone]

## 2019-10-21 NOTE — TELEPHONE ENCOUNTER
----- Message from Yolande Betts MD sent at 10/21/2019  1:21 PM CDT -----  Please call :       Explain mild atypical nevus on the right abdomen and back.     Benign nevi on the right upper chest, right mid cheek and left mid neck.    Skin exam in one year     FYI information regarding atypical nevus : An atypical nevus is benign but may have potential to evolve into a melanoma skin cancer over time. There are degrees of atypia called mild, moderate and severe . A mild atypical nevus does not need a re-excision but if pigmentation should recur then it should be evaluated. The more severe atypical changes that are present then  more concern there is regarding its potential to evolve into a melanoma. Usually I may recommend re excision for a moderate- severe atypical nevus.         Thank you,   Yolande Betts M.D.

## 2019-10-22 NOTE — TELEPHONE ENCOUNTER
Patient notified of test results and providers message, patient has no further questions.    Sarahi HWANGRN BSN  [facility]  [phone]

## 2020-03-10 DIAGNOSIS — F41.9 ANXIETY: ICD-10-CM

## 2020-03-10 DIAGNOSIS — L71.0 PERIORAL DERMATITIS: ICD-10-CM

## 2020-03-10 NOTE — TELEPHONE ENCOUNTER
"Requested Prescriptions   Pending Prescriptions Disp Refills     clindamycin (CLEOCIN T) 1 % external lotion [Pharmacy Med Name: CLINDAMYCIN PHOSP 1% LOTION]        Last Written Prescription Date:  5.21.18  Last Fill Quantity: 60 ml,  # refills: 1   Last office visit: 10/17/2019 with prescribing provider:  Mey Mcleod MD         Discontinued          Future Office Visit:       60 mL 1     Sig: APPLY TOPICALLY 2 TIMES DAILY AFTER CLEANSING       Topical Acne Medications Protocol Failed - 3/10/2020 10:09 AM        Failed - Medication is active on med list        Passed - Patient is 12 years of age or older        Passed - Recent (12 mo) or future (30 days) visit within the authorizing provider's specialty     Patient has had an office visit with the authorizing provider or a provider within the authorizing providers department within the previous 12 mos or has a future within next 30 days. See \"Patient Info\" tab in inbasket, or \"Choose Columns\" in Meds & Orders section of the refill encounter.                 citalopram (CELEXA) 20 MG tablet [Pharmacy Med Name: CITALOPRAM HBR 20 MG TABLET]          Last Written Prescription Date:  9.26.19  Last Fill Quantity: 90 tablet,  # refills: 0   Last office visit: 10/17/2019 with prescribing provider:  Mey Mcleod MD           Future Office Visit:       90 tablet 0     Sig: TAKE 1 TABLET BY MOUTH EVERY DAY       SSRIs Protocol Passed - 3/10/2020 10:09 AM         PHQ-9 SCORE 5/21/2018 8/2/2018 12/13/2018   PHQ-9 Total Score 1 4 0     CRISTOFER-7 SCORE 5/21/2018 8/2/2018 12/13/2018   Total Score 5 21 3              Passed - Recent (12 mo) or future (30 days) visit within the authorizing provider's specialty     Patient has had an office visit with the authorizing provider or a provider within the authorizing providers department within the previous 12 mos or has a future within next 30 days. See \"Patient Info\" tab in inbasket, or \"Choose Columns\" in Meds & Orders " section of the refill encounter.              Passed - Medication is active on med list        Passed - Patient is age 18 or older        Passed - No active pregnancy on record        Passed - No positive pregnancy test in last 12 months

## 2020-03-12 ENCOUNTER — MYC MEDICAL ADVICE (OUTPATIENT)
Dept: FAMILY MEDICINE | Facility: CLINIC | Age: 35
End: 2020-03-12

## 2020-03-12 RX ORDER — CLINDAMYCIN PHOSPHATE 10 UG/ML
LOTION TOPICAL 2 TIMES DAILY
Qty: 60 ML | Refills: 1 | OUTPATIENT
Start: 2020-03-12

## 2020-03-12 NOTE — TELEPHONE ENCOUNTER
Pt needs updated PHQ, mychart message sent.     PHQ 5/21/2018 8/2/2018 12/13/2018   PHQ-9 Total Score 1 4 0   Q9: Thoughts of better off dead/self-harm past 2 weeks Not at all Not at all Not at all     Jordon Erickson RN   [facility]

## 2020-03-14 ENCOUNTER — NURSE TRIAGE (OUTPATIENT)
Dept: NURSING | Facility: CLINIC | Age: 35
End: 2020-03-14

## 2020-03-14 NOTE — TELEPHONE ENCOUNTER
"Pt calls in with complaint of mainly diarrhea    Friday - vomited 1 time ( didn't look at emesis )  Started having diarrhea that same night     had eaten the same food pt had -  With no issues    Today no more vomiting     But diarrhea continues   loose - watery - has gone she thinks at least 7 times today     No abdominal pain   Slight headache - 3/10  No fever    Pt says she is taking PO fluids  Is urinating \" ok\"  Does not feel dizzy     Also says she recently has just got over a cold     Per protocol pt advised to be seen by a Provider within 24 hours    After discussion with pt  > plan is to keep pushing fluids   Slowly start re-introducing solid foods such as yoghurt - crackers   (Pt actually says her appetite is returning)      Will try and get a good nights sleep     And if not feeling better by tomorrow  As in diarrhea continues     Will go to [facility] tomorrow for evaluation     Advised pt if she does go to  tomorrow - to try and put some of her stool in a small container to bring with her    Protocol and care advice reviewed  Caller states understanding of the recommended disposition    Advised to call back if further questions or concerns    Hernando Schulz , RN / [facility]                              Reason for Disposition    [1] SEVERE diarrhea (e.g., 7 or more times / day more than normal) AND [2] present > 24 hours (1 day)    Additional Information    Negative: Shock suspected (e.g., cold/pale/clammy skin, too weak to stand, low BP, rapid pulse)    Negative: Difficult to awaken or acting confused (e.g., disoriented, slurred speech)    Negative: Sounds like a life-threatening emergency to the triager    Negative: [1] SEVERE abdominal pain (e.g., excruciating) AND [2] present > 1 hour    Negative: [1] SEVERE abdominal pain AND [2] age > 60    Negative: [1] Blood in the stool AND [2] moderate or large amount of blood    Negative: Black or tarry bowel movements  " (Exception: chronic-unchanged  black-grey bowel movements AND is taking iron pills or Pepto-bismol)    Negative: [1] Drinking very little AND [2] dehydration suspected (e.g., no urine > 12 hours, very dry mouth, very lightheaded)    Negative: Patient sounds very sick or weak to the triager    Negative: [1] SEVERE diarrhea (e.g., 7 or more times / day more than normal) AND [2]  age > 60 years    Negative: [1] Constant abdominal pain AND [2] present > 2 hours    Negative: [1] Fever > 103 F (39.4 C) AND [2] not able to get the fever down using Fever Care Advice    Protocols used: DIARRHEA-A-AH

## 2020-03-15 ENCOUNTER — HOSPITAL ENCOUNTER (EMERGENCY)
Facility: CLINIC | Age: 35
Discharge: HOME | End: 2020-03-15
Attending: EMERGENCY MEDICINE | Admitting: EMERGENCY MEDICINE
Payer: COMMERCIAL

## 2020-03-15 VITALS
HEART RATE: 76 BPM | TEMPERATURE: 97.5 F | WEIGHT: 165 LBS | OXYGEN SATURATION: 99 % | RESPIRATION RATE: 16 BRPM | BODY MASS INDEX: 25.46 KG/M2 | SYSTOLIC BLOOD PRESSURE: 96 MMHG | DIASTOLIC BLOOD PRESSURE: 59 MMHG

## 2020-03-15 DIAGNOSIS — R19.7 DIARRHEA OF PRESUMED INFECTIOUS ORIGIN: ICD-10-CM

## 2020-03-15 DIAGNOSIS — E86.0 DEHYDRATION: ICD-10-CM

## 2020-03-15 LAB
ANION GAP SERPL CALCULATED.3IONS-SCNC: 4 MMOL/L (ref 3–14)
BUN SERPL-MCNC: 8 MG/DL (ref 7–30)
CALCIUM SERPL-MCNC: 8.4 MG/DL (ref 8.5–10.1)
CHLORIDE SERPL-SCNC: 102 MMOL/L (ref 94–109)
CO2 SERPL-SCNC: 25 MMOL/L (ref 20–32)
CREAT SERPL-MCNC: 0.66 MG/DL (ref 0.52–1.04)
GFR SERPL CREATININE-BSD FRML MDRD: >90 ML/MIN/{1.73_M2}
GLUCOSE SERPL-MCNC: 113 MG/DL (ref 70–99)
POTASSIUM SERPL-SCNC: 3.5 MMOL/L (ref 3.4–5.3)
SODIUM SERPL-SCNC: 131 MMOL/L (ref 133–144)

## 2020-03-15 PROCEDURE — 25800030 ZZH RX IP 258 OP 636: Performed by: EMERGENCY MEDICINE

## 2020-03-15 PROCEDURE — 96360 HYDRATION IV INFUSION INIT: CPT

## 2020-03-15 PROCEDURE — 80048 BASIC METABOLIC PNL TOTAL CA: CPT | Performed by: EMERGENCY MEDICINE

## 2020-03-15 PROCEDURE — 25000128 H RX IP 250 OP 636: Performed by: EMERGENCY MEDICINE

## 2020-03-15 PROCEDURE — 99283 EMERGENCY DEPT VISIT LOW MDM: CPT | Mod: 25

## 2020-03-15 RX ORDER — LOPERAMIDE HYDROCHLORIDE 2 MG/1
2 TABLET ORAL 4 TIMES DAILY PRN
Qty: 20 TABLET | Refills: 0 | Status: SHIPPED | OUTPATIENT
Start: 2020-03-15 | End: 2020-10-19

## 2020-03-15 RX ORDER — ONDANSETRON 4 MG/1
4 TABLET, ORALLY DISINTEGRATING ORAL ONCE
Status: COMPLETED | OUTPATIENT
Start: 2020-03-15 | End: 2020-03-15

## 2020-03-15 RX ADMIN — SODIUM CHLORIDE 1000 ML: 9 INJECTION, SOLUTION INTRAVENOUS at 05:01

## 2020-03-15 RX ADMIN — ONDANSETRON 4 MG: 4 TABLET, ORALLY DISINTEGRATING ORAL at 03:35

## 2020-03-15 NOTE — ED PROVIDER NOTES
History     Chief Complaint:  Nausea, vomiting, and diarrhea      HPI   Mandy Novak is a 35 year old female who presents with 2 days of nausea, vomiting, and diarrhea. Patient's diarrhea ramped up during the night which prompted her to be evaluated in the ED. She also endorses back pain. She denies objective fevers at home, blood in her stool, urinary symptoms, or chance for pregnancy. She denies recent travel or recent antibiotics. She has not taken anti-diarrhea medications. She endorses adequate hydration at home otherwise.     Allergies:  No known drug allergies     Medications:    Vitamin D   Celexa    Past Medical History:    Depression  Situational syncope  Anxiety     Past Surgical History:    [place] teeth  Colposcopy     Family History:    Diabetes   Thyroid disease  Depression  Hyperlipidemia  Anxiety    Social History:  Smoking status: Never  Alcohol use: Yes  Drug use: No  Patient presents with mother.  PCP: Mey Mlceod    Marital Status:      Review of Systems   All other systems reviewed and are negative.    Physical Exam     Patient Vitals for the past 24 hrs:   BP Temp Temp src Heart Rate Resp SpO2 Weight   03/15/20 0328 102/66 -- -- -- -- -- --   03/15/20 0326 -- 97.5  F (36.4  C) Temporal 101 18 99 % 74.8 kg (165 lb)     Physical Exam  Nursing note and vitals reviewed.  Constitutional: Cooperative.   HENT:   Mouth/Throat: Mucous membranes are dry.   Cardiovascular: Normal rate, regular rhythm and normal heart sounds.  No murmur.  Pulmonary/Chest: Effort normal and breath sounds normal. No respiratory distress.   Abdominal: Soft. Normal appearance and bowel sounds are normal. No distension. There is no tenderness. There is no rigidity and no guarding.    Neurological: Alert.   Skin: Skin is warm and dry.    Psychiatric: Normal mood and affect.      Emergency Department Course     Laboratory:   BMP: Na 131 (L), Glucose 113 (H), Calcium 8.4 (L), o/w WNL (Creatinine 0.66)      Interventions:  0335 - Zofran 4 mg ODT     Emergency Department Course:  Past medical records, nursing notes, and vitals reviewed.  0439: I performed an exam of the patient and obtained history, as documented above.    IV inserted and blood drawn. This was sent to laboratory for testing, findings above.      0534: Findings and plan explained to the Patient. Patient discharged home with instructions regarding supportive care, medications, and reasons to return. The importance of close follow-up was reviewed. The patient was prescribed Zofran.    I personally reviewed the laboratory results with the Patient and answered all related questions prior to discharge.     Impression & Plan     Medical Decision Making:  Mandy Novak is a 35 year old female who presents with non-bloody diarrhea illness. There has been no fever or travel or recent antibiotics. This is unlikely to represent C.diff or invasive bacterial species, though the duration of her symptoms is somewhat concerning. Her basic metabolic panel and renal function are reasonable. She was unable to provide a stool sample, so I will send her home with a collection kit to be followed up on an outpatient basis.     Diagnosis:    ICD-10-CM   1. Diarrhea of presumed infectious origin  R19.7       2. Dehydration  E86.0     Disposition:  Discharged to home.    Discharge Medications:  New Prescriptions    HYDROCORTISONE (ANUSOL-HC) 2.5 % CREAM    Place rectally 2 times daily as needed for hemorrhoids    LOPERAMIDE (IMODIUM A-D) 2 MG TABLET    Take 1 tablet (2 mg) by mouth 4 times daily as needed for diarrhea     Scribe Disclosure:  George HERNANDEZ Chi, am serving as a scribe at 4:27 AM on 3/15/2020 to document services personally performed by Dutch Jessica MD based on my observations and the provider's statements to me.      George Jones   3/15/2020   [facility] EMERGENCY DEPARTMENT     Dutch Jessica MD  03/15/20 0718

## 2020-03-15 NOTE — ED AVS SNAPSHOT
[facility] Emergency Department  [address]  [facility] [address]  Phone:  [phone]  Fax:  [phone]                                    Mandy Novak   MRN: [id number]    Department:  [facility] Emergency Department   Date of Visit:  3/15/2020           After Visit Summary Signature Page    I have received my discharge instructions, and my questions have been answered. I have discussed any challenges I see with this plan with the nurse or doctor.    ..........................................................................................................................................  Patient/Patient Representative Signature      ..........................................................................................................................................  Patient Representative Print Name and Relationship to Patient    ..................................................               ................................................  Date                                   Time    ..........................................................................................................................................  Reviewed by Signature/Title    ...................................................              ..............................................  Date                                               Time          22EPIC Rev 08/18

## 2020-03-15 NOTE — DISCHARGE INSTRUCTIONS
Discharge Instructions  Adult Diarrhea    You have been seen today for diarrhea (loose stools). This is usually caused by a virus, but some bacteria, parasites, medicines, or other medical conditions can cause similar symptoms. At this time your provider does not find that your diarrhea is a sign of anything dangerous or life-threatening. However, sometimes the signs of serious illness do not show up right away. If you have new or worse symptoms, you may need to be seen again in the Emergency Department or by your primary provider.     Generally, every Emergency Department visit should have a follow-up clinic visit with either a primary or a specialty clinic/provider. Please follow-up as instructed by your emergency provider today.    Return to the Emergency Department if:  You feel you are getting dehydrated, such as being very thirsty, not urinating (peeing) like usual, or feeling faint or lightheaded.   You develop a new fever.  You have abdominal (belly) pain that seems worse than cramps, is in one spot, or is getting worse over time.   You have blood in your stool or your stool becomes black.  (Remember that if you take Pepto-Bismol , this will turn your stool black).   You feel very weak.    What can I do to help myself?  The most important thing to do is to drink clear liquids.   It is best to have only small, frequent sips of liquids. Drinking too much at once may cause more diarrhea. You should also replace minerals, sodium and potassium lost with diarrhea. Pedialyte  and sports drinks can help you replace these minerals. You can also drink clear liquids such as water, weak tea, apple juice, and 7-Up . Avoid acidic liquids (orange juice), caffeine (coffee) or alcohol. Milk products will make the diarrhea worse.  Eat bland (plain) foods. Soda crackers, toast, plain noodles, gelatin, applesauce and bananas are good first choices. Avoid foods that have acid, are spicy, fatty or fibrous (such as meats, coarse  "grains, vegetables). You may start eating these foods again in about 3 days when you are better.   Sometimes treatment includes prescription medicine to prevent diarrhea. If your provider prescribes these for you, take them as directed.   Nonprescription medicine is available for the treatment of diarrhea and can be very effective. If you use it, make sure you use the dose recommended on the package. Check with your healthcare provider before you use any medicine for diarrhea.   Do not take ibuprofen, or other nonsteroidal anti-inflammatory medicines, without checking with your healthcare provider.   Probiotics: If you have been given an antibiotic, you may want to also take a probiotic pill or eat yogurt with live cultures. Probiotics have \"good bacteria\" to help your intestines stay healthy. Studies have shown that probiotics help prevent diarrhea and other intestine problems (including C. diff infection) when you take antibiotics. You can buy these without a prescription in the pharmacy section of the store.   If you were given a prescription for medicine here today, be sure to read all of the information (including the package insert) that comes with your prescription.  This will include important information about the medicine, its side effects, and any warnings that you need to know about.  The pharmacist who fills the prescription can provide more information and answer questions you may have about the medicine.  If you have questions or concerns that the pharmacist cannot address, please call or return to the Emergency Department.  Remember that you can always come back to the Emergency Department if you are not able to see your regular provider in the amount of time listed above, if you get any new symptoms, or if there is anything that worries you.    "

## 2020-03-19 RX ORDER — CITALOPRAM HYDROBROMIDE 20 MG/1
TABLET ORAL
Qty: 90 TABLET | Refills: 0 | Status: SHIPPED | OUTPATIENT
Start: 2020-03-19 | End: 2020-06-23

## 2020-03-26 ASSESSMENT — ANXIETY QUESTIONNAIRES
GAD7 TOTAL SCORE: 0
1. FEELING NERVOUS, ANXIOUS, OR ON EDGE: NOT AT ALL
GAD7 TOTAL SCORE: 0
6. BECOMING EASILY ANNOYED OR IRRITABLE: NOT AT ALL
3. WORRYING TOO MUCH ABOUT DIFFERENT THINGS: NOT AT ALL
7. FEELING AFRAID AS IF SOMETHING AWFUL MIGHT HAPPEN: NOT AT ALL
4. TROUBLE RELAXING: NOT AT ALL
7. FEELING AFRAID AS IF SOMETHING AWFUL MIGHT HAPPEN: NOT AT ALL
5. BEING SO RESTLESS THAT IT IS HARD TO SIT STILL: NOT AT ALL
GAD7 TOTAL SCORE: 0
2. NOT BEING ABLE TO STOP OR CONTROL WORRYING: NOT AT ALL

## 2020-03-26 ASSESSMENT — PATIENT HEALTH QUESTIONNAIRE - PHQ9
SUM OF ALL RESPONSES TO PHQ QUESTIONS 1-9: 0
SUM OF ALL RESPONSES TO PHQ QUESTIONS 1-9: 0

## 2020-03-26 NOTE — TELEPHONE ENCOUNTER
PHQ 8/2/2018 12/13/2018 3/26/2020   PHQ-9 Total Score 4 0 0   Q9: Thoughts of better off dead/self-harm past 2 weeks Not at all Not at all Not at all       CRISTOFER-7 SCORE 8/2/2018 12/13/2018 3/26/2020   Total Score - - 0 (minimal anxiety)   Total Score 21 3 0             Jody Hernandez, KAREN, RN, PHN  [facility]  Office: [phone]  Fax: [phone]

## 2020-03-27 ASSESSMENT — ANXIETY QUESTIONNAIRES: GAD7 TOTAL SCORE: 0

## 2020-03-27 ASSESSMENT — PATIENT HEALTH QUESTIONNAIRE - PHQ9: SUM OF ALL RESPONSES TO PHQ QUESTIONS 1-9: 0

## 2020-03-27 NOTE — TELEPHONE ENCOUNTER
Mychart reply sent to patient.      Jody Hernandez, KAREN, RN, PHN  [facility]  Office: [phone]  Fax: [phone]

## 2020-05-11 ENCOUNTER — MYC MEDICAL ADVICE (OUTPATIENT)
Dept: FAMILY MEDICINE | Facility: CLINIC | Age: 35
End: 2020-05-11

## 2020-05-11 NOTE — TELEPHONE ENCOUNTER
Routing to PCP for further review/recommendations/orders.    Leonora Bravo RN  [facility]

## 2020-09-08 ENCOUNTER — TELEPHONE (OUTPATIENT)
Dept: FAMILY MEDICINE | Facility: CLINIC | Age: 35
End: 2020-09-08

## 2020-09-08 NOTE — TELEPHONE ENCOUNTER
General Call:     Who is calling:  Pt    Reason for Call:  Pt would like prenatal appts,  Pt did home pregnancy and is positive    What are your questions or concerns:  na    Date of last appointment with provider: na    Okay to leave a detailed message:Yes at Home number on file [phone] (home)

## 2020-09-09 NOTE — TELEPHONE ENCOUNTER
appt made for 10/5/2020 at 10:40am with Dr. Mcleod for a prenatal appt.     Jailene Clarke MA on 9/9/2020 at 2:34 PM

## 2020-09-21 ENCOUNTER — TELEPHONE (OUTPATIENT)
Dept: FAMILY MEDICINE | Facility: CLINIC | Age: 35
End: 2020-09-21

## 2020-09-21 ENCOUNTER — HOSPITAL ENCOUNTER (OUTPATIENT)
Dept: ULTRASOUND IMAGING | Facility: CLINIC | Age: 35
Discharge: HOME | End: 2020-09-21
Attending: FAMILY MEDICINE | Admitting: FAMILY MEDICINE
Payer: COMMERCIAL

## 2020-09-21 ENCOUNTER — HOSPITAL ENCOUNTER (OUTPATIENT)
Dept: LAB | Facility: CLINIC | Age: 35
Discharge: HOME | End: 2020-09-21
Attending: FAMILY MEDICINE | Admitting: FAMILY MEDICINE
Payer: COMMERCIAL

## 2020-09-21 DIAGNOSIS — O20.0 THREATENED ABORTION: Primary | ICD-10-CM

## 2020-09-21 DIAGNOSIS — O20.0 THREATENED ABORTION: ICD-10-CM

## 2020-09-21 DIAGNOSIS — O03.9 SPONTANEOUS ABORTION: ICD-10-CM

## 2020-09-21 DIAGNOSIS — N83.11 CORPUS LUTEUM CYST OF RIGHT OVARY: ICD-10-CM

## 2020-09-21 LAB — B-HCG SERPL-ACNC: 279 IU/L (ref 0–5)

## 2020-09-21 PROCEDURE — 84702 CHORIONIC GONADOTROPIN TEST: CPT | Performed by: FAMILY MEDICINE

## 2020-09-21 PROCEDURE — 76801 OB US < 14 WKS SINGLE FETUS: CPT

## 2020-09-21 PROCEDURE — 36415 COLL VENOUS BLD VENIPUNCTURE: CPT | Performed by: FAMILY MEDICINE

## 2020-09-21 NOTE — TELEPHONE ENCOUNTER
No LMP recorded.     Need first day LMP and quant hcg's x 2 - 48 hours apart and pelvic US = orders placed for stat labs and US today. [facility] will call me with results.   [facility] outpatient lab on the first floor at [facility] Hours walk-in from 7am to 7pm M-F and from 9am to 12noon Sat and Sun. Orders are in and you can just walk in. They close promptly at the closing times.       Dx: threatened ab.     Please, call or return to clinic or go to the ER immediately if signs or symptoms worsen or fail to improve as anticipated.

## 2020-09-21 NOTE — TELEPHONE ENCOUNTER
Called # [phone]     [facility] 8/9/20    Pt advised of note below. Patient stated an understanding and agreed with plan.    Jordon Erickson RN   [facility]

## 2020-09-21 NOTE — TELEPHONE ENCOUNTER
Patient called clinic stating she thinks she may have had a miscarriage last night.  She states she wiped and there was blood and the bleeding continued through the night and she had additional cramping and back pain.  Patient continues to have bleeding this morning.    Patient is wondering what the next steps are.    Routing to provider for review and advise as appropriate.    Patient can be reached [phone], ok to leave a detailed message.    KAREN TaylorN, RN  [facility]

## 2020-09-23 ENCOUNTER — HOSPITAL ENCOUNTER (OUTPATIENT)
Dept: LAB | Facility: CLINIC | Age: 35
Discharge: HOME | End: 2020-09-23
Attending: FAMILY MEDICINE | Admitting: FAMILY MEDICINE
Payer: COMMERCIAL

## 2020-09-23 DIAGNOSIS — O20.0 THREATENED ABORTION: ICD-10-CM

## 2020-09-23 LAB — B-HCG SERPL-ACNC: 62 IU/L (ref 0–5)

## 2020-09-23 PROCEDURE — 84702 CHORIONIC GONADOTROPIN TEST: CPT | Performed by: FAMILY MEDICINE

## 2020-09-23 PROCEDURE — 36415 COLL VENOUS BLD VENIPUNCTURE: CPT | Performed by: FAMILY MEDICINE

## 2020-10-05 ENCOUNTER — OFFICE VISIT (OUTPATIENT)
Dept: FAMILY MEDICINE | Facility: CLINIC | Age: 35
End: 2020-10-05
Payer: COMMERCIAL

## 2020-10-05 VITALS
DIASTOLIC BLOOD PRESSURE: 78 MMHG | TEMPERATURE: 97.9 F | OXYGEN SATURATION: 99 % | BODY MASS INDEX: 26.54 KG/M2 | HEART RATE: 116 BPM | WEIGHT: 172 LBS | SYSTOLIC BLOOD PRESSURE: 118 MMHG

## 2020-10-05 DIAGNOSIS — O03.9 SPONTANEOUS ABORTION: Primary | ICD-10-CM

## 2020-10-05 LAB
B-HCG SERPL-ACNC: 1 IU/L (ref 0–5)
BASOPHILS # BLD AUTO: 0 10E9/L (ref 0–0.2)
BASOPHILS NFR BLD AUTO: 0.4 %
DIFFERENTIAL METHOD BLD: NORMAL
EOSINOPHIL # BLD AUTO: 0.1 10E9/L (ref 0–0.7)
EOSINOPHIL NFR BLD AUTO: 0.7 %
ERYTHROCYTE [DISTWIDTH] IN BLOOD BY AUTOMATED COUNT: 12 % (ref 10–15)
HCT VFR BLD AUTO: 42 % (ref 35–47)
HGB BLD-MCNC: 13.9 G/DL (ref 11.7–15.7)
LYMPHOCYTES # BLD AUTO: 2.3 10E9/L (ref 0.8–5.3)
LYMPHOCYTES NFR BLD AUTO: 30.7 %
MCH RBC QN AUTO: 29.4 PG (ref 26.5–33)
MCHC RBC AUTO-ENTMCNC: 33.1 G/DL (ref 31.5–36.5)
MCV RBC AUTO: 89 FL (ref 78–100)
MONOCYTES # BLD AUTO: 0.7 10E9/L (ref 0–1.3)
MONOCYTES NFR BLD AUTO: 9.5 %
NEUTROPHILS # BLD AUTO: 4.3 10E9/L (ref 1.6–8.3)
NEUTROPHILS NFR BLD AUTO: 58.7 %
PLATELET # BLD AUTO: 255 10E9/L (ref 150–450)
RBC # BLD AUTO: 4.73 10E12/L (ref 3.8–5.2)
WBC # BLD AUTO: 7.3 10E9/L (ref 4–11)

## 2020-10-05 PROCEDURE — 84702 CHORIONIC GONADOTROPIN TEST: CPT | Performed by: FAMILY MEDICINE

## 2020-10-05 PROCEDURE — 36415 COLL VENOUS BLD VENIPUNCTURE: CPT | Performed by: FAMILY MEDICINE

## 2020-10-05 PROCEDURE — 85025 COMPLETE CBC W/AUTO DIFF WBC: CPT | Performed by: FAMILY MEDICINE

## 2020-10-05 PROCEDURE — 99213 OFFICE O/P EST LOW 20 MIN: CPT | Performed by: FAMILY MEDICINE

## 2020-10-05 NOTE — PROGRESS NOTES
"  SUBJECTIVE:                                                    Mandy Novak is a 35 year old . female who presents to clinic today for the following health issues:    Follow up on miscarriage. Is no longer bleeding. No further cramping either.  Actively grieving for her loss.  Discussed grief and support available.   HCG Quantitative Serum   Date Value Ref Range Status   2020 62 (H) 0 - 5 IU/L Final   DOWN FROM 279 on 2020.     Patient's last menstrual period was 2020 (exact date).     US showed:   \"OBSTETRIC ULTRASOUND LESS THAN 14 WEEKS SINGLE  2020 4:59 PM     CLINICAL HISTORY: Bleeding per vagina in the first trimester of  pregnancy. Threatened .     TECHNIQUE: Transabdominal scans were performed. Endovaginal ultrasound  was performed to better visualize the embryo.     COMPARISON: None.     FINDINGS:  No intrauterine gestational sac demonstrated. The endometrium measures  7 mm.     RIGHT OVARY: Normal. Probable corpus luteum in the right ovary  measures up to 1.2 cm.  LEFT OVARY: Normal.     No adnexal mass or pelvic free fluid.                                                                      IMPRESSION:   No intrauterine gestation demonstrated. No adnexal mass or pelvic free  Fluid.\"     Patient Active Problem List   Diagnosis     CARDIOVASCULAR SCREENING; LDL GOAL LESS THAN 160     Situational syncope- with any blood draws or shots     ASCUS with positive high risk HPV- while pregnant 2016 = normal - recheck pap and cotest postpartum     Resolved - Marginal placenta previa with intrapartum hemorrhage in first trimester-repeat 1/15/2015= resolved      Multiple pigmented nevi     Supervision of other normal pregnancy, antepartum     Type O blood, Rh positive     Cecilio breech presentation - seen on 31 week US - resolved with external version on 3/24/2017     Large for gestational age     Indication for care in labor or delivery     History of shoulder " dystocia in prior pregnancy- with first baby 7lbs 14oz 2015       (normal spontaneous vaginal delivery)     Immediate postpartum hemorrhage, postpartum     Elevated d-dimer     Acute posthemorrhagic anemia     Anxiety     Postpartum depression associated with second pregnancy     Family history of nonmelanoma skin cancer     Difficulty falling asleep at night until early morning hours     Exposure to human papillomavirus-  with genital warts - recently diagnosed       Current Outpatient Medications   Medication Sig Dispense Refill     cholecalciferol (VITAMIN D3) 1000 UNIT tablet Take 1 tablet (1,000 Units) by mouth daily 100 tablet 3     Prenatal Vit-Fe Fumarate-FA (PRENATAL VITAMINS) 28-0.8 MG TABS Take 1 tablet by mouth daily          No Known Allergies         Problem list and histories reviewed & adjusted, as indicated.  Additional history: as documented    Reviewed and updated as needed this visit by clinical staff  Tobacco  Allergies  Meds  Problems  Med Hx  Surg Hx  Fam Hx        Reviewed and updated as needed this visit by Provider                 ROS:   ROS: 12 point ROS neg other than the symptoms noted above    OBJECTIVE:                                                    /78   Pulse 116   Temp 97.9  F (36.6  C)   Wt 78 kg (172 lb)   SpO2 99%   BMI 26.54 kg/m    Body mass index is 26.54 kg/m .   GENERAL: healthy, alert, well nourished, well hydrated, no distress  HENT: ear canals- normal; TMs- normal; Nose- normal; Mouth- no ulcers, no lesions  NECK: no tenderness, no adenopathy, no asymmetry, no masses, no stiffness; thyroid- normal to palpation  RESP: lungs clear to auscultation - no rales, no rhonchi, no wheezes  CV: regular rates and rhythm, normal S1 S2, no S3 or S4 and no murmur, no click or rub -  ABDOMEN: soft, no tenderness, no  hepatosplenomegaly, no masses, normal bowel sounds  MS: extremities- no gross deformities noted, no edema   PeLVIC: Vagina and vulva  are normal;  no discharge is noted.  Cervix normal without lesions. Uterus anteverted and mobile, still feels a bit enlarged about 4 weeks pregnant  in size and shape without tenderness.  Adnexa normal in size without masses or tenderness. Pap Smear - is up to date.       Diagnostic Test Results:  Labs reviewed in Epic     ASSESSMENT/PLAN:                                                        ICD-10-CM    1. Spontaneous   O03.9 HCG quantitative pregnancy     CBC with platelets and differential     Pt will followup for repeat US to follow up corpus luteum cyst. Resources given for pregnancy loss. Will recheck HCG level until less than 5.  Will get a repeat US to to follow up on corpus luteum cyst around 10/22/2020.  Ordered already.      Offered counseling referral and resources re: miscarriage /pregnancy loss groups and pt declined at this time.  Has good family support and support from her friends and [employer].  She'll call if she changes her mind.      Return in about 2 months (around 2020) for Physical/Preventative Visit, with Dr. Mcleod, please call Sarahi [phone] to schedule.      See Patient Instructions. Please, call or return to clinic or go to the ER immediately if signs or symptoms worsen or fail to improve as anticipated.           Mey Mcleod MD    [facility]

## 2020-10-05 NOTE — PATIENT INSTRUCTIONS
[facility]  [address] [place] [address]  Office: [phone]   Fax:    [phone]        [facility]  [address] [place] [id number]  Office: [phone]   Fax:    [phone]       Return in about 2 months (around 12/5/2020) for Physical/Preventative Visit, with Dr. Mcleod, please call Sarahi [phone] to schedule.       Patient Education     Completed Spontaneous Miscarriage  Today's exams show your pregnancy has ended suddenly. This can be emotionally difficult. There is little that can be done to change the way you feel. But understand that miscarriages are common.  About 1 or 2 out of every 10 pregnancies end this way. Some end even before you know you are pregnant. This happens for a number of reasons, and usually the cause is never known. It s important you know that it is not your fault. It didn t happen because you did anything wrong.  Having sex or exercising does not cause a miscarriage. These activities are usually safe unless you have pain or bleeding or your doctor tells you to stop. Even minor falls won t cause a miscarriage. Miscarriages happen because things were not developing as they were supposed to.  It appears that your miscarriage is complete. All tissue from the pregnancy should have passed out of your uterus. If some of the pregnancy tissue remains in the uterus, you will probably have more cramping and bleeding. The bleeding can be light spotting or like a period, but it is usually not heavy. You may also pass some tissue.  After you have recovered, you should still be able to get pregnant again. But before trying, talk with your healthcare provider.  Home care  Follow these tips to take of yourself at home:    You can go back to your normal activities if you don t have heavy bleeding or pain.    You may have some cramping and bleeding, but it shouldn t be severe.  Until the bleeding stops completely and  to prevent infection:    Don t have sex until your healthcare provider says it s OK    Use sanitary napkins instead of tampons.    Don t douche.  Having a miscarriage can be very difficult emotionally. It is natural to feel sadness or grief. It may help to talk about your feelings with family and friends, or with a counselor.  Follow-up care  Make an appointment to see your healthcare provider in 1 to 2 weeks for a checkup.  If cramping and bleeding return and continue for more than a few days, call your healthcare provider or return here for an exam. To prevent infection in the uterus, your provider might need to take out any tissue that remains. Or you may be given medicine to take at home to help your body expel the rest of the tissue.  If you had an ultrasound, a radiologist will review it. You will be told of any new findings that may affect your care.  Call 911  Call 911 if you have:    Severe pain and very heavy bleeding    Severe lightheadedness, passing out, or fainting    Rapid heart rate    Difficulty breathing    Confusion or difficulty waking up  When to seek medical advice  Call your healthcare provider right away if any of these occur:    Heavy bleeding. This means soaking 1 new pad an hour over 3 hours.    Bleeding that doesn t stop after 10 days    Foul-smelling vaginal discharge    Fever of 100.4 F (38 C) or higher, or as directed by your healthcare provider    Pain in your lower belly (abdomen) that gets worse    Weakness or dizziness  Date Last Reviewed: 9/1/2016 2000-2019 [employer]. [address] [place] [address]. All rights reserved. This information is not intended as a substitute for professional medical care. Always follow your healthcare professional's instructions.           Patient Education     Understanding Miscarriage: Emotions    Miscarriage is the unplanned end of a pregnancy that happens before you reach 20 weeks. When a miscarriage happens, you re  likely to have a wide range of feelings. Allow yourself to accept how you feel. Only then can you begin to move on.  No one is to blame  Know that you did not cause this to happen. Miscarriage is very common. There is a 15% chance of miscarriage with each pregnancy (after pregnancy has been diagnosed). Miscarriage usually takes place during the first 10 weeks after conception.  Grief takes many forms  Grief may be the first thing you feel, or it may come upon you later. Perhaps you ll grieve because the future you hoped for is lost. Grief is painful and often lonely. But your miscarriage should become easier to deal with over time.  What you feel is OK  No one can tell you how to respond to your miscarriage. If you have been trying to have a child, this loss may feel overwhelming. Perhaps this was an unplanned pregnancy. That doesn t mean you won t feel loss. You know yourself best. It s OK to feel whatever you feel.  A sense of loss  No matter what you thought about being pregnant, having a miscarriage may cause a sense of loss. You may feel as if something is missing. It s OK if you can t describe how you feel. At first, it may be enough just to look inside yourself and feel your emotions.  Partner s note  Men grieve, too. You may be feeling sad, helpless or frustrated. When you re struggling with your own feelings, knowing how to help your partner may be hard. But do your best to provide support. The following tips may also help:    Be kind to yourself and your partner.    Spend time together.    Fix a meal or bring dinner home for her.    Rent a movie.    If you have children, spend extra time with them.   Date Last Reviewed: 6/1/2016 2000-2019 [employer]. [address] [facility], [place] [address]. All rights reserved. This information is not intended as a substitute for professional medical care. Always follow your healthcare professional's instructions.           Patient Education      Understanding Miscarriage: Possible Causes  Miscarriage is common, but finding its cause may not be easy. If a cause can be found, it s likely to be a problem with the baby or the structure of the uterus. Other factors cause miscarriage, but they are less common.  Problems with the baby  Either of the following problems with the baby can cause a miscarriage:    There is a problem with the baby s chromosomes (genes that carry the information needed for life).    Birth defects  Problems with the uterus or cervix  Any of the following problems with the uterus or cervix can cause a miscarriage:    The uterus may be divided (have a septum), or have fibroids or adhesions.    The lining of the uterus may be too thin for the fertilized egg to implant.    The cervix may be too weak to support the weight of a pregnancy.  Other factors  Any of the following problems can cause a miscarriage:    A serious illness, such as uncontrolled diabetes mellitus.     A bad injury, perhaps during a car accident.    Exposure to toxins or radiation.  What does not cause miscarriage  Plenty of myths and  old wives  tales  try to explain the cause of miscarriage. But they are fiction--not fact. None of the following activities causes miscarriage:    Carrying groceries    Lifting a small child    Wearing high heels    Coloring your hair    Having sex    Vacuuming   Working outside the home    Being a vegetarian    Eating spicy foods    Having a Pap smear    Riding a horse or a bicycle    Wishing away or denying a pregnancy       Date Last Reviewed: 6/1/2016 2000-2019 [employer]. [address] [place] [address]. All rights reserved. This information is not intended as a substitute for professional medical care. Always follow your healthcare professional's instructions.           Patient Education     Understanding Miscarriage: Recovery    Your body has had a shock to its system. Because of this, you may not feel  well for a few days. Your body is going through changes, and you can expect mood swings. When you are ready, start back to your normal routine.  Mood swings  The miscarriage has caused a sudden drop in your hormone levels. This is likely to produce mood swings or make your emotions even more extreme. Stress and lack of sleep can also affect your moods. As your body returns to normal, these mood swings should lessen.  Returning to your daily routines  You are the best  of how you feel. Do only as much as you feel up to. Also be sure to follow your healthcare provider s instructions. Keep the following in mind:    Return to work or your daily routines when you feel ready. This might be right away, or you may want to wait a few days.    Take showers instead of tub baths. This helps prevent infection. Your healthcare provider will tell you when you can take baths again.    Avoid strenuous exercise, such as aerobics or running, until the bleeding slows to the rate of a normal period.    Wait to have sex, and don t use tampons until your healthcare provider says it is OK.    Do not douche.  Finding support  Recognize your need to talk. Ask for support when you want it, and accept help when it s offered. Although sharing thoughts with your partner is vital, you may also feel like talking with other family members or friends.  Look nearby  The real experts on miscarriage are the women who have gone through it. Because miscarriage is so common, it s likely that someone close to you has had one. You may begin to see that you re not alone in experiencing such a loss.  Other sources of support  Many women find it easier to talk to people who are not family or friends. If this is true for you, try contacting the following:    Share: Pregnancy and Infant Loss Support at http://www.nationalshare.org    Resolve Through Sharing at http://www.bereavementservices.org    Pregnancy Loss Support Program at  http://www.pregnancyloss.org/  When to call the healthcare provider  It s normal to be sad for a while. You may even feel  down  until you re pregnant again. Be sure to call your healthcare provider if either of the following is true:    You continue to have no interest in eating or are not able to sleep.    Your depression does not lessen or you get more upset.  Date Last Reviewed: 6/1/2016 2000-2019 [employer]. [address] [place] [address]. All rights reserved. This information is not intended as a substitute for professional medical care. Always follow your healthcare professional's instructions.           Patient Education     Understanding Miscarriage: Trying Again     Now is a good time to focus on your relationship.     You ve been pregnant, so you know that chances are very good it can happen again--if you want it to. The choice is up to you and your partner. If you want to try again, do so when you re ready.  A joint decision  You and your partner may decide to try again soon, or you may prefer to wait. To increase the chances of a healthy pregnancy, your healthcare provider may suggest waiting 2 to 3 monthly cycles. This builds up the uterine lining. As a result, the fertilized egg is more likely to implant properly.  Special tests  If you go on to have repeat miscarriages, your healthcare provider may want to run a few tests. In certain cases, special tests can pinpoint the cause of miscarriage. Some causes, such as problems with the uterus, can often be corrected. If you have a general health problem, finding ways to control it may be all that s needed.  Prepare for the future  After the first miscarriage, most couples go on to have a healthy pregnancy. You can give a future baby the best start by eating a balanced diet. To help prevent problems during your next pregnancy, avoid actions that may place the baby at risk. While you are pregnant, stay away from the  following:    Smoking    Drinking alcohol    Using drugs    Spending time in hot tubs and saunas  When you are ready  Your health is what matters. Wait until you feel fit in body and mind before trying to get pregnant again. Until then, enjoy the time you spend with your partner. Allow yourselves to enjoy each other. Try not to let getting pregnant become your one goal. Instead, look at pregnancy as one possible outcome of a loving relationship.  Date Last Reviewed: 2016-2019 [employer]. [address] [place] [address], [place] [address]. All rights reserved. This information is not intended as a substitute for professional medical care. Always follow your healthcare professional's instructions.           Patient Education     [facility] also has a Children's Memorial and Healing Garden. You and your family can visit the garden to pray or feel close to your child.   You may also ask to have your own  or arabella leader hold a memorial service at the garden.  Around the garden is a Wall of Remembrance. You may pay to put your child's name or just your family name on the wall.   [facility] and [facility] offer a group burial program, if desired. [facility]  Service and Cremation also gives support.   To learn more  To learn more, contact The [facility] office at [facility] at [phone] or [email or web address]. Or, ask your  or arabella leader to call.     For informational purposes only. Not to replace the advice of your health care provider. Copyright   2019 [facility]. All rights reserved. Photographs copyright   [facility]. Used with permission. [employer] [id number] - Rev .     Thank you so much or choosing [facility]  for your Health Care. It was a pleasure seeing you at your visit today! Please contact us with any questions or concerns you may have.                    Mey Mcleod MD                              To reach your [facility] care team after hours call:   [phone]    PLEASE NOTE OUR HOURS HAVE CHANGED secondary to COVID-19 coronavirus pandemic, as we are trying to minimize patient exposure to the virus,  which is now widespread in the [facility].  These hours may change with very little notice.  We apologize for any inconvenience.       Our current clinic hours are:          Monday- Friday  7:00am - 6:00pm  in person.                       Saturday and Sunday : Closed to in person and virtual visits        We have telephone and virtual visit times available between    7:00am - 6pm on Monday-Friday as well.                                                Phone:  [phone]      Our pharmacy hours:   Monday  9:00 am to 6:00 pm      Tuesday through Friday 9:00am to 5:00pm                        Saturday - 9:00 am to 12 noon       Sunday : Closed.              Phone:  [phone]    ###  Please note: at this time we are not accepting any walk-in visits. ###      There is also information available at our web site:  [email or web address]    If your provider ordered any lab tests and you do not receive the results within 10 business days, please call the clinic.    If you need a medication refill please contact your pharmacy.  Please allow 2 business days for your refill to be completed.    Our clinic offers telephone visits and e visits.  Please ask one of your team members to explain more.      Use [employer] (secure email communication and access to your chart) to send your primary care provider a message or make an appointment. Ask someone on your Team how to sign up for [employer].

## 2020-10-18 NOTE — PROGRESS NOTES
[facility] - PRIMARY CARE SKIN    CC : skin cancer screening (full-body)  SUBJECTIVE:                                                    Mandy Novak is a 34 year old female who presents to clinic today for skin exam.    Issue one : no particular skin concerns  Personal history of skin cancer : NO -  history of mild atypical nevi.  Family history of skin cancer : YES - ?non-melanoma in maternal grandmother.      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    Refer to electronic medical record (EMR) for past medical history and medications.    INTEGUMENTARY/SKIN: NEGATIVE for worrisome rashes, moles or lesions  ROS : 14 point review of systems was negative except the symptoms listed above in the HPI.      OBJECTIVE:                                                    GENERAL: healthy, alert and no distress  SKIN: Paul Skin Type - I.  This patient was examined from the top of the head to the bottom of the feet  including scalp, face, neck, back, chest, breasts, buttocks, both arms, both legs, both hands, both feet, all 10 fingers and all 10 toes. The dermatoscope was used to help evaluate pigmented lesions.    Trunk, arms, legs,:           Brown, macule(s) most consistent with benign solar lentigo              Brown macules of various sizes and shapes most consistent with (benign) melanocytic nevi        Diagnostic Test Results:  none     ASSESSMENT:                                                      Encounter Diagnoses   Name Primary?     Multiple pigmented nevi Yes     Skin exam for malignant neoplasm      History of atypical nevus          PLAN:                                                    Patient Instructions   Skin exam in one year      SUN PROTECTION INSTRUCTIONS  Sun damage can lead to skin cancer and premature aging of the skin.      The best way to protect from sun damage to your skin is to avoid the sun during peak hours (10 am - 2 pm) even on overcast days.    Never use tanning beds.  "Tanning beds are associated with much higher risks of skin cancer.    All tanning damages the skin. Aim for ivory skin year round and you will have less trouble with your skin in years to come. There is no merit in getting \"a base tan\" before a warm weather vacation, as any tanning indicates your body's response to sun damage.    Stop smoking. Smokers have higher rates of skin cancer and also have premature skin wrinkling.    Use UPF sun-protective clothing, which while more expensive initially provides longer lasting coverage without having to worry about remembering to re-apply.  1. Wear a wide-brimmed hat and sunglasses.   2. Wear sun-protective clothing.  [employer] and other [employer] make sun protective clothing that are stylish, comfortable and cool.   [employer] and other [employer] make UV arm sleeves suitable for golfing, gardening and other activities.    Sunscreen instructions:  1. Use sunscreens with Zinc Oxide, Titanium Dioxide or Avobenzone to protect from UVA rays.  2. Use SPF 30-50+ to protect from UVB rays.  3. Re-apply every 2 hours even if water resistant.  4. Apply on your face every day even when cloudy and even in the winter. UVA \"aging rays\" penetrate window glass and are just as strong in the winter as in the summer.    FYI  You should use about 3 tablespoons of sunscreen to protect your whole body. Thus a typical eight ounce bottle of sunscreen should last 4 applications. Remember, that the SPF rating is compromised if you don t apply enough. Most people only apply 1/2 - 1/3 of the amount they need. Also don t forget areas such as your ears, feet, upper back and harder to reach places. Keep in mind that these amounts should be increased for larger body sizes.    Sunscreens with titanium dioxide and/or zinc oxide in the active ingredients are physical blockers as opposed to chemical blockers. Chemical-free sunscreens should not irritate the skin.    Spray-on sunscreens may be used " for touch-up application only, not as a base layer. Also, use with caution around small children due to inhalation risk.    SPF means sun protection factor, which is just the degree to which the sunscreen can protect against UVB rays. There is no rating system for UVA rays. SPF is calculated as the time skin will burn when sunscreen is applied vs. skin without sunscreen.    Water resistant sunscreens should be re-applied every 1-2 hours.    Product Recommendations:    Consider use of sunscreen sticks with Zinc Oxide and Titanium Dioxide active ingredients such as Neutrogena Pure&Free Baby Sunscreen Stick.    Good examples include: Blue Lizard, EltaMD, Solbar    Good daily moisturizers with SPF: Vanicream, CeraVe.    For sensitive skin, consider : SkinMedica Essential Defense Mineral Shield Broad Spectrum SPF 35    Men: consider use of Neutrogena Triple Protect Facial Lotion    Avoid retinyl palmitate products.  Avoid combination products that include both sunscreen and insect repellant, as sunscreen should be applied every 2 hours, but insect repellant should not be applied as frequently.    For more information:  https://www.skincancer.org/prevention/sun-protection/sunscreen/sunscreens-safe-and-effective        The patient was counseled about sunscreens and sun avoidance. The patient was counseled to check the skin regularly and report any lesion that is new, changing, itching, scabbing, bleeding or otherwise bothersome. The patient was discharged ambulatory and in stable condition.      PROCEDURES:                                                    None.    TT : 25 minutes.  CT : 15 minutes.

## 2020-10-19 ENCOUNTER — OFFICE VISIT (OUTPATIENT)
Dept: FAMILY MEDICINE | Facility: CLINIC | Age: 35
End: 2020-10-19
Payer: COMMERCIAL

## 2020-10-19 VITALS — SYSTOLIC BLOOD PRESSURE: 112 MMHG | DIASTOLIC BLOOD PRESSURE: 68 MMHG

## 2020-10-19 DIAGNOSIS — Z87.898 HISTORY OF ATYPICAL NEVUS: ICD-10-CM

## 2020-10-19 DIAGNOSIS — Z12.83 SKIN EXAM FOR MALIGNANT NEOPLASM: ICD-10-CM

## 2020-10-19 DIAGNOSIS — D22.9 MULTIPLE PIGMENTED NEVI: Primary | ICD-10-CM

## 2020-10-19 PROCEDURE — 99213 OFFICE O/P EST LOW 20 MIN: CPT | Performed by: FAMILY MEDICINE

## 2020-10-19 NOTE — PATIENT INSTRUCTIONS
"Skin exam in one year      SUN PROTECTION INSTRUCTIONS  Sun damage can lead to skin cancer and premature aging of the skin.      The best way to protect from sun damage to your skin is to avoid the sun during peak hours (10 am - 2 pm) even on overcast days.    Never use tanning beds. Tanning beds are associated with much higher risks of skin cancer.    All tanning damages the skin. Aim for ivory skin year round and you will have less trouble with your skin in years to come. There is no merit in getting \"a base tan\" before a warm weather vacation, as any tanning indicates your body's response to sun damage.    Stop smoking. Smokers have higher rates of skin cancer and also have premature skin wrinkling.    Use UPF sun-protective clothing, which while more expensive initially provides longer lasting coverage without having to worry about remembering to re-apply.  1. Wear a wide-brimmed hat and sunglasses.   2. Wear sun-protective clothing.  [employer] and other [employer] make sun protective clothing that are stylish, comfortable and cool.   [employer] and other [employer] make UV arm sleeves suitable for golfing, gardening and other activities.    Sunscreen instructions:  1. Use sunscreens with Zinc Oxide, Titanium Dioxide or Avobenzone to protect from UVA rays.  2. Use SPF 30-50+ to protect from UVB rays.  3. Re-apply every 2 hours even if water resistant.  4. Apply on your face every day even when cloudy and even in the winter. UVA \"aging rays\" penetrate window glass and are just as strong in the winter as in the summer.    FYI  You should use about 3 tablespoons of sunscreen to protect your whole body. Thus a typical eight ounce bottle of sunscreen should last 4 applications. Remember, that the SPF rating is compromised if you don t apply enough. Most people only apply 1/2 - 1/3 of the amount they need. Also don t forget areas such as your ears, feet, upper back and harder to reach places. Keep in mind " that these amounts should be increased for larger body sizes.    Sunscreens with titanium dioxide and/or zinc oxide in the active ingredients are physical blockers as opposed to chemical blockers. Chemical-free sunscreens should not irritate the skin.    Spray-on sunscreens may be used for touch-up application only, not as a base layer. Also, use with caution around small children due to inhalation risk.    SPF means sun protection factor, which is just the degree to which the sunscreen can protect against UVB rays. There is no rating system for UVA rays. SPF is calculated as the time skin will burn when sunscreen is applied vs. skin without sunscreen.    Water resistant sunscreens should be re-applied every 1-2 hours.    Product Recommendations:    Consider use of sunscreen sticks with Zinc Oxide and Titanium Dioxide active ingredients such as Neutrogena Pure&Free Baby Sunscreen Stick.    Good examples include: Blue Lizard, EltaMD, Solbar    Good daily moisturizers with SPF: Vanicream, CeraVe.    For sensitive skin, consider : SkinMedica Essential Defense Mineral Shield Broad Spectrum SPF 35    Men: consider use of Neutrogena Triple Protect Facial Lotion    Avoid retinyl palmitate products.  Avoid combination products that include both sunscreen and insect repellant, as sunscreen should be applied every 2 hours, but insect repellant should not be applied as frequently.    For more information:  https://www.skincancer.org/prevention/sun-protection/sunscreen/sunscreens-safe-and-effective

## 2020-10-19 NOTE — LETTER
10/19/2020         RE: Mandy Novak  [address] [place] [address]        Dear Colleague,    Thank you for referring your patient, Mandy Novak, to the [facility]. Please see a copy of my visit note below.    [facility] - PRIMARY CARE SKIN    CC : skin cancer screening (full-body)  SUBJECTIVE:                                                    Mandy Novak is a 34 year old female who presents to clinic today for skin exam.    Issue one : no particular skin concerns  Personal history of skin cancer : NO -  history of mild atypical nevi.  Family history of skin cancer : YES - ?non-melanoma in maternal grandmother.      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    Refer to electronic medical record (EMR) for past medical history and medications.    INTEGUMENTARY/SKIN: NEGATIVE for worrisome rashes, moles or lesions  ROS : 14 point review of systems was negative except the symptoms listed above in the HPI.      OBJECTIVE:                                                    GENERAL: healthy, alert and no distress  SKIN: Paul Skin Type - I.  This patient was examined from the top of the head to the bottom of the feet  including scalp, face, neck, back, chest, breasts, buttocks, both arms, both legs, both hands, both feet, all 10 fingers and all 10 toes. The dermatoscope was used to help evaluate pigmented lesions.    Trunk, arms, legs,:           Brown, macule(s) most consistent with benign solar lentigo              Brown macules of various sizes and shapes most consistent with (benign) melanocytic nevi        Diagnostic Test Results:  none     ASSESSMENT:                                                      Encounter Diagnoses   Name Primary?     Multiple pigmented nevi Yes     Skin exam for malignant neoplasm      History of atypical nevus          PLAN:                                                    Patient Instructions   Skin exam in one  "year      SUN PROTECTION INSTRUCTIONS  Sun damage can lead to skin cancer and premature aging of the skin.      The best way to protect from sun damage to your skin is to avoid the sun during peak hours (10 am - 2 pm) even on overcast days.    Never use tanning beds. Tanning beds are associated with much higher risks of skin cancer.    All tanning damages the skin. Aim for ivory skin year round and you will have less trouble with your skin in years to come. There is no merit in getting \"a base tan\" before a warm weather vacation, as any tanning indicates your body's response to sun damage.    Stop smoking. Smokers have higher rates of skin cancer and also have premature skin wrinkling.    Use UPF sun-protective clothing, which while more expensive initially provides longer lasting coverage without having to worry about remembering to re-apply.  1. Wear a wide-brimmed hat and sunglasses.   2. Wear sun-protective clothing.  [employer] and other [employer] make sun protective clothing that are stylish, comfortable and cool.   [employer] and other [employer] make UV arm sleeves suitable for golfing, gardening and other activities.    Sunscreen instructions:  1. Use sunscreens with Zinc Oxide, Titanium Dioxide or Avobenzone to protect from UVA rays.  2. Use SPF 30-50+ to protect from UVB rays.  3. Re-apply every 2 hours even if water resistant.  4. Apply on your face every day even when cloudy and even in the winter. UVA \"aging rays\" penetrate window glass and are just as strong in the winter as in the summer.    FYI  You should use about 3 tablespoons of sunscreen to protect your whole body. Thus a typical eight ounce bottle of sunscreen should last 4 applications. Remember, that the SPF rating is compromised if you don t apply enough. Most people only apply 1/2 - 1/3 of the amount they need. Also don t forget areas such as your ears, feet, upper back and harder to reach places. Keep in mind that these amounts " should be increased for larger body sizes.    Sunscreens with titanium dioxide and/or zinc oxide in the active ingredients are physical blockers as opposed to chemical blockers. Chemical-free sunscreens should not irritate the skin.    Spray-on sunscreens may be used for touch-up application only, not as a base layer. Also, use with caution around small children due to inhalation risk.    SPF means sun protection factor, which is just the degree to which the sunscreen can protect against UVB rays. There is no rating system for UVA rays. SPF is calculated as the time skin will burn when sunscreen is applied vs. skin without sunscreen.    Water resistant sunscreens should be re-applied every 1-2 hours.    Product Recommendations:    Consider use of sunscreen sticks with Zinc Oxide and Titanium Dioxide active ingredients such as Neutrogena Pure&Free Baby Sunscreen Stick.    Good examples include: Blue Lizard, EltaMD, Solbar    Good daily moisturizers with SPF: Vanicream, CeraVe.    For sensitive skin, consider : SkinMedica Essential Defense Mineral Shield Broad Spectrum SPF 35    Men: consider use of Neutrogena Triple Protect Facial Lotion    Avoid retinyl palmitate products.  Avoid combination products that include both sunscreen and insect repellant, as sunscreen should be applied every 2 hours, but insect repellant should not be applied as frequently.    For more information:  https://www.skincancer.org/prevention/sun-protection/sunscreen/sunscreens-safe-and-effective        The patient was counseled about sunscreens and sun avoidance. The patient was counseled to check the skin regularly and report any lesion that is new, changing, itching, scabbing, bleeding or otherwise bothersome. The patient was discharged ambulatory and in stable condition.      PROCEDURES:                                                    None.    TT : 25 minutes.  CT : 15 minutes.            Again, thank you for allowing me to participate  in the care of your patient.        Sincerely,        Yolande Betts MD

## 2020-10-22 ENCOUNTER — ANCILLARY PROCEDURE (OUTPATIENT)
Dept: ULTRASOUND IMAGING | Facility: CLINIC | Age: 35
End: 2020-10-22
Attending: FAMILY MEDICINE
Payer: COMMERCIAL

## 2020-10-22 DIAGNOSIS — N83.11 CORPUS LUTEUM CYST OF RIGHT OVARY: ICD-10-CM

## 2020-10-22 DIAGNOSIS — O03.9 SPONTANEOUS ABORTION: ICD-10-CM

## 2020-10-22 PROCEDURE — 76856 US EXAM PELVIC COMPLETE: CPT | Performed by: FAMILY MEDICINE

## 2020-10-22 PROCEDURE — 76830 TRANSVAGINAL US NON-OB: CPT | Performed by: FAMILY MEDICINE

## 2020-11-05 ENCOUNTER — OFFICE VISIT (OUTPATIENT)
Dept: FAMILY MEDICINE | Facility: CLINIC | Age: 35
End: 2020-11-05
Payer: COMMERCIAL

## 2020-11-05 VITALS
TEMPERATURE: 96 F | DIASTOLIC BLOOD PRESSURE: 70 MMHG | BODY MASS INDEX: 25.91 KG/M2 | SYSTOLIC BLOOD PRESSURE: 110 MMHG | HEIGHT: 68 IN | WEIGHT: 171 LBS | OXYGEN SATURATION: 98 %

## 2020-11-05 DIAGNOSIS — Z23 NEEDS FLU SHOT: ICD-10-CM

## 2020-11-05 DIAGNOSIS — Z13.6 CARDIOVASCULAR SCREENING; LDL GOAL LESS THAN 160: ICD-10-CM

## 2020-11-05 DIAGNOSIS — E55.9 VITAMIN D DEFICIENCY: ICD-10-CM

## 2020-11-05 DIAGNOSIS — Z11.59 NEED FOR HEPATITIS C SCREENING TEST: ICD-10-CM

## 2020-11-05 DIAGNOSIS — Z00.00 ENCOUNTER FOR ROUTINE ADULT HEALTH EXAMINATION WITHOUT ABNORMAL FINDINGS: Primary | ICD-10-CM

## 2020-11-05 LAB
ERYTHROCYTE [DISTWIDTH] IN BLOOD BY AUTOMATED COUNT: 11.9 % (ref 10–15)
HCT VFR BLD AUTO: 39.6 % (ref 35–47)
HGB BLD-MCNC: 13.2 G/DL (ref 11.7–15.7)
MCH RBC QN AUTO: 29.5 PG (ref 26.5–33)
MCHC RBC AUTO-ENTMCNC: 33.3 G/DL (ref 31.5–36.5)
MCV RBC AUTO: 88 FL (ref 78–100)
PLATELET # BLD AUTO: 252 10E9/L (ref 150–450)
RBC # BLD AUTO: 4.48 10E12/L (ref 3.8–5.2)
WBC # BLD AUTO: 6.1 10E9/L (ref 4–11)

## 2020-11-05 PROCEDURE — 99395 PREV VISIT EST AGE 18-39: CPT | Mod: 25 | Performed by: FAMILY MEDICINE

## 2020-11-05 PROCEDURE — 36415 COLL VENOUS BLD VENIPUNCTURE: CPT | Performed by: FAMILY MEDICINE

## 2020-11-05 PROCEDURE — 82306 VITAMIN D 25 HYDROXY: CPT | Performed by: FAMILY MEDICINE

## 2020-11-05 PROCEDURE — 84443 ASSAY THYROID STIM HORMONE: CPT | Performed by: FAMILY MEDICINE

## 2020-11-05 PROCEDURE — 90471 IMMUNIZATION ADMIN: CPT | Performed by: FAMILY MEDICINE

## 2020-11-05 PROCEDURE — 86803 HEPATITIS C AB TEST: CPT | Performed by: FAMILY MEDICINE

## 2020-11-05 PROCEDURE — 80053 COMPREHEN METABOLIC PANEL: CPT | Performed by: FAMILY MEDICINE

## 2020-11-05 PROCEDURE — 90686 IIV4 VACC NO PRSV 0.5 ML IM: CPT | Performed by: FAMILY MEDICINE

## 2020-11-05 PROCEDURE — 85027 COMPLETE CBC AUTOMATED: CPT | Performed by: FAMILY MEDICINE

## 2020-11-05 RX ORDER — CHOLECALCIFEROL (VITAMIN D3) 50 MCG
1 TABLET ORAL DAILY
COMMUNITY
Start: 2020-11-05

## 2020-11-05 RX ORDER — CHLORAL HYDRATE 500 MG
1 CAPSULE ORAL DAILY
Qty: 120 CAPSULE | Refills: 11 | COMMUNITY
Start: 2020-11-05 | End: 2021-11-11

## 2020-11-05 ASSESSMENT — MIFFLIN-ST. JEOR: SCORE: 1519.15

## 2020-11-05 NOTE — PATIENT INSTRUCTIONS
[facility]  [address] [place] [address]  Office: [phone]   Fax:    [phone]     Return in about 1 year (around 11/5/2021) for Physical/Preventative Visit, with Dr. Mcleod, please call Sarahi [phone] to schedule.       Preventive Health Recommendations  Female Ages 26 - 39  Yearly exam:   See your health care provider every year in order to    Review health changes.     Discuss preventive care.      Review your medicines if you your doctor has prescribed any.    Until age 30: Get a Pap test every three years (more often if you have had an abnormal result).    After age 30: Talk to your doctor about whether you should have a Pap test every 3 years or have a Pap test with HPV screening every 5 years.   You do not need a Pap test if your uterus was removed (hysterectomy) and you have not had cancer.  You should be tested each year for STDs (sexually transmitted diseases), if you're at risk.   Talk to your provider about how often to have your cholesterol checked.  If you are at risk for diabetes, you should have a diabetes test (fasting glucose).  Shots: Get a flu shot each year. Get a tetanus shot every 10 years.   Nutrition:     Eat at least 5 servings of fruits and vegetables each day.    Eat whole-grain bread, whole-wheat pasta and brown rice instead of white grains and rice.    Get adequate Calcium and Vitamin D.     Lifestyle    Exercise at least 150 minutes a week (30 minutes a day, 5 days of the week). This will help you control your weight and prevent disease.    Limit alcohol to one drink per day.    No smoking.     Wear sunscreen to prevent skin cancer.    See your dentist every six months for an exam and cleaning.    Thank you so much or choosing [facility]  for your Health Care. It was a pleasure seeing you at your visit today! Please contact us with any questions or concerns you may have.                   Mey LOPEZ  MD Harsha                              To reach your [facility] team after hours call:   [phone]    PLEASE NOTE OUR HOURS HAVE CHANGED secondary to COVID-19 coronavirus pandemic, as we are trying to minimize patient exposure to the virus,  which is now widespread in the state.  These hours may change with very little notice.  We apologize for any inconvenience.       Our current clinic hours are:          Monday- Thursday   7:00am - 6:00pm  in person.      Friday  7:00am- 5:00pm                       Saturday and Sunday : Closed to in person and virtual visits        We have telephone and virtual visit times available between    7:00am - 6pm on Monday-Friday as well.                                                Phone:  [phone]      Our pharmacy hours:   Monday  9:00 am to 6:00 pm      Tuesday through Friday 9:00am to 5:00pm                        Saturday - 9:00 am to 12 noon       Sunday : Closed.              Phone:  [phone]    ###  Please note: at this time we are not accepting any walk-in visits. ###      There is also information available at our web site:  [email or web address]    If your provider ordered any lab tests and you do not receive the results within 10 business days, please call the clinic.    If you need a medication refill please contact your pharmacy.  Please allow 2 business days for your refill to be completed.    Our clinic offers telephone visits and e visits.  Please ask one of your team members to explain more.      Use [email or web address] (secure email communication and access to your chart) to send your primary care provider a message or make an appointment. Ask someone on your Team how to sign up for [facility].

## 2020-11-05 NOTE — PROGRESS NOTES
SUBJECTIVE:   CC: Mandy Novak is an 35 year old woman who presents for preventive health visit.       Patient has been advised of split billing requirements and indicates understanding: Yes  Healthy Habits:     Getting at least 3 servings of Calcium per day:  Yes    Bi-annual eye exam:  Yes    Dental care twice a year:  Yes    Sleep apnea or symptoms of sleep apnea:  None    Diet:  Regular (no restrictions)    Frequency of exercise:  2-3 days/week    Duration of exercise:  15-30 minutes    Taking medications regularly:  Yes    Medication side effects:  None    PHQ-2 Total Score: 0    Additional concerns today:  No      Just had a miscarriage 9/21/2020.   Then had a normal period 10/20/2020.  Doing really well. No complaints. Continuing on prenatal vit with folic acid. Has felt like she was able to grieve well for her loss.     Today's PHQ-2 Score:   PHQ-2 ( 1999 [employer]) 11/4/2020   Q1: Little interest or pleasure in doing things 0   Q2: Feeling down, depressed or hopeless 0   PHQ-2 Score 0   Q1: Little interest or pleasure in doing things Not at all   Q2: Feeling down, depressed or hopeless Not at all   PHQ-2 Score 0       Abuse: Current or Past (Physical, Sexual or Emotional) - No  Do you feel safe in your environment? Yes        Social History     Tobacco Use     Smoking status: Never Smoker     Smokeless tobacco: Never Used   Substance Use Topics     Alcohol use: Yes     Comment: 0-1 drinks per week     If you drink alcohol do you typically have >3 drinks per day or >7 drinks per week? No    Alcohol Use 11/4/2020   Prescreen: >3 drinks/day or >7 drinks/week? No   Prescreen: >3 drinks/day or >7 drinks/week? -       Reviewed orders with patient.  Reviewed health maintenance and updated orders accordingly - Yes  Lab work is in process  Labs reviewed in EPIC  BP Readings from Last 3 Encounters:   11/05/20 110/70   10/19/20 112/68   10/05/20 118/78    Wt Readings from Last 3 Encounters:   11/05/20 77.6 kg  (171 lb)   10/05/20 78 kg (172 lb)   03/15/20 74.8 kg (165 lb)                  Patient Active Problem List   Diagnosis     CARDIOVASCULAR SCREENING; LDL GOAL LESS THAN 160     Situational syncope- with any blood draws or shots     ASCUS with positive high risk HPV- while pregnant 2016 = normal - recheck pap and cotest postpartum     Resolved - Marginal placenta previa with intrapartum hemorrhage in first trimester-repeat 1/15/2015= resolved      Multiple pigmented nevi     Supervision of other normal pregnancy, antepartum     Type O blood, Rh positive     Cecilio breech presentation - seen on 31 week US - resolved with external version on 3/24/2017     Large for gestational age     Indication for care in labor or delivery     History of shoulder dystocia in prior pregnancy- with first baby 7lbs 14oz 2015       (normal spontaneous vaginal delivery)     Immediate postpartum hemorrhage, postpartum     Elevated d-dimer     Acute posthemorrhagic anemia     Anxiety     Postpartum depression associated with second pregnancy     Family history of nonmelanoma skin cancer     Difficulty falling asleep at night until early morning hours     Exposure to human papillomavirus-  with genital warts - recently diagnosed     Past Surgical History:   Procedure Laterality Date     BIOPSY  2015, mulitple others    colposcopy, several other mole removals     C ORAL SURGERY PROCEDURE      graft surgery for lower gum recession on 2020     [place] teeth extraction         Social History     Tobacco Use     Smoking status: Never Smoker     Smokeless tobacco: Never Used   Substance Use Topics     Alcohol use: Yes     Comment: 0-1 drinks per week     Family History   Problem Relation Age of Onset     Diabetes Maternal Grandfather         diet controlled     Thyroid Disease Maternal Grandmother      Depression Maternal Grandmother      Family History Negative Mother      Family History Negative Father       Hyperlipidemia Father      Anxiety Disorder Sister      Depression Sister      Anxiety Disorder Brother      Depression Brother      Depression Sister      Depression Brother          Current Outpatient Medications   Medication Sig Dispense Refill     fish oil-omega-3 fatty acids 1000 MG capsule Take 1 capsule (1 g) by mouth daily 120 capsule 11     Prenatal Vit-Fe Fumarate-FA (PRENATAL VITAMINS) 28-0.8 MG TABS Take 1 tablet by mouth daily       vitamin D3 (CHOLECALCIFEROL) 50 mcg (2000 units) tablet Take 1 tablet (50 mcg) by mouth daily       No Known Allergies  Recent Labs   Lab Test 03/15/20  0459 18  0947 07/10/18  1650 17  1122   LDL  --   --  58  --    HDL  --   --  56  --    TRIG  --   --  98  --    CR 0.66  --   --   --    GFRESTIMATED >90  --   --   --    GFRESTBLACK >90  --   --   --    POTASSIUM 3.5  --   --   --    TSH  --  1.38  --  1.06        Mammogram not appropriate for this patient based on age.    Pertinent mammograms are reviewed under the imaging tab.  History of abnormal Pap smear: NO - age 30- 65 PAP every 3 years recommended  PAP / HPV Latest Ref Rng & Units 2018   PAP - NIL NIL NIL   HPV 16 DNA NEG:Negative Negative Negative -   HPV 18 DNA NEG:Negative Negative Negative -   OTHER HR HPV NEG:Negative Negative Negative -     Reviewed and updated as needed this visit by clinical staff                 Reviewed and updated as needed this visit by Provider                OB History    Para Term  AB Living   3 2 2 0 1 2   SAB TAB Ectopic Multiple Live Births   1 0 0 0 2      # Outcome Date GA Lbr Eh/2nd Weight Sex Delivery Anes PTL Lv   3 SAB 20 6w0d    SAB      2 Term 17 40w0d 05:32 / 01:27 4.36 kg (9 lb 9.8 oz) F Vag-Spont EPI N NAZANIN      Birth Comments: none      Complications: Excessive Vaginal Bleeding, Dysfunctional Labor      Name: Sherice Galvan       Apgar1: 8  Apgar5: 9   1 Term 06/19/15 38w5d 16:00 / 02:37 3.569 kg (7 lb 13.9  "oz) M Vag-Spont EPI  NAZANIN      Birth Comments: hand next to face       Complications: Shoulder Dystocia      Name: Maik      Apgar1: 8  Apgar5: 9      Obstetric Comments   Son Maik - born in 2015   Daughter, Sherice, born in 2017    SAB 9/21/2020        Review of Systems  CONSTITUTIONAL: NEGATIVE for fever, chills, change in weight  INTEGUMENTARU/SKIN: NEGATIVE for worrisome rashes, moles or lesions  EYES: NEGATIVE for vision changes or irritation  ENT: NEGATIVE for ear, mouth and throat problems  RESP: NEGATIVE for significant cough or SOB  BREAST: NEGATIVE for masses, tenderness or discharge  CV: NEGATIVE for chest pain, palpitations or peripheral edema  GI: NEGATIVE for nausea, abdominal pain, heartburn, or change in bowel habits  : NEGATIVE for unusual urinary or vaginal symptoms. Periods are regular.  MUSCULOSKELETAL: NEGATIVE for significant arthralgias or myalgia  NEURO: NEGATIVE for weakness, dizziness or paresthesias  PSYCHIATRIC: NEGATIVE for changes in mood or affect     OBJECTIVE:   /70   Temp 96  F (35.6  C)   Ht 1.727 m (5' 8\")   Wt 77.6 kg (171 lb)   LMP 10/20/2020   SpO2 98%   BMI 26.00 kg/m    Physical Exam  GENERAL: healthy, alert and no distress  EYES: Eyes grossly normal to inspection, PERRL and conjunctivae and sclerae normal  HENT: ear canals and TM's normal, nose and mouth without ulcers or lesions  NECK: no adenopathy, no asymmetry, masses, or scars and thyroid normal to palpation  RESP: lungs clear to auscultation - no rales, rhonchi or wheezes  BREAST: normal without masses, tenderness or nipple discharge and no palpable axillary masses or adenopathy  CV: regular rate and rhythm, normal S1 S2, no S3 or S4, no murmur, click or rub, no peripheral edema and peripheral pulses strong  ABDOMEN: soft, nontender, no hepatosplenomegaly, no masses and bowel sounds normal  MS: no gross musculoskeletal defects noted, no edema  SKIN: no suspicious lesions or rashes  NEURO: Normal strength " "and tone, mentation intact and speech normal  PSYCH: mentation appears normal, affect normal/bright  Pelvic exam was just done last month. - normal.    Diagnostic Test Results:  [facility]    HCG was 1 on 10/5/2020.     ASSESSMENT/PLAN:       ICD-10-CM    1. Encounter for routine adult health examination without abnormal findings  Z00.00    2. Vitamin D deficiency  E55.9 vitamin D3 (CHOLECALCIFEROL) 50 mcg (2000 units) tablet     25 Hydroxyvitamin D2 and D3   3. Need for hepatitis C screening test  Z11.59 Hepatitis C antibody   4. CARDIOVASCULAR SCREENING; LDL GOAL LESS THAN 160  Z13.6 CBC with platelets     Comprehensive metabolic panel     TSH with free T4 reflex     fish oil-omega-3 fatty acids 1000 MG capsule   5. Needs flu shot  Z23 INFLUENZA VACCINE IM > 6 MONTHS VALENT IIV4 [64416]     ADMIN 1st VACCINE       Patient has been advised of split billing requirements and indicates understanding: Yes  COUNSELING:  Reviewed preventive health counseling, as reflected in patient instructions    Estimated body mass index is 26.54 kg/m  as calculated from the following:    Height as of 10/2/19: 1.715 m (5' 7.5\").    Weight as of 10/5/20: 78 kg (172 lb).    Weight management plan: Discussed healthy diet and exercise guidelines    She reports that she has never smoked. She has never used smokeless tobacco.    Return in about 1 year (around 11/5/2021) for Physical/Preventative Visit, with Dr. Mcleod, please call [facility] [phone] to schedule.     Counseling Resources:  ATP IV Guidelines  Pooled Cohorts Equation Calculator  Breast Cancer Risk Calculator  BRCA-Related Cancer Risk Assessment: FHS-7 Tool  FRAX Risk Assessment  ICSI Preventive Guidelines  Dietary Guidelines for Americans, 2010  USDA's MyPlate  ASA Prophylaxis  Lung CA Screening    Mey Mcleod MD  [facility]  "

## 2020-11-06 LAB
ALBUMIN SERPL-MCNC: 3.9 G/DL (ref 3.4–5)
ALP SERPL-CCNC: 64 U/L (ref 40–150)
ALT SERPL W P-5'-P-CCNC: 33 U/L (ref 0–50)
ANION GAP SERPL CALCULATED.3IONS-SCNC: 5 MMOL/L (ref 3–14)
AST SERPL W P-5'-P-CCNC: 22 U/L (ref 0–45)
BILIRUB SERPL-MCNC: 0.4 MG/DL (ref 0.2–1.3)
BUN SERPL-MCNC: 12 MG/DL (ref 7–30)
CALCIUM SERPL-MCNC: 8.9 MG/DL (ref 8.5–10.1)
CHLORIDE SERPL-SCNC: 106 MMOL/L (ref 94–109)
CO2 SERPL-SCNC: 29 MMOL/L (ref 20–32)
CREAT SERPL-MCNC: 0.68 MG/DL (ref 0.52–1.04)
DEPRECATED CALCIDIOL+CALCIFEROL SERPL-MC: <52 UG/L (ref 20–75)
GFR SERPL CREATININE-BSD FRML MDRD: >90 ML/MIN/{1.73_M2}
GLUCOSE SERPL-MCNC: 102 MG/DL (ref 70–99)
HCV AB SERPL QL IA: NONREACTIVE
POTASSIUM SERPL-SCNC: 4.5 MMOL/L (ref 3.4–5.3)
PROT SERPL-MCNC: 7.6 G/DL (ref 6.8–8.8)
SODIUM SERPL-SCNC: 140 MMOL/L (ref 133–144)
TSH SERPL DL<=0.005 MIU/L-ACNC: 1.44 MU/L (ref 0.4–4)
VITAMIN D2 SERPL-MCNC: <5 UG/L
VITAMIN D3 SERPL-MCNC: 47 UG/L

## 2021-05-07 ENCOUNTER — MYC MEDICAL ADVICE (OUTPATIENT)
Dept: FAMILY MEDICINE | Facility: CLINIC | Age: 36
End: 2021-05-07

## 2021-05-11 NOTE — TELEPHONE ENCOUNTER
Please see my chart message below     Please review and advise     Thank you     Deedee Alvarado RN, BSN  [facility]

## 2021-05-12 NOTE — PATIENT INSTRUCTIONS
Patient Education     PFIZER-BIONTECH COVID-19 VACCINE- rna ingredient [id number] injection Injectable Suspension 1 dose  Uses    Instructions  This medicine is given as an injection into a muscle.  This medicine should be given by a trained health care provider.  If you miss a dose, contact your doctor for instructions.  Please tell your doctor and pharmacist about all the medicines you take. Include both prescription and over-the-counter medicines. Also tell them about any vitamins, herbal medicines, or anything else you take for your health.  Continue to wear a mask, practice social distancing, and follow other safety guidelines to protect yourself and those around you until otherwise directed.  Cautions  Tell your doctor and pharmacist if you ever had an allergic reaction to a medicine. Symptoms of an allergic reaction can include trouble breathing, skin rash, itching, swelling, or severe dizziness.  Speak with your health care provider before receiving any vaccinations.  Tell the doctor or pharmacist if you are pregnant, planning to be pregnant, or breastfeeding.  Ask your pharmacist if this medicine can interact with any of your other medicines. Be sure to tell them about all the medicines you take.  Side Effects  The following is a list of some common side effects from this medicine. Please speak with your doctor about what you should do if you experience these or other side effects.    lack of energy and tiredness    fever or chills    headaches    reaction at the area of the injection (pain, redness, swelling)    pain in the joints    muscle pain  Call your doctor or get medical help right away if you notice any of these more serious side effects:    dizziness    ear problems (ringing in the ears, hearing loss)    fainting    muscle weakness    seizures    blurring or changes of vision  A few people may have an allergic reactions to this medicine. Symptoms can include difficulty breathing, skin rash,  itching, swelling, or severe dizziness. If you notice any of these symptoms, seek medical help quickly.  Extra  Please speak with your doctor, nurse, or pharmacist if you have any questions about this medicine.  [email or web address]  IMPORTANT NOTE: This document tells you briefly how to take your medicine, but it does not tell you all there is to know about it.Your doctor or pharmacist may give you other documents about your medicine. Please talk to them if you have any questions.Always follow their advice. There is a more complete description of this medicine available in English.Scan this code on your smartphone or tablet or use the web address below. You can also ask your pharmacist for a printout. If you have any questions, please ask your pharmacist.     2021 [employer].

## 2021-06-02 NOTE — TELEPHONE ENCOUNTER
Reached out and spoke with Yara at [facility] \"[facility]\" [phone].  She verified patient remains at the facility at this time and has not yet been discharged.      Due to the length of stay at [facility] and due to the change is pcp from Dr. Deloris Nair to Dr. Anton Corea noted in pt's EMR, RN will reach out to pt's daughter, Lynn Baires, to confirm pt's status at the [facility] (short or long term) and to confirm pt's pcp.     RN reached out and spoke with Lynn.  She confirms pt will likely be a long term resident at [facility] now so therefore her new pcp will be Dr. Anton Corea MD as he sees pt at the [facility].      RN will send this update to Dr. Nair and in light of Lynn's report of pt's change in pcp, RN will no longer follow pt.   mendez [place] - L&D charge nurse - scheduled pt for induction of labor for medical reasons for Monday 4/17/2017 - at 40w 0 days secondary to fetal macrosomia with EFW = 3950g on 4/13/2017 who will be greater than 4000g on 4/17/2017 with hx of shoulder dystocia with previous baby at 7lbs 14oz and borderline polyhydramnios.      Informed pt - see last note and pt instructions.   She will call L&D at 0630 on 4/17/2017 for arrival at 0730.          Mey Mcleod MD

## 2021-07-06 ENCOUNTER — MYC MEDICAL ADVICE (OUTPATIENT)
Dept: FAMILY MEDICINE | Facility: CLINIC | Age: 36
End: 2021-07-06

## 2021-07-07 NOTE — TELEPHONE ENCOUNTER
My chart message sent     Awaiting reply     Deedee Alvarado RN, BSN  [facility]

## 2021-07-14 ENCOUNTER — OFFICE VISIT (OUTPATIENT)
Dept: FAMILY MEDICINE | Facility: CLINIC | Age: 36
End: 2021-07-14
Payer: COMMERCIAL

## 2021-07-14 VITALS
DIASTOLIC BLOOD PRESSURE: 82 MMHG | SYSTOLIC BLOOD PRESSURE: 110 MMHG | HEART RATE: 95 BPM | HEIGHT: 68 IN | RESPIRATION RATE: 16 BRPM | OXYGEN SATURATION: 99 % | WEIGHT: 160 LBS | BODY MASS INDEX: 24.25 KG/M2 | TEMPERATURE: 97 F

## 2021-07-14 DIAGNOSIS — L71.9 ACNE ROSACEA: ICD-10-CM

## 2021-07-14 DIAGNOSIS — F41.9 ANXIETY: Primary | ICD-10-CM

## 2021-07-14 DIAGNOSIS — J38.3 VOCAL CORD DYSFUNCTION: ICD-10-CM

## 2021-07-14 DIAGNOSIS — F41.0 PANIC ATTACK: ICD-10-CM

## 2021-07-14 PROCEDURE — 99214 OFFICE O/P EST MOD 30 MIN: CPT | Performed by: FAMILY MEDICINE

## 2021-07-14 RX ORDER — AZELAIC ACID 0.15 G/G
0.5 GEL TOPICAL 2 TIMES DAILY
Qty: 50 G | Refills: 5 | Status: SHIPPED | OUTPATIENT
Start: 2021-07-14 | End: 2022-03-11

## 2021-07-14 RX ORDER — CITALOPRAM HYDROBROMIDE 20 MG/1
10 TABLET ORAL DAILY
Qty: 90 TABLET | Refills: 1 | Status: SHIPPED | OUTPATIENT
Start: 2021-07-14 | End: 2021-11-11

## 2021-07-14 RX ORDER — PROPRANOLOL HYDROCHLORIDE 10 MG/1
10-20 TABLET ORAL 3 TIMES DAILY PRN
Qty: 30 TABLET | Refills: 1 | Status: SHIPPED | OUTPATIENT
Start: 2021-07-14 | End: 2022-10-27

## 2021-07-14 ASSESSMENT — ANXIETY QUESTIONNAIRES
1. FEELING NERVOUS, ANXIOUS, OR ON EDGE: NEARLY EVERY DAY
3. WORRYING TOO MUCH ABOUT DIFFERENT THINGS: NEARLY EVERY DAY
GAD7 TOTAL SCORE: 21
6. BECOMING EASILY ANNOYED OR IRRITABLE: NEARLY EVERY DAY
IF YOU CHECKED OFF ANY PROBLEMS ON THIS QUESTIONNAIRE, HOW DIFFICULT HAVE THESE PROBLEMS MADE IT FOR YOU TO DO YOUR WORK, TAKE CARE OF THINGS AT HOME, OR GET ALONG WITH OTHER PEOPLE: VERY DIFFICULT
5. BEING SO RESTLESS THAT IT IS HARD TO SIT STILL: NEARLY EVERY DAY
7. FEELING AFRAID AS IF SOMETHING AWFUL MIGHT HAPPEN: NEARLY EVERY DAY
2. NOT BEING ABLE TO STOP OR CONTROL WORRYING: NEARLY EVERY DAY

## 2021-07-14 ASSESSMENT — MIFFLIN-ST. JEOR: SCORE: 1464.26

## 2021-07-14 ASSESSMENT — PATIENT HEALTH QUESTIONNAIRE - PHQ9
SUM OF ALL RESPONSES TO PHQ QUESTIONS 1-9: 4
5. POOR APPETITE OR OVEREATING: NEARLY EVERY DAY

## 2021-07-14 NOTE — PATIENT INSTRUCTIONS
[facility]  [address] [place] [address]  Office: [phone]   Fax:    [phone]            Patient Education     Treating Anxiety Disorders with Therapy    If you have an anxiety disorder, you don t have to suffer needlessly. Treatment is available. Therapy (also called counseling) is often a helpful treatment for anxiety disorders. With therapy, a trained professional (therapist) helps you face and learn to manage your anxiety. Therapy can be short-term or long-term based on your needs. In some cases, medicine may also be prescribed with therapy. It may take time before you notice how much therapy is helping, but stick with it. With therapy, you can feel better.   Cognitive behavioral therapy (CBT)  Cognitive behavioral therapy (CBT) teaches you to manage anxiety. It does this by helping you understand how you think and act when you re anxious. Research has shown CBT to be a very effective treatment for anxiety disorders. CBT involves homework and activities to build skills that teach you to cope with anxiety step by step. It can be done in a group or 1-on-1, and often takes place for a set number of sessions. CBT has 2 main parts:     Cognitive therapy. This helps you identify the negative, irrational thoughts that occur with your anxiety. You ll learn to replace these with more positive, realistic thoughts.    Behavioral therapy. This helps you change how you react to anxiety. You ll learn coping skills and methods for relaxing to help you better deal with anxiety.  Other forms of therapy  Other therapy methods may work better for you than CBT. Or, you may move from CBT to another form of therapy as your treatment needs change. This may mean meeting with a therapist by yourself or in a group. Therapy can also help you work through problems in your life, such as drug or alcohol dependence, that may be making your anxiety worse.   Getting better takes time  Therapy will  help you feel better and teach you skills to help manage anxiety long term. But change doesn t happen right away. It takes a commitment from you. And treatment only works if you learn to face the causes of your anxiety. So, you might feel worse before you feel better. This can sometimes make it hard to stick with it. But remember: Therapy is a very effective treatment. The results will be well worth it.   Helping yourself  If anxiety is wearing you down, here are some things you can do to cope:    Check with your healthcare provider and rule out any physical problems that may be causing the anxiety symptoms.    If you are diagnosed with an anxiety disorder, seek mental healthcare. This is an illness and it can respond to treatment. Most types of anxiety disorders will respond to talk therapy and medicine.    Educate yourself about anxiety disorders. Keep track of helpful online resources and books you can use during stressful periods.    Try stress management methods such as meditation.    Consider online or in-person support groups.    Don t fight your feelings. Anxiety feeds itself. The more you worry about it, the worse it gets. Instead, try to identify what might have triggered your anxiety. Then try to put this threat in perspective.    Keep in mind that you can t control everything about a situation. Change what you can and let the rest take its course.    Exercise -- it s a great way to relieve tension and help your body feel relaxed.    Examine your life for stress, and try to find ways to reduce it.    Avoid caffeine and nicotine. These can make anxiety symptoms worse.    Don't turn to alcohol or unprescribed medicines for relief. They only make things worse in the long run.  [employer] last reviewed this educational content on 5/1/2020 2000-2021 [employer]. All rights reserved. This information is not intended as a substitute for professional medical care. Always follow your healthcare  professional's instructions.           Patient Education     Treating Anxiety Disorders with Medicine  An anxiety disorder can make you feel nervous or apprehensive, even without a clear reason. In people age 65 and older, generalized anxiety disorder is one of the most commonly diagnosed anxiety disorders. Many times it occurs with depression. Certain anxiety disorders can cause intense feelings of fear or panic. You may even have physical symptoms such as a racing heartbeat, sweating, or dizziness. If you have these feelings, you don t have to suffer anymore. Treatment to help you overcome your fears will likely include therapy (also called counseling). Medicine may also be prescribed to help control your symptoms.     Medicines  Certain medicines may be prescribed to help control your symptoms. So you may feel less anxious. You may also feel able to move forward with therapy. At first, medicines and dosages may need to be adjusted to find what works best for you. Try to be patient. Tell your healthcare provider how a medicine makes you feel. This way, you can work together to find the treatment that s best for you. Keep in mind that medicines can have side effects. Talk with your provider about any side effects that are bothering you. Changing the dose or type of medicine may help. Don t stop taking medicine on your own. That can cause symptoms to come back or cause dangerous withdrawal symptoms.     Anti-anxiety medicine. This medicine eases symptoms and helps you relax. Your healthcare provider will explain when and how to use it. It may be prescribed for use before situations that make you anxious. You may also be told to take medicine on a regular schedule. Anti-anxiety medicine may make you feel a little sleepy or  out of it.  Don t drive a car or operate machinery while on this medicine, until you know how it affects you.  Never use alcohol or other drugs with anti-anxiety medicines. This could result in  loss of muscular control, sedation, coma, or death. Also, use only the amount of medicine prescribed for you. If you think you may have taken too much, get emergency care right away. Never share your medicines with others. Store these medicines in a safe place that can't be reached by children or visitors.   Keep taking medicines as prescribed  Never change your dosage, share or use another person's medicine, or stop taking your medicines without talking to your healthcare provider first. Keep the following in mind:     Some medicines must be taken on a schedule. Make this part of your daily routine. For instance, always take your pill before brushing your teeth. A pillbox can help you remember if you ve taken your medicine each day.    Medicines are often taken for 6 to 12 months. Your healthcare provider will then evaluate whether you need to stay on them. Many people who have also had therapy may no longer need medicine to manage anxiety.    You may need to stop taking medicine slowly to give your body time to adjust. When it s time to stop, your healthcare provider will tell you more. Remember: Never stop taking your medicine without talking to your provider first.    If symptoms return, you may need to start taking medicines again.  This isn t your fault. It s just the nature of your anxiety disorder.  What to think about    Side effects. Medicines may cause side effects. Ask your healthcare provider or pharmacist what you can expect. They may have ideas for avoiding some side effects.    Sexual problems. Some antidepressants can affect your desire for sex or your ability to have an orgasm. A change in dosage or medicine often solves the problem. If you have a sexual side effect that concerns you, tell your healthcare provider.    Addiction. If you ve never had a problem with drugs or alcohol, you may not have a problem with medicines used to treat anxiety disorders. But always discuss the medicines with your  healthcare provider before taking them. If you have a history of addiction, you may not be able to use certain medicines used to treat anxiety disorders.    Medicine interactions. Always check with your pharmacist before using any over-the-counter medicines (OTCs), including herbal supplements. Some OTCs may interact with your anti-anxiety medicines and increase or decrease their effectiveness.    [employer] last reviewed this educational content on 12/1/2019 2000-2021 [employer]. All rights reserved. This information is not intended as a substitute for professional medical care. Always follow your healthcare professional's instructions.           Patient Education     Your Body s Response to Anxiety  Normal anxiety is part of the body s natural defense system. It's an alert to a threat that is unknown, vague, or comes from your own internal fears. While you re in this state, your feelings can range from a vague sense of worry to physical sensations such as a pounding heartbeat. These feelings make you want to react to the threat. An anxiety response is normal in many situations. But when you have an anxiety disorder, the same response can occur at the wrong times.   Anxiety can be helpful  Normal anxiety is a signal from your brain. It warns you of a threat. It's a normal response to help you prevent something. Or to decrease the bad effects of something you can't control. For example, anxiety is a normal response to situations that might harm your body, separate you from a loved one, or lose your job. The symptoms of anxiety can be physical and mental.   How does it feel?  People with anxiety may have:    Dizziness    Muscle tension or pain    Restlessness    Sleeplessness    Trouble focusing    Racing heartbeat    Fast breathing    Shaking or trembling    Stomachache    Diarrhea    Loss of energy    Sweating    Cold, clammy hands    Chest pain    Dry mouth  Anxiety can also be a problem  Anxiety  can become a problem when it is hard to control, occurs for months, and interferes with important parts of your life. With an anxiety disorder, your body has the response described above, but in inappropriate ways. The response a person has depends on the anxiety disorder he or she has. With some disorders, the anxiety is way out of proportion to the threat that triggers it. With others, anxiety may occur even when there isn t a clear threat or trigger.   Who does it affect?  Some people are more likely to have lasting anxiety than others. It tends to run in families. And it affects more younger people than older people, and more women than men. But no age, race, or gender is immune to anxiety problems.   Anxiety can be treated  The good news is that the anxiety that s disrupting your life can be treated. Check with your healthcare provider and rule out any physical problems that may be causing the anxiety symptoms. If an anxiety disorder is diagnosed, seek mental healthcare. This is an illness and it can respond to treatment. Most types of anxiety disorders will respond to talk therapy (counseling) and medicines. Working with your doctor or other healthcare provider, you can develop skills to help you cope with anxiety. You can also gain the perspective you need to overcome your fears. Good sources of support or guidance can be found at your local hospital, mental health clinic, or an employee assistance program.     How to cope with anxiety  Here are some things you can do to cope:    Do what you can.  Keep in mind that you can t control everything. Change what you can. And let the rest take its course.    Exercise. This is a great way to ease tension and help your body feel relaxed.    Stay away from caffeine and nicotine.  These can make anxiety symptoms worse.    Stay sober.  Don't use alcohol or unprescribed medicines. They only make things worse in the long run.    Learn more about anxiety disorders.  Keep  track of helpful online resources and books you can use during stressful periods.    Try stress management. Try methods such as meditation.    Talk with others. Think about joining online or in-person support groups.    Get help. Find professional mental health services if your symptoms can't be managed or reduced with the above methods.  [employer] last reviewed this educational content on 4/1/2020 2000-2021 [employer]. All rights reserved. This information is not intended as a substitute for professional medical care. Always follow your healthcare professional's instructions.           Patient Education     Treating Panic Disorder with Medicine    Panic disorder is a type of anxiety disorder characterized by panic attacks. A panic attack is a sudden, intense fear that lasts for several minutes when there is no real danger. It's accompanied by terror, severe physical symptoms, and a strong need to escape wherever you are. If you have panic disorder, your healthcare provider may prescribe one or more medicines for treatment. Common medicines are described below.    Types of medicines  Medicines to treat panic disorder include:    Anti-anxiety medicine. This medicine relieves symptoms and helps you relax. Your healthcare provider will explain when and how to use it. It may be prescribed for use before entering a situation that makes you anxious. Or you may be told to take it on a regular schedule. Anti-anxiety medicine may make you feel a little sleepy or out of it. Don't drive a car or operate machinery while on this medicine, until you know how it affects you.    Antidepressant medicine. This kind of medicine is often used to treat anxiety, even if you aren't depressed. An antidepressant balances brain chemicals. This helps keep anxiety under control. This medicine is taken on a schedule. It takes a few weeks to start working. If you don't notice a change at first, you may just need more time. But  if you don't notice results after the first few weeks, tell your provider.  Tips for taking medicines  Never change your dosage or stop taking your medicines without talking to your healthcare provider first. Never share your medicine or use someone else s medicine, even if it's the same medicine and dosage. Keep the following in mind:     Some medicines must be taken on a schedule. Make this part of a daily routine. For instance, always take your pill before brushing your teeth. A pillbox can help you remember if you've taken your medicine each day.    Medicines are often taken for 6 to 12 months. Your healthcare provider will then evaluate whether you need to stay on them. Many people who have also had therapy may no longer need medicine to manage anxiety.    You may need to stop taking medicine slowly. This will give your body time to adjust. When it's time to stop, your provider will tell you more.    If symptoms return, you may need to start taking medicines again.  This isn't your fault. It's just the nature of your anxiety disorder.  Special concerns    Side effects. Medicines may cause side effects. Ask your healthcare provider or pharmacist what you can expect. They may have ideas for avoiding some side effects. You can also check the package insert to learn more about side effects.    Sexual problems. Some antidepressants can affect your desire for sex or your ability to have regular orgasms. A change in dosage or medicine often solves the problem. If you have a sexual side effect that concerns you, tell your provider.    Addiction. Antidepressants are not addictive. And if you've never had a problem with drugs or alcohol, you likely won't have a problem with anti-anxiety medicine. But if you have a history of addiction, you may need to avoid this medicine. Let your provider know if you have an addiction history.    [employer] last reviewed this educational content on 5/1/2020 2000-2021 [employer]. All rights reserved. This information is not intended as a substitute for professional medical care. Always follow your healthcare professional's instructions.           Patient Education     Understanding Panic Disorder (Panic Attack)  A panic attack is a sudden, intense fear that lasts for several minutes when there is no real danger. With it comes terror, physical symptoms, and a strong need to escape from wherever you are. If you have these attacks often, you have panic disorder. The attacks can be very frightening. You may be scared of having another one. You may even stay away from a place where you ve had an attack. Some people become so afraid of having panic attacks, it s very hard for them to leave home.   Before accepting a diagnosis of panic disorder, it s important to see your healthcare provider. Your provider will evaluate your symptoms to make sure you don t have another health condition that is causing the symptoms. Conditions that can cause panic symptoms include certain heart and lung conditions and thyroid disease. Other things can also cause panic attack symptoms. These include having too much caffeine or using other stimulants, such as certain medicines.     What does a panic attack feel like?   Most panic attacks start suddenly, for no clear reason. They last about 5 to 20 minutes. A panic attack can start at any time, even while you re sleeping. During the attack, you may have:     A sudden surge of anxiety, as if you just missed hitting someone with your car. But with a panic attack, you re anxious for no clear reason    Physical symptoms, such as sweating, shortness of breath, a pounding heart, trembling, feeling like you re choking, chest pain, nausea, or dizziness    A fear that you re having a heart attack, dying, or about to lose control    A feeling that things happening around you are not real  What to do during a panic attack    Remind yourself that your body is having  a false alarm. Nothing bad will happen to you. You ve survived attacks before, and you will this time, too.    Don t fight your feelings. Let them come. Ride them out. Focus on a task like counting backward from 100. Think about some place relaxing, such as a tropical island or quiet meadow. Ask your healthcare provider or therapist to suggest other relaxation techniques.    If your symptoms worsen or occur more often, see your healthcare provider.    Help to overcome the fear  Fear of a panic attack can make you miserable. But with professional support and self-monitoring strategies, this fear can be managed. Ask your healthcare provider or therapist for help. Remember these tips:     Keep in mind that places and activities don t cause attacks. Separate the attack from the situation. Make an effort not to avoid the situation in the future.    Don t give in to the temptation to use alcohol or unprescribed medicines as an escape. In the long run, they will only add to your problems.  Warning signs for suicide  Panic disorders can be a discouraging, frightening condition that can lead some people to consider self-harm or suicide. It's very important to work with a trusted therapist, take any medicines as prescribed, and seek help if you have any of these symptoms:     Thinking often about taking your life    Planning how you may try to end your life.    Talking or writing about committing suicide    Feeling that death is the only solution to your problems    Feeling a pressing need to make out your will or arrange your     Giving away things you own    Participating in risky behaviors, such as sex with someone you don't know or drinking and driving  If you notice any of these warning signs, get help right away. You can call a mental health clinic, a 24-hour suicide crisis hotline, or go to a hospital emergency room. If there is an immediate risk, call 911.  Family and friends are often the first to recognize  the warning signs of suicide. Suicidal thoughts or actions are not a harmless bid for attention, they are a sign of extreme stress and should not be ignored. If you know someone who is talking about suicide and has the means to carry it out,  don't leave the person alone. Take action. Remove any means, such as guns, rope, or stockpiled pills. Get help from people or agencies specializing in crisis such as those listed below.   Other resources    National Suicide Prevention Lifeline  465.186.4407 (907-345-JEOC) www.suicidepreventionlifeline.org    National Suicide Hotline  500.788.5355 (800-SUICIDE) www.suicidepreventionlifeline.org    National [place] [employer] Mental [employer]ness (ALIYA)  763.494.6184 www.aliya.org    [facility] 415-518-2413 [email or web address]    [employer] last reviewed this educational content on 1/1/2020 2000-2021 [employer]. All rights reserved. This information is not intended as a substitute for professional medical care. Always follow your healthcare professional's instructions.         Thank you so much or choosing [facility]  for your Health Care. It was a pleasure seeing you at your visit today! Please contact us with any questions or concerns you may have.                   Mey Mcleod MD                              To reach your [facility] care team after hours call:   [phone]    PLEASE NOTE OUR HOURS HAVE CHANGED secondary to COVID-19 coronavirus pandemic, as we are trying to minimize patient exposure to the virus,  which is now widespread in the [facility].  These hours may change with very little notice.  We apologize for any inconvenience.       Our current clinic hours are:          Monday- Thursday   7:00am - 6:00pm  in person.      Friday  7:00am- 5:00pm                       Saturday and Sunday : Closed to in person and virtual visits        We have telephone and virtual visit times available between     7:00am - 6pm on Monday-Friday as well.                                                Phone:  [phone]      Our pharmacy hours: Monday through Friday 9:00am to 5:00pm                        Saturday - 9:00 am to 12 noon       Sunday : Closed.              Phone:  [phone]              ###  Please note: at this time we are not accepting any walk-in visits. ###      There is also information available at our web site:  [email or web address]    If your provider ordered any lab tests and you do not receive the results within 10 business days, please call the clinic.    If you need a medication refill please contact your pharmacy.  Please allow 2 business days for your refill to be completed.    Our clinic offers telephone visits and e visits.  Please ask one of your team members to explain more.      Use [employer] (secure email communication and access to your chart) to send your primary care provider a message or make an appointment. Ask someone on your Team how to sign up for [employer]t.

## 2021-07-14 NOTE — PROGRESS NOTES
Assessment & Plan       ICD-10-CM    1. Anxiety  F41.9 citalopram (CELEXA) 20 MG tablet   2. Panic attack  F41.0 citalopram (CELEXA) 20 MG tablet     propranolol (INDERAL) 10 MG tablet   3. Acne rosacea  L71.9 azelaic acid (FINACIA) 15 % external gel     SKIN CARE REFERRAL   4. Vocal cord dysfunction  J38.3 Otolaryngology Referral      At end of visit pt mentioned increase in hormonal acne - never had acne before the last year or so.  Washes with CeraVe hydrating cleanser and uses their day lotion as well.  Will add azelaic acid 15% gell and have pt seen primary care skin clinic.     Pt has noticed that her singing voice is not as strong as previous and her range has decreased.      Ordering of each unique test  Prescription drug management         MEDICATIONS:   Orders Placed This Encounter   Medications     citalopram (CELEXA) 20 MG tablet     Sig: Take 0.5 tablets (10 mg) by mouth daily For 1-2 weeks, then increase to 1 tab daily     Dispense:  90 tablet     Refill:  1     propranolol (INDERAL) 10 MG tablet     Sig: Take 1-2 tablets (10-20 mg) by mouth 3 times daily as needed (anxiety)     Dispense:  30 tablet     Refill:  1     azelaic acid (FINACIA) 15 % external gel     Sig: Apply 0.5 inches topically 2 times daily     Dispense:  50 g     Refill:  5          - Continue other medications without change  CONSULTATION/REFERRAL to the above.   Regular exercise  See Patient Instructions    Return in about 1 month (around 8/14/2021) for depression/anxiety follow up, as a video visit.               Mey Mcleod MD  [facility]    Subjective ::   Mandy is a 36 year old who presents for the following health issues :   1. Anxiety    2. Panic attack    3. Acne rosacea    4. Vocal cord dysfunction         HPI     Anxiety Follow-Up:     How are you doing with your anxiety since your last visit? Worsened     Are you having other symptoms that might be associated with anxiety? Yes:   PANIC ATTACK- shortness of breath, hyperventilates and gets lightheaded.     Have you had a significant life event? Grief or Loss Miscarriage, son with autism (new dx) Thinking about not trying for a 3rd child again.  , Raymond, is really supportive of her.     Their oldest child , Maik, recently was diagnosed with an autism spectrum disorder.     Summer hard for her as she doesn't have the routine that she has during the school year.     Lost 14 pounds this past winter with working out.     Travelling every weekend this summer.      Will be going to a counselor - declined referral today.      Are you feeling depressed? No    Do you have any concerns with your use of alcohol or other drugs? No    TSH   Date Value Ref Range Status   11/05/2020 1.44 0.40 - 4.00 mU/L Final      Social History     Tobacco Use     Smoking status: Never Smoker     Smokeless tobacco: Never Used   Substance Use Topics     Alcohol use: Yes     Comment: 0-1 drinks per week     Drug use: No     Comment: no herbal meds either      CRISTOFER-7 SCORE 12/13/2018 3/26/2020 7/14/2021   Total Score - 0 (minimal anxiety) -   Total Score 3 0 21     PHQ 12/13/2018 3/26/2020 7/14/2021   PHQ-9 Total Score 0 0 4   Q9: Thoughts of better off dead/self-harm past 2 weeks Not at all Not at all Not at all     Last PHQ-9 7/14/2021   1.  Little interest or pleasure in doing things 0   2.  Feeling down, depressed, or hopeless 0   3.  Trouble falling or staying asleep, or sleeping too much 1   4.  Feeling tired or having little energy 1   5.  Poor appetite or overeating 0   6.  Feeling bad about yourself 0   7.  Trouble concentrating 0   8.  Moving slowly or restless 2   Q9: Thoughts of better off dead/self-harm past 2 weeks 0   PHQ-9 Total Score 4   Difficulty at work, home, or with people Somewhat difficult     CRISTOFER-7  7/14/2021   1. Feeling nervous, anxious, or on edge 3   2. Not being able to stop or control worrying 3   3. Worrying too much about different  things 3   4. Trouble relaxing 3   5. Being so restless that it is hard to sit still 3   6. Becoming easily annoyed or irritable 3   7. Feeling afraid, as if something awful might happen 3   CRISTOFER-7 Total Score 21   If you checked any problems, how difficult have they made it for you to do your work, take care of things at home, or get along with other people? Very difficult     Citalopram worked well for her in the past.     Patient Active Problem List   Diagnosis     CARDIOVASCULAR SCREENING; LDL GOAL LESS THAN 160     Situational syncope- with any blood draws or shots     ASCUS with positive high risk HPV- while pregnant 2016 = normal - recheck pap and cotest postpartum     Resolved - Marginal placenta previa with intrapartum hemorrhage in first trimester-repeat 1/15/2015= resolved      Multiple pigmented nevi     Supervision of other normal pregnancy, antepartum     Type O blood, Rh positive     Cecilio breech presentation - seen on 31 week US - resolved with external version on 3/24/2017     Large for gestational age     Indication for care in labor or delivery     History of shoulder dystocia in prior pregnancy- with first baby 7lbs 14oz 2015       (normal spontaneous vaginal delivery)     Immediate postpartum hemorrhage, postpartum     Elevated d-dimer     Acute posthemorrhagic anemia     Anxiety     Postpartum depression associated with second pregnancy     Family history of nonmelanoma skin cancer     Difficulty falling asleep at night until early morning hours     Exposure to human papillomavirus-  with genital warts - recently diagnosed       Current Outpatient Medications   Medication Sig Dispense Refill     Prenatal Vit-Fe Fumarate-FA (PRENATAL VITAMINS) 28-0.8 MG TABS Take 1 tablet by mouth daily       vitamin D3 (CHOLECALCIFEROL) 50 mcg (2000 units) tablet Take 1 tablet (50 mcg) by mouth daily       fish oil-omega-3 fatty acids 1000 MG capsule Take 1 capsule (1 g) by mouth daily  "(Patient not taking: Reported on 7/14/2021) 120 capsule 11        No Known Allergies         Review of Systems   Constitutional, HEENT, cardiovascular, pulmonary, GI, , musculoskeletal, neuro, skin, endocrine and psych systems are negative, except as otherwise noted.      Objective    /82   Pulse 95   Temp 97  F (36.1  C)   Resp 16   Ht 1.727 m (5' 8\")   Wt 72.6 kg (160 lb)   LMP 07/08/2021   SpO2 99%   BMI 24.33 kg/m    Body mass index is 24.33 kg/m .  Physical Exam   GENERAL: healthy, alert and no distress  EYES: Eyes grossly normal to inspection, PERRL and conjunctivae and sclerae normal  HENT: ear canals and TM's normal, nose and mouth without ulcers or lesions  NECK: no adenopathy, no asymmetry, masses, or scars and thyroid normal to palpation  RESP: lungs clear to auscultation - no rales, rhonchi or wheezes  CV: regular rate and rhythm, normal S1 S2, no S3 or S4, no murmur, click or rub, no peripheral edema and peripheral pulses strong  ABDOMEN: soft, nontender, no hepatosplenomegaly, no masses and bowel sounds normal  MS: no gross musculoskeletal defects noted, no edema  SKIN: no suspicious lesions or rashes  NEURO: Normal strength and tone, mentation intact and speech normal  PSYCH: mentation appears normal, affect flat, tearful, anxious, judgement and insight intact and appearance well groomed    Office Visit on 11/05/2020   Component Date Value Ref Range Status     WBC 11/05/2020 6.1  4.0 - 11.0 10e9/L Final     RBC Count 11/05/2020 4.48  3.8 - 5.2 10e12/L Final     Hemoglobin 11/05/2020 13.2  11.7 - 15.7 g/dL Final     Hematocrit 11/05/2020 39.6  35.0 - 47.0 % Final     MCV 11/05/2020 88  78 - 100 fl Final     MCH 11/05/2020 29.5  26.5 - 33.0 pg Final     MCHC 11/05/2020 33.3  31.5 - 36.5 g/dL Final     RDW 11/05/2020 11.9  10.0 - 15.0 % Final     Platelet Count 11/05/2020 252  150 - 450 10e9/L Final     Sodium 11/05/2020 140  133 - 144 mmol/L Final     Potassium 11/05/2020 4.5  3.4 - 5.3 " mmol/L Final     Chloride 11/05/2020 106  94 - 109 mmol/L Final     Carbon Dioxide 11/05/2020 29  20 - 32 mmol/L Final     Anion Gap 11/05/2020 5  3 - 14 mmol/L Final     Glucose 11/05/2020 102* 70 - 99 mg/dL Final     Urea Nitrogen 11/05/2020 12  7 - 30 mg/dL Final     Creatinine 11/05/2020 0.68  0.52 - 1.04 mg/dL Final     GFR Estimate 11/05/2020 >90  >60 mL/min/[1.73_m2] Final    Comment: Non  GFR Calc  Starting 12/18/2018, serum creatinine based estimated GFR (eGFR) will be   calculated using the Chronic Kidney Disease Epidemiology Collaboration   (CKD-EPI) equation.       GFR Estimate If Black 11/05/2020 >90  >60 mL/min/[1.73_m2] Final    Comment:  GFR Calc  Starting 12/18/2018, serum creatinine based estimated GFR (eGFR) will be   calculated using the Chronic Kidney Disease Epidemiology Collaboration   (CKD-EPI) equation.       Calcium 11/05/2020 8.9  8.5 - 10.1 mg/dL Final     Bilirubin Total 11/05/2020 0.4  0.2 - 1.3 mg/dL Final     Albumin 11/05/2020 3.9  3.4 - 5.0 g/dL Final     Protein Total 11/05/2020 7.6  6.8 - 8.8 g/dL Final     Alkaline Phosphatase 11/05/2020 64  40 - 150 U/L Final     ALT 11/05/2020 33  0 - 50 U/L Final     AST 11/05/2020 22  0 - 45 U/L Final     TSH 11/05/2020 1.44  0.40 - 4.00 mU/L Final     25 OH Vit D2 11/05/2020 <5  ug/L Final     25 OH Vit D3 11/05/2020 47  ug/L Final     25 OH Vit D total 11/05/2020 <52  20 - 75 ug/L Final    Comment: Season, race, dietary intake, and treatment affect the concentration of   25-hydroxy-Vitamin D. Values may decrease during winter months and increase   during summer months. Values 20-29 ug/L may indicate Vitamin D insufficiency   and values <20 ug/L may indicate Vitamin D deficiency.  This test was developed and its performance characteristics determined by the   [facility].   It has not been cleared or approved by the FDA. The laboratory is  regulated   under CLIA as qualified to perform high-complexity testing. This test is used   for clinical purposes. It should not be regarded as investigational or for   research.       Hepatitis C Antibody 11/05/2020 Nonreactive  NR^Nonreactive Final    Comment: Assay performance characteristics have not been established for newborns,   infants, and children

## 2021-07-15 ASSESSMENT — ANXIETY QUESTIONNAIRES: GAD7 TOTAL SCORE: 21

## 2021-08-13 ENCOUNTER — MYC MEDICAL ADVICE (OUTPATIENT)
Dept: FAMILY MEDICINE | Facility: CLINIC | Age: 36
End: 2021-08-13

## 2021-08-13 ENCOUNTER — VIRTUAL VISIT (OUTPATIENT)
Dept: FAMILY MEDICINE | Facility: CLINIC | Age: 36
End: 2021-08-13
Payer: COMMERCIAL

## 2021-08-13 DIAGNOSIS — F41.9 ANXIETY: Primary | ICD-10-CM

## 2021-08-13 DIAGNOSIS — F33.0 MAJOR DEPRESSIVE DISORDER, RECURRENT EPISODE, MILD (H): ICD-10-CM

## 2021-08-13 DIAGNOSIS — Z71.85 VACCINE COUNSELING: ICD-10-CM

## 2021-08-13 PROCEDURE — 99214 OFFICE O/P EST MOD 30 MIN: CPT | Mod: 95 | Performed by: FAMILY MEDICINE

## 2021-08-13 ASSESSMENT — PATIENT HEALTH QUESTIONNAIRE - PHQ9
SUM OF ALL RESPONSES TO PHQ QUESTIONS 1-9: 3
SUM OF ALL RESPONSES TO PHQ QUESTIONS 1-9: 3
10. IF YOU CHECKED OFF ANY PROBLEMS, HOW DIFFICULT HAVE THESE PROBLEMS MADE IT FOR YOU TO DO YOUR WORK, TAKE CARE OF THINGS AT HOME, OR GET ALONG WITH OTHER PEOPLE: NOT DIFFICULT AT ALL

## 2021-08-13 ASSESSMENT — ANXIETY QUESTIONNAIRES
GAD7 TOTAL SCORE: 8
7. FEELING AFRAID AS IF SOMETHING AWFUL MIGHT HAPPEN: SEVERAL DAYS
5. BEING SO RESTLESS THAT IT IS HARD TO SIT STILL: SEVERAL DAYS
7. FEELING AFRAID AS IF SOMETHING AWFUL MIGHT HAPPEN: SEVERAL DAYS
GAD7 TOTAL SCORE: 8
GAD7 TOTAL SCORE: 8
3. WORRYING TOO MUCH ABOUT DIFFERENT THINGS: SEVERAL DAYS
4. TROUBLE RELAXING: MORE THAN HALF THE DAYS
8. IF YOU CHECKED OFF ANY PROBLEMS, HOW DIFFICULT HAVE THESE MADE IT FOR YOU TO DO YOUR WORK, TAKE CARE OF THINGS AT HOME, OR GET ALONG WITH OTHER PEOPLE?: SOMEWHAT DIFFICULT
1. FEELING NERVOUS, ANXIOUS, OR ON EDGE: SEVERAL DAYS
6. BECOMING EASILY ANNOYED OR IRRITABLE: SEVERAL DAYS
2. NOT BEING ABLE TO STOP OR CONTROL WORRYING: SEVERAL DAYS

## 2021-08-13 NOTE — PROGRESS NOTES
Mandy is a 36 year old who is being evaluated via a billable video visit.      How would you like to obtain your AVS? MyChart  If the video visit is dropped, the invitation should be resent by: Text to cell phone: [phone]  Will anyone else be joining your video visit? No    Video Start Time: 12:04 PM    Assessment & Plan       ICD-10-CM    1. Anxiety  F41.9    2. Major depressive disorder, recurrent episode, mild (H)  F33.0    3. Vaccine counseling - for COVID-19 vaccines - discussed in detail today   Z71.89         Pt would like to stay with current 20mg citalopram dosage and follow-up with me on 11/11/2021 at her scheduled physical.  If she does not get to where she wants to be mood wise in the next month, she will call.  We discussed increasing citalopram to 30 mg daily equaling 1.5- 20 mg tablets daily.  She is like to hold on that for now and see how she feels.  She feels like she is on a positive path right now and would like to stay the course.    We had a long discussion re: need for covid19 vaccines. I highly recommended the Pfizer or Moderna - mRNA vaccines for her for lowest side effect risks and highest efficacy.   Discussed that the risks of the mRNA vaccines are minimal compared with the risks of mild to severe COVID-19 novel coronavirus infection.  The risks of the vaccines are minimal, but younger, healthier  people now are dying from the illness. At this time, August of 2021 - pts are getting severe , hospitalized cases younger, sicker and quicker.  Even younger healthier patients with no risk factors.  Also discussed that some people who are recovering from COVID-19 are having long-term sequelae including significant scarring of the lungs severe enough to need a lung transplant, neuropathy, blood clots, kidney issues, longer term changes in smell and taste and other neurological problems, including cognitive issues and chronic fatigue.  Discussed urgent need for these vaccines with the newer  Delta Variant occurring in people who are younger and those patients are getting sicker quicker, even those  who have had previous wild-type COVID-19 infection variants, but the vaccines so far have been protecting against the Delta variant far better than natural immunity according to the latest research for the CDC.       Pt is not at high risk for severe COVID-19 novel coronavirus disease, hospitalization and death in general.  However, we discussed the highly contagious and more virulent nature of the new delta variant of COVID-19.     Despite the above , pt has refused COVID-19 novel coronavirus vaccination today.  They will think about it seriously for the very near future.  Gave information on making appointment at our clinic for the  Moderna  vaccine or doing a walk-in for any of the 3 available COVID-19 vaccines (moderna, Pfizer, or Jimi).     Ordering of each unique test  Prescription drug management    Please, call our clinic or go to the ER immediately if signs or symptoms worsen or fail to improve as anticipated.      MEDICATIONS:   No orders of the defined types were placed in this encounter.         - Continue other medications without change  Work on weight loss  Regular exercise  See Patient Instructions    Return in 3 months (on 11/11/2021) for Physical/Preventative Visit, depression/anxiety follow up, in person.    Mey Mcleod MD  [facility] :   Mandy is a 36 year old who presents for the following health issues :   1. Anxiety    2. Major depressive disorder, recurrent episode, mild (H)    3. Vaccine counseling - for COVID-19 vaccines - discussed in detail today          HPI     Depression and Anxiety Follow-Up    How are you doing with your depression since your last visit? Improved significantly.     How are you doing with your anxiety since your last visit?  Improved - much improved - was having panic attacks every day, now just 1-2x/weeks.    Has felt a bit more tired and drowsy since starting on citalopram 10mg for 2 weeks, now on 20mg for the last 2-3 weeks.      Has travelled 4x in the last month- camping in a tent, at a cabin, at her grandparents , etc.    Is awakening at night 1x - having a hard time getting back to sleep. - pt attributes that to her travel.       Are you having other symptoms that might be associated with depression or anxiety? No    Have you had a significant life event? No     Do you have any concerns with your use of alcohol or other drugs? No    Social History     Tobacco Use     Smoking status: Never Smoker     Smokeless tobacco: Never Used   Substance Use Topics     Alcohol use: Yes     Comment: 0-1 drinks per week     Drug use: No     Comment: no herbal meds either      PHQ 3/26/2020 7/14/2021 8/13/2021   PHQ-9 Total Score 0 4 3   Q9: Thoughts of better off dead/self-harm past 2 weeks Not at all Not at all Not at all     CRISTOFER-7 SCORE 3/26/2020 7/14/2021 8/13/2021   Total Score 0 (minimal anxiety) - 8 (mild anxiety)   Total Score 0 21 8     Last PHQ-9 8/13/2021   1.  Little interest or pleasure in doing things 0   2.  Feeling down, depressed, or hopeless 0   3.  Trouble falling or staying asleep, or sleeping too much 1   4.  Feeling tired or having little energy 1   5.  Poor appetite or overeating 0   6.  Feeling bad about yourself 0   7.  Trouble concentrating 0   8.  Moving slowly or restless 1   Q9: Thoughts of better off dead/self-harm past 2 weeks 0   PHQ-9 Total Score 3   Difficulty at work, home, or with people -     CRISTOFER-7  8/13/2021   1. Feeling nervous, anxious, or on edge 1   2. Not being able to stop or control worrying 1   3. Worrying too much about different things 1   4. Trouble relaxing 2   5. Being so restless that it is hard to sit still 1   6. Becoming easily annoyed or irritable 1   7. Feeling afraid, as if something awful might happen 1   CRISTOFER-7 Total Score 8   If you checked any problems, how difficult  have they made it for you to do your work, take care of things at home, or get along with other people? -     Answers for HPI/ROS submitted by the patient on 8/13/2021  If you checked off any problems, how difficult have these problems made it for you to do your work, take care of things at home, or get along with other people?: Not difficult at all  PHQ9 TOTAL SCORE: 3  CRISTOFER 7 TOTAL SCORE: 8      Suicide Assessment Five-step Evaluation and Treatment (SAFE-T)        Review of Systems   Constitutional, HEENT, cardiovascular, pulmonary, gi and gu systems are negative, except as otherwise noted.      Objective           Vitals:  No vitals were obtained today due to virtual visit.    Physical Exam   GENERAL: Healthy, alert and no distress  EYES: Eyes grossly normal to inspection.  No discharge or erythema, or obvious scleral/conjunctival abnormalities.  RESP: No audible wheeze, cough, or visible cyanosis.  No visible retractions or increased work of breathing.    SKIN: Visible skin clear. No significant rash, abnormal pigmentation or lesions.  NEURO: Cranial nerves grossly intact.  Mentation and speech appropriate for age.  PSYCH: Mentation appears normal, affect normal/bright, judgement and insight intact, normal speech and appearance well-groomed.    Office Visit on 11/05/2020   Component Date Value Ref Range Status     WBC 11/05/2020 6.1  4.0 - 11.0 10e9/L Final     RBC Count 11/05/2020 4.48  3.8 - 5.2 10e12/L Final     Hemoglobin 11/05/2020 13.2  11.7 - 15.7 g/dL Final     Hematocrit 11/05/2020 39.6  35.0 - 47.0 % Final     MCV 11/05/2020 88  78 - 100 fl Final     MCH 11/05/2020 29.5  26.5 - 33.0 pg Final     MCHC 11/05/2020 33.3  31.5 - 36.5 g/dL Final     RDW 11/05/2020 11.9  10.0 - 15.0 % Final     Platelet Count 11/05/2020 252  150 - 450 10e9/L Final     Sodium 11/05/2020 140  133 - 144 mmol/L Final     Potassium 11/05/2020 4.5  3.4 - 5.3 mmol/L Final     Chloride 11/05/2020 106  94 - 109 mmol/L Final     Carbon  Dioxide 11/05/2020 29  20 - 32 mmol/L Final     Anion Gap 11/05/2020 5  3 - 14 mmol/L Final     Glucose 11/05/2020 102* 70 - 99 mg/dL Final     Urea Nitrogen 11/05/2020 12  7 - 30 mg/dL Final     Creatinine 11/05/2020 0.68  0.52 - 1.04 mg/dL Final     GFR Estimate 11/05/2020 >90  >60 mL/min/[1.73_m2] Final    Comment: Non  GFR Calc  Starting 12/18/2018, serum creatinine based estimated GFR (eGFR) will be   calculated using the Chronic Kidney Disease Epidemiology Collaboration   (CKD-EPI) equation.       GFR Estimate If Black 11/05/2020 >90  >60 mL/min/[1.73_m2] Final    Comment:  GFR Calc  Starting 12/18/2018, serum creatinine based estimated GFR (eGFR) will be   calculated using the Chronic Kidney Disease Epidemiology Collaboration   (CKD-EPI) equation.       Calcium 11/05/2020 8.9  8.5 - 10.1 mg/dL Final     Bilirubin Total 11/05/2020 0.4  0.2 - 1.3 mg/dL Final     Albumin 11/05/2020 3.9  3.4 - 5.0 g/dL Final     Protein Total 11/05/2020 7.6  6.8 - 8.8 g/dL Final     Alkaline Phosphatase 11/05/2020 64  40 - 150 U/L Final     ALT 11/05/2020 33  0 - 50 U/L Final     AST 11/05/2020 22  0 - 45 U/L Final     TSH 11/05/2020 1.44  0.40 - 4.00 mU/L Final     25 OH Vit D2 11/05/2020 <5  ug/L Final     25 OH Vit D3 11/05/2020 47  ug/L Final     25 OH Vit D total 11/05/2020 <52  20 - 75 ug/L Final    Comment: Season, race, dietary intake, and treatment affect the concentration of   25-hydroxy-Vitamin D. Values may decrease during winter months and increase   during summer months. Values 20-29 ug/L may indicate Vitamin D insufficiency   and values <20 ug/L may indicate Vitamin D deficiency.  This test was developed and its performance characteristics determined by the   [facility].   It has not been cleared or approved by the FDA. The laboratory is regulated   under CLIA as qualified to perform high-complexity testing. This test  is used   for clinical purposes. It should not be regarded as investigational or for   research.       Hepatitis C Antibody 11/05/2020 Nonreactive  NR^Nonreactive Final    Comment: Assay performance characteristics have not been established for newborns,   infants, and children                   Video-Visit Details    Type of service:  Video Visit    Video End Time:12:25 PM    Originating Location (pt. Location): Home    Distant Location (provider location):  [facility]     Platform used for Video Visit: [employer]

## 2021-08-14 ASSESSMENT — PATIENT HEALTH QUESTIONNAIRE - PHQ9: SUM OF ALL RESPONSES TO PHQ QUESTIONS 1-9: 3

## 2021-08-14 ASSESSMENT — ANXIETY QUESTIONNAIRES: GAD7 TOTAL SCORE: 8

## 2021-08-16 NOTE — TELEPHONE ENCOUNTER
Pt was seen     Deedee Alvarado RN, BSN  [facility]

## 2021-10-03 ENCOUNTER — HEALTH MAINTENANCE LETTER (OUTPATIENT)
Age: 36
End: 2021-10-03

## 2021-11-09 SDOH — ECONOMIC STABILITY: TRANSPORTATION INSECURITY
IN THE PAST 12 MONTHS, HAS LACK OF TRANSPORTATION KEPT YOU FROM MEETINGS, WORK, OR FROM GETTING THINGS NEEDED FOR DAILY LIVING?: NO

## 2021-11-09 SDOH — ECONOMIC STABILITY: FOOD INSECURITY: WITHIN THE PAST 12 MONTHS, YOU WORRIED THAT YOUR FOOD WOULD RUN OUT BEFORE YOU GOT MONEY TO BUY MORE.: NEVER TRUE

## 2021-11-09 SDOH — ECONOMIC STABILITY: TRANSPORTATION INSECURITY
IN THE PAST 12 MONTHS, HAS THE LACK OF TRANSPORTATION KEPT YOU FROM MEDICAL APPOINTMENTS OR FROM GETTING MEDICATIONS?: NO

## 2021-11-09 SDOH — HEALTH STABILITY: PHYSICAL HEALTH: ON AVERAGE, HOW MANY MINUTES DO YOU ENGAGE IN EXERCISE AT THIS LEVEL?: 30 MIN

## 2021-11-09 SDOH — ECONOMIC STABILITY: INCOME INSECURITY: HOW HARD IS IT FOR YOU TO PAY FOR THE VERY BASICS LIKE FOOD, HOUSING, MEDICAL CARE, AND HEATING?: NOT HARD AT ALL

## 2021-11-09 SDOH — ECONOMIC STABILITY: INCOME INSECURITY: IN THE LAST 12 MONTHS, WAS THERE A TIME WHEN YOU WERE NOT ABLE TO PAY THE MORTGAGE OR RENT ON TIME?: NO

## 2021-11-09 SDOH — HEALTH STABILITY: PHYSICAL HEALTH: ON AVERAGE, HOW MANY DAYS PER WEEK DO YOU ENGAGE IN MODERATE TO STRENUOUS EXERCISE (LIKE A BRISK WALK)?: 1 DAY

## 2021-11-09 SDOH — ECONOMIC STABILITY: FOOD INSECURITY: WITHIN THE PAST 12 MONTHS, THE FOOD YOU BOUGHT JUST DIDN'T LAST AND YOU DIDN'T HAVE MONEY TO GET MORE.: NEVER TRUE

## 2021-11-09 ASSESSMENT — ENCOUNTER SYMPTOMS
SORE THROAT: 0
ARTHRALGIAS: 0
JOINT SWELLING: 0
BREAST MASS: 0
MYALGIAS: 0
NERVOUS/ANXIOUS: 0
HEADACHES: 0
EYE PAIN: 0
PALPITATIONS: 0
PARESTHESIAS: 0
NAUSEA: 0
FEVER: 0
FREQUENCY: 0
COUGH: 0
HEMATURIA: 0
CHILLS: 0
WEAKNESS: 0
CONSTIPATION: 0
HEMATOCHEZIA: 0
ABDOMINAL PAIN: 0
HEARTBURN: 0
SHORTNESS OF BREATH: 0
DIARRHEA: 0
DYSURIA: 0
DIZZINESS: 0

## 2021-11-09 ASSESSMENT — SOCIAL DETERMINANTS OF HEALTH (SDOH)
HOW OFTEN DO YOU ATTEND CHURCH OR RELIGIOUS SERVICES?: MORE THAN 4 TIMES PER YEAR
DO YOU BELONG TO ANY CLUBS OR ORGANIZATIONS SUCH AS CHURCH GROUPS UNIONS, FRATERNAL OR ATHLETIC GROUPS, OR SCHOOL GROUPS?: YES
HOW OFTEN DO YOU GET TOGETHER WITH FRIENDS OR RELATIVES?: MORE THAN THREE TIMES A WEEK
IN A TYPICAL WEEK, HOW MANY TIMES DO YOU TALK ON THE PHONE WITH FAMILY, FRIENDS, OR NEIGHBORS?: MORE THAN THREE TIMES A WEEK

## 2021-11-09 ASSESSMENT — LIFESTYLE VARIABLES
HOW OFTEN DO YOU HAVE A DRINK CONTAINING ALCOHOL: NEVER
HOW MANY STANDARD DRINKS CONTAINING ALCOHOL DO YOU HAVE ON A TYPICAL DAY: PATIENT DECLINED
HOW OFTEN DO YOU HAVE SIX OR MORE DRINKS ON ONE OCCASION: NEVER

## 2021-11-11 ENCOUNTER — PATIENT OUTREACH (OUTPATIENT)
Dept: ONCOLOGY | Facility: CLINIC | Age: 36
End: 2021-11-11

## 2021-11-11 ENCOUNTER — OFFICE VISIT (OUTPATIENT)
Dept: FAMILY MEDICINE | Facility: CLINIC | Age: 36
End: 2021-11-11
Payer: COMMERCIAL

## 2021-11-11 VITALS
DIASTOLIC BLOOD PRESSURE: 62 MMHG | WEIGHT: 159 LBS | HEART RATE: 87 BPM | OXYGEN SATURATION: 97 % | SYSTOLIC BLOOD PRESSURE: 112 MMHG | HEIGHT: 68 IN | TEMPERATURE: 98 F | BODY MASS INDEX: 24.1 KG/M2

## 2021-11-11 DIAGNOSIS — D22.9 MULTIPLE PIGMENTED NEVI: ICD-10-CM

## 2021-11-11 DIAGNOSIS — E55.9 VITAMIN D DEFICIENCY: ICD-10-CM

## 2021-11-11 DIAGNOSIS — Z83.719 FAMILY HISTORY OF COLONIC POLYPS: ICD-10-CM

## 2021-11-11 DIAGNOSIS — F41.0 PANIC ATTACK: ICD-10-CM

## 2021-11-11 DIAGNOSIS — Z00.01 ENCOUNTER FOR ROUTINE ADULT MEDICAL EXAM WITH ABNORMAL FINDINGS: Primary | ICD-10-CM

## 2021-11-11 DIAGNOSIS — Z12.4 SCREENING FOR MALIGNANT NEOPLASM OF CERVIX: ICD-10-CM

## 2021-11-11 DIAGNOSIS — R49.9 HOARSENESS OR CHANGING VOICE: ICD-10-CM

## 2021-11-11 DIAGNOSIS — Z80.8 FAMILY HISTORY OF NONMELANOMA SKIN CANCER: ICD-10-CM

## 2021-11-11 DIAGNOSIS — Z13.6 CARDIOVASCULAR SCREENING; LDL GOAL LESS THAN 160: ICD-10-CM

## 2021-11-11 DIAGNOSIS — N81.2 FIRST DEGREE UTERINE PROLAPSE: ICD-10-CM

## 2021-11-11 DIAGNOSIS — R55 SITUATIONAL SYNCOPE: ICD-10-CM

## 2021-11-11 DIAGNOSIS — Z80.3 FAMILY HISTORY OF BREAST CANCER: ICD-10-CM

## 2021-11-11 DIAGNOSIS — F41.9 ANXIETY: ICD-10-CM

## 2021-11-11 DIAGNOSIS — N39.3 SUI (STRESS URINARY INCONTINENCE, FEMALE): ICD-10-CM

## 2021-11-11 PROCEDURE — 99214 OFFICE O/P EST MOD 30 MIN: CPT | Mod: 25 | Performed by: FAMILY MEDICINE

## 2021-11-11 PROCEDURE — 99395 PREV VISIT EST AGE 18-39: CPT | Performed by: FAMILY MEDICINE

## 2021-11-11 PROCEDURE — G0145 SCR C/V CYTO,THINLAYER,RESCR: HCPCS | Performed by: FAMILY MEDICINE

## 2021-11-11 PROCEDURE — 96127 BRIEF EMOTIONAL/BEHAV ASSMT: CPT | Performed by: FAMILY MEDICINE

## 2021-11-11 PROCEDURE — 87624 HPV HI-RISK TYP POOLED RSLT: CPT | Performed by: FAMILY MEDICINE

## 2021-11-11 RX ORDER — CITALOPRAM HYDROBROMIDE 20 MG/1
20 TABLET ORAL DAILY
Qty: 90 TABLET | Refills: 1 | Status: SHIPPED | OUTPATIENT
Start: 2021-11-11 | End: 2022-07-12

## 2021-11-11 ASSESSMENT — ANXIETY QUESTIONNAIRES
2. NOT BEING ABLE TO STOP OR CONTROL WORRYING: NOT AT ALL
5. BEING SO RESTLESS THAT IT IS HARD TO SIT STILL: NOT AT ALL
IF YOU CHECKED OFF ANY PROBLEMS ON THIS QUESTIONNAIRE, HOW DIFFICULT HAVE THESE PROBLEMS MADE IT FOR YOU TO DO YOUR WORK, TAKE CARE OF THINGS AT HOME, OR GET ALONG WITH OTHER PEOPLE: NOT DIFFICULT AT ALL
1. FEELING NERVOUS, ANXIOUS, OR ON EDGE: NOT AT ALL
GAD7 TOTAL SCORE: 0
3. WORRYING TOO MUCH ABOUT DIFFERENT THINGS: NOT AT ALL
7. FEELING AFRAID AS IF SOMETHING AWFUL MIGHT HAPPEN: NOT AT ALL
6. BECOMING EASILY ANNOYED OR IRRITABLE: NOT AT ALL

## 2021-11-11 ASSESSMENT — ENCOUNTER SYMPTOMS
CHILLS: 0
BREAST MASS: 0
ARTHRALGIAS: 0
FEVER: 0
DYSURIA: 0
COUGH: 0
DIARRHEA: 0
JOINT SWELLING: 0
HEMATURIA: 0
HEARTBURN: 0
PARESTHESIAS: 0
SHORTNESS OF BREATH: 0
DIZZINESS: 0
EYE PAIN: 0
HEMATOCHEZIA: 0
PALPITATIONS: 0
NAUSEA: 0
WEAKNESS: 0
CONSTIPATION: 0
HEADACHES: 0
ABDOMINAL PAIN: 0
NERVOUS/ANXIOUS: 0
SORE THROAT: 0
MYALGIAS: 0
FREQUENCY: 0

## 2021-11-11 ASSESSMENT — PATIENT HEALTH QUESTIONNAIRE - PHQ9: 5. POOR APPETITE OR OVEREATING: NOT AT ALL

## 2021-11-11 ASSESSMENT — MIFFLIN-ST. JEOR: SCORE: 1459.72

## 2021-11-11 NOTE — PROGRESS NOTES
SUBJECTIVE:   CC: Mandy Noavk is an 36 year old woman who presents for preventive health visit and the following other medical problems:      1. Encounter for routine adult medical exam with abnormal findings    2. Anxiety    3. Panic attack    4. Family history of breast cancer    5. CARDIOVASCULAR SCREENING; LDL GOAL LESS THAN 160    6. Multiple pigmented nevi    7. Vitamin D deficiency    8. Family history of colonic polyps    9. Screening for malignant neoplasm of cervix    10. Situational syncope- with any blood draws or shots    11. Family history of nonmelanoma skin cancer    12. 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 LANCE (stress urinary incontinence, female)    14. First degree uterine prolapse           Patient has been advised of split billing requirements and indicates understanding: No  Healthy Habits:     Getting at least 3 servings of Calcium per day:  Yes    Bi-annual eye exam:  Yes    Dental care twice a year:  Yes    Sleep apnea or symptoms of sleep apnea:  Daytime drowsiness    Diet:  Regular (no restrictions)    Frequency of exercise:  1 day/week    Duration of exercise:  Less than 15 minutes    Taking medications regularly:  Yes    Medication side effects:  Other    PHQ-2 Total Score: 0    Additional concerns today:  No          Depression Followup:     How are you doing with your depression since your last visit? Improved     Are you having other symptoms that might be associated with depression? No    Have you had a significant life event?  No     Are you feeling anxious or having panic attacks?   No    Do you have any concerns with your use of alcohol or other drugs? No    Social History     Tobacco Use     Smoking status: Never Smoker     Smokeless tobacco: Never Used   Substance Use Topics     Alcohol use: Yes     Comment: 0-1 drinks per week     Drug use: No     Comment: no herbal meds  either      PHQ 7/14/2021 8/13/2021 11/11/2021   PHQ-9 Total Score 4 3 1   Q9: Thoughts of better off dead/self-harm past 2 weeks Not at all Not at all Not at all     CRISTOFER-7 SCORE 7/14/2021 8/13/2021 11/11/2021   Total Score - 8 (mild anxiety) -   Total Score 21 8 0     Last PHQ-9 11/11/2021   1.  Little interest or pleasure in doing things 0   2.  Feeling down, depressed, or hopeless 0   3.  Trouble falling or staying asleep, or sleeping too much 0   4.  Feeling tired or having little energy 1   5.  Poor appetite or overeating 0   6.  Feeling bad about yourself 0   7.  Trouble concentrating 0   8.  Moving slowly or restless 0   Q9: Thoughts of better off dead/self-harm past 2 weeks 0   PHQ-9 Total Score 1   Difficulty at work, home, or with people Not difficult at all     CRISTOFER-7  11/11/2021   1. Feeling nervous, anxious, or on edge 0   2. Not being able to stop or control worrying 0   3. Worrying too much about different things 0   4. Trouble relaxing 0   5. Being so restless that it is hard to sit still 0   6. Becoming easily annoyed or irritable 0   7. Feeling afraid, as if something awful might happen 0   CRISTOFER-7 Total Score 0   If you checked any problems, how difficult have they made it for you to do your work, take care of things at home, or get along with other people? Not difficult at all       Suicide Assessment Five-step Evaluation and Treatment (SAFE-T)      Today's PHQ-2 Score:   PHQ-2 ( 1999 Pf[employer]) 11/9/2021   Q1: Little interest or pleasure in doing things 0   Q2: Feeling down, depressed or hopeless 0   PHQ-2 Score 0   Q1: Little interest or pleasure in doing things Not at all   Q2: Feeling down, depressed or hopeless Not at all   PHQ-2 Score 0       Abuse: Current or Past (Physical, Sexual or Emotional) - No  Do you feel safe in your environment? Yes    Have you ever done Advance Care Planning? (For example, a Health Directive, POLST, or a discussion with a medical provider or your loved ones about your  wishes): No, advance care planning information given to patient to review.  Advanced care planning was discussed at today's visit.    Social History     Tobacco Use     Smoking status: Never Smoker     Smokeless tobacco: Never Used   Substance Use Topics     Alcohol use: Yes     Comment: 0-1 drinks per week         Alcohol Use 2021   Prescreen: >3 drinks/day or >7 drinks/week? Not Applicable   Prescreen: >3 drinks/day or >7 drinks/week? -       Reviewed orders with patient.  Reviewed health maintenance and updated orders accordingly - Yes  Lab work is in process  Labs reviewed in EPIC  BP Readings from Last 3 Encounters:   21 112/62   21 110/82   20 110/70    Wt Readings from Last 3 Encounters:   21 72.1 kg (159 lb)   21 72.6 kg (160 lb)   20 77.6 kg (171 lb)                  Patient Active Problem List   Diagnosis     CARDIOVASCULAR SCREENING; LDL GOAL LESS THAN 160     Situational syncope- with any blood draws or shots     ASCUS with positive high risk HPV- while pregnant 2016 = normal - recheck pap and cotest postpartum     Resolved - Marginal placenta previa with intrapartum hemorrhage in first trimester-repeat 1/15/2015= resolved      Multiple pigmented nevi     Supervision of other normal pregnancy, antepartum     Type O blood, Rh positive     Large for gestational age     Indication for care in labor or delivery     History of shoulder dystocia in prior pregnancy- with first baby 7lbs 14oz 2015       (normal spontaneous vaginal delivery)     Immediate postpartum hemorrhage, postpartum     Elevated d-dimer     Acute posthemorrhagic anemia     Anxiety     Postpartum depression associated with second pregnancy     Family history of nonmelanoma skin cancer     Difficulty falling asleep at night until early morning hours     Exposure to human papillomavirus-  with genital warts - recently diagnosed     Major depressive disorder, recurrent episode, mild  (H)     Past Surgical History:   Procedure Laterality Date     BIOPSY  August 2015, mulitple others    colposcopy, several other mole removals     C ORAL SURGERY PROCEDURE      graft surgery for lower gum recession on 11/24/2020     [place] teeth extraction         Social History     Tobacco Use     Smoking status: Never Smoker     Smokeless tobacco: Never Used   Substance Use Topics     Alcohol use: Yes     Comment: 0-1 drinks per week     Family History   Problem Relation Age of Onset     Diabetes Maternal Grandfather         diet controlled     Thyroid Disease Maternal Grandmother      Depression Maternal Grandmother      Family History Negative Mother      Family History Negative Father      Hyperlipidemia Father      Anxiety Disorder Sister      Depression Sister      Anxiety Disorder Brother      Depression Brother      Depression Sister      Depression Brother          Current Outpatient Medications   Medication Sig Dispense Refill     azelaic acid (FINACIA) 15 % external gel Apply 0.5 inches topically 2 times daily 50 g 5     citalopram (CELEXA) 20 MG tablet Take 0.5 tablets (10 mg) by mouth daily For 1-2 weeks, then increase to 1 tab daily 90 tablet 1     Prenatal Vit-Fe Fumarate-FA (PRENATAL VITAMINS) 28-0.8 MG TABS Take 1 tablet by mouth daily       propranolol (INDERAL) 10 MG tablet Take 1-2 tablets (10-20 mg) by mouth 3 times daily as needed (anxiety) 30 tablet 1     vitamin D3 (CHOLECALCIFEROL) 50 mcg (2000 units) tablet Take 1 tablet (50 mcg) by mouth daily       fish oil-omega-3 fatty acids 1000 MG capsule Take 1 capsule (1 g) by mouth daily (Patient not taking: Reported on 7/14/2021) 120 capsule 11     No Known Allergies  Recent Labs   Lab Test 11/05/20  1133 03/15/20  0459 08/02/18  0947 07/10/18  1650   LDL  --   --   --  58   HDL  --   --   --  56   TRIG  --   --   --  98   ALT 33  --   --   --    CR 0.68 0.66  --   --    GFRESTIMATED >90 >90  --   --    GFRESTBLACK >90 >90  --   --    POTASSIUM  4.5 3.5  --   --    TSH 1.44  --  1.38  --         Breast Cancer Screening:    FHS-7:   Breast CA Risk Assessment (FHS-7) 2021   Did any of your first-degree relatives have breast or ovarian cancer? No   Did any of your relatives have bilateral breast cancer? No   Did any man in your family have breast cancer? No   Did any woman in your family have breast and ovarian cancer? No   Did any woman in your family have breast cancer before age 50 y? No   Do you have 2 or more relatives with breast and/or ovarian cancer? Yes   Do you have 2 or more relatives with breast and/or bowel cancer? Yes     Maternal Aunt - Eli (Alive) Breast Cancer (Age:65)       Maternal Great-Grandmother (Other) Breast Cancer           Patient under 40 years of age: Routine Mammogram Screening not recommended.   Pertinent mammograms are reviewed under the imaging tab.    History of abnormal Pap smear: NO - age 30- 65 PAP every 3 years recommended  PAP / HPV Latest Ref Rng & Units 2018   PAP (Historical) - NIL NIL NIL   HPV16 NEG:Negative Negative Negative -   HPV18 NEG:Negative Negative Negative -   HRHPV NEG:Negative Negative Negative -     Reviewed and updated as needed this visit by clinical staff                Reviewed and updated as needed this visit by Provider               Past Medical History:   Diagnosis Date     ASCUS with positive high risk HPV 2014    + HPV 16, hx of ASCUS , but neg HPV in       Depressive disorder     post-partum     Cec[place] breech presentation - seen on 31 week US - resolved with external version on 3/24/2017 2017      Past Surgical History:   Procedure Laterality Date     BIOPSY  2015, mulitple others    colposcopy, several other mole removals     C ORAL SURGERY PROCEDURE      graft surgery for lower gum recession on 2020     [place] teeth extraction       OB History    Para Term  AB Living   3 2 2 0 1 2   SAB IAB Ectopic Multiple Live Births  "  1 0 0 0 2      # Outcome Date GA Lbr Eh/2nd Weight Sex Delivery Anes PTL Lv   3 SAB 09/21/20 6w0d    SAB      2 Term 04/17/17 40w0d 05:32 / 01:27 4.36 kg (9 lb 9.8 oz) F Vag-Spont EPI N NAZANIN      Birth Comments: none      Complications: Excessive Vaginal Bleeding, Dysfunctional Labor      Name: Sherice Galvan       Apgar1: 8  Apgar5: 9   1 Term 06/19/15 38w5d 16:00 / 02:37 3.569 kg (7 lb 13.9 oz) M Vag-Spont EPI  NAZANIN      Birth Comments: hand next to face       Complications: Shoulder Dystocia      Name: Maik      Apgar1: 8  Apgar5: 9      Obstetric Comments   Son Maik - born in 2015   Daughter, Sherice, born in 2017    SAB 9/21/2020        Review of Systems   Constitutional: Negative for chills and fever.   HENT: Negative for congestion, ear pain, hearing loss and sore throat.    Eyes: Negative for pain and visual disturbance.   Respiratory: Negative for cough and shortness of breath.    Cardiovascular: Negative for chest pain, palpitations and peripheral edema.   Gastrointestinal: Negative for abdominal pain, constipation, diarrhea, heartburn, hematochezia and nausea.   Breasts:  Negative for tenderness, breast mass and discharge.   Genitourinary: Negative for dysuria, frequency, genital sores, hematuria, pelvic pain, urgency, vaginal bleeding and vaginal discharge.   Musculoskeletal: Negative for arthralgias, joint swelling and myalgias.   Skin: Negative for rash.   Neurological: Negative for dizziness, weakness, headaches and paresthesias.   Psychiatric/Behavioral: Negative for mood changes. The patient is not nervous/anxious.         OBJECTIVE:   /62   Pulse 87   Temp 98  F (36.7  C)   Ht 1.727 m (5' 8\")   Wt 72.1 kg (159 lb)   LMP 10/24/2021   SpO2 97%   BMI 24.18 kg/m    Physical Exam  GENERAL: healthy, alert and no distress  EYES: Eyes grossly normal to inspection, PERRL and conjunctivae and sclerae normal  HENT: ear canals and TM's normal, nose and mouth without ulcers or lesions  NECK: no " adenopathy, no asymmetry, masses, or scars and thyroid normal to palpation  RESP: lungs clear to auscultation - no rales, rhonchi or wheezes  BREAST: normal without masses, tenderness or nipple discharge and no palpable axillary masses or adenopathy  CV: regular rate and rhythm, normal S1 S2, no S3 or S4, no murmur, click or rub, no peripheral edema and peripheral pulses strong  ABDOMEN: soft, nontender, no hepatosplenomegaly, no masses and bowel sounds normal   (female): normal female external genitalia, normal urethral meatus, vaginal mucosa pink, moist, well rugated, and normal cervix/adnexa/uterus without masses or discharge; first degree uterine prolapse noted.   MS: no gross musculoskeletal defects noted, no edema  SKIN: no suspicious lesions or rashes, many pigmented nevi seen.   NEURO: Normal strength and tone, mentation intact and speech normal  PSYCH: mentation appears normal, affect normal/bright    Diagnostic Test Results:  Labs reviewed in Epic    ASSESSMENT/PLAN:       ICD-10-CM    1. Encounter for routine adult medical exam with abnormal findings  Z00.01    2. Anxiety  F41.9 citalopram (CELEXA) 20 MG tablet     DEPRESSION ACTION PLAN (DAP)     OFFICE/OUTPT VISIT,EST,LEVL IV   3. Panic attack  F41.0 citalopram (CELEXA) 20 MG tablet     DEPRESSION ACTION PLAN (DAP)     OFFICE/OUTPT VISIT,EST,LEVL IV   4. Family history of breast cancer  Z80.3 Cancer Risk Mgmt/Cancer Genetic Counseling Referral   5. CARDIOVASCULAR SCREENING; LDL GOAL LESS THAN 160  Z13.6 REVIEW OF HEALTH MAINTENANCE PROTOCOL ORDERS     CBC with platelets     Lipid panel reflex to direct LDL Fasting     TSH with free T4 reflex     Glucose   6. Multiple pigmented nevi  D22.9 Adult Dermatology Referral     OFFICE/OUTPT VISIT,EST,LEVL IV   7. Vitamin D deficiency  E55.9 OFFICE/OUTPT VISIT,EST,LEVL IV   8. Family history of colonic polyps  Z83.71    9. Screening for malignant neoplasm of cervix  Z12.4 Pap Screen with HPV - recommended age  "30 - 65 years   10. Situational syncope- with any blood draws or shots  R55    11. Family history of nonmelanoma skin cancer  Z80.8 Adult Dermatology Referral   12. 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 LANCE (stress urinary incontinence, female)  N39.3 Physical Therapy Referral     UA Macro with Reflex to Micro and Culture - lab collect     OFFICE/OUTPT VISIT,EST,LEVL IV   14. First degree uterine prolapse  N81.2 Physical Therapy Referral     OFFICE/OUTPT VISIT,EST,LEVL IV     At end of visit pt mentioned her moderate stress urinary incontinence since the birth of her 9lbs 10oz. Daughter who is now 4 yrs old.       Ok for lab only visit for her futured labs above.     Patient has been advised of split billing requirements and indicates understanding: Yes  COUNSELING:  Reviewed preventive health counseling, as reflected in patient instructions    Estimated body mass index is 24.33 kg/m  as calculated from the following:    Height as of 7/14/21: 1.727 m (5' 8\").    Weight as of 7/14/21: 72.6 kg (160 lb).    Please,  continue your current medications and/or supplements and follow up in 6 months with video visit.      She reports that she has never smoked. She has never used smokeless tobacco.    Pt declined by COVID-19 novel coronavirus and flu vaccines again today.     We had a long discussion re: need for covid19 vaccines. I highly recommended the Pfizer or Moderna - mRNA vaccines for her for lowest side effect risks and highest efficacy.   Discussed that the risks of the mRNA vaccines are minimal compared with the risks of mild to severe COVID-19 novel coronavirus infection.  People are dying from the illness. At this time, fall of 2021 - pts are getting severe , hospitalized cases younger, sicker and quicker.  The current statistics " for people who are unvaccinated who get COVID-19 - chance of severe illness = hospitalization is 1/6; the chance of dying from COVID is 1/60.  Also discussed that some people who are recovering from COVID-19 are having long-term sequelae including significant scarring of the lungs severe enough to need a lung transplant, neuropathy, blood clots, kidney issues, longer term changes in smell and taste and other neurological problems, including cognitive issues and chronic fatigue.  Discussed urgent need for these vaccines with the newer Delta Variant occurring in people who are younger and those patients are getting sicker quicker, even those  who have had previous wild-type COVID-19 infection variants, but the vaccines so far have been protecting against the Delta variant far better than natural immunity according to the latest research for the CDC.     Despite the above , pt has refused COVID-19 novel coronavirus vaccination today.  They will think about it seriously for the very near future.      https://www.cdc.gov/coronavirus/2019-ncov/vaccines/facts.html      Counseling Resources:  ATP IV Guidelines  Pooled Cohorts Equation Calculator  Breast Cancer Risk Calculator  BRCA-Related Cancer Risk Assessment: FHS-7 Tool  FRAX Risk Assessment  ICSI Preventive Guidelines  Dietary Guidelines for Americans, 2010  USDA's MyPlate  ASA Prophylaxis  Lung CA Screening          Mey Mcleod MD  [facility]

## 2021-11-11 NOTE — PATIENT INSTRUCTIONS
[facility]  [address] [place] [id number]  Office: [phone]   Fax:    [phone]     Return in about 6 months (around 5/11/2022) for depression/anxiety follow up, with Dr. Mcleod, as a video visit.       Patient Education     Prevention Guidelines, Women Ages 18 to 39  Screening tests and vaccines are an important part of managing your health. A screening test is done to find possible disorders or diseases in people who don't have any symptoms. The goal is to find a disease early so lifestyle changes can be made and you can be watched more closely to reduce the risk of disease, or to detect it early enough to treat it most effectively. Screening tests are not considered diagnostic, but are used to determine if more testing is needed. Health counseling is essential, too. Below are guidelines for these, for women ages 18 to 39. Talk with your healthcare provider to make sure you re up-to-date on what you need.   Screening Who needs it How often   Alcohol misuse All women in this age group  At routine exams   Blood pressure All women in this age group  Yearly checkup if your blood pressure is normal   Normal blood pressure is less than 120/80 mm Hg   If your blood pressure reading is higher than normal, follow the advice of your healthcare provider    Breast cancer All women in this age group should talk with their healthcare providers about the need for clinical breast exams (CBE)1  Clinical breast exam every 3 years1    Cervical cancer Women ages 21 and older  Women between ages 21 and 29 should have a Pap test every 3 years; women between ages 30 and 65 are advised to have a Pap test plus an HPV test every 5 years    Chlamydia Sexually active women ages 24 and younger, and women at increased risk for infection  Every 3 years if you're at risk or have symptoms    Depression All women in this age group  At routine exams   Diabetes mellitus, type 2  Adults with no  symptoms who are overweight or obese and have 1 or more other risk factors for diabetes  At least every 3 years. Also, testing for diabetes during pregnancy after the 24th week.     Gonorrhea Sexually active women at increased risk for infection  At routine exams   Hepatitis C Anyone at increased risk  At routine exams   HIV All women At routine exams3     Obesity All women in this age group  At routine exams   Syphilis Women at increased risk for infection should talk with their healthcare provider  At routine exams   Tuberculosis Women at increased risk for infection should talk with their healthcare provider  Ask your healthcare provider    Vision All women in this age group  At least 1 complete exam in your 20s, and 2 in your 30s    Vaccine Who needs it How often   Chickenpox (varicella)  All women in this age group who have no record of this infection or vaccine  2 doses; the second dose should be given 4 to 8 weeks after the first dose    Hepatitis A Women at increased risk for infection should talk with their healthcare provider  2 doses given at least 6 months apart    Hepatitis B Women at increased risk for infection should talk with their healthcare provider  3 doses over 6 months; second dose should be given 1 month after the first dose; the third dose should be given at least 2 months after the second dose and at least 4 months after the first dose    Haemophilus influenzae  Type B (HIB)  Women at increased risk for infection should talk with their healthcare provider  1 to 3 doses   Human papillomavirus (HPV)  All women in this age group up to age 26  3 doses; the second dose should be given 1 to 2 months after the first dose and the third dose given 6 months after the first dose    Influenza (flu) All women in this age group  Once a year   Measles, mumps, rubella (MMR)  All women in this age group who have no record of these infections or vaccines  1 or 2 doses   Meningococcal Women at increased risk  for infection should talk with their healthcare provider  1 or more doses   Pneumococcal conjugate vaccine (PCV13) and pneumococcal polysaccharide vaccine (PPSV23)  Women at increased risk for infection should talk with their healthcare provider  PCV13: 1 dose ages 19 to 65 (protects against 13 types of pneumococcal bacteria)   PPSV23: 1 to 2 doses through age 64, or 1 dose at 65 or older (protects against 23 types of pneumococcal bacteria)    Tetanus/diphtheria/pertussis (Td/Tdap) booster All women in this age group  Td every 10 years, or a one-time dose of Tdap instead of a Td booster after age 18, then Td every 10 years    Counseling Who needs it How often   BRCA gene mutation testing for breast and ovarian cancer susceptibility  Women with increased risk for having gene mutation  When your risk is known    Breast cancer and chemoprevention  Women at high risk for breast cancer  When your risk is known    Diet and exercise Women who are overweight or obese  When diagnosed, and then at routine exams    Domestic violence Women at the age in which they are able to have children  At routine exams   Sexually transmitted infection prevention  Women who are sexually active  At routine exams   Skin cancer Prevention of skin cancer in fair-skinned adults  At routine exams   Use of tobacco and the health effects it can cause  All women in this age group  Every visit   1 According to the ACS, women ages 20 to 39 years should have a clinical breast exam (CBE) as part of their routine health exam every 3 years. Breast self-exams are an option for women starting in their 20s. But the U.S. Preventive Services Task Force (USPSTF) does not recommend CBE.   2 Those who are 18 years old and not up-to-date on their childhood vaccines should get all appropriate catch-up vaccines recommended by the CDC.   3 The USPSTF recommends that all people ages 15 to 65 years be screened for HIV and those younger or older people at increased  risk. The CDC recommends that everyone between the ages of 13 and 64 get tested for HIV at least once as part of routine health care.   [employer] last reviewed this educational content on 10/1/2017    0209-4663 [employer]. All rights reserved. This information is not intended as a substitute for professional medical care. Always follow your healthcare professional's instructions.         Thank you so much or choosing [facility]  for your Health Care. It was a pleasure seeing you at your visit today! Please contact us with any questions or concerns you may have.                   Mey Mcleod MD                              To reach your [facility] care team after hours call:   [phone]    PLEASE NOTE OUR HOURS HAVE CHANGED secondary to COVID-19 coronavirus pandemic, as we are trying to minimize patient exposure to the virus,  which is now widespread in the [facility].  These hours may change with very little notice.  We apologize for any inconvenience.       Our current clinic hours are:          Monday- Thursday   7:00am - 6:00pm  in person.      Friday  7:00am- 5:00pm                       Saturday and Sunday : Closed to in person and virtual visits        We have telephone and virtual visit times available between    7:00am - 6pm on Monday-Friday as well.                                                Phone:  [phone]      Our pharmacy hours: Monday through Friday 9:00am to 5:00pm                        Saturday - 9:00 am to 12 noon       Sunday : Closed.              Phone:  [phone]              ###  Please note: at this time we are not accepting any walk-in visits. ###      There is also information available at our web site:  [email or web address]    If your provider ordered any lab tests and you do not receive the results within 10 business days, please call the clinic.    If you need a medication refill please contact your  pharmacy.  Please allow 2 business days for your refill to be completed.    Our clinic offers telephone visits and e visits.  Please ask one of your team members to explain more.      Use [employer] (secure email communication and access to your chart) to send your primary care provider a message or make an appointment. Ask someone on your Team how to sign up for [employer].               We had a long discussion re: need for covid19 vaccines. I highly recommended the Pfizer or Moderna - mRNA vaccines for her for lowest side effect risks and highest efficacy.   Discussed that the risks of the mRNA vaccines are minimal compared with the risks of mild to severe COVID-19 novel coronavirus infection.  People are dying from the illness. At this time, fall of 2021 - pts are getting severe , hospitalized cases younger, sicker and quicker.  The current statistics for people who are unvaccinated who get COVID-19 - chance of severe illness = hospitalization is 1/6; the chance of dying from COVID is 1/60.  Also discussed that some people who are recovering from COVID-19 are having long-term sequelae including significant scarring of the lungs severe enough to need a lung transplant, neuropathy, blood clots, kidney issues, longer term changes in smell and taste and other neurological problems, including cognitive issues and chronic fatigue.  Discussed urgent need for these vaccines with the newer Delta Variant occurring in people who are younger and those patients are getting sicker quicker, even those  who have had previous wild-type COVID-19 infection variants, but the vaccines so far have been protecting against the Delta variant far better than natural immunity according to the latest research for the CDC.       Despite the above , pt has refused COVID-19 novel coronavirus vaccination today.  They will think about it seriously for the very near future.      https://www.cdc.gov/coronavirus/2019-ncov/vaccines/facts.html.

## 2021-11-12 ASSESSMENT — ANXIETY QUESTIONNAIRES: GAD7 TOTAL SCORE: 0

## 2021-11-12 ASSESSMENT — PATIENT HEALTH QUESTIONNAIRE - PHQ9: SUM OF ALL RESPONSES TO PHQ QUESTIONS 1-9: 1

## 2021-11-16 LAB
BKR LAB AP GYN ADEQUACY: NORMAL
BKR LAB AP GYN INTERPRETATION: NORMAL
BKR LAB AP HPV REFLEX: NORMAL
BKR LAB AP PREVIOUS ABNORMAL: NORMAL
PATH REPORT.COMMENTS IMP SPEC: NORMAL
PATH REPORT.COMMENTS IMP SPEC: NORMAL
PATH REPORT.RELEVANT HX SPEC: NORMAL

## 2021-11-18 LAB
HUMAN PAPILLOMA VIRUS 16 DNA: NEGATIVE
HUMAN PAPILLOMA VIRUS 18 DNA: NEGATIVE
HUMAN PAPILLOMA VIRUS FINAL DIAGNOSIS: NORMAL
HUMAN PAPILLOMA VIRUS OTHER HR: NEGATIVE

## 2021-12-14 NOTE — TELEPHONE ENCOUNTER
FUTURE VISIT INFORMATION      FUTURE VISIT INFORMATION:    Date: 3/31/2022    Time: 2PM    Location: [facility]  REFERRAL INFORMATION:    Referring provider:  Mey Mcleod MD    Referring providers clinic:  [facility]    Reason for visit/diagnosis  Hoarseness or changing voice, no recs, ref by Mey Mcleod MD in  FAMILY PRACTICE, appt made by pt    RECORDS REQUESTED FROM:       Clinic name Comments Records Status Imaging Status    [facility] 11/11/2021 note and referral from Mey Mcleod MD EPIC

## 2022-03-02 ENCOUNTER — TRANSFERRED RECORDS (OUTPATIENT)
Dept: HEALTH INFORMATION MANAGEMENT | Facility: CLINIC | Age: 37
End: 2022-03-02
Payer: COMMERCIAL

## 2022-03-02 ENCOUNTER — NURSE TRIAGE (OUTPATIENT)
Dept: FAMILY MEDICINE | Facility: CLINIC | Age: 37
End: 2022-03-02
Payer: COMMERCIAL

## 2022-03-11 ENCOUNTER — OFFICE VISIT (OUTPATIENT)
Dept: FAMILY MEDICINE | Facility: CLINIC | Age: 37
End: 2022-03-11
Attending: FAMILY MEDICINE
Payer: COMMERCIAL

## 2022-03-11 VITALS — SYSTOLIC BLOOD PRESSURE: 110 MMHG | DIASTOLIC BLOOD PRESSURE: 70 MMHG

## 2022-03-11 DIAGNOSIS — L71.9 ROSACEA: ICD-10-CM

## 2022-03-11 DIAGNOSIS — L71.9 ACNE ROSACEA: ICD-10-CM

## 2022-03-11 DIAGNOSIS — D22.9 MULTIPLE BENIGN MELANOCYTIC NEVI: ICD-10-CM

## 2022-03-11 DIAGNOSIS — Z12.83 SCREENING EXAM FOR SKIN CANCER: Primary | ICD-10-CM

## 2022-03-11 DIAGNOSIS — Z80.8 FAMILY HISTORY OF NONMELANOMA SKIN CANCER: ICD-10-CM

## 2022-03-11 DIAGNOSIS — D22.9 MULTIPLE PIGMENTED NEVI: ICD-10-CM

## 2022-03-11 PROCEDURE — 99214 OFFICE O/P EST MOD 30 MIN: CPT | Performed by: DERMATOLOGY

## 2022-03-11 RX ORDER — METRONIDAZOLE 7.5 MG/G
GEL TOPICAL 2 TIMES DAILY
Qty: 45 G | Refills: 3 | Status: SHIPPED | OUTPATIENT
Start: 2022-03-11 | End: 2023-05-26

## 2022-03-11 RX ORDER — AZELAIC ACID 0.15 G/G
0.5 GEL TOPICAL 2 TIMES DAILY
Qty: 50 G | Refills: 5 | Status: SHIPPED | OUTPATIENT
Start: 2022-03-11

## 2022-03-11 NOTE — PROGRESS NOTES
[facility] Dermatology Note  Encounter Date: Mar 11, 2022  Office Visit   Former Dr. Betts patient- last OV 10/19/20  Hx atypical mole    Dermatology Problem List:  1. History of CDN1: R abdomen, back  2. Rosacea type 1/2      ____________________________________________    Assessment & Plan:     # Multiple benign melanocytic nevi.   - ABCDEs: Counseled ABCDEs of melanoma: Asymmetry, Border (irregularity), Color (not uniform, changes in color), Diameter (greater than 6 mm which is about the size of a pencil eraser), and Evolving (any changes in preexisting moles).  - Moisturize: Recommend good OTC moisturizer to full body, such as Cera-Ve, Yanna-cream, or Cetaphil. Advised best to apply after bathing to lock in moisture.  - Sun protection: Counseled SPF30+ sunscreen, UPF clothing, sun avoidance, tanning bed avoidance.        # Rosacea, erythrotelangiectatic and mild papulopustular types.   - Continue azelaic acid 15% cream daily; refill provided  - Start metronidazole 0.75% gel daily   - Discussed laser, green tinted makeups    Procedures Performed:   None     Follow-up: 1 year(s) in-person, or earlier for new or changing lesions    Staff:     Farhad Tsai MD  Dermatology/Dermatopathology Staff Physician  , Department of Dermatology    ____________________________________________    CC: Skin Check    HPI:  Ms. Mandy Novak is a(n) 37 year old female who presents today as a new patient to me (saw Dr. Betts in [facility] 10/2020) for FBSE and rosacea.    History of mildly dysplastic nevi, no reported recurrent pigment.     Denies painful, bleeding, or nonhealing lesions. No new or changing moles.    Patient is otherwise feeling well, without additional skin concerns.     Labs Reviewed:  N/A    Physical Exam:  Vitals: /70   SKIN: Total skin excluding the undergarment areas was performed. The exam included the head/face, neck, both arms, chest, back, abdomen, both legs,  digits and/or nails.   - Well healed biopsy site scars, no recurrent pigment.  - There are erythematous papules and scattered telangectasias on the bilateral cheeks and nose.   - Multiple regular brown pigmented macules and papules are identified on the trunk and extremities.   - Intradermal nevi on face: 4 forehead, nasal dorsum, left cheek, arms, back, legs  - No other lesions of concern on areas examined.     Medications:  Current Outpatient Medications   Medication     azelaic acid (FINACIA) 15 % external gel     citalopram (CELEXA) 20 MG tablet     Prenatal Vit-Fe Fumarate-FA (PRENATAL VITAMINS) 28-0.8 MG TABS     propranolol (INDERAL) 10 MG tablet     vitamin D3 (CHOLECALCIFEROL) 50 mcg (2000 units) tablet     No current facility-administered medications for this visit.      Past Medical History:   Patient Active Problem List   Diagnosis     CARDIOVASCULAR SCREENING; LDL GOAL LESS THAN 160     Situational syncope- with any blood draws or shots     Resolved - Marginal placenta previa with intrapartum hemorrhage in first trimester-repeat 1/15/2015= resolved      Multiple pigmented nevi     Supervision of other normal pregnancy, antepartum     Type O blood, Rh positive     Large for gestational age     Indication for care in labor or delivery     History of shoulder dystocia in prior pregnancy- with first baby 7lbs 14oz 2015       (normal spontaneous vaginal delivery)     Immediate postpartum hemorrhage, postpartum     Elevated d-dimer     Acute posthemorrhagic anemia     Anxiety     Postpartum depression associated with second pregnancy     Family history of nonmelanoma skin cancer     Difficulty falling asleep at night until early morning hours     Exposure to human papillomavirus-  with genital warts - recently diagnosed     Major depressive disorder, recurrent episode, mild (H)     Hoarseness or changing voice- noticed 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

## 2022-03-11 NOTE — PATIENT INSTRUCTIONS
Recommendations for dry skin and dermatitis   1. Bathe or shower daily in lukewarm water  2. Use a gentle non-soap detergent cleanser  - Soaps are alkaline (which can irritate sensitive skin) and remove natural moisturizing factors   - Recommended products, in no particular order, include:   - Bars:    - Aveeno Moisturizing Bar    - Cetaphil Gentle Cleansing Bar    - Dove Sensitive Skin Unscented Beauty Bar    - Olay Ultra Moisture Bar   - Liquid Cleansers:    - Aquanil Cleanser    - CeraVe Hydrating Cleanser    - Cetaphil Gentle Skin Cleanser  - Avoid scented soaps or bath additives unless your doctor tells you otherwise  - Focus on washing the face, underarms, and underwear areas; other sites usually do not need frequent washing  3. Rinse off thoroughly, then pat dry until skin is slightly damp  4. Apply moisturizer to damp skin within 3-5 minutes of exiting the bath/shower  - Recommended products, in no particular order, include:   - Lotions (thinner/lighter, but may be less effective)    - AmLactin Cerapeutic Restoring Body Lotion    - CeraVe Facial Moisturizing Lotion (AM and/or PM)    - Lubriderm Advanced Therapy Lotion   - Creams (thicker, likely the best balance of effectiveness and feel)    - AmLactin Ultra Hydrating Body Cream    - Aveeno Eczema Therapy Moisturizing Cream    - Aveeno Eczema Therapy Itch Relief Balm    - CeraVe Itch Relief Moisturizing Cream   - Ointments (thickest)    - Vaseline  5. If prescribed a topical steroid medication, this may be applied before or after the moisturizer (whichever order you prefer)  6. Reapply moisturizer one or two additional times throughout the day when dry skin is present; once this improves, reduce to daily or every other day as needed to prevent recurrence  7. If dry skin or dermatitis is present on the hands, keep moisturizer near the sink and apply after washing and drying your hands  8. A humidifier may be helpful during the winter months (when  ambient humidity is very low)      What Is Rosacea?  Rosacea is a long-term (chronic) skin disease that causes facial redness. Rosacea appears mainly in adults. Light-skinned people who tend to flush are most often affected. It may be made worse by the following:    Sun exposure    Heat    Eating spicy foods    Drinking alcohol    Getting embarrassed  Rosacea is rarely a health risk. But the symptoms of this disease can be painful and hurt your self-image. If you have rosacea, know that treatment and self-care can help.  A disease with changing symptoms  No one knows what causes rosacea. Heredity, flushing, and the presence of mites or bacteria may play a role. Increased blood flow in the facial skin may be involved. So may the use of some medicines. Rosacea has 4 main types of symptoms that affect the skin. They are described below. Some people with rosacea also have problems with their eyes. Your eyelids may be red, swollen, or itchy. You may feel as though there is sand in your eyes. From day to day, symptoms can come and go. They may worsen or improve. They can usually be managed with proper treatment and self-care.  Symptoms of rosacea  Flushing  The central region of the face often flushes. This includes the cheeks, chin, forehead, and nose. The color can range from pink to red. Flushing can often include a burning feeling in the skin, rather than itching. The flushing can come and go. Alcohol or hot drinks can make flushing worse. There are few good treatments for those who have only flushing as the main symptoms of their rosacea. So avoiding triggers is the key.   Dilated blood vessels  Tiny enlarged (dilated) blood vessels (telangiectasias) may form a web-like pattern on 1 or more parts of the face.  Acne-like lesions  Acne-like lesions appear on the face. They are called papules, pustules, and nodules, and they tend to occur above the nose, on the cheeks, and on the chin. They may come and go. Facial  redness and tiny dilated blood vessels tend to persist.  Enlargement of the nose  With severe rosacea, redness and swelling may enlarge the nose (rhinophyma) due to thickening of the skin. Thickened skin may also occur on the forehead, chin, cheeks, and ears. This occurs most often in men.  [employer] last reviewed this educational content on 8/1/2019 2000-2021 [employer]. All rights reserved. This information is not intended as a substitute for professional medical care. Always follow your healthcare professional's instructions.

## 2022-03-11 NOTE — LETTER
3/11/2022         RE: Mandy Novak  [address] [place] [address]        Dear Colleague,    Thank you for referring your patient, Mandy Novak, to the [facility]. Please see a copy of my visit note below.    [facility] Dermatology Note  Encounter Date: Mar 11, 2022  Office Visit   Former Dr. Betts patient- last OV 10/19/20  Hx atypical mole    Dermatology Problem List:  1. intraderman nevi on face: 4 forehead- dorsum- left cheek- arms- back- legs-  2.     ____________________________________________    Assessment & Plan:     # ***.   - ABCDEs: Counseled ABCDEs of melanoma: Asymmetry, Border (irregularity), Color (not uniform, changes in color), Diameter (greater than 6 mm which is about the size of a pencil eraser), and Evolving (any changes in preexisting moles).  - Moisturize: Recommend good OTC moisturizer to full body, such as Cera-Ve, Yanna-cream, or Cetaphil. Advised best to apply after bathing to lock in moisture.  - Sun protection: Counseled SPF30+ sunscreen, UPF clothing, sun avoidance, tanning bed avoidance.        # ***.   {kkplans:440132}   - ***     Procedures Performed:   {kkprocedurenotes:526053}  {kkprocedurenotes:358287}    Follow-up: 1 year(s) in-person, or earlier for new or changing lesions    {kkstaffinvolved:603729}    ***  ____________________________________________    CC: Skin Check    HPI:  Ms. Mandy Novak is a(n) 37 year old female who presents today {kknew/return:428685} for ***    Patient is otherwise feeling well, without additional skin concerns.     Labs Reviewed:  ***N/A    Physical Exam:  Vitals: /70   SKIN: {kkSkinExam:871646}  - ***  - {Skin Exam Derm:463336}.   - {Skin Exam Derm:110848}.   - {Skin Exam Derm:412638}.   - No other lesions of concern on areas examined.     Medications:  Current Outpatient Medications   Medication     azelaic acid (FINACIA) 15 % external gel     citalopram (CELEXA) 20 MG  tablet     Prenatal Vit-Fe Fumarate-FA (PRENATAL VITAMINS) 28-0.8 MG TABS     propranolol (INDERAL) 10 MG tablet     vitamin D3 (CHOLECALCIFEROL) 50 mcg (2000 units) tablet     No current facility-administered medications for this visit.      Past Medical History:   Patient Active Problem List   Diagnosis     CARDIOVASCULAR SCREENING; LDL GOAL LESS THAN 160     Situational syncope- with any blood draws or shots     Resolved - Marginal placenta previa with intrapartum hemorrhage in first trimester-repeat 1/15/2015= resolved      Multiple pigmented nevi     Supervision of other normal pregnancy, antepartum     Type O blood, Rh positive     Large for gestational age     Indication for care in labor or delivery     History of shoulder dystocia in prior pregnancy- with first baby 7lbs 14oz 2015       (normal spontaneous vaginal delivery)     Immediate postpartum hemorrhage, postpartum     Elevated d-dimer     Acute posthemorrhagic anemia     Anxiety     Postpartum depression associated with second pregnancy     Family history of nonmelanoma skin cancer     Difficulty falling asleep at night until early morning hours     Exposure to human papillomavirus-  with genital warts - recently diagnosed     Major depressive disorder, recurrent episode, mild (H)     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    Fwd back to Dr. Tsai. Note is incomplete.       Again, thank you for allowing me to participate in the care of your patient.         Sincerely,        Farhad Tsai MD

## 2022-03-31 ENCOUNTER — OFFICE VISIT (OUTPATIENT)
Dept: OTOLARYNGOLOGY | Facility: CLINIC | Age: 37
End: 2022-03-31
Payer: COMMERCIAL

## 2022-03-31 ENCOUNTER — PRE VISIT (OUTPATIENT)
Dept: OTOLARYNGOLOGY | Facility: CLINIC | Age: 37
End: 2022-03-31

## 2022-03-31 VITALS
OXYGEN SATURATION: 100 % | HEIGHT: 68 IN | WEIGHT: 155 LBS | BODY MASS INDEX: 23.49 KG/M2 | SYSTOLIC BLOOD PRESSURE: 110 MMHG | DIASTOLIC BLOOD PRESSURE: 70 MMHG | TEMPERATURE: 98.6 F | HEART RATE: 83 BPM

## 2022-03-31 DIAGNOSIS — J38.7 LARYNGEAL HYPERFUNCTION: ICD-10-CM

## 2022-03-31 DIAGNOSIS — R49.9 HOARSENESS OR CHANGING VOICE: ICD-10-CM

## 2022-03-31 DIAGNOSIS — R49.0 DYSPHONIA: Primary | ICD-10-CM

## 2022-03-31 DIAGNOSIS — R09.89 CHRONIC THROAT CLEARING: ICD-10-CM

## 2022-03-31 PROCEDURE — 31579 LARYNGOSCOPY TELESCOPIC: CPT | Performed by: OTOLARYNGOLOGY

## 2022-03-31 PROCEDURE — 99203 OFFICE O/P NEW LOW 30 MIN: CPT | Mod: 25 | Performed by: OTOLARYNGOLOGY

## 2022-03-31 PROCEDURE — 92524 BEHAVRAL QUALIT ANALYS VOICE: CPT | Mod: GN | Performed by: SPEECH-LANGUAGE PATHOLOGIST

## 2022-03-31 ASSESSMENT — PAIN SCALES - GENERAL: PAINLEVEL: NO PAIN (0)

## 2022-03-31 NOTE — NURSING NOTE
"Chief Complaint   Patient presents with     Consult     New patient       Blood pressure 110/70, pulse 83, temperature 98.6  F (37  C), temperature source Temporal, height 1.727 m (5' 8\"), weight 70.3 kg (155 lb), SpO2 100 %, not currently breastfeeding.    Wesly Avalos, EMT  "

## 2022-03-31 NOTE — PROGRESS NOTES
[facility] VOICE CLINIC  Dino Allen Jr., M.D., F.A.C.S.  Deedee Cordero M.D., M.P.H.  Stella Dorman M.D.  Suyapa Jordan, Ph.D., CCC-SLP  Azael Valdez, Ph.D., CCC-SLP  Syeda Lees M.M. (voice), M.A., CCC-SLP  Ray Sargent M.M. (voice), MKEMAR., CCC-SLP  VICKY Huggins (voice), M.S., CCC-SLP  [facility] VOICE [facility]  CLINICAL EVALUATION REPORT    Patient: Mandy Novak  Date of Service: 3/31/2022  Clinician: Daniele Huggins, MS, CCC-SLP  Seen in conjunction with: Dr. Stella Dorman  Referring physician: Mey Mcleod MD    HISTORY  PATIENT INFORMATION  Mandy Novak is a 37 year old female presenting today for evaluation of dysphonia. Salient details of her symptom history are as follows:    Chief complaint: Mandy started teaching in 2007, high school and middle school choir. Her voice was normal when she first started teaching. Starting in 2010, her singing voice didn't feel as strong and her mid-range voice sounded hoarser and weaker. She is a soprano 1 and started to struggle with her upper register as well. The music directors would do a summer camp each August and she started to lose her voice every year during this event, even though the camp wasn't terribly long rehearsals. As she continued to teach and sing in class over the years, her voice continued to have increasing impairment. 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 Her symptoms have continued to worsen and she finally has time in her schedule to address her voice concerns. She hopes to eventually return to teaching, teaching voice lessons, joining choir, etc. She doesn't currently feel like she'd be able to sing in a choir.     Onset: 10 years ago     Course: Worsening    CURRENT SYMPTOMS INCLUDE:    VOICE: Weakness and hoarseness in mid-range. Difficulty with upper register. Increased effort and strain. She has to shout instructions to large classes in very large spaces. More recently, she's noticed that even her speaking voice is  affected. She has a 4 and 6 year old and reading out loud has become more difficult. Of note, she's had several URI this past winter, which has further impacted her voice. She enjoys singing and talking with her kids, but otherwise feels like she's limited in her abilities were she to return to choir or work. She can't sing soprano any more and can only sing the lower alto part. She'll completely lose her voice if she does push her voice to the higher part during a 2 hour rehearsal. She wouldn't lose her voice at the end of teaching days, but would be very vocally tired by the end.     COUGH/THROAT CLEAR: Cough and throat clearing more when talking, singing, laying down at night, and does not notice any issues with exercise, scents, eating/drinking or temperatures. She feels like there's mucus by her soft palate or fatigue near her voice box that would make her cough or clear her throat. She felt like her voice was hitting too hard, and she knew it wasn't healthy, but also couldn't stop. She also has a tendency for cough to linger long after a cold or URI. She currently feels like her cough is lingering from a bronchitis over a month ago.     SWALLOWING: Denies    BREATHING: Denies, other than being less efficient when singing.     OTHER PERTINENT HISTORY    Medications: none related to symptoms    Reflux: no overt symptoms, but has had heartburn 2x in the past month which is unusual.     Post-Nasal Drip/Congestion: Yes, particularly this winter; worse since teaching because she was exposed to more germs. Increased again when she had kids.     Neck/Back/Jaw Pain and/or Tension: Grinds her teeth at night, wears a bite guard which seems to help most of the time.     Please also refer to Dr. Stella Dorman's dictation.     Past Medical History:   Diagnosis Date     ASCUS with positive high risk HPV 12/2014    + HPV 16, hx of ASCUS , but neg HPV in 2008      Depressive disorder 2017    post-partum     Cecilio patiño  "presentation - seen on 31 week US - resolved with external version on 3/24/2017 2/20/2017     Past Surgical History:   Procedure Laterality Date     BIOPSY  August 2015, mulitple others    colposcopy, several other mole removals     [place] teeth extraction       [facility] ORAL SURGERY PROCEDURE      graft surgery for lower gum recession on 11/24/2020       OBJECTIVE FINDINGS  Patient Supplied Answers To VHI Questionnaire  Voice Handicap Index (VHI-10) 3/29/2022   My voice makes it difficult for people to hear me 0   People have difficulty understanding me in a noisy room 1   My voice difficulties restrict my personal and social life.  1   I feel left out of conversations because of my voice 0   My voice problem causes me to lose income 0   I feel as though I have to strain to produce voice 2   The clarity of my voice is unpredictable 2   My voice problem upsets me 2   My voice makes me feel handicapped 2   People ask, \"What's wrong with your voice?\" 1   VHI-10 11        Patient Supplied Answers To CSI Questionnaire  Cough Severity Index (CSI) 3/29/2022   My cough is worse when I lie down 3   My coughing problem causes me to restrict my personal and social life 2   I tend to avoid places because of my cough problem 2   I feel embarrassed because of my coughing problem 2   People ask, ''What's wrong?'' because I cough a lot 1   I run out of air when I cough 0   My coughing problem affects my voice 2   My coughing problem limits my physical activity 1   My coughing problem upsets me 2   People ask me if I am sick because I cough a lot 2   CSI Score 17        Patient Supplied Answers To EAT Questionnaire  Eating Assessment Tool (EAT-10) 3/29/2022   My swallowing problem has caused me to lose weight 0   My swallowing problem interferes with my ability to go out for meals 0   Swallowing liquids takes extra effort 0   Swallowing solids takes extra effort 0   Swallowing pills takes extra effort 0   Swallowing is painful 0   The " pleasure of eating is affected by my swallowing 0   When I swallow food sticks in my throat 0   I cough when I eat 0   Swallowing is stressful 0   EAT-10 0     PERCEPTUAL EVALUATION (80194)  VOICE/ SPEECH/ NON-COMMUNICATIVE LARYNGEAL BEHAVIORS EVALUATION    Palpation of the laryngeal area shows:    reduced thyrohoid space    Breathing pattern:     appears within normal limits and adequate and incoordination with phonation    Tension/Posture:    is evident in the neck and shoulders    Cough/ Throat clear:    not observed today     Mandy states today is a typical voice day, with clinician observing voice quality characterized by:    Roughness: Mild    Breathiness: Mild    Strain: Mild to moderate    Habitual pitch is 211 Hz and is WNL    Pitch glide reveals frequency range of 141-1397 Hz    Loudness is WNL and is appropriate for the setting    Maximum Phonation Time: 25 seconds    GLOBAL ASSESSMENT OF DYSPHONIA: 45/100    LARYNGEAL EXAMINATION  Procedure: Flexible endoscopy with chip-tip technology with stroboscopy, left nostril; topical anesthesia with 3% Lidocaine and 0.25% phenylephrine was applied.   Performed by: Dr. Stella Dorman  The laryngeal and pharyngeal structures were evaluated for gross appearance, mobility, function, and focal lesions / abnormalities of the associated mucosa.    All findings were within normal limits with the exception of the following salient features:     Moderate AP constriction    Mild edema/erythema of posterior cricoid region    Stroboscopy was warranted to evaluate closure, symmetry, and vibratory characteristics of the vocal folds, and allowed evaluation of the mucosal wave:    Amplitude: right: mildly decreased; left: mildly decreased.     Symmetry: good symmetry.    Closure pattern: posterior gap of posterior half margin    Closure plane: at glottic level.     Phase distribution: open-phase predominant.     The laryngeal exam was reviewed with Mandy, and I provided pertinent  explanations, as well as written and oral information.    STIMULABILITY: results of therapy probes during perceptual and laryngeal evaluation demonstrate improvement with coordination of respiration and phonation and use of yawn sigh    ASSESSMENT / PLAN  IMPRESSIONS: Mandy Novak is presenting today with Chronic Throat Clearing (R68.89) and Dysphonia (R49.0) in the context of Laryngeal Hyperfunction (J38.7). Laryngoscopy revealed moderate AP constriction and mild posterior cricoid edema/erythema suggestive of LPR. She perceptually demonstrated mildly rough and breathy voice quality with mild-moderate strain, visible perilaryngeal hyperfunction in the neck and shoulders, and incoordination of respiration with phonation despite good breathing mechanics. She would benefit from a course of speech therapy targeting reduced laryngeal irritation (LPR, PND, throat clear avoidance), improved coordination of respiration with phonation, and strategies to reduce perilaryngeal hyperfunction during phonation, both speaking and singing, in order for patient to meet her vocational and avocational voice needs.     A course of speech therapy is recommended to improve voice quality and promote reduced discomfort, effort and fatigue.    She demonstrates a Good prognosis for improvement given adherence to therapeutic recommendations.     Positive indicators: positive response to therapy probes    Negative indicators: None    Research: This patient is willing to be contacted about participation in research. May be eligible for MTD study.      DURATION / FREQUENCY: 2 weekly and 4 bi-weekly therapy sessions    Goals:  Patient goal:    To understand the problem and fix it as much as possible     Short-term goal(s): Within the first 4 sessions, Mandy will:  -- utilize vocal hygiene strategies in order to minimize laryngeal irritation and facilitate improved therapeutic outcomes with 90% accy.  -- demonstrate provided cough and throat clear  suppression/substitution strategies from memory independently with 90% accuracy.  -- demonstrate silent inhalation and abdominal breathing pattern in order to optimize breathing mechanics with 90% accy and min cues.  -- demonstrate jocy-laryngeal release and laryngeal massage techniques with >80% accy  -- coordinate appropriate air flow levels with forward resonance during phonation in order to minimize laryngeal compensation and effort with 90% accy.    Long-term goal(s): In 3 months, Mandy will:  -- report a 90% resolution of voice symptoms during a week of performing typical personal, social, and professional activities.    This treatment plan was developed with the patient who agreed with the recommendations.    TOTAL SERVICE TIME: 60 minutes  EVALUATION OF VOICE AND RESONANCE (71903)  NO CHARGE FACILITY FEE (88339)    Mariano Huggins (voice), M.S., CCC-SLP  Speech-Language Pathologist  [facility]  [phone]  [email or web address]  Pronouns: she/her/hers      *this report was created in part through the use of computerized dictation software, and though reviewed following completion, some typographic errors may persist.  If there is confusion regarding any of this notes contents, please contact me for clarification

## 2022-03-31 NOTE — PROGRESS NOTES
[facility]   at the [facility]   Otolaryngology Clinic     Patient: Mandy Novak    MRN: [id number]    : 1985    Age/Gender: 37 year old female  Date of Service: 3/31/2022  Rendering Provider:   Stella Dorman MD     Referring Provider   PCP: Mey Mcleod  Referring Physician: Mey Mcleod MD  [address] [place] [address]  Reason for Consultation   Dysphonia  History   HISTORY OF PRESENT ILLNESS: I was asked to consult on Mandy Novak, by Dr. Mcleod for evaluation of dysphonia. Ms. Novak is a 37 year old female who presents to us today for consultation.      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     she presents today for evaluation. she reports:    She reports stress-induced syncopal episodes.    Dysphonia  - is a   - on break now from work  - since  has been having issues  - has speaking and singing issues  - increased difficulty with talking or reading to her students  - will stop to throat clear and then will keep going  - is a soprano  - used to lose her voice every august at [email or web address] camp  - difficulty with upper registers  - increased strain  - has young children  - asked  to massage her SCM's  - a chiropractor she saw many years ago massaged her SCM's    Dysphagia  - denies    Dyspnea  - denies  - no inhalers  - no difficulties with exercise    Throat clearing/cough  - coughs at night when laying down  - feels mucus  - does not wake up from sleep for coughing  - when she gets sick, she has a lingering cough  - has not been sick in 1-2 months  - feels extra mucus in the back of her throat  - will need to cough a few times  - has felt more mucus sensation in the back of her throat recently  - does not cough more during or after meals    GERD/LPRD   - denies      PAST MEDICAL HISTORY:   Past Medical History:   Diagnosis Date     ASCUS with positive high risk HPV 2014    + HPV  16, hx of ASCUS , but neg HPV in 2008      Depressive disorder 2017    post-partum     [place] breech presentation - seen on 31 week US - resolved with external version on 3/24/2017 2/20/2017     PAST SURGICAL HISTORY:   Past Surgical History:   Procedure Laterality Date     BIOPSY  August 2015, mulitple others    colposcopy, several other mole removals     [place] teeth extraction       [facility] ORAL SURGERY PROCEDURE      graft surgery for lower gum recession on 11/24/2020     CURRENT MEDICATIONS:   Current Outpatient Medications:      azelaic acid (FINACIA) 15 % external gel, Apply 0.5 inches topically 2 times daily, Disp: 50 g, Rfl: 5     citalopram (CELEXA) 20 MG tablet, Take 1 tablet (20 mg) by mouth daily, Disp: 90 tablet, Rfl: 1     metroNIDAZOLE (METROGEL) 0.75 % external gel, Apply topically 2 times daily, Disp: 45 g, Rfl: 3     Prenatal Vit-Fe Fumarate-FA (PRENATAL VITAMINS) 28-0.8 MG TABS, Take 1 tablet by mouth daily, Disp: , Rfl:      propranolol (INDERAL) 10 MG tablet, Take 1-2 tablets (10-20 mg) by mouth 3 times daily as needed (anxiety), Disp: 30 tablet, Rfl: 1     vitamin D3 (CHOLECALCIFEROL) 50 mcg (2000 units) tablet, Take 1 tablet (50 mcg) by mouth daily, Disp:  , Rfl:     ALLERGIES: Patient has no known allergies.    SOCIAL HISTORY:    Social History     Socioeconomic History     Marital status:      Spouse name: Raymond Novak     Number of children: 0     Years of education: 18     Highest education level: Not on file   Occupational History     Occupation: Teacher -Middle School Choir/Music     Comment: [employer]   Tobacco Use     Smoking status: Never Smoker     Smokeless tobacco: Never Used   Substance and Sexual Activity     Alcohol use: Yes     Comment: 0-1 drinks per week     Drug use: No     Comment: no herbal meds either      Sexual activity: Yes     Partners: Male     Birth control/protection: Pull-out method, Condom, Pill     Comment: Stopped BC end of 09/2014   Other  Topics Concern     Parent/sibling w/ CABG, MI or angioplasty before 65F 55M? No      Service No     Blood Transfusions No     Caffeine Concern Yes     Comment: stopped secondary to pregnancy      Occupational Exposure Not Asked     Hobby Hazards Not Asked     Sleep Concern Not Asked     Stress Concern Not Asked     Weight Concern Not Asked     Special Diet Not Asked     Back Care Not Asked     Exercise Yes     Comment: walking     Bike Helmet Not Asked     Seat Belt Yes     Self-Exams Yes   Social History Narrative    Their oldest child , Maik, recently was diagnosed with an autism spectrum disorder. --Mey Mcleod MD      July 14, 2021      Social Determinants of Health     Financial Resource Strain: Low Risk      Difficulty of Paying Living Expenses: Not hard at all   Food Insecurity: No Food Insecurity     Worried About Running Out of Food in the Last Year: Never true     Ran Out of Food in the Last Year: Never true   Transportation Needs: No Transportation Needs     Lack of Transportation (Medical): No     Lack of Transportation (Non-Medical): No   Physical Activity: Insufficiently Active     Days of Exercise per Week: 1 day     Minutes of Exercise per Session: 30 min   Stress: No Stress Concern Present     Feeling of Stress : Only a little   Social Connections: Socially Integrated     Frequency of Communication with Friends and Family: More than three times a week     Frequency of Social Gatherings with Friends and Family: More than three times a week     Attends Mandaeism Services: More than 4 times per year     Active Member of Clubs or Organizations: Yes     Attends Club or Organization Meetings: Not on file     Marital Status:    Intimate Partner Violence: Not on file   Housing Stability: Low Risk      Unable to Pay for Housing in the Last Year: No     Number of Places Lived in the Last Year: 1     Unstable Housing in the Last Year: No       FAMILY HISTORY:   Family History    Problem Relation Age of Onset     Family History Negative Mother      Family History Negative Father      Hyperlipidemia Father      Anxiety Disorder Sister      Depression Sister      Depression Sister      Anxiety Disorder Brother      Depression Brother      Depression Brother      Thyroid Disease Maternal Grandmother      Depression Maternal Grandmother      Diabetes Maternal Grandfather         diet controlled     Prostate Cancer Paternal Grandfather 90     Breast Cancer Maternal Aunt 65     Breast Cancer Maternal Great-Grandmother      Non-contributory for problems with anesthesia    REVIEW OF SYSTEMS:   The patient was asked a 14 point review of systems regarding constitutional symptoms, eye symptoms, ears, nose, mouth, throat symptoms, cardiovascular symptoms, respiratory symptoms, gastrointestinal symptoms, genitourinary symptoms, musculoskeletal symptoms, integumentary symptoms, neurological symptoms, psychiatric symptoms, endocrine symptoms, hematologic/lymphatic symptoms, and allergic/ immunologic symptoms.   The pertinent factors have been included in the HPI and below.  Patient Supplied Answers to Review of Systems  [facility] E[facility] ROS 3/29/2022   Psychology Frequently feeling anxious   Ears, Nose, Throat Nasal congestion or drainage, Hoarseness   Cardiopulmonary Cough   Gastrointestinal/Genitourinary Heartburn/indigestion   Musculoskeletal Neck pain   Allergy/Immunology Skin changes     Physical Examination   The patient underwent a physical examination as described below. The pertinent positive and negative findings are summarized after the description of the examination.  Constitutional: The patient's developmental and nutritional status was assessed. The patient's voice quality was assessed.  Head and Face: The head and face were inspected for deformities. The sinuses were palpated. The salivary glands were palpated. Facial muscle strength was assessed bilaterally.  Eyes: Extraocular movements and primary  gaze alignment were assessed.  Ears, Nose, Mouth and Throat: The ears and nose were examined for deformities. The nasal septum, mucosa, and turbinates were inspected by anterior rhinoscopy. The lips, teeth, and gums were examined for abnormalities. The oral mucosa, tongue, palate, tonsils, lateral and posterior pharynx were inspected for the presence of asymmetry or mucosal lesions.    Neck: The tracheal position was noted, and the neck mass palpated to determine if there were any asymmetries, abnormal neck masses, thyromegally, or thyroid nodules.  Respiratory: The nature of the breathing and chest expansion/symmetry was observed.  Cardiovascular: The patient was examined to determine the presence of any edema or jugular venous distension.  Abdomen: The contour of the abdomen was noted.  Lymphatic: The patient was examined for infraclavicular lymphadenopathy.  Musculoskeletal: The patient was inspected for the presence of skeletal deformities.  Extremities: The extremities were examined for any clubbing or cyanosis.  Skin: The skin was examined for inflammatory or neoplastic conditions.  Neurologic: The patient's orientation, mood, and affect were noted. The cranial nerve  functions were examined.  Other pertinent positive and negative findings on physical examination:      OC/OP: no lesions, uvula midline, soft palate elevates symmetrically  Neck: no lesions, bilateral TH tenderness to palpation, no need to cough with TH pressure, bilateral SCM tenderness    All other physical examination findings were within normal limits and noncontributory.  Procedures   Video Laryngoscopy with Stroboscopy (CPT 27125) and Behavioral & Qualitative Evaluation of Voice and Resonence     Preoperative Diagnosis:  Dysphonia and throat symptoms  Postoperative Diagnosis: Dysphonia and throat symptoms  Indication:  Patient has new or persistent dysphonia and throat symptoms.  This requires evaluation by stroboscopy to fully delineate  the laryngeal functioning; especially mucosal wave assessment, ultrasharp visualization of lesions on the vocal folds, and overall functioning of the larynx.  Details of Procedure: A 70 degree rigid telescopic laryngoscope or a distal chip flexible scope was used. The lighting was obtained via a light cable connected to a stroboscopic unit. The telescope was inserted either transorally or transnasally until the vocal folds could be visualized. The patient was instructed to sustain the vowel  ee  at a comfortable pitch and loudness as the voice was monitored by a microphone connected to a fundamental frequency tracker. This circuit tracked vocal periodicity, allowing the light to flash in synchrony with the glottal cycles. A setting on the stroboscope was set to change the phase of light strobing with relation to the vocal fundamental frequency, producing an image of 1 to 2 glottal cycles every second. The video images were recorded for analysis. Use of the variable speed, slow and stop scan allowed careful study of pertinent segments of laryngeal function to increase accuracy of clinical assessments of function and dysfunction.  In particular, features of glottal closure, mucosal wave on the vocal fold cover and laryngeal symmetry were analyzed. Lastly, the patient was asked to phonate speech samples and auditory/perceptual evaluation of voice and resonance were performed.  The vocal quality was carefully evaluated for hoarseness, breathiness, loudness, phrase length and intelligibility to determine the source of dysphonia.    Findings:   A. BEHAVIORAL & QUALITATIVE EVALUATION OF VOICE AND RESONENCE   Comments: F0 210 Hz MPT: 25 seconds  Vocal Quality: Normal    Pitch Range:  Normal 141 - > 1000 Hz  Phrase Length:  Normal  Vocal Loudness: Normal  Dysarthria: No    B. LARYNGOVIDEOSTROBOSCOPY   Anatomic/Physiological Deviations:  RNC, severe supraglottic hyperfunction, postcricoid edema  Mucosal wave: Right:  No  "restriction     Left: No restriction  Bilateral Vocal Fold Vibration: Symmetric  Vocal Process: Right: No restriction    Left:  No restriction  Vocal Fold closure: Complete glottal closure    Complication(s): None  Disposition: Patient tolerated the procedure well            Review of Relevant Clinical Data   I personally reviewed:  Notes: Mey Gan Dr. 1/11/22    Labs:  Lab Results   Component Value Date    TSH 1.44 11/05/2020     Lab Results   Component Value Date     11/05/2020    CO2 29 11/05/2020    BUN 12 11/05/2020     Lab Results   Component Value Date    WBC 6.1 11/05/2020    HGB 13.2 11/05/2020    HCT 39.6 11/05/2020    MCV 88 11/05/2020     11/05/2020     Lab Results   Component Value Date    PTT 25 04/20/2017    INR 0.87 04/20/2017     No results found for: DILIA  No components found for: RHEUMATOIDFACTOR,  RF  No results found for: CRP  No components found for: CKTOT, URICACID  No components found for: C3, C4, DSDNAAB, NDNAABIFA  No results found for: MPOAB    Patient reported Quality of Life (QOL) Measures   Patient Supplied Answers To VHI Questionnaire  Voice Handicap Index (VHI-10) 3/29/2022   My voice makes it difficult for people to hear me 0   People have difficulty understanding me in a noisy room 1   My voice difficulties restrict my personal and social life.  1   I feel left out of conversations because of my voice 0   My voice problem causes me to lose income 0   I feel as though I have to strain to produce voice 2   The clarity of my voice is unpredictable 2   My voice problem upsets me 2   My voice makes me feel handicapped 2   People ask, \"What's wrong with your voice?\" 1   VHI-10 11     Patient Supplied Answers To EAT Questionnaire  Eating Assessment Tool (EAT-10) 3/29/2022   My swallowing problem has caused me to lose weight 0   My swallowing problem interferes with my ability to go out for meals 0   Swallowing liquids takes extra effort 0   Swallowing solids " takes extra effort 0   Swallowing pills takes extra effort 0   Swallowing is painful 0   The pleasure of eating is affected by my swallowing 0   When I swallow food sticks in my throat 0   I cough when I eat 0   Swallowing is stressful 0   EAT-10 0     Patient Supplied Answers To CSI Questionnaire  Cough Severity Index (CSI) 3/29/2022   My cough is worse when I lie down 3   My coughing problem causes me to restrict my personal and social life 2   I tend to avoid places because of my cough problem 2   I feel embarrassed because of my coughing problem 2   People ask, ''What's wrong?'' because I cough a lot 1   I run out of air when I cough 0   My coughing problem affects my voice 2   My coughing problem limits my physical activity 1   My coughing problem upsets me 2   People ask me if I am sick because I cough a lot 2   CSI Score 17     Patient Supplied Answers to Dyspnea Index Questionnaire:  No flowsheet data found.    Impression & Plan     IMPRESSION: Ms. Novak is a 37 year old female who is being seen for the followin. 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 All documentation has been reviewed by the aforementioned provider prior to being entered into the official medical record.       Thank you for the kind referral and for allowing  me to share in the care of Ms. Novak. If you have any questions, please do not hesitate to contact me.    Stella Dorman MD    Laryngology    [facility]  Department of  Otolaryngology - Head and Neck Surgery  [facility] [place] [address]  Appointment line: [phone]  Fax: [phone]  [email or web address]

## 2022-03-31 NOTE — LETTER
3/31/2022       RE: Mandy Novak  [address] [place] [address] [place] [address]     Dear Colleague,    Thank you for referring your patient, Mandy Novak, to the [facility] [place] [facility]. Please see a copy of my visit note below.    [facility]  Dino Allen Jr., M.D., F.A.C.S.  Deedee Cordero M.D., M.P.H.  Stella Dorman M.D.  Suyapa Jordan, Ph.D., CCC-SLP  Azael Valdez, Ph.D., CCC-SLP  Syeda Lees M.M. (voice), M.PATRICIA., CCC-SLP  Ray Sargent M.M. (voice), M.A., CCC-SLP  VICKY Huggins (voice), M.S., CCC-SLP  [facility]  CLINICAL EVALUATION REPORT    Patient: Mandy Novak  Date of Service: 3/31/2022  Clinician: Daniele Huggins, MS, CCC-SLP  Seen in conjunction with: Dr. Stella Dorman  Referring physician: Mey Mcleod MD    HISTORY  PATIENT INFORMATION  Mandy Novak is a 37 year old female presenting today for evaluation of dysphonia. Salient details of her symptom history are as follows:    Chief complaint: Mandy started teaching in 2007, high school and middle school choir. Her voice was normal when she first started teaching. Starting in 2010, her singing voice didn't feel as strong and her mid-range voice sounded hoarser and weaker. She is a soprano 1 and started to struggle with her upper register as well. The music directors would do a summer camp each August and she started to lose her voice every year during this event, even though the camp wasn't terribly long rehearsals. As she continued to teach and sing in class over the years, her voice continued to have increasing impairment. 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 Her symptoms have continued to worsen and she finally has time in her schedule to address her voice concerns. She hopes to eventually return to teaching, teaching voice lessons, joining choir, etc. She doesn't currently feel like she'd be able to sing in a choir.  Potassium still elevated.  Kidney function normal.  Patient not on any medications.  Patient called.  No answer.  Leave a message to call if any symptoms like fatigue, muscle weakness, heart symptoms.  Patient asked to come to the office for the EKG.  Stay hydrated and repeat potassium this week     Onset: 10 years ago     Course: Worsening    CURRENT SYMPTOMS INCLUDE:    VOICE: Weakness and hoarseness in mid-range. Difficulty with upper register. Increased effort and strain. She has to shout instructions to large classes in very large spaces. More recently, she's noticed that even her speaking voice is affected. She has a 4 and 6 year old and reading out loud has become more difficult. Of note, she's had several URI this past winter, which has further impacted her voice. She enjoys singing and talking with her kids, but otherwise feels like she's limited in her abilities were she to return to choir or work. She can't sing soprano any more and can only sing the lower alto part. She'll completely lose her voice if she does push her voice to the higher part during a 2 hour rehearsal. She wouldn't lose her voice at the end of teaching days, but would be very vocally tired by the end.     COUGH/THROAT CLEAR: Cough and throat clearing more when talking, singing, laying down at night, and does not notice any issues with exercise, scents, eating/drinking or temperatures. She feels like there's mucus by her soft palate or fatigue near her voice box that would make her cough or clear her throat. She felt like her voice was hitting too hard, and she knew it wasn't healthy, but also couldn't stop. She also has a tendency for cough to linger long after a cold or URI. She currently feels like her cough is lingering from a bronchitis over a month ago.     SWALLOWING: Denies    BREATHING: Denies, other than being less efficient when singing.     OTHER PERTINENT HISTORY    Medications: none related to symptoms    Reflux: no overt symptoms, but has had heartburn 2x in the past month which is unusual.     Post-Nasal Drip/Congestion: Yes, particularly this winter; worse since teaching because she was exposed to more germs. Increased again when she had kids.     Neck/Back/Jaw Pain and/or Tension: Grinds her teeth at night,  "wears a bite guard which seems to help most of the time.     Please also refer to Dr. Stella Dorman's dictation.     Past Medical History:   Diagnosis Date     ASCUS with positive high risk HPV 12/2014    + HPV 16, hx of ASCUS , but neg HPV in 2008      Depressive disorder 2017    post-partum     Cecilio breech presentation - seen on 31 week US - resolved with external version on 3/24/2017 2/20/2017     Past Surgical History:   Procedure Laterality Date     BIOPSY  August 2015, mulitple others    colposcopy, several other mole removals     [place] teeth extraction       [facility] ORAL SURGERY PROCEDURE      graft surgery for lower gum recession on 11/24/2020       OBJECTIVE FINDINGS  Patient Supplied Answers To VHI Questionnaire  Voice Handicap Index (VHI-10) 3/29/2022   My voice makes it difficult for people to hear me 0   People have difficulty understanding me in a noisy room 1   My voice difficulties restrict my personal and social life.  1   I feel left out of conversations because of my voice 0   My voice problem causes me to lose income 0   I feel as though I have to strain to produce voice 2   The clarity of my voice is unpredictable 2   My voice problem upsets me 2   My voice makes me feel handicapped 2   People ask, \"What's wrong with your voice?\" 1   VHI-10 11        Patient Supplied Answers To CSI Questionnaire  Cough Severity Index (CSI) 3/29/2022   My cough is worse when I lie down 3   My coughing problem causes me to restrict my personal and social life 2   I tend to avoid places because of my cough problem 2   I feel embarrassed because of my coughing problem 2   People ask, ''What's wrong?'' because I cough a lot 1   I run out of air when I cough 0   My coughing problem affects my voice 2   My coughing problem limits my physical activity 1   My coughing problem upsets me 2   People ask me if I am sick because I cough a lot 2   CSI Score 17        Patient Supplied Answers To EAT Questionnaire  Eating Assessment " Tool (EAT-10) 3/29/2022   My swallowing problem has caused me to lose weight 0   My swallowing problem interferes with my ability to go out for meals 0   Swallowing liquids takes extra effort 0   Swallowing solids takes extra effort 0   Swallowing pills takes extra effort 0   Swallowing is painful 0   The pleasure of eating is affected by my swallowing 0   When I swallow food sticks in my throat 0   I cough when I eat 0   Swallowing is stressful 0   EAT-10 0     PERCEPTUAL EVALUATION (85923)  VOICE/ SPEECH/ NON-COMMUNICATIVE LARYNGEAL BEHAVIORS EVALUATION    Palpation of the laryngeal area shows:    reduced thyrohoid space    Breathing pattern:     appears within normal limits and adequate and incoordination with phonation    Tension/Posture:    is evident in the neck and shoulders    Cough/ Throat clear:    not observed today     Mandy states today is a typical voice day, with clinician observing voice quality characterized by:    Roughness: Mild    Breathiness: Mild    Strain: Mild to moderate    Habitual pitch is 211 Hz and is WNL    Pitch glide reveals frequency range of 141-1397 Hz    Loudness is WNL and is appropriate for the setting    Maximum Phonation Time: 25 seconds    GLOBAL ASSESSMENT OF DYSPHONIA: 45/100    LARYNGEAL EXAMINATION  Procedure: Flexible endoscopy with chip-tip technology with stroboscopy, left nostril; topical anesthesia with 3% Lidocaine and 0.25% phenylephrine was applied.   Performed by: Dr. Stella Dorman  The laryngeal and pharyngeal structures were evaluated for gross appearance, mobility, function, and focal lesions / abnormalities of the associated mucosa.    All findings were within normal limits with the exception of the following salient features:     Moderate AP constriction    Mild edema/erythema of posterior cricoid region    Stroboscopy was warranted to evaluate closure, symmetry, and vibratory characteristics of the vocal folds, and allowed evaluation of the mucosal  wave:    Amplitude: right: mildly decreased; left: mildly decreased.     Symmetry: good symmetry.    Closure pattern: posterior gap of posterior half margin    Closure plane: at glottic level.     Phase distribution: open-phase predominant.     The laryngeal exam was reviewed with Mandy, and I provided pertinent explanations, as well as written and oral information.    STIMULABILITY: results of therapy probes during perceptual and laryngeal evaluation demonstrate improvement with coordination of respiration and phonation and use of yawn sigh    ASSESSMENT / PLAN  IMPRESSIONS: Mandy Novak is presenting today with Chronic Throat Clearing (R68.89) and Dysphonia (R49.0) in the context of Laryngeal Hyperfunction (J38.7). Laryngoscopy revealed moderate AP constriction and mild posterior cricoid edema/erythema suggestive of LPR. She perceptually demonstrated mildly rough and breathy voice quality with mild-moderate strain, visible perilaryngeal hyperfunction in the neck and shoulders, and incoordination of respiration with phonation despite good breathing mechanics. She would benefit from a course of speech therapy targeting reduced laryngeal irritation (LPR, PND, throat clear avoidance), improved coordination of respiration with phonation, and strategies to reduce perilaryngeal hyperfunction during phonation, both speaking and singing, in order for patient to meet her vocational and avocational voice needs.     A course of speech therapy is recommended to improve voice quality and promote reduced discomfort, effort and fatigue.    She demonstrates a Good prognosis for improvement given adherence to therapeutic recommendations.     Positive indicators: positive response to therapy probes    Negative indicators: None    Research: This patient is willing to be contacted about participation in research. May be eligible for MTD study.      DURATION / FREQUENCY: 2 weekly and 4 bi-weekly therapy sessions    Goals:  Patient  goal:    To understand the problem and fix it as much as possible     Short-term goal(s): Within the first 4 sessions, Mandy will:  -- utilize vocal hygiene strategies in order to minimize laryngeal irritation and facilitate improved therapeutic outcomes with 90% accy.  -- demonstrate provided cough and throat clear suppression/substitution strategies from memory independently with 90% accuracy.  -- demonstrate silent inhalation and abdominal breathing pattern in order to optimize breathing mechanics with 90% accy and min cues.  -- demonstrate jocy-laryngeal release and laryngeal massage techniques with >80% accy  -- coordinate appropriate air flow levels with forward resonance during phonation in order to minimize laryngeal compensation and effort with 90% accy.    Long-term goal(s): In 3 months, Mandy will:  -- report a 90% resolution of voice symptoms during a week of performing typical personal, social, and professional activities.    This treatment plan was developed with the patient who agreed with the recommendations.    TOTAL SERVICE TIME: 60 minutes  EVALUATION OF VOICE AND RESONANCE (71042)  NO CHARGE FACILITY FEE (26831)    Mariano Huggins (voice) M.S., CCC-SLP  Speech-Language Pathologist  [facility]  [phone]  [email or web address]  Pronouns: she/her/hers      *this report was created in part through the use of computerized dictation software, and though reviewed following completion, some typographic errors may persist.  If there is confusion regarding any of this notes contents, please contact me for clarification      Again, thank you for allowing me to participate in the care of your patient.      Sincerely,    Rebekah Alcazar, SLP

## 2022-03-31 NOTE — PATIENT INSTRUCTIONS
1.  You were seen in the ENT Clinic today by . If you have any questions or concerns after your appointment, please call [phone]. Press option #1 for scheduling related needs. Press option #3 for Nurse advice.    2.   has recommended  the following:   - voice therapy    3.  Plan is to return to clinic as needed after therapy      Leila Gottlieb LPN  [phone]  [facility]

## 2022-03-31 NOTE — LETTER
3/31/2022       RE: Mandy Novak  [address]  [place] [address]     Dear Colleague,    Thank you for referring your patient, Mandy Novak, to the [facility] EAR NOSE AND THROAT CLINIC [place] at [facility]. Please see a copy of my visit note below.        [facility]   at the [facility]   Otolaryngology Clinic     Patient: Mandy Novak    MRN: [id number]    : 1985    Age/Gender: 37 year old female  Date of Service: 3/31/2022  Rendering Provider:   Stella Dorman MD     Referring Provider   PCP: Mey Mcleod  Referring Physician: Mey Mcleod MD  [address] [place] [id number]  Reason for Consultation   Dysphonia  History   HISTORY OF PRESENT ILLNESS: I was asked to consult on Mandy Novak, by Dr. Mcleod for evaluation of dysphonia. Ms. Novak is a 37 year old female who presents to us today for consultation.      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     she presents today for evaluation. she reports:    She reports stress-induced syncopal episodes.    Dysphonia  - is a   - on break now from work  - since  has been having issues  - has speaking and singing issues  - increased difficulty with talking or reading to her students  - will stop to throat clear and then will keep going  - is a soprano  - used to lose her voice every august at summer camp  - difficulty with upper registers  - increased strain  - has young children  - asked  to massage her SCM's  - a chiropractor she saw many years ago massaged her SCM's    Dysphagia  - denies    Dyspnea  - denies  - no inhalers  - no difficulties with exercise    Throat clearing/cough  - coughs at night when laying down  - feels mucus  - does not wake up from sleep for coughing  - when she gets sick, she has a lingering cough  - has not been sick in 1-2 months  - feels extra  mucus in the back of her throat  - will need to cough a few times  - has felt more mucus sensation in the back of her throat recently  - does not cough more during or after meals    GERD/LPRD   - denies      PAST MEDICAL HISTORY:   Past Medical History:   Diagnosis Date     ASCUS with positive high risk HPV 12/2014    + HPV 16, hx of ASCUS , but neg HPV in 2008      Depressive disorder 2017    post-partum     Cec[place] breech presentation - seen on 31 week US - resolved with external version on 3/24/2017 2/20/2017     PAST SURGICAL HISTORY:   Past Surgical History:   Procedure Laterality Date     BIOPSY  August 2015, mulitple others    colposcopy, several other mole removals     [place] teeth extraction       [facility] ORAL SURGERY PROCEDURE      graft surgery for lower gum recession on 11/24/2020     CURRENT MEDICATIONS:   Current Outpatient Medications:      azelaic acid (FINACIA) 15 % external gel, Apply 0.5 inches topically 2 times daily, Disp: 50 g, Rfl: 5     citalopram (CELEXA) 20 MG tablet, Take 1 tablet (20 mg) by mouth daily, Disp: 90 tablet, Rfl: 1     metroNIDAZOLE (METROGEL) 0.75 % external gel, Apply topically 2 times daily, Disp: 45 g, Rfl: 3     Prenatal Vit-Fe Fumarate-FA (PRENATAL VITAMINS) 28-0.8 MG TABS, Take 1 tablet by mouth daily, Disp: , Rfl:      propranolol (INDERAL) 10 MG tablet, Take 1-2 tablets (10-20 mg) by mouth 3 times daily as needed (anxiety), Disp: 30 tablet, Rfl: 1     vitamin D3 (CHOLECALCIFEROL) 50 mcg (2000 units) tablet, Take 1 tablet (50 mcg) by mouth daily, Disp:  , Rfl:     ALLERGIES: Patient has no known allergies.    SOCIAL HISTORY:    Social History     Socioeconomic History     Marital status:      Spouse name: Rayomnd Novak     Number of children: 0     Years of education: 18     Highest education level: Not on file   Occupational History     Occupation: Teacher -Middle School Choir/Music     Comment: [employer]   Tobacco Use     Smoking status: Never Smoker      Smokeless tobacco: Never Used   Substance and Sexual Activity     Alcohol use: Yes     Comment: 0-1 drinks per week     Drug use: No     Comment: no herbal meds either      Sexual activity: Yes     Partners: Male     Birth control/protection: Pull-out method, Condom, Pill     Comment: Stopped BC end of 09/2014   Other Topics Concern     Parent/sibling w/ CABG, MI or angioplasty before 65F 55M? No      Service No     Blood Transfusions No     Caffeine Concern Yes     Comment: stopped secondary to pregnancy      Occupational Exposure Not Asked     Hobby Hazards Not Asked     Sleep Concern Not Asked     Stress Concern Not Asked     Weight Concern Not Asked     Special Diet Not Asked     Back Care Not Asked     Exercise Yes     Comment: walking     Bike Helmet Not Asked     Seat Belt Yes     Self-Exams Yes   Social History Narrative    Their oldest child , Maik, recently was diagnosed with an autism spectrum disorder. --Mey Mcleod MD      July 14, 2021      Social Determinants of Health     Financial Resource Strain: Low Risk      Difficulty of Paying Living Expenses: Not hard at all   Food Insecurity: No Food Insecurity     Worried About Running Out of Food in the Last Year: Never true     Ran Out of Food in the Last Year: Never true   Transportation Needs: No Transportation Needs     Lack of Transportation (Medical): No     Lack of Transportation (Non-Medical): No   Physical Activity: Insufficiently Active     Days of Exercise per Week: 1 day     Minutes of Exercise per Session: 30 min   Stress: No Stress Concern Present     Feeling of Stress : Only a little   Social Connections: Socially Integrated     Frequency of Communication with Friends and Family: More than three times a week     Frequency of Social Gatherings with Friends and Family: More than three times a week     Attends Sikh Services: More than 4 times per year     Active Member of Clubs or Organizations: Yes     Attends Club  or Organization Meetings: Not on file     Marital Status:    Intimate Partner Violence: Not on file   Housing Stability: Low Risk      Unable to Pay for Housing in the Last Year: No     Number of Places Lived in the Last Year: 1     Unstable Housing in the Last Year: No       FAMILY HISTORY:   Family History   Problem Relation Age of Onset     Family History Negative Mother      Family History Negative Father      Hyperlipidemia Father      Anxiety Disorder Sister      Depression Sister      Depression Sister      Anxiety Disorder Brother      Depression Brother      Depression Brother      Thyroid Disease Maternal Grandmother      Depression Maternal Grandmother      Diabetes Maternal Grandfather         diet controlled     Prostate Cancer Paternal Grandfather 90     Breast Cancer Maternal Aunt 65     Breast Cancer Maternal Great-Grandmother      Non-contributory for problems with anesthesia    REVIEW OF SYSTEMS:   The patient was asked a 14 point review of systems regarding constitutional symptoms, eye symptoms, ears, nose, mouth, throat symptoms, cardiovascular symptoms, respiratory symptoms, gastrointestinal symptoms, genitourinary symptoms, musculoskeletal symptoms, integumentary symptoms, neurological symptoms, psychiatric symptoms, endocrine symptoms, hematologic/lymphatic symptoms, and allergic/ immunologic symptoms.   The pertinent factors have been included in the HPI and below.  Patient Supplied Answers to Review of Systems  [facility] ENT ROS 3/29/2022   Psychology Frequently feeling anxious   Ears, Nose, Throat Nasal congestion or drainage, Hoarseness   Cardiopulmonary Cough   Gastrointestinal/Genitourinary Heartburn/indigestion   Musculoskeletal Neck pain   Allergy/Immunology Skin changes     Physical Examination   The patient underwent a physical examination as described below. The pertinent positive and negative findings are summarized after the description of the examination.  Constitutional: The  patient's developmental and nutritional status was assessed. The patient's voice quality was assessed.  Head and Face: The head and face were inspected for deformities. The sinuses were palpated. The salivary glands were palpated. Facial muscle strength was assessed bilaterally.  Eyes: Extraocular movements and primary gaze alignment were assessed.  Ears, Nose, Mouth and Throat: The ears and nose were examined for deformities. The nasal septum, mucosa, and turbinates were inspected by anterior rhinoscopy. The lips, teeth, and gums were examined for abnormalities. The oral mucosa, tongue, palate, tonsils, lateral and posterior pharynx were inspected for the presence of asymmetry or mucosal lesions.    Neck: The tracheal position was noted, and the neck mass palpated to determine if there were any asymmetries, abnormal neck masses, thyromegally, or thyroid nodules.  Respiratory: The nature of the breathing and chest expansion/symmetry was observed.  Cardiovascular: The patient was examined to determine the presence of any edema or jugular venous distension.  Abdomen: The contour of the abdomen was noted.  Lymphatic: The patient was examined for infraclavicular lymphadenopathy.  Musculoskeletal: The patient was inspected for the presence of skeletal deformities.  Extremities: The extremities were examined for any clubbing or cyanosis.  Skin: The skin was examined for inflammatory or neoplastic conditions.  Neurologic: The patient's orientation, mood, and affect were noted. The cranial nerve  functions were examined.  Other pertinent positive and negative findings on physical examination:      OC/OP: no lesions, uvula midline, soft palate elevates symmetrically  Neck: no lesions, bilateral TH tenderness to palpation, no need to cough with TH pressure, bilateral SCM tenderness    All other physical examination findings were within normal limits and noncontributory.  Procedures   Video Laryngoscopy with Stroboscopy (CPT  01789) and Behavioral & Qualitative Evaluation of Voice and Resonence     Preoperative Diagnosis:  Dysphonia and throat symptoms  Postoperative Diagnosis: Dysphonia and throat symptoms  Indication:  Patient has new or persistent dysphonia and throat symptoms.  This requires evaluation by stroboscopy to fully delineate the laryngeal functioning; especially mucosal wave assessment, ultrasharp visualization of lesions on the vocal folds, and overall functioning of the larynx.  Details of Procedure: A 70 degree rigid telescopic laryngoscope or a distal chip flexible scope was used. The lighting was obtained via a light cable connected to a stroboscopic unit. The telescope was inserted either transorally or transnasally until the vocal folds could be visualized. The patient was instructed to sustain the vowel  ee  at a comfortable pitch and loudness as the voice was monitored by a microphone connected to a fundamental frequency tracker. This circuit tracked vocal periodicity, allowing the light to flash in synchrony with the glottal cycles. A setting on the stroboscope was set to change the phase of light strobing with relation to the vocal fundamental frequency, producing an image of 1 to 2 glottal cycles every second. The video images were recorded for analysis. Use of the variable speed, slow and stop scan allowed careful study of pertinent segments of laryngeal function to increase accuracy of clinical assessments of function and dysfunction.  In particular, features of glottal closure, mucosal wave on the vocal fold cover and laryngeal symmetry were analyzed. Lastly, the patient was asked to phonate speech samples and auditory/perceptual evaluation of voice and resonance were performed.  The vocal quality was carefully evaluated for hoarseness, breathiness, loudness, phrase length and intelligibility to determine the source of dysphonia.    Findings:   A. BEHAVIORAL & QUALITATIVE EVALUATION OF VOICE AND RESONENCE  "  Comments: F0 210 Hz MPT: 25 seconds  Vocal Quality: Normal    Pitch Range:  Normal 141 - > 1000 Hz  Phrase Length:  Normal  Vocal Loudness: Normal  Dysarthria: No    B. LARYNGOVIDEOSTROBOSCOPY   Anatomic/Physiological Deviations:  RNC, severe supraglottic hyperfunction, postcricoid edema  Mucosal wave: Right:  No restriction     Left: No restriction  Bilateral Vocal Fold Vibration: Symmetric  Vocal Process: Right: No restriction    Left:  No restriction  Vocal Fold closure: Complete glottal closure    Complication(s): None  Disposition: Patient tolerated the procedure well            Review of Relevant Clinical Data   I personally reviewed:  Notes: Mey Gan Dr. 1/11/22    Labs:  Lab Results   Component Value Date    TSH 1.44 11/05/2020     Lab Results   Component Value Date     11/05/2020    CO2 29 11/05/2020    BUN 12 11/05/2020     Lab Results   Component Value Date    WBC 6.1 11/05/2020    HGB 13.2 11/05/2020    HCT 39.6 11/05/2020    MCV 88 11/05/2020     11/05/2020     Lab Results   Component Value Date    PTT 25 04/20/2017    INR 0.87 04/20/2017     No results found for: DILIA  No components found for: RHEUMATOIDFACTOR,  RF  No results found for: CRP  No components found for: CKTOT, URICACID  No components found for: C3, C4, DSDNAAB, NDNAABIFA  No results found for: MPOAB    Patient reported Quality of Life (QOL) Measures   Patient Supplied Answers To VHI Questionnaire  Voice Handicap Index (VHI-10) 3/29/2022   My voice makes it difficult for people to hear me 0   People have difficulty understanding me in a noisy room 1   My voice difficulties restrict my personal and social life.  1   I feel left out of conversations because of my voice 0   My voice problem causes me to lose income 0   I feel as though I have to strain to produce voice 2   The clarity of my voice is unpredictable 2   My voice problem upsets me 2   My voice makes me feel handicapped 2   People ask, \"What's wrong " "with your voice?\" 1   VHI-10 11     Patient Supplied Answers To EAT Questionnaire  Eating Assessment Tool (EAT-10) 3/29/2022   My swallowing problem has caused me to lose weight 0   My swallowing problem interferes with my ability to go out for meals 0   Swallowing liquids takes extra effort 0   Swallowing solids takes extra effort 0   Swallowing pills takes extra effort 0   Swallowing is painful 0   The pleasure of eating is affected by my swallowing 0   When I swallow food sticks in my throat 0   I cough when I eat 0   Swallowing is stressful 0   EAT-10 0     Patient Supplied Answers To CSI Questionnaire  Cough Severity Index (CSI) 3/29/2022   My cough is worse when I lie down 3   My coughing problem causes me to restrict my personal and social life 2   I tend to avoid places because of my cough problem 2   I feel embarrassed because of my coughing problem 2   People ask, ''What's wrong?'' because I cough a lot 1   I run out of air when I cough 0   My coughing problem affects my voice 2   My coughing problem limits my physical activity 1   My coughing problem upsets me 2   People ask me if I am sick because I cough a lot 2   CSI Score 17     Patient Supplied Answers to Dyspnea Index Questionnaire:  No flowsheet data found.    Impression & Plan     IMPRESSION: Ms. Novak is a 37 year old female who is being seen for the followin. 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 All documentation has been reviewed by the aforementioned provider prior to being entered into the official medical record.       Thank you for the kind referral and for allowing me to share in the care of Ms. Novak. If you have any questions, please do not hesitate to contact me.    Stella Dorman MD    Laryngology    [facility]  Department of  Otolaryngology - Head and Neck Surgery  [facility] [place] [address]  Appointment line: [phone]  Fax: [phone]  [email or web address]

## 2022-05-31 ENCOUNTER — VIRTUAL VISIT (OUTPATIENT)
Dept: OTOLARYNGOLOGY | Facility: CLINIC | Age: 37
End: 2022-05-31
Payer: COMMERCIAL

## 2022-05-31 DIAGNOSIS — R49.0 DYSPHONIA: Primary | ICD-10-CM

## 2022-05-31 DIAGNOSIS — J38.7 LARYNGEAL HYPERFUNCTION: ICD-10-CM

## 2022-05-31 PROCEDURE — 92507 TX SP LANG VOICE COMM INDIV: CPT | Mod: GN | Performed by: SPEECH-LANGUAGE PATHOLOGIST

## 2022-05-31 NOTE — PATIENT INSTRUCTIONS
"Hello!    It was a pleasure to see you today. Please complete the exercises below and remember, a few minutes of practice many times throughout the day is more important than one large practice session. If you have any questions about your strategies, feel free to contact me at [email or web address] or via [email or web address]. Please call [phone] for scheduling alterations.     We will plan to see each other next on 6/19 at 10am. Please remember to logon to [employer] a few minutes early to complete the questionnaires before your visit starts.     Home Exercise Program:  BELLY BREATHING (10 breaths every AM&PM, 3 breaths per hour, until habit)  If you need to initially, stretch your arms up and lean toward each side, feeling your ribcage lift and expand. Then lean forward and allow your back and neck to stretch and relax for 30 seconds.   Sit hinged forward with your elbows on your knees. OR Sit normally and place your hands on either side of your belly or low ribs  Slowly breathe in and feel your belly and back expand, like filling a balloon, pushing out against your waistband  Slowly breathe out and feel your belly and back crunch inward, like zipping tight pants  Repeat slowly and take breaks if you get dizzy  HELPFUL HINTS:  -- Tie a string or piece of elastic around your low ribs when you get dressed. You'll be able to feel yourself expand against this as you breathe all day.   -- Some people find it easiest to practice while laying on their back or in a recliner, hands on their belly.  ____________________________________________________    VOICE EXERCISES (AM & PM, more if needed)  -- Inhale SILENTLY and feel your belly fill with air  -- Slowly exhale as your belly contracts, ROUND \"OO\" LIPS  -- Straw and 1 inch of water in a cup  -- \"Whoooooo\" (smooth bubbles like a motor boat or a simmering pot of water)  3x silently with just air for 5-7 seconds  3x short and easy sighs on \"whooo\"  3x foghorn on single pitch " "for 5-7 seconds  3x sliding lower for 5-7 seconds (\"ding dong\")  3x sliding higher for 5-7 seconds (\"here comes the bride\") (a little more breathy/Avelina Stout as you go higher)  3x sliding up and down like sirens (corners forward on high)  BONUS: Easy songs/melodies with slurred phrases and no stopping bubbles  -- Double-check that your LIPS are ROUNDED, space between molars      Thank you and have a great day!  Rebekah"

## 2022-05-31 NOTE — LETTER
5/31/2022       RE: Mandy Novak  [address] [place] [address]     Dear Colleague,    Thank you for referring your patient, Mandy Novak, to the [facility] VOICE CLINIC [place] at [facility]. Please see a copy of my visit note below.    [facility] VOICE CLINIC  PROGRESS REPORT (CPT 03309)    Patient: Mandy Novak  Date of Service: 5/31/2022  Date of Last Service: 3/31/22  Number of Visits: 2  Referring Physician: Dr. Stella Dorman and Mey Mcleod MD  Impressions from evaluation on 3/31/22 by Mariano Huggins (voice), M.S., CCC-SLP:  Mandy Novak is presenting today with Chronic Throat Clearing (R68.89) and Dysphonia (R49.0) in the context of Laryngeal Hyperfunction (J38.7). Laryngoscopy revealed moderate AP constriction and mild posterior cricoid edema/erythema suggestive of LPR. She perceptually demonstrated mildly rough and breathy voice quality with mild-moderate strain, visible perilaryngeal hyperfunction in the neck and shoulders, and incoordination of respiration with phonation despite good breathing mechanics. She would benefit from a course of speech therapy targeting reduced laryngeal irritation (LPR, PND, throat clear avoidance), improved coordination of respiration with phonation, and strategies to reduce perilaryngeal hyperfunction during phonation, both speaking and singing, in order for patient to meet her vocational and avocational voice needs.     SUBJECTIVE:  Since Mandy's last session, she reports the following:   o She feels like her voice and throat clearing have been slightly better, since she hasn't been sick like she was for most of the winter.   o She has a very occasional cough, but otherwise feels like her coughing and throat clearing are resolved.   o She hasn't done much singing the past couple of months, which is where she typically notices the most issues with her voice.   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     OBJECTIVE:  Patient Specific Goal Metrics:  Dysponia SLP Goals 5/31/2022   How would you rate your speaking voice quality, if 0 is worst voice quality, and 10 is best voice? 7   How would you rate your singing voice quality, if 0 is worst voice quality, and 10 is best voice? 7   How much does your voice problem bother you? Quite a bit       THERAPEUTIC ACTIVITIES  Today Mandy participated in the following therapeutic activities:  Counseling and Education:    Asked questions about the nature of her symptoms, and I answered all of these thoroughly.    Therapy protocol and rationale, including plan for today's session and future sessions    Education of laryngeal anatomy and physiology    I provided Mandy with explanation and skilled instruction of:  Education and exercises to promote optimal respiratory mechanics were provided:    Explanation of the anatomy and physiology of respiration for phonation; she found this to be helpful    She demonstrated excessive upper thoracic engagement during inhalation    Difficulty allowing abdominal relaxation for inhalation, and audible inhalation prior to instruction    Targeted practice of optimal breathing mechanics with patient positioned in sitting and a forward leaning position    With clinician support, patient was able to demonstrate improved abdominal relaxation and engagement on inhalation    Mandy demonstrated good accuracy with minimal to moderate clinician support, including verbal explanation and visual demonstration to facilitate awareness of low respiratory engagement.     Exercises to coordinate phonation with optimal flowing airstream and reduce phonatory impact.     Semi-occluded vocal tract exercises with a straw and cup of 1-1.5\" water (\"straw and bubbles\") were most facilitating    She " progressed through the warm-up level of phonatory complexity    Instructed to use as a voice warm up, cool down, coordination of breath flow with phonation, and for tissue mobilization.    Specific modifications instructed included focus on labial rounding    Patient demonstrated good learning, but will need practice and additional reinforcement    Instruction of Home Practice:    I instructed Mandy in the concepts of an optimal practice regimen, including use of an interval schedule with brief periods of practice frequently throughout each day, and concepts of volitional practice to facilitate motor learning.    I emphasized the therapeutic rational and provided an After Visit Summary through [employer] to reinforce today's therapeutic activities and facilitate home practice.    ASSESSMENT/PLAN  Progress toward long-term goals:  Minimal at this point, as this is first session, but good learning today    Impressions:  IMPRESSIONS: Chronic Throat Clearing (R68.89) and Dysphonia (R49.0) in the context of Laryngeal Hyperfunction (J38.7)  Mandy had a productive session of therapy today, working on respiratory mechanics and SOVT straw bubbles. She will continue to work on her exercises on a daily basis, and work on incorporating the techniques into her daily activities. Speech therapy continues to be medically necessary for Mandy to participate fully and engage in activities of daily living.     Plan:   I will see Mandy in 4 weeks and will plan to progress straw bubbles to hums and speech tasks, check on abdominal pattern. Instruct posterior jaw position and anterior tongue release.   For practice goals, see AVS.     TOTAL SERVICE TIME: 45 minutes  TREATMENT (56847)  NO CHARGE FACILITY FEE    Mariano Huggins (voice) MMilenaS., CCC-SLP  Speech-Language Pathologist  [facility]  [phone]  [email or web address]  Pronouns: she/her/hers      *this report was created in part through the use of computerized  dictation software, and though reviewed following completion, some typographic errors may persist.  If there is confusion regarding any of this notes contents, please contact me for clarification

## 2022-05-31 NOTE — PROGRESS NOTES
Mandy Novak is a 37 year old female who is being evaluated via a billable video visit.      Mandy has been notified and verbally consented to the following:     This video visit will be conducted between you and your provider.    Patient has opted to conduct today's video visit vs an in-person appointment.     Video visits are billed at different rates depending on your insurance coverage. Please reach out to your insurance provider with any questions.     If during the course of the call the provider feels the appointment is not appropriate, you will not be charged for this service.  Provider has received verbal consent for billable virtual visit from the patient? Yes  Will anyone else be joining your video visit? No    Call initiated at: 1000   Type of Visit Platform Used: [employer] Video  Location of provider: Home  Location of patient: [facility] VOICE CLINIC  PROGRESS REPORT (CPT [id number])    Patient: Mandy Novak  Date of Service: 5/31/2022  Date of Last Service: 3/31/22  Number of Visits: 2  Referring Physician: Dr. Stella Dorman and Mey Mcleod MD  Impressions from evaluation on 3/31/22 by Mariano Huggins (voice) MMilenaSMilena, CCC-SLP:  Mandy Novak is presenting today with Chronic Throat Clearing (R68.89) and Dysphonia (R49.0) in the context of Laryngeal Hyperfunction (J38.7). Laryngoscopy revealed moderate AP constriction and mild posterior cricoid edema/erythema suggestive of LPR. She perceptually demonstrated mildly rough and breathy voice quality with mild-moderate strain, visible perilaryngeal hyperfunction in the neck and shoulders, and incoordination of respiration with phonation despite good breathing mechanics. She would benefit from a course of speech therapy targeting reduced laryngeal irritation (LPR, PND, throat clear avoidance), improved coordination of respiration with phonation, and strategies to reduce perilaryngeal hyperfunction during phonation, both speaking and singing, in order  for patient to meet her vocational and avocational voice needs.     SUBJECTIVE:  Since Mandy's last session, she reports the following:   o She feels like her voice and throat clearing have been slightly better, since she hasn't been sick like she was for most of the winter.   o She has a very occasional cough, but otherwise feels like her coughing and throat clearing are resolved.   o She hasn't done much singing the past couple of months, which is where she typically notices the most issues with her voice.   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     OBJECTIVE:  Patient Specific Goal Metrics:  Dysponia SLP Goals 5/31/2022   How would you rate your speaking voice quality, if 0 is worst voice quality, and 10 is best voice? 7   How would you rate your singing voice quality, if 0 is worst voice quality, and 10 is best voice? 7   How much does your voice problem bother you? Quite a bit       THERAPEUTIC ACTIVITIES  Today Mandy participated in the following therapeutic activities:  Counseling and Education:    Asked questions about the nature of her symptoms, and I answered all of these thoroughly.    Therapy protocol and rationale, including plan for today's session and future sessions    Education of laryngeal anatomy and physiology    I provided Mandy with explanation and skilled instruction of:  Education and exercises to promote optimal respiratory mechanics were provided:    Explanation of the anatomy and physiology of respiration for phonation; she found this to be helpful    She demonstrated excessive upper thoracic engagement during inhalation    Difficulty allowing abdominal relaxation for inhalation, and audible inhalation prior to instruction    Targeted practice of optimal breathing mechanics with patient positioned in sitting and a forward  "leaning position    With clinician support, patient was able to demonstrate improved abdominal relaxation and engagement on inhalation    Mandy demonstrated good accuracy with minimal to moderate clinician support, including verbal explanation and visual demonstration to facilitate awareness of low respiratory engagement.     Exercises to coordinate phonation with optimal flowing airstream and reduce phonatory impact.     Semi-occluded vocal tract exercises with a straw and cup of 1-1.5\" water (\"straw and bubbles\") were most facilitating    She progressed through the warm-up level of phonatory complexity    Instructed to use as a voice warm up, cool down, coordination of breath flow with phonation, and for tissue mobilization.    Specific modifications instructed included focus on labial rounding    Patient demonstrated good learning, but will need practice and additional reinforcement    Instruction of Home Practice:    I instructed Mandy in the concepts of an optimal practice regimen, including use of an interval schedule with brief periods of practice frequently throughout each day, and concepts of volitional practice to facilitate motor learning.    I emphasized the therapeutic rational and provided an [facility] to reinforce today's therapeutic activities and facilitate home practice.    ASSESSMENT/PLAN  Progress toward long-term goals:  Minimal at this point, as this is first session, but good learning today    Impressions:  IMPRESSIONS: Chronic Throat Clearing (R68.89) and Dysphonia (R49.0) in the context of Laryngeal Hyperfunction (J38.7)  Mandy had a productive session of therapy today, working on respiratory mechanics and SOVT straw bubbles. She will continue to work on her exercises on a daily basis, and work on incorporating the techniques into her daily activities. Speech therapy continues to be medically necessary for Mandy to participate fully and engage in activities of daily " living.     Plan:   I will see Mandy in 4 weeks and will plan to progress straw bubbles to hums and speech tasks, check on abdominal pattern. Instruct posterior jaw position and anterior tongue release.   For practice goals, see AVS.     TOTAL SERVICE TIME: 45 minutes  TREATMENT (76870)  NO CHARGE FACILITY FEE    Mariano Huggins (voice), M.S., CCC-SLP  Speech-Language Pathologist  [facility]  [phone]  [email or web address]  Pronouns: she/her/hers      *this report was created in part through the use of computerized dictation software, and though reviewed following completion, some typographic errors may persist.  If there is confusion regarding any of this notes contents, please contact me for clarification

## 2022-06-29 ENCOUNTER — VIRTUAL VISIT (OUTPATIENT)
Dept: OTOLARYNGOLOGY | Facility: CLINIC | Age: 37
End: 2022-06-29
Payer: COMMERCIAL

## 2022-06-29 DIAGNOSIS — J38.7 LARYNGEAL HYPERFUNCTION: ICD-10-CM

## 2022-06-29 DIAGNOSIS — R49.0 DYSPHONIA: Primary | ICD-10-CM

## 2022-06-29 DIAGNOSIS — R09.89 CHRONIC THROAT CLEARING: ICD-10-CM

## 2022-06-29 PROCEDURE — 92507 TX SP LANG VOICE COMM INDIV: CPT | Mod: GN | Performed by: SPEECH-LANGUAGE PATHOLOGIST

## 2022-06-29 NOTE — PATIENT INSTRUCTIONS
"Hello!     It was a pleasure to see you today. Please complete the exercises below and remember, a few minutes of practice many times throughout the day is more important than one large practice session. If you have any questions about your strategies, feel free to contact me at [email or web address] or via [employer]. Please call [phone] for scheduling alterations.      We will plan to see each other next on 7/13 at 10am. Please remember to logon to [employer] a few minutes early to complete the questionnaires before your visit starts.      Home Exercise Program:  BELLY BREATHING (10 breaths every AM&PM, 3 breaths per hour, until habit)  If you need to initially, stretch your arms up and lean toward each side, feeling your ribcage lift and expand. Then lean forward and allow your back and neck to stretch and relax for 30 seconds.   Sit hinged forward with your elbows on your knees. OR Sit normally and place your hands on either side of your belly or low ribs  Slowly breathe in and feel your belly and back expand, like filling a balloon, pushing out against your waistband  Slowly breathe out and feel your belly and back crunch inward, like zipping tight pants  Repeat slowly and take breaks if you get dizzy  HELPFUL HINTS:  -- Tie a string or piece of elastic around your low ribs when you get dressed. You'll be able to feel yourself expand against this as you breathe all day.   -- Some people find it easiest to practice while laying on their back or in a recliner, hands on their belly.  ____________________________________________________    LARYNGEAL RELEASE  Learning Tasks:  1. Place your finger tips along the center of your throat/Josh's apple  2. Swallow and notice your voice box move up and back down  3. Compare a high-pitch EE (voice box goes up slightly) with a silent, yawny sigh on \"Huh\" like the word \"hug\" (voice box goes down slightly)  Homework:  1. Attempt to inhale SILENTLY with a deep, yawny \"uh\" " "shape in your throat and see if your voice box releases slightly lower.   2. Repeat 3 breaths each hour until this release becomes easy and automatic.    ____________________________________________________     VOICE EXERCISES (AM & PM, more if needed)  -- Inhale SILENTLY LOW UH and feel your belly fill with air  -- Slowly exhale as your belly contracts, ROUND \"OO\" LIPS  -- Straw and 1 inch of water in a cup  -- Tongue forward under the straw and jaw back  -- \"Whoooooo\" (smooth bubbles like a motor boat or a simmering pot of water)  3x silently with just air for 5-7 seconds  3x short and easy sighs on \"whooo\"  3x foghorn on single pitch (B3)  for 5-7 seconds  3x sliding lower for 5-7 seconds (\"ding dong\")  3x sliding higher for 5-7 seconds (\"here comes the bride\") (a little more breathy/Avelina Stout as you go higher)  3x sliding up and down like sirens (LOW UH as you go up in pitch)  BONUS: Easy songs/melodies with slurred phrases and no stopping bubbles  -- Double-check that your LIPS are ROUNDED, space between molars    BUZZY VOICE  Practice throughout the day, whenever you get a chance  1. 3x  whooommm  or \"hmmm\" for 5 seconds (on B3)  Inhale with your LOW UH release  Focus on \"nasal\" vibrations in your lips/nose on \"mmm\"  Create \"molar space\" to relax your jaw open slightly  2. Moomoomoo, moemoemoe, mememe, mamama, mymymy, maymaymay, mehmehmeh  3. Piedra, mom, mean, man, mine, moan, men, main, morning, meaning, manners, mammals  4. Sentences:  Mother knits many mittens. Shahid makes much money. Nobody knows Brittany s name.  Norma s measles made Norma miserable. Milking machines make much noise. Mild-mannered Radha  danyel Choi. Nathan marched many, many miles. Freddie made money in the market in March. My mother munches melon on Mondays.Maybe my memory needs mending. Catherine made more money. Norma made many muffins. Merlin motored many miles. Nathan makes major messes. My momma made meatloaf. Music makes mellow " "moods.  5.  Mmmy favorite ... is ...  or \"nnnI like...\"   6. Reading out loud, starting after each breath with a \"hmm...\"  7. Conversation, narrating yourself, road signs, license plates, 20 questions, etc.   Chanted on 1 pitch for now, like a monk or robot.        Thank you and have a great day!  Rebekah"

## 2022-06-29 NOTE — LETTER
6/29/2022       RE: Mandy Novak  [address] [place] [address]     Dear Colleague,    Thank you for referring your patient, Mandy Novak, to the [facility] [place] at [facility]. Please see a copy of my visit note below.    Mandy Novak is a 37 year old female who is being evaluated via a billable video visit.      Mandy has been notified and verbally consented to the following:     This video visit will be conducted between you and your provider.    Patient has opted to conduct today's video visit vs an in-person appointment.     Video visits are billed at different rates depending on your insurance coverage. Please reach out to your insurance provider with any questions.     If during the course of the call the provider feels the appointment is not appropriate, you will not be charged for this service.  Provider has received verbal consent for billable virtual visit from the patient? Yes  Will anyone else be joining your video visit? No    Call initiated at: 1000   Type of Visit Platform Used: [employer] Video  Location of provider: Home  Location of patient: [facility] VOICE CLINIC  PROGRESS REPORT (CPT 92458)    Patient: Mandy Novak  Date of Service: 6/29/2022  Date of Last Service: 5/31/22  Number of Visits: 3  Referring Physician: Dr. Stella Dorman and Mey Mcleod MD  Impressions from evaluation on 3/31/22 by Mariano Huggins (voice), M.S., CCC-SLP:  Mandy Novak is presenting today with Chronic Throat Clearing (R68.89) and Dysphonia (R49.0) in the context of Laryngeal Hyperfunction (J38.7). Laryngoscopy revealed moderate AP constriction and mild posterior cricoid edema/erythema suggestive of LPR. She perceptually demonstrated mildly rough and breathy voice quality with mild-moderate strain, visible perilaryngeal hyperfunction in the neck and shoulders, and incoordination of respiration with phonation despite good  "breathing mechanics. She would benefit from a course of speech therapy targeting reduced laryngeal irritation (LPR, PND, throat clear avoidance), improved coordination of respiration with phonation, and strategies to reduce perilaryngeal hyperfunction during phonation, both speaking and singing, in order for patient to meet her vocational and avocational voice needs.     SUBJECTIVE:  Since Mandy's last session, she reports the following:   o Overall symptoms are gradually improving.   o She is more aware of her breathing throughout the day and is trying to check-in to release muscle tension periodically.   o She ends her day with abdominal breathing.     OBJECTIVE:  Patient Specific Goal Metrics:  Dysponia SLP Goals 5/31/2022 6/29/2022   How would you rate your speaking voice quality, if 0 is worst voice quality, and 10 is best voice? 7 8   How would you rate your singing voice quality, if 0 is worst voice quality, and 10 is best voice? 7 7   How much does your voice problem bother you? Quite a bit Somewhat     Patient Supplied Answers To Last 2 VHI Questionnaires  Voice Handicap Index (VHI-10) 5/31/2022 6/29/2022   My voice makes it difficult for people to hear me 0 0   People have difficulty understanding me in a noisy room 1 1   My voice difficulties restrict my personal and social life.  0 0   I feel left out of conversations because of my voice 0 0   My voice problem causes me to lose income 1 0   I feel as though I have to strain to produce voice 2 1   The clarity of my voice is unpredictable 2 2   My voice problem upsets me 2 2   My voice makes me feel handicapped 1 0   People ask, \"What's wrong with your voice?\" 0 0   VHI-10 9 6         THERAPEUTIC ACTIVITIES  Today Mandy participated in the following therapeutic activities:  Counseling and Education:    Asked questions about the nature of her symptoms, and I answered all of these thoroughly.    I provided Mandy with explanation and skilled instruction " "of:  Resonant Voice Therapy (RVT) exercises to promote forward locus of resonance and optimized pattern of laryngeal adduction  ? Easy descending glide on /m/ utilized in conjunction with relaxed jaw, tongue, and lightly closed lips to facilitate forward resonant sound  ? Use of the carrier phrase \"mmhmm\" instructed to promote generalization to everyday speech  ? Word level exercises featuring nasal continuant loaded stimuli  ? Special care was taken to maintain sense of open pharyngeal space for broadened resonance in conjunction with forward resonant principles  ? Use of inhalation to \"set the instrument\" for the remainder of the statement was facilitating.  ? Phrase level exercises featuring nasal continuants in more complex phonemic contexts were employed    Good accuracy with moderate support    Tendency towards lower than optimal pitch  Laryngeal release technique targeting reduced laryngeal elevation and constriction and improved vocal fold aBduction was instructed    Patient was instructed through negative practice of laryngeal elevation with high-frequency, closed vowel productions and low-frequency, open vowel productions in an effort to develop somato-sensory awareness of the laryngeal musculature    This was combined with aBductory breathing maneuvers    Patient was instructed to utilize a silent, yawny, \"uh\"-shaped inhalation with application to all other therapy tasks    She demonstrated good accuracy following moderate clinician support    Instruction of Home Practice:    A revised regimen for home practice was instructed.    I provided an AVS of important notes of today's therapeutic activities to facilitate practice.    ASSESSMENT/PLAN  Progress toward long-term goals:  Adequate but incomplete progress; please see above    Impressions:  IMPRESSIONS: Chronic Throat Clearing (R68.89) and Dysphonia (R49.0) in the context of Laryngeal Hyperfunction (J38.7)     Mandy had a productive session of therapy " today, working on laryngeal release, silent inhalation, and progression from SOVT tasks to resonant voice tasks centered on B3.  She will continue to work on her exercises on a daily basis, and work on incorporating the techniques into her daily activities. Speech therapy continues to be medically necessary for Mandy to participate fully and engage in activities of daily living.     Plan:   I will see Mandy in 2 weeks and will plan to apply laryngeal release, posterior jaw/anterior tongue positioning to singing tasks. Instruct throat clearing avoidance if time allows.  For practice goals, see AVS.     TOTAL SERVICE TIME: 45 minutes  TREATMENT (87874)  NO CHARGE FACILITY FEE    Mariano Huggins (voice) MMilenaS., CCC-SLP  Speech-Language Pathologist  [facility] Voice [facility]  [phone]  [email or web address]  Pronouns: she/her/hers      *this report was created in part through the use of computerized dictation software, and though reviewed following completion, some typographic errors may persist.  If there is confusion regarding any of this notes contents, please contact me for clarification      Again, thank you for allowing me to participate in the care of your patient.      Sincerely,    Rebekah Alcazar, SLP

## 2022-06-29 NOTE — PROGRESS NOTES
Mandy Novak is a 37 year old female who is being evaluated via a billable video visit.      Mandy has been notified and verbally consented to the following:     This video visit will be conducted between you and your provider.    Patient has opted to conduct today's video visit vs an in-person appointment.     Video visits are billed at different rates depending on your insurance coverage. Please reach out to your insurance provider with any questions.     If during the course of the call the provider feels the appointment is not appropriate, you will not be charged for this service.  Provider has received verbal consent for billable virtual visit from the patient? Yes  Will anyone else be joining your video visit? No    Call initiated at: 1000   Type of Visit Platform Used: [employer] Video  Location of provider: Home  Location of patient: [facility] VOICE CLINIC  PROGRESS REPORT (CPT 62273)    Patient: Mandy Novak  Date of Service: 6/29/2022  Date of Last Service: 5/31/22  Number of Visits: 3  Referring Physician: Dr. Stella Dorman and Mey Mcleod MD  Impressions from evaluation on 3/31/22 by Mariano Huggins (voice) [email or web address], CCC-SLP:  Mandy Novak is presenting today with Chronic Throat Clearing (R68.89) and Dysphonia (R49.0) in the context of Laryngeal Hyperfunction (J38.7). Laryngoscopy revealed moderate AP constriction and mild posterior cricoid edema/erythema suggestive of LPR. She perceptually demonstrated mildly rough and breathy voice quality with mild-moderate strain, visible perilaryngeal hyperfunction in the neck and shoulders, and incoordination of respiration with phonation despite good breathing mechanics. She would benefit from a course of speech therapy targeting reduced laryngeal irritation (LPR, PND, throat clear avoidance), improved coordination of respiration with phonation, and strategies to reduce perilaryngeal hyperfunction during phonation, both speaking and singing, in order  "for patient to meet her vocational and avocational voice needs.     SUBJECTIVE:  Since Mandy's last session, she reports the following:   o Overall symptoms are gradually improving.   o She is more aware of her breathing throughout the day and is trying to check-in to release muscle tension periodically.   o She ends her day with abdominal breathing.     OBJECTIVE:  Patient Specific Goal Metrics:  Dysponia SLP Goals 5/31/2022 6/29/2022   How would you rate your speaking voice quality, if 0 is worst voice quality, and 10 is best voice? 7 8   How would you rate your singing voice quality, if 0 is worst voice quality, and 10 is best voice? 7 7   How much does your voice problem bother you? Quite a bit Somewhat     Patient Supplied Answers To Last 2 VHI Questionnaires  Voice Handicap Index (VHI-10) 5/31/2022 6/29/2022   My voice makes it difficult for people to hear me 0 0   People have difficulty understanding me in a noisy room 1 1   My voice difficulties restrict my personal and social life.  0 0   I feel left out of conversations because of my voice 0 0   My voice problem causes me to lose income 1 0   I feel as though I have to strain to produce voice 2 1   The clarity of my voice is unpredictable 2 2   My voice problem upsets me 2 2   My voice makes me feel handicapped 1 0   People ask, \"What's wrong with your voice?\" 0 0   VHI-10 9 6         THERAPEUTIC ACTIVITIES  Today Mandy participated in the following therapeutic activities:  Counseling and Education:    Asked questions about the nature of her symptoms, and I answered all of these thoroughly.    I provided Mandy with explanation and skilled instruction of:  Resonant Voice Therapy (RVT) exercises to promote forward locus of resonance and optimized pattern of laryngeal adduction  ? Easy descending glide on /m/ utilized in conjunction with relaxed jaw, tongue, and lightly closed lips to facilitate forward resonant sound  ? Use of the carrier phrase \"mmhmm\" " "instructed to promote generalization to everyday speech  ? Word level exercises featuring nasal continuant loaded stimuli  ? Special care was taken to maintain sense of open pharyngeal space for broadened resonance in conjunction with forward resonant principles  ? Use of inhalation to \"set the instrument\" for the remainder of the statement was facilitating.  ? Phrase level exercises featuring nasal continuants in more complex phonemic contexts were employed    Good accuracy with moderate support    Tendency towards lower than optimal pitch  Laryngeal release technique targeting reduced laryngeal elevation and constriction and improved vocal fold aBduction was instructed    Patient was instructed through negative practice of laryngeal elevation with high-frequency, closed vowel productions and low-frequency, open vowel productions in an effort to develop somato-sensory awareness of the laryngeal musculature    This was combined with aBductory breathing maneuvers    Patient was instructed to utilize a silent, yawny, \"uh\"-shaped inhalation with application to all other therapy tasks    She demonstrated good accuracy following moderate clinician support    Instruction of Home Practice:    A revised regimen for home practice was instructed.    I provided an AVS of important notes of today's therapeutic activities to facilitate practice.    ASSESSMENT/PLAN  Progress toward long-term goals:  Adequate but incomplete progress; please see above    Impressions:  IMPRESSIONS: Chronic Throat Clearing (R68.89) and Dysphonia (R49.0) in the context of Laryngeal Hyperfunction (J38.7)     Mandy had a productive session of therapy today, working on laryngeal release, silent inhalation, and progression from SOVT tasks to resonant voice tasks centered on B3.  She will continue to work on her exercises on a daily basis, and work on incorporating the techniques into her daily activities. Speech therapy continues to be medically necessary " for Mandy to participate fully and engage in activities of daily living.     Plan:   I will see Mandy in 2 weeks and will plan to apply laryngeal release, posterior jaw/anterior tongue positioning to singing tasks. Instruct throat clearing avoidance if time allows.  For practice goals, see AVS.     TOTAL SERVICE TIME: 45 minutes  TREATMENT (46854)  NO CHARGE FACILITY FEE    Mariano Huggins (voice) MMilenaS., CCC-SLP  Speech-Language Pathologist  [facility]  [phone]  [email or web address]  Pronouns: she/her/hers      *this report was created in part through the use of computerized dictation software, and though reviewed following completion, some typographic errors may persist.  If there is confusion regarding any of this notes contents, please contact me for clarification

## 2022-07-07 DIAGNOSIS — F41.0 PANIC ATTACK: ICD-10-CM

## 2022-07-07 DIAGNOSIS — F41.9 ANXIETY: ICD-10-CM

## 2022-07-07 NOTE — TELEPHONE ENCOUNTER
Please call patient to schedule an appointment   Due for a office visit for a mood/medication check  for further refills.     Please route back to RV Refill once appointment has been made so luis refills can be provided.     11/11/21-annual physical   8/13/21 last visit for anxiety.     Thank you!  Luz MORAES RN   [facility] Triage

## 2022-07-11 NOTE — TELEPHONE ENCOUNTER
Patient called, she is driving but will make an appointment and then let us know so we can fill the medication       Sarahi Collazo

## 2022-07-12 RX ORDER — CITALOPRAM HYDROBROMIDE 20 MG/1
TABLET ORAL
Qty: 90 TABLET | Refills: 1 | Status: SHIPPED | OUTPATIENT
Start: 2022-07-12 | End: 2022-09-08 | Stop reason: ALTCHOICE

## 2022-07-19 NOTE — IP AVS SNAPSHOT
MRN:[id number]                      After Visit Summary   4/17/2017    Mandy Novak    MRN: [id number]           Thank you!     Thank you for choosing [facility] for your care. Our goal is always to provide you with excellent care. Hearing back from our patients is one way we can continue to improve our services. Please take a few minutes to complete the written survey that you may receive in the mail after you visit. If you would like to speak to someone directly about your visit please contact Patient Relations at [phone]. Thank you!          Patient Information     Date Of Birth          1985        Designated Caregiver       Most Recent Value    Caregiver    Will someone help with your care after discharge? no      About your hospital stay     You were admitted on:  April 17, 2017 You last received care in the:  [facility]    You were discharged on:  April 20, 2017       Who to Call     For medical emergencies, please call 911.  For non-urgent questions about your medical care, please call your primary care provider or clinic, [phone]          Attending Provider     Provider Specialty    Mey Mcleod MD Family Practice       Primary Care Provider Office Phone # Fax #    Mey Mcleod [phone]       [facility] [address] [place] [address]        After Care Instructions     Activity       Review discharge instructions            Diet       Resume previous diet            Discharge Instructions - Postpartum visit       Schedule postpartum visit with your provider in 1 week for 15 minute visit to recheck hemoglobin and return to clinic in 6 weeks for full post-partum exam.     Nothing in the vagina until then, no fingers, no tampons, no intercourse, please, until cleared at your 6 weeks postpartum visit.                  Further instructions from your care team       [place]  Niranjan Lactation Consultant:  [phone]    Postpartum Vaginal Delivery Instructions  Follow up with your primary OB in clinic in 1 week for a hemoglobin check, and  Follow up with your primary OB in clinic in 6 weeks for a full postpartum check.  Activity       Ask family and friends for help when you need it.    Do not place anything in your vagina for 6 weeks.    You are not restricted on other activities, but take it easy for a few weeks to allow your body to recover from delivery.  You are able to do any activities you feel up to that point.    No driving until you have stopped taking your pain medications (usually two weeks after delivery).     Call your health care provider if you have any of these symptoms:       Increased pain, swelling, redness, or fluid around your stiches from an episiotomy or perineal tear.    A fever above 100.4 F (38 C) with or without chills when placing a thermometer under your tongue.    You soak a sanitary pad with blood within 1 hour, or you see blood clots larger than a golf ball.    Bleeding that lasts more than 6 weeks.    Vaginal discharge that smells bad.    Severe pain, cramping or tenderness in your lower belly area.    A need to urinate more frequently (use the toilet more often), more urgently (use the toilet very quickly), or it burns when you urinate.    Nausea and vomiting.    Redness, swelling or pain around a vein in your leg.    Problems breastfeeding or a red or painful area on your breast.    Chest pain and cough or are gasping for air.    Problems coping with sadness, anxiety, or depression.  If you have any concerns about hurting yourself or the baby, call your provider immediately.     You have questions or concerns after you return home.     Keep your hands clean:  Always wash your hands before touching your perineal area and stitches.  This helps reduce your risk of infection.  If your hands aren't dirty, you may use an alcohol hand-rub to clean your  "hands. Keep your nails clean and short.        Pending Results     Date and Time Order Name Status Description    4/17/2017 2149 Placenta path order and indications In process             Statement of Approval     Ordered          04/20/17 0908  I have reviewed and agree with all the recommendations and orders detailed in this document.  EFFECTIVE NOW     Approved and electronically signed by:  Mey Mcleod MD             Admission Information     Date & Time Provider Department Dept. Phone    4/17/2017 Mey Mcleod MD [facility] [phone]      Your Vitals Were     Blood Pressure Pulse Temperature Respirations Height Last Period    107/73 73 98.7  F (37.1  C) (Oral) 16 1.727 m (5' 8\") 07/16/2016    Pulse Oximetry                   99%           MyChart Information     [employer] gives you secure access to your electronic health record. If you see a primary care provider, you can also send messages to your care team and make appointments. If you have questions, please call your primary care clinic.  If you do not have a primary care provider, please call [phone] and they will assist you.        Care EveryWhere ID     This is your Care EveryWhere ID. This could be used by other organizations to access your [place] medical records  [id number]           Review of your medicines      START taking        Dose / Directions    acetaminophen 325 MG tablet   Commonly known as:  TYLENOL        Dose:  650 mg   Take 2 tablets (650 mg) by mouth every 4 hours as needed for mild pain or fever (greater than or equal to 38? C /100.4? F (oral) or 38.5? C/ 101.4? F (core).)   Quantity:  100 tablet   Refills:  0       bisacodyl 10 MG Suppository   Commonly known as:  DULCOLAX   Used for:  Drug-induced constipation        Dose:  10 mg   Place 1 suppository (10 mg) rectally daily as needed for constipation   Quantity:  10 suppository   Refills:  0       ferrous gluconate 324 (38 FE) MG tablet "   Commonly known as:  FERGON        Dose:  324 mg   Take 1 tablet (324 mg) by mouth daily (with breakfast) Take 1 tab twice daily for the first 1-2 weeks   Quantity:  90 tablet   Refills:  1       ibuprofen 400 MG tablet   Commonly known as:  ADVIL/MOTRIN   Notes to Patient:  Take with food to prevent stomach ulcers;  Alternate with Tylenol        Dose:  400-800 mg   Take 1-2 tablets (400-800 mg) by mouth every 6 hours as needed for other (cramping)   Quantity:  120 tablet   Refills:  0       lanolin ointment   Used for:  At risk for ineffective breastfeeding        Apply topically every hour as needed for dry skin (sore nipples)   Refills:  0       methylcellulose (laxative) Powd   Used for:  Drug-induced constipation        Dose:  1 teaspoonful   Take 0.3 g (1.05 teaspoonful) by mouth daily In 8 oz of water or juice for daily fiber therapy while on iron   Quantity:  245 g   Refills:  1       senna-docusate 8.6-50 MG per tablet   Commonly known as:  SENOKOT-S;PERICOLACE   Used for:  Drug-induced constipation        Dose:  1-2 tablet   Take 1-2 tablets by mouth 2 times daily   Quantity:  30 tablet   Refills:  3         CONTINUE these medicines which have NOT CHANGED        Dose / Directions    Prenatal Vitamins 28-0.8 MG Tabs   Used for:  Absence of menstruation        Dose:  1 tablet   Take 1 tablet by mouth daily   Refills:  0            Where to get your medicines      These medications were sent to [facility] [address] [address] [address]     Phone:  [phone]     bisacodyl 10 MG Suppository    ferrous gluconate 324 (38 FE) MG tablet    methylcellulose (laxative) Powd    senna-docusate 8.6-50 MG per tablet         Some of these will need a paper prescription and others can be bought over the counter. Ask your nurse if you have questions.     You don't need a prescription for these medications     acetaminophen 325 MG tablet     ibuprofen 400 MG tablet    lanolin ointment                Protect others around you: Learn how to safely use, store and throw away your medicines at www.disposemymeds.org.             Medication List: This is a list of all your medications and when to take them. Check marks below indicate your daily home schedule. Keep this list as a reference.      Medications           Morning Afternoon Evening Bedtime As Needed    acetaminophen 325 MG tablet   Commonly known as:  TYLENOL   Take 2 tablets (650 mg) by mouth every 4 hours as needed for mild pain or fever (greater than or equal to 38? C /100.4? F (oral) or 38.5? C/ 101.4? F (core).)                                   bisacodyl 10 MG Suppository   Commonly known as:  DULCOLAX   Place 1 suppository (10 mg) rectally daily as needed for constipation                                   ferrous gluconate 324 (38 FE) MG tablet   Commonly known as:  FERGON   Take 1 tablet (324 mg) by mouth daily (with breakfast) Take 1 tab twice daily for the first 1-2 weeks   Last time this was given:  324 mg on 4/20/2017  8:17 AM                                      ibuprofen 400 MG tablet   Commonly known as:  ADVIL/MOTRIN   Take 1-2 tablets (400-800 mg) by mouth every 6 hours as needed for other (cramping)   Last time this was given:  800 mg on 4/20/2017  8:17 AM   Notes to Patient:  Take with food to prevent stomach ulcers;  Alternate with Tylenol                                   lanolin ointment   Apply topically every hour as needed for dry skin (sore nipples)                                   methylcellulose (laxative) Powd   Take 0.3 g (1.05 teaspoonful) by mouth daily In 8 oz of water or juice for daily fiber therapy while on iron                                   Prenatal Vitamins 28-0.8 MG Tabs   Take 1 tablet by mouth daily                                   senna-docusate 8.6-50 MG per tablet   Commonly known as:  SENOKOT-S;PERICOLACE   Take 1-2 tablets by mouth 2 times daily    Last time this was given:  2 tablets on 4/20/2017  8:17 AM                                         Statement Selected

## 2022-09-01 ASSESSMENT — ANXIETY QUESTIONNAIRES
GAD7 TOTAL SCORE: 12
2. NOT BEING ABLE TO STOP OR CONTROL WORRYING: SEVERAL DAYS
3. WORRYING TOO MUCH ABOUT DIFFERENT THINGS: NOT AT ALL
7. FEELING AFRAID AS IF SOMETHING AWFUL MIGHT HAPPEN: NOT AT ALL
IF YOU CHECKED OFF ANY PROBLEMS ON THIS QUESTIONNAIRE, HOW DIFFICULT HAVE THESE PROBLEMS MADE IT FOR YOU TO DO YOUR WORK, TAKE CARE OF THINGS AT HOME, OR GET ALONG WITH OTHER PEOPLE: SOMEWHAT DIFFICULT
4. TROUBLE RELAXING: NEARLY EVERY DAY
GAD7 TOTAL SCORE: 12
8. IF YOU CHECKED OFF ANY PROBLEMS, HOW DIFFICULT HAVE THESE MADE IT FOR YOU TO DO YOUR WORK, TAKE CARE OF THINGS AT HOME, OR GET ALONG WITH OTHER PEOPLE?: SOMEWHAT DIFFICULT
7. FEELING AFRAID AS IF SOMETHING AWFUL MIGHT HAPPEN: NOT AT ALL
6. BECOMING EASILY ANNOYED OR IRRITABLE: NEARLY EVERY DAY
5. BEING SO RESTLESS THAT IT IS HARD TO SIT STILL: NEARLY EVERY DAY
1. FEELING NERVOUS, ANXIOUS, OR ON EDGE: MORE THAN HALF THE DAYS
GAD7 TOTAL SCORE: 12

## 2022-09-08 ENCOUNTER — VIRTUAL VISIT (OUTPATIENT)
Dept: FAMILY MEDICINE | Facility: CLINIC | Age: 37
End: 2022-09-08
Payer: COMMERCIAL

## 2022-09-08 DIAGNOSIS — F33.0 MAJOR DEPRESSIVE DISORDER, RECURRENT EPISODE, MILD (H): ICD-10-CM

## 2022-09-08 DIAGNOSIS — F41.9 ANXIETY: Primary | ICD-10-CM

## 2022-09-08 DIAGNOSIS — F41.0 PANIC ATTACK: ICD-10-CM

## 2022-09-08 PROCEDURE — 99214 OFFICE O/P EST MOD 30 MIN: CPT | Mod: 95 | Performed by: FAMILY MEDICINE

## 2022-09-08 RX ORDER — ESCITALOPRAM OXALATE 20 MG/1
20 TABLET ORAL DAILY
Qty: 90 TABLET | Refills: 1 | Status: SHIPPED | OUTPATIENT
Start: 2022-09-08 | End: 2023-03-17

## 2022-09-08 ASSESSMENT — PATIENT HEALTH QUESTIONNAIRE - PHQ9
SUM OF ALL RESPONSES TO PHQ QUESTIONS 1-9: 6
SUM OF ALL RESPONSES TO PHQ QUESTIONS 1-9: 6
10. IF YOU CHECKED OFF ANY PROBLEMS, HOW DIFFICULT HAVE THESE PROBLEMS MADE IT FOR YOU TO DO YOUR WORK, TAKE CARE OF THINGS AT HOME, OR GET ALONG WITH OTHER PEOPLE: SOMEWHAT DIFFICULT

## 2022-09-08 ASSESSMENT — ANXIETY QUESTIONNAIRES: GAD7 TOTAL SCORE: 12

## 2022-09-08 NOTE — PROGRESS NOTES
Mandy is a 37 year old who is being evaluated via a billable video visit.      How would you like to obtain your AVS? MyChart  If the video visit is dropped, the invitation should be resent by: Text to cell phone: [phone]  Will anyone else be joining your video visit? No          Assessment & Plan       ICD-10-CM    1. Anxiety  F41.9 Adult Mental Health  Referral     escitalopram (LEXAPRO) 20 MG tablet   2. Panic attacks   F41.0    3. Major depressive disorder, recurrent episode, mild (H)  F33.0         Ordering of each unique test  Prescription drug management  30 minutes spent on the date of the encounter doing chart review, history and exam, documentation and further activities per the note       MEDICATIONS:   Orders Placed This Encounter   Medications     escitalopram (LEXAPRO) 20 MG tablet     Sig: Take 1 tablet (20 mg) by mouth daily     Dispense:  90 tablet     Refill:  1          - Continue other medications without change  Regular exercise  See Patient Instructions    Return in 7 weeks (on 10/27/2022) for depression/anxiety follow up, as a video visit, w/ Dr. CHAVES for 40 minute appointment.      Future Appointments 10/2/2022 - 3/31/2023              Date Visit Type Length Department Provider     10/27/2022 12:20 PM OFFICE VISIT 40 min  FAMILY PRACTICE Mey Mcleod MD    Location Instructions:     [facility] is located at [address], [address] Free parking is available; access the lot by turning [address]  onto [address], then west onto [address].              12/14/2022 10:40 AM [facility] PREVENTATIVE ADULT VISIT 40 min  FAMILY PRACTICE Mey Mcleod MD    Location Instructions:     [facility] is located at [address], [address] Free parking is available; access the lot by turning north from [address] onto [address], then west onto [address].                  Mey Mcleod MD  [facility] [place]        Jazz Galvan is a 37 year old, presenting for the following health issues:  Recheck Medication    1. Anxiety    2. Panic attacks     3. Major depressive disorder, recurrent episode, mild (H)    vHas regular list of daily tasks .      History of Present Illness       Mental Health Follow-up:  Patient presents to follow-up on Anxiety.    Patient's anxiety since last visit has been:  Worse  The patient is having other symptoms associated with anxiety.  Any significant life events: other  Patient is feeling anxious or having panic attacks.  Patient has no concerns about alcohol or drug use.    She eats 2-3 servings of fruits and vegetables daily.She consumes 0 sweetened beverage(s) daily.She exercises with enough effort to increase her heart rate 9 or less minutes per day.  She exercises with enough effort to increase her heart rate 3 or less days per week. She is missing 1 dose(s) of medications per week.  She is not taking prescribed medications regularly due to remembering to take.    Today's PHQ-9         PHQ-9 Total Score: 6    PHQ-9 Q9 Thoughts of better off dead/self-harm past 2 weeks :   Not at all    How difficult have these problems made it for you to do your work, take care of things at home, or get along with other people: Somewhat difficult  Today's CRISTOFER-7 Score: 12     Daughter titus is in  - big change for her.   Anxiety increasing - worrying too much , irritable , restless, guilty feelings gi issues = nauesa & diarrhea, panic/ claustrophobic feelings -tingling - some mild agorophobia/anxiety = gi sx's = missed a bday pARTY for a neighbor 2nd to that.     Hasn't been in counseling ever. Hasn't been using the propranolol prn at all.  Friend recommended nerve tonic     Ok for sleepytime tea by [employer].     Has been on celexa on and off - now on x 1 yr.         Social History      Tobacco Use     Smoking status: Never Smoker     Smokeless tobacco: Never Used   Substance Use Topics     Alcohol use: Yes     Comment: 0-1 drinks per week     Drug use: No     Comment: no herbal meds either      PHQ 8/13/2021 11/11/2021 9/8/2022   PHQ-9 Total Score 3 1 6   Q9: Thoughts of better off dead/self-harm past 2 weeks Not at all Not at all Not at all     CRISTOFER-7 SCORE 8/13/2021 11/11/2021 9/1/2022   Total Score 8 (mild anxiety) - 12 (moderate anxiety)   Total Score 8 0 12     Last PHQ-9 9/8/2022   1.  Little interest or pleasure in doing things 0   2.  Feeling down, depressed, or hopeless 0   3.  Trouble falling or staying asleep, or sleeping too much 2   4.  Feeling tired or having little energy 1   5.  Poor appetite or overeating 1   6.  Feeling bad about yourself 0   7.  Trouble concentrating 0   8.  Moving slowly or restless 2   Q9: Thoughts of better off dead/self-harm past 2 weeks 0   PHQ-9 Total Score 6   Difficulty at work, home, or with people -     CRISTOFER-7  9/1/2022   1. Feeling nervous, anxious, or on edge 2   2. Not being able to stop or control worrying 1   3. Worrying too much about different things 0   4. Trouble relaxing 3   5. Being so restless that it is hard to sit still 3   6. Becoming easily annoyed or irritable 3   7. Feeling afraid, as if something awful might happen 0   CRISTOFER-7 Total Score 12   If you checked any problems, how difficult have they made it for you to do your work, take care of things at home, or get along with other people? Somewhat difficult       Suicide Assessment Five-step Evaluation and Treatment (SAFE-T)      Patient Active Problem List   Diagnosis     CARDIOVASCULAR SCREENING; LDL GOAL LESS THAN 160     Situational syncope- with any blood draws or shots     Resolved - Marginal placenta previa with intrapartum hemorrhage in first trimester-repeat 1/15/2015= resolved      Multiple pigmented nevi     Supervision of other normal pregnancy, antepartum     Type O  blood, Rh positive     Large for gestational age     Indication for care in labor or delivery     History of shoulder dystocia in prior pregnancy- with first baby 7lbs 14oz 2015       (normal spontaneous vaginal delivery)     Immediate postpartum hemorrhage, postpartum     Elevated d-dimer     Acute posthemorrhagic anemia     Anxiety     Postpartum depression associated with second pregnancy     Family history of nonmelanoma skin cancer     Difficulty falling asleep at night until early morning hours     Exposure to human papillomavirus-  with genital warts - recently diagnosed     Major depressive disorder, recurrent episode, mild (H)     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      Rosacea     Panic attack       Current Outpatient Medications   Medication Sig Dispense Refill     escitalopram (LEXAPRO) 20 MG tablet Take 1 tablet (20 mg) by mouth daily 90 tablet 1     metroNIDAZOLE (METROGEL) 0.75 % external gel Apply topically 2 times daily 45 g 3     Prenatal Vit-Fe Fumarate-FA (PRENATAL VITAMINS) 28-0.8 MG TABS Take 1 tablet by mouth daily       propranolol (INDERAL) 10 MG tablet Take 1-2 tablets (10-20 mg) by mouth 3 times daily as needed (anxiety) 30 tablet 1     vitamin D3 (CHOLECALCIFEROL) 50 mcg (2000 units) tablet Take 1 tablet (50 mcg) by mouth daily       azelaic acid (FINACIA) 15 % external gel Apply 0.5 inches topically 2 times daily 50 g 5        No Known Allergies        Review of Systems   Constitutional, HEENT, cardiovascular, pulmonary, GI, , musculoskeletal, neuro, skin, endocrine and psych systems are negative, except as otherwise noted.      Objective           Vitals:  No vitals were obtained today due to virtual visit.    Physical Exam   GENERAL: Healthy, alert and no distress  EYES: Eyes grossly normal to inspection.  No discharge or erythema, or obvious scleral/conjunctival  abnormalities.  RESP: No audible wheeze, cough, or visible cyanosis.  No visible retractions or increased work of breathing.    SKIN: Visible skin clear. No significant rash, abnormal pigmentation or lesions.  NEURO: Cranial nerves grossly intact.  Mentation and speech appropriate for age.  PSYCH: Mentation appears normal, affect normal/bright, judgement and insight intact, normal speech and appearance well-groomed.    Office Visit on 11/11/2021   Component Date Value Ref Range Status     Interpretation 11/11/2021 Negative for Intraepithelial Lesion or Malignancy (NILM)    Final     Comment 11/11/2021    Final                    Value:This result contains rich text formatting which cannot be displayed here.     Specimen Adequacy 11/11/2021 Satisfactory for evaluation, endocervical/transformation zone component present   Final     Clinical Information 11/11/2021    Final                    Value:This result contains rich text formatting which cannot be displayed here.     Reflex Testing 11/11/2021 Yes regardless of result   Final     Previous Abnormal? 11/11/2021    Final                    Value:This result contains rich text formatting which cannot be displayed here.     Performing Labs 11/11/2021    Final                    Value:This result contains rich text formatting which cannot be displayed here.     Other HR HPV 11/11/2021 Negative  Negative Final     HPV16 DNA 11/11/2021 Negative  Negative Final     HPV18 DNA 11/11/2021 Negative  Negative Final     FINAL DIAGNOSIS 11/11/2021    Final                    Value:This result contains rich text formatting which cannot be displayed here.               Video-Visit Details    Video Start Time: 9:25 AM    Type of service:  Video Visit    Video End Time:9:56 AM    Originating Location (pt. Location): Home    Distant Location (provider location):  [facility]     Platform used for Video Visit: [employer]

## 2022-09-08 NOTE — PATIENT INSTRUCTIONS
Try an oral probiotic over the counter such as Culturelle, Align. IbSium, Florastor or UltraFlora  Intensive Care  to help restore your natural gut bacteria and improve digestion.   May also use Beano , an over the counter supplement, which contains digestive enzymes as well to improve digestion and possible decrease gassiness and bloating related to eating, especially eating higher fiber foods.      Stop citalopram  Start escitalopram 20mg 1/2 tab daily for 3-5 days then  increase     Start walking for exercise - If walking outside,   - rain or shine - walk a block, then come home, next day walk   2 blocks , then come home ; and then add a block further from home daily - work up to 30-45minutes daily or 3-4x/week - or can work  up to  3-4 miles briskly daily.       If walking on treadmill or at  the mall, start with 5 minutes first day, then 6 minutes next day , then 7 minutes next day, then 8 minutes next day, etc.   Gradually work up to the above goals.  No stopping.      Can also try marching in place - try to have your knees come up as high to your chest as possible.  Start with 1-2 minutes at a time, and gradually add 30-60 seconds each workout. Try to work up to 15-20 minutes of walking/marching in place  Daily - great for during those days, when you are not comfortable walking outside.             Thank you so much for doing a video visit with us today. And, again, thank you  for choosing our [facility].  Please contact us with any questions that you may have.   We appreciate the opportunity to serve you now and look forward to supporting your healthcare needs for a long time to come!    Our clinic for the foreseeable future and until further notice , will continue to offer virtual visits, including telephone visits, video visits,  and e-visits, in addition to in person visits,  especially during this period of COVID-19 coronavirus pandemic.  Please call to schedule another one if  you need it!        Most Sincerely,     Mey Mcleod MD                                              To reach your [facility] team after hours call:   [phone]    PLEASE NOTE OUR HOURS HAVE CHANGED secondary to COVID-19 coronavirus pandemic, as we are trying to minimize patient exposure to the virus,  which is now widespread in the state.  These hours may change with very little notice.  We apologize for any inconvenience.       Our current clinic hours are:         Monday- Thursday   7:00am - 6:00pm  in person.      Friday  7:00am- 5:00pm in person                       Saturday and Sunday : Closed to in person and virtual visits            We have telephone and virtual visit times available between 7:00am - 6pm on             Monday-Friday as well.                                                Phone:  [phone]    Our pharmacy hours: Monday  through Friday 8:00am to 5:00pm                        Saturday - 9:00 am to 12 noon         Sunday : Closed.     ###  Please note: at this time we are not accepting any walk-in visits. ###      There is also information available at our web site:  [email or web address]    If your provider ordered any lab tests and you do not receive the results within 10 business days, please call the clinic.    If you need a medication refill please contact your pharmacy.  Please allow 3 business days for your refill to be completed.    Our clinic offers telephone visits and e visits.  Please ask one of your team members to explain more.      Use [employer] (secure email communication and access to your chart) to send your primary care provider a message or make an appointment. Ask someone on your Team how to sign up for [employer].

## 2022-10-02 PROBLEM — F41.0 PANIC ATTACK: Status: ACTIVE | Noted: 2022-10-02

## 2022-10-27 ENCOUNTER — VIRTUAL VISIT (OUTPATIENT)
Dept: FAMILY MEDICINE | Facility: CLINIC | Age: 37
End: 2022-10-27
Payer: COMMERCIAL

## 2022-10-27 DIAGNOSIS — F41.0 PANIC ATTACK: ICD-10-CM

## 2022-10-27 DIAGNOSIS — F41.9 ANXIETY: Primary | ICD-10-CM

## 2022-10-27 PROCEDURE — 99213 OFFICE O/P EST LOW 20 MIN: CPT | Mod: 95 | Performed by: FAMILY MEDICINE

## 2022-10-27 RX ORDER — PROPRANOLOL HYDROCHLORIDE 10 MG/1
10-20 TABLET ORAL 3 TIMES DAILY PRN
Qty: 30 TABLET | Refills: 1 | Status: SHIPPED | OUTPATIENT
Start: 2022-10-27

## 2022-10-27 ASSESSMENT — ANXIETY QUESTIONNAIRES
1. FEELING NERVOUS, ANXIOUS, OR ON EDGE: NOT AT ALL
GAD7 TOTAL SCORE: 2
2. NOT BEING ABLE TO STOP OR CONTROL WORRYING: NOT AT ALL
8. IF YOU CHECKED OFF ANY PROBLEMS, HOW DIFFICULT HAVE THESE MADE IT FOR YOU TO DO YOUR WORK, TAKE CARE OF THINGS AT HOME, OR GET ALONG WITH OTHER PEOPLE?: NOT DIFFICULT AT ALL
7. FEELING AFRAID AS IF SOMETHING AWFUL MIGHT HAPPEN: NOT AT ALL
GAD7 TOTAL SCORE: 2
7. FEELING AFRAID AS IF SOMETHING AWFUL MIGHT HAPPEN: NOT AT ALL
IF YOU CHECKED OFF ANY PROBLEMS ON THIS QUESTIONNAIRE, HOW DIFFICULT HAVE THESE PROBLEMS MADE IT FOR YOU TO DO YOUR WORK, TAKE CARE OF THINGS AT HOME, OR GET ALONG WITH OTHER PEOPLE: NOT DIFFICULT AT ALL
4. TROUBLE RELAXING: SEVERAL DAYS
3. WORRYING TOO MUCH ABOUT DIFFERENT THINGS: NOT AT ALL
6. BECOMING EASILY ANNOYED OR IRRITABLE: NOT AT ALL
5. BEING SO RESTLESS THAT IT IS HARD TO SIT STILL: SEVERAL DAYS
GAD7 TOTAL SCORE: 2

## 2022-10-27 ASSESSMENT — PATIENT HEALTH QUESTIONNAIRE - PHQ9
SUM OF ALL RESPONSES TO PHQ QUESTIONS 1-9: 0
SUM OF ALL RESPONSES TO PHQ QUESTIONS 1-9: 0

## 2022-10-27 NOTE — PROGRESS NOTES
Mandy is a 37 year old who is being evaluated via a billable video visit.      How would you like to obtain your AVS? MyChart  If the video visit is dropped, the invitation should be resent by: Text to cell phone: [phone]  Will anyone else be joining your video visit? No        Assessment & Plan :       ICD-10-CM    1. Anxiety  F41.9       2. Panic attack  F41.0 propranolol (INDERAL) 10 MG tablet           Ordering of each unique test  Prescription drug management  12 minutes spent on the date of the encounter doing chart review, history and exam, documentation and further activities per the note       MEDICATIONS:  Continue current medications without change  Regular exercise  See Patient Instructions    Return in 7 weeks (on 12/14/2022) for Physical/Preventative Visit, depression/anxiety follow up, w/ Dr. CHAVES for 40 minute appointment.          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 That's been fun.  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   Sister had 8lbs 10oz. First baby vaginally yesterday- signif. Complications.       History of Present Illness       Mental Health Follow-up:  Patient presents to follow-up on Anxiety.    Patient's anxiety since last visit has been:  Better  The patient is not having other symptoms associated with anxiety.  Any significant life events: No  Patient is not feeling anxious or having panic attacks.  Patient has no concerns about alcohol or drug use.    She eats 2-3 servings of fruits and vegetables daily.She consumes 0 sweetened beverage(s) daily.She exercises with enough effort to increase her heart rate 10 to 19 minutes per day.  She exercises with enough effort to increase her heart rate 7 days per week.   She is taking medications regularly.    Today's PHQ-9          PHQ-9 Total Score: 0    PHQ-9 Q9 Thoughts of better off dead/self-harm past 2 weeks :   Not at all      Today's CRISTOFER-7 Score: 2     Feels like her mood has been much better on the 20mg of lexapro daily (from citalopram 20mg daily prior to 9/8/2022). Gets a little dizzy if she is really tired.   Closer to her usual/normal.  No anxiety or panic attacks in the last 1.5 months.  Able to fall asleep quickly and get back to sleep if she awakens at all during the night.    Sometimes feels a little restless.  Not exercising regularly, but staying really active on a daily basis with exertional activities.      Future Appointments 10/27/2022 - 4/25/2023      Date Visit Type Length Department Provider     12/14/2022 10:40 AM [facility] [place] 40 min  F[facility]ILY PRACTICE Mey Mcleod MD    Location Instructions:     [facility] is located at [address] [place] [address], [address] Free parking is available; access the lot by turning north from [address] onto [address], then west onto [facility].                      Social History     Tobacco Use     Smoking status: Never     Smokeless tobacco: Never   Substance Use Topics     Alcohol use: Yes     Comment: 0-1 drinks per week     Drug use: No     Comment: no herbal meds either      CRISTOFER-7 SCORE 11/11/2021 9/1/2022 10/27/2022   Total Score - 12 (moderate anxiety) 2 (minimal anxiety)   Total Score 0 12 2     PHQ 11/11/2021 9/8/2022 10/27/2022   PHQ-9 Total Score 1 6 0   Q9: Thoughts of better off dead/self-harm past 2 weeks Not at all Not at all Not at all     Last PHQ-9 10/27/2022   1.  Little interest or pleasure in doing things 0   2.  Feeling down, depressed, or hopeless 0   3.  Trouble falling or staying asleep, or sleeping too much 0   4.  Feeling tired or having little energy 0   5.  Poor appetite or overeating 0   6.  Feeling bad about yourself 0   7.  Trouble concentrating 0   8.  Moving slowly or restless  0   Q9: Thoughts of better off dead/self-harm past 2 weeks 0   PHQ-9 Total Score 0   Difficulty at work, home, or with people -     CRISTOFER-7  10/27/2022   1. Feeling nervous, anxious, or on edge 0   2. Not being able to stop or control worrying 0   3. Worrying too much about different things 0   4. Trouble relaxing 1   5. Being so restless that it is hard to sit still 1   6. Becoming easily annoyed or irritable 0   7. Feeling afraid, as if something awful might happen 0   CRISTOFER-7 Total Score 2   If you checked any problems, how difficult have they made it for you to do your work, take care of things at home, or get along with other people? Not difficult at all         Patient Active Problem List   Diagnosis     CARDIOVASCULAR SCREENING; LDL GOAL LESS THAN 160     Situational syncope- with any blood draws or shots     Resolved - Marginal placenta previa with intrapartum hemorrhage in first trimester-repeat 1/15/2015= resolved      Multiple pigmented nevi     Supervision of other normal pregnancy, antepartum     Type O blood, Rh positive     Large for gestational age     Indication for care in labor or delivery     History of shoulder dystocia in prior pregnancy- with first baby 7lbs 14oz 2015       (normal spontaneous vaginal delivery)     Immediate postpartum hemorrhage, postpartum     Elevated d-dimer     Acute posthemorrhagic anemia     Anxiety     Postpartum depression associated with second pregnancy     Family history of nonmelanoma skin cancer     Difficulty falling asleep at night until early morning hours     Exposure to human papillomavirus-  with genital warts - recently diagnosed     Major depressive disorder, recurrent episode, mild (H)     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      Rosacea     Panic attack       Current Outpatient Medications   Medication Sig Dispense Refill     azelaic acid (FINACIA) 15 %  external gel Apply 0.5 inches topically 2 times daily 50 g 5     escitalopram (LEXAPRO) 20 MG tablet Take 1 tablet (20 mg) by mouth daily 90 tablet 1     metroNIDAZOLE (METROGEL) 0.75 % external gel Apply topically 2 times daily 45 g 3     Prenatal Vit-Fe Fumarate-FA (PRENATAL VITAMINS) 28-0.8 MG TABS Take 1 tablet by mouth daily       propranolol (INDERAL) 10 MG tablet Take 1-2 tablets (10-20 mg) by mouth 3 times daily as needed (anxiety) 30 tablet 1     vitamin D3 (CHOLECALCIFEROL) 50 mcg (2000 units) tablet Take 1 tablet (50 mcg) by mouth daily          No Known Allergies       Review of Systems   Constitutional, HEENT, cardiovascular, pulmonary, GI, , musculoskeletal, neuro, skin, endocrine and psych systems are negative, except as otherwise noted.      Objective           Vitals:  No vitals were obtained today due to virtual visit.    Physical Exam   GENERAL: Healthy, alert and no distress  EYES: Eyes grossly normal to inspection.  No discharge or erythema, or obvious scleral/conjunctival abnormalities.  RESP: No audible wheeze, cough, or visible cyanosis.  No visible retractions or increased work of breathing.    SKIN: Visible skin clear. No significant rash, abnormal pigmentation or lesions.  NEURO: Cranial nerves grossly intact.  Mentation and speech appropriate for age.  PSYCH: Mentation appears normal, affect normal/bright, judgement and insight intact, normal speech and appearance well-groomed.    Office Visit on 11/11/2021   Component Date Value Ref Range Status     Interpretation 11/11/2021 Negative for Intraepithelial Lesion or Malignancy (NILM)    Final     Comment 11/11/2021    Final                    Value:This result contains rich text formatting which cannot be displayed here.     Specimen Adequacy 11/11/2021 Satisfactory for evaluation, endocervical/transformation zone component present   Final     Clinical Information 11/11/2021    Final                    Value:This result contains rich text  formatting which cannot be displayed here.     Reflex Testing 11/11/2021 Yes regardless of result   Final     Previous Abnormal? 11/11/2021    Final                    Value:This result contains rich text formatting which cannot be displayed here.     Performing Labs 11/11/2021    Final                    Value:This result contains rich text formatting which cannot be displayed here.     Other HR HPV 11/11/2021 Negative  Negative Final     HPV16 DNA 11/11/2021 Negative  Negative Final     HPV18 DNA 11/11/2021 Negative  Negative Final     FINAL DIAGNOSIS 11/11/2021    Final                    Value:This result contains rich text formatting which cannot be displayed here.               Video-Visit Details    Video Start Time: 12:55pm     Type of service:  Video Visit    Video End Time:1:04 PM    Originating Location (pt. Location): Home    Distant Location (provider location):  On-site    Platform used for Video Visit: Dorian

## 2023-01-22 ENCOUNTER — HEALTH MAINTENANCE LETTER (OUTPATIENT)
Age: 38
End: 2023-01-22

## 2023-03-16 DIAGNOSIS — F41.9 ANXIETY: ICD-10-CM

## 2023-03-17 RX ORDER — ESCITALOPRAM OXALATE 20 MG/1
TABLET ORAL
Qty: 90 TABLET | Refills: 2 | Status: SHIPPED | OUTPATIENT
Start: 2023-03-17 | End: 2023-11-27

## 2023-03-18 NOTE — TELEPHONE ENCOUNTER
Prescription approved per [facility] Refill Protocol.  Goldie Ledezma RN

## 2023-05-19 ASSESSMENT — ENCOUNTER SYMPTOMS
HEADACHES: 0
BREAST MASS: 0
NAUSEA: 0
PARESTHESIAS: 0
NERVOUS/ANXIOUS: 0
FREQUENCY: 0
ABDOMINAL PAIN: 0
JOINT SWELLING: 0
PALPITATIONS: 0
WEAKNESS: 0
CHILLS: 0
DYSURIA: 0
DIARRHEA: 0
HEMATOCHEZIA: 0
CONSTIPATION: 0
SHORTNESS OF BREATH: 0
SORE THROAT: 0
EYE PAIN: 0
HEMATURIA: 0
COUGH: 0
ARTHRALGIAS: 0
HEARTBURN: 0
DIZZINESS: 0
MYALGIAS: 0
FEVER: 0

## 2023-05-25 ASSESSMENT — PATIENT HEALTH QUESTIONNAIRE - PHQ9
SUM OF ALL RESPONSES TO PHQ QUESTIONS 1-9: 0
SUM OF ALL RESPONSES TO PHQ QUESTIONS 1-9: 0

## 2023-05-26 ENCOUNTER — OFFICE VISIT (OUTPATIENT)
Dept: FAMILY MEDICINE | Facility: CLINIC | Age: 38
End: 2023-05-26
Payer: COMMERCIAL

## 2023-05-26 VITALS
HEIGHT: 68 IN | OXYGEN SATURATION: 98 % | RESPIRATION RATE: 18 BRPM | SYSTOLIC BLOOD PRESSURE: 104 MMHG | WEIGHT: 169 LBS | HEART RATE: 78 BPM | TEMPERATURE: 98.7 F | BODY MASS INDEX: 25.61 KG/M2 | DIASTOLIC BLOOD PRESSURE: 76 MMHG

## 2023-05-26 DIAGNOSIS — F41.9 ANXIETY: ICD-10-CM

## 2023-05-26 DIAGNOSIS — Z00.01 ENCOUNTER FOR ROUTINE ADULT HEALTH EXAMINATION WITH ABNORMAL FINDINGS: Primary | ICD-10-CM

## 2023-05-26 DIAGNOSIS — N39.3 SUI (STRESS URINARY INCONTINENCE, FEMALE): ICD-10-CM

## 2023-05-26 DIAGNOSIS — L71.9 ACNE ROSACEA: ICD-10-CM

## 2023-05-26 DIAGNOSIS — Z13.6 CARDIOVASCULAR SCREENING; LDL GOAL LESS THAN 160: ICD-10-CM

## 2023-05-26 DIAGNOSIS — F33.0 MAJOR DEPRESSIVE DISORDER, RECURRENT EPISODE, MILD (H): ICD-10-CM

## 2023-05-26 DIAGNOSIS — N81.2 FIRST DEGREE UTERINE PROLAPSE: ICD-10-CM

## 2023-05-26 LAB
CHOLEST SERPL-MCNC: 176 MG/DL
ERYTHROCYTE [DISTWIDTH] IN BLOOD BY AUTOMATED COUNT: 12 % (ref 10–15)
HCT VFR BLD AUTO: 38.8 % (ref 35–47)
HDLC SERPL-MCNC: 59 MG/DL
HGB BLD-MCNC: 13.1 G/DL (ref 11.7–15.7)
LDLC SERPL CALC-MCNC: 100 MG/DL
MCH RBC QN AUTO: 29.4 PG (ref 26.5–33)
MCHC RBC AUTO-ENTMCNC: 33.8 G/DL (ref 31.5–36.5)
MCV RBC AUTO: 87 FL (ref 78–100)
NONHDLC SERPL-MCNC: 117 MG/DL
PLATELET # BLD AUTO: 283 10E3/UL (ref 150–450)
RBC # BLD AUTO: 4.46 10E6/UL (ref 3.8–5.2)
TRIGL SERPL-MCNC: 84 MG/DL
TSH SERPL DL<=0.005 MIU/L-ACNC: 1.5 UIU/ML (ref 0.3–4.2)
WBC # BLD AUTO: 5.9 10E3/UL (ref 4–11)

## 2023-05-26 PROCEDURE — 85027 COMPLETE CBC AUTOMATED: CPT | Performed by: FAMILY MEDICINE

## 2023-05-26 PROCEDURE — 99214 OFFICE O/P EST MOD 30 MIN: CPT | Mod: 25 | Performed by: FAMILY MEDICINE

## 2023-05-26 PROCEDURE — 80061 LIPID PANEL: CPT | Performed by: FAMILY MEDICINE

## 2023-05-26 PROCEDURE — 99395 PREV VISIT EST AGE 18-39: CPT | Performed by: FAMILY MEDICINE

## 2023-05-26 PROCEDURE — 84443 ASSAY THYROID STIM HORMONE: CPT | Performed by: FAMILY MEDICINE

## 2023-05-26 PROCEDURE — 36415 COLL VENOUS BLD VENIPUNCTURE: CPT | Performed by: FAMILY MEDICINE

## 2023-05-26 RX ORDER — AZELAIC ACID 0.15 G/G
0.5 GEL TOPICAL 2 TIMES DAILY
Qty: 50 G | Refills: 5 | Status: SHIPPED | OUTPATIENT
Start: 2023-05-26

## 2023-05-26 RX ORDER — ERYTHROMYCIN 20 MG/G
GEL TOPICAL DAILY
Qty: 30 G | Refills: 1 | Status: SHIPPED | OUTPATIENT
Start: 2023-05-26

## 2023-05-26 RX ORDER — METRONIDAZOLE 7.5 MG/G
GEL TOPICAL 2 TIMES DAILY
Qty: 45 G | Refills: 3 | Status: SHIPPED | OUTPATIENT
Start: 2023-05-26

## 2023-05-26 ASSESSMENT — ENCOUNTER SYMPTOMS
NAUSEA: 0
HEARTBURN: 0
COUGH: 0
DYSURIA: 0
MYALGIAS: 0
FEVER: 0
PARESTHESIAS: 0
DIARRHEA: 0
PALPITATIONS: 0
ABDOMINAL PAIN: 0
WEAKNESS: 0
SHORTNESS OF BREATH: 0
FREQUENCY: 0
SORE THROAT: 0
HEADACHES: 0
JOINT SWELLING: 0
CHILLS: 0
DIZZINESS: 0
EYE PAIN: 0
ARTHRALGIAS: 0
CONSTIPATION: 0
HEMATOCHEZIA: 0
HEMATURIA: 0
BREAST MASS: 0
NERVOUS/ANXIOUS: 0

## 2023-05-26 ASSESSMENT — ANXIETY QUESTIONNAIRES
3. WORRYING TOO MUCH ABOUT DIFFERENT THINGS: NOT AT ALL
6. BECOMING EASILY ANNOYED OR IRRITABLE: NOT AT ALL
GAD7 TOTAL SCORE: 0
5. BEING SO RESTLESS THAT IT IS HARD TO SIT STILL: NOT AT ALL
2. NOT BEING ABLE TO STOP OR CONTROL WORRYING: NOT AT ALL
7. FEELING AFRAID AS IF SOMETHING AWFUL MIGHT HAPPEN: NOT AT ALL
1. FEELING NERVOUS, ANXIOUS, OR ON EDGE: NOT AT ALL
7. FEELING AFRAID AS IF SOMETHING AWFUL MIGHT HAPPEN: NOT AT ALL
1. FEELING NERVOUS, ANXIOUS, OR ON EDGE: NOT AT ALL
3. WORRYING TOO MUCH ABOUT DIFFERENT THINGS: NOT AT ALL
5. BEING SO RESTLESS THAT IT IS HARD TO SIT STILL: NOT AT ALL
GAD7 TOTAL SCORE: 0
IF YOU CHECKED OFF ANY PROBLEMS ON THIS QUESTIONNAIRE, HOW DIFFICULT HAVE THESE PROBLEMS MADE IT FOR YOU TO DO YOUR WORK, TAKE CARE OF THINGS AT HOME, OR GET ALONG WITH OTHER PEOPLE: NOT DIFFICULT AT ALL
6. BECOMING EASILY ANNOYED OR IRRITABLE: NOT AT ALL
GAD7 TOTAL SCORE: 0
2. NOT BEING ABLE TO STOP OR CONTROL WORRYING: NOT AT ALL

## 2023-05-26 ASSESSMENT — PATIENT HEALTH QUESTIONNAIRE - PHQ9
5. POOR APPETITE OR OVEREATING: NOT AT ALL
5. POOR APPETITE OR OVEREATING: NOT AT ALL

## 2023-05-26 NOTE — PROGRESS NOTES
SUBJECTIVE:   CC: Mandy is an 38 year old who presents for preventive health visit and the following other medical problems:      1. Encounter for routine adult health examination with abnormal findings    2. Anxiety    3. Major depressive disorder, recurrent episode, mild (H)    4. LANCE (stress urinary incontinence, female)- with cough, lift, sneezing and brisk walking as well - now has time for pelvic floor PT -desires another referral     5. First degree uterine prolapse    6. CARDIOVASCULAR SCREENING; LDL GOAL LESS THAN 160--needs lab follow up    7. Acne rosacea            5/26/2023    10:25 AM   Additional Questions   Roomed by Manisha Salas MA   Accompanied by self   Patient has been advised of split billing requirements and indicates understanding: Yes  Healthy Habits:     Getting at least 3 servings of Calcium per day:  Yes    Bi-annual eye exam:  Yes    Dental care twice a year:  Yes    Sleep apnea or symptoms of sleep apnea:  None    Diet:  Regular (no restrictions)    Frequency of exercise:  None    Taking medications regularly:  Yes    Medication side effects:  None    PHQ-2 Total Score: 0    Additional concerns today:  Yes  Answers for HPI/ROS submitted by the patient on 5/25/2023  PHQ9 TOTAL SCORE: 0        Depression and Anxiety Follow-Up- Does wants to discuss weaning off or maybe lowering the dose on the Escitalopram 20mg - somewhat backing off of it. Would like to come down in dose as her blunted mood feels not quite her.  She'll try coming down to 1/2 tablet daily or every other day and see how she feels.  Has plenty of refills for the next 6 months.     How are you doing with your depression since your last visit? Improved     How are you doing with your anxiety since your last visit?  Improved     Are you having other symptoms that might be associated with depression or anxiety? No    Have you had a significant life event? No     Do you have any concerns with your use of alcohol or other  drugs? No     LANCE (stress urinary incontinence, female)- with first degree uterine prolapse with cough, lift, sneezing and brisk walking as well - now has time for pelvic floor PT -desires another referral .      Social History     Tobacco Use     Smoking status: Never     Smokeless tobacco: Never   Substance Use Topics     Alcohol use: Not Currently     Comment: 0-1 drinks per week     Drug use: No     Comment: no herbal meds either          9/8/2022     8:56 AM 10/27/2022    12:22 PM 5/25/2023     1:41 PM   PHQ   PHQ-9 Total Score 6 0 0   Q9: Thoughts of better off dead/self-harm past 2 weeks Not at all Not at all Not at all         9/1/2022    10:12 AM 10/27/2022    12:24 PM 5/26/2023    11:10 AM   CRISTOFER-7 SCORE   Total Score 12 (moderate anxiety) 2 (minimal anxiety)    Total Score 12 2 0         5/25/2023     1:41 PM   Last PHQ-9   1.  Little interest or pleasure in doing things 0   2.  Feeling down, depressed, or hopeless 0   3.  Trouble falling or staying asleep, or sleeping too much 0   4.  Feeling tired or having little energy 0   5.  Poor appetite or overeating 0   6.  Feeling bad about yourself 0   7.  Trouble concentrating 0   8.  Moving slowly or restless 0   Q9: Thoughts of better off dead/self-harm past 2 weeks 0   PHQ-9 Total Score 0         5/26/2023    11:10 AM   CRISTOFER-7    1. Feeling nervous, anxious, or on edge 0   2. Not being able to stop or control worrying 0   3. Worrying too much about different things 0   4. Trouble relaxing 0   5. Being so restless that it is hard to sit still 0   6. Becoming easily annoyed or irritable 0   7. Feeling afraid, as if something awful might happen 0   CRISTOFER-7 Total Score 0   If you checked any problems, how difficult have they made it for you to do your work, take care of things at home, or get along with other people? Not difficult at all          Social History     Tobacco Use     Smoking status: Never     Smokeless tobacco: Never   Vaping Use     Vaping status:  Not on file   Substance Use Topics     Alcohol use: Yes     Comment: 0-1 drinks per week             2023     9:17 AM   Alcohol Use   Prescreen: >3 drinks/day or >7 drinks/week? Not Applicable     Reviewed orders with patient.  Reviewed health maintenance and updated orders accordingly - Yes  Lab work is in process  Labs reviewed in EPIC  BP Readings from Last 3 Encounters:   23 104/76   22 110/70   22 110/70    Wt Readings from Last 3 Encounters:   23 76.7 kg (169 lb)   22 70.3 kg (155 lb)   21 72.1 kg (159 lb)                  Patient Active Problem List   Diagnosis     CARDIOVASCULAR SCREENING; LDL GOAL LESS THAN 160     Situational syncope- with any blood draws or shots     Resolved - Marginal placenta previa with intrapartum hemorrhage in first trimester-repeat 1/15/2015= resolved      Multiple pigmented nevi     Supervision of other normal pregnancy, antepartum     Type O blood, Rh positive     Large for gestational age     Indication for care in labor or delivery     History of shoulder dystocia in prior pregnancy- with first baby 7lbs 14oz 2015       (normal spontaneous vaginal delivery)     Immediate postpartum hemorrhage, postpartum     Elevated d-dimer     Acute posthemorrhagic anemia     Anxiety     Postpartum depression associated with second pregnancy     Family history of nonmelanoma skin cancer     Difficulty falling asleep at night until early morning hours     Exposure to human papillomavirus-  with genital warts - recently diagnosed     Major depressive disorder, recurrent episode, mild (H)     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      Rosacea     Panic attack     Past Surgical History:   Procedure Laterality Date     BIOPSY  2015, krystyna others    colposcopy, several other mole removals     ENT SURGERY  2020    gum graft     [place] teeth  extraction       ZZC ORAL SURGERY PROCEDURE      graft surgery for lower gum recession on 11/24/2020       Social History     Tobacco Use     Smoking status: Never     Smokeless tobacco: Never   Vaping Use     Vaping status: Never Used   Substance Use Topics     Alcohol use: Not Currently     Comment: 0-1 drinks per week     Family History   Problem Relation Age of Onset     Family History Negative Mother      Family History Negative Father      Hyperlipidemia Father      Anxiety Disorder Sister      Depression Sister      Depression Sister      Anxiety Disorder Brother      Depression Brother      Depression Brother      Thyroid Disease Maternal Grandmother      Depression Maternal Grandmother      Other Cancer Maternal Grandmother         skin cancer     Diabetes Maternal Grandfather         diet controlled     Prostate Cancer Paternal Grandfather      Breast Cancer Maternal Aunt 65     Breast Cancer Maternal Great-Grandmother      Mental Illness Maternal Uncle         had a psychotic break         Current Outpatient Medications   Medication Sig Dispense Refill     escitalopram (LEXAPRO) 20 MG tablet TAKE 1 TABLET BY MOUTH EVERY DAY 90 tablet 2     Prenatal Vit-Fe Fumarate-FA (PRENATAL VITAMINS) 28-0.8 MG TABS Take 1 tablet by mouth daily       propranolol (INDERAL) 10 MG tablet Take 1-2 tablets (10-20 mg) by mouth 3 times daily as needed (anxiety) 30 tablet 1     vitamin D3 (CHOLECALCIFEROL) 50 mcg (2000 units) tablet Take 1 tablet (50 mcg) by mouth daily       azelaic acid (FINACIA) 15 % external gel Apply 0.5 inches topically 2 times daily (Patient not taking: Reported on 5/26/2023) 50 g 5     metroNIDAZOLE (METROGEL) 0.75 % external gel Apply topically 2 times daily (Patient not taking: Reported on 5/26/2023) 45 g 3     No Known Allergies  Recent Labs   Lab Test 11/05/20  1133 03/15/20  0459 08/02/18  0947 07/10/18  1650   LDL  --   --   --  58   HDL  --   --   --  56   TRIG  --   --   --  98   ALT 33  --   --    --    CR 0.68 0.66  --   --    GFRESTIMATED >90 >90  --   --    GFRESTBLACK >90 >90  --   --    POTASSIUM 4.5 3.5  --   --    TSH 1.44  --  1.38  --         Breast Cancer Screening:    FHS-7:       11/8/2021    11:35 AM   Breast CA Risk Assessment (FHS-7)   Did any of your first-degree relatives have breast or ovarian cancer? No   Did any of your relatives have bilateral breast cancer? No   Did any man in your family have breast cancer? No   Did any woman in your family have breast and ovarian cancer? No   Did any woman in your family have breast cancer before age 50 y? No   Do you have 2 or more relatives with breast and/or ovarian cancer? Yes   Do you have 2 or more relatives with breast and/or bowel cancer? Yes       Patient under 40 years of age: Routine Mammogram Screening not recommended.   Pertinent mammograms are reviewed under the imaging tab.    History of abnormal Pap smear:   age 30- 65 PAP every 3 years recommended  YES - updated in Problem List and Health Maintenance accordingly      Latest Ref Rng & Units 11/11/2021     1:29 PM 8/2/2018     9:45 AM 8/2/2018     9:33 AM   PAP / HPV   PAP  Negative for Intraepithelial Lesion or Malignancy (NILM)       PAP (Historical)    NIL     HPV 16 DNA Negative Negative   Negative      HPV 18 DNA Negative Negative   Negative      Other HR HPV Negative Negative   Negative        Reviewed and updated as needed this visit by clinical staff                  Reviewed and updated as needed this visit by Provider                 Past Medical History:   Diagnosis Date     ASCUS with positive high risk HPV 12/2014    + HPV 16, hx of ASCUS , but neg HPV in 2008      Depressive disorder 2017    post-partum     Cecilio breech presentation - seen on 31 week US - resolved with external version on 3/24/2017 02/20/2017     History of blood transfusion April 2017    hemorrhage after giving birth      Past Surgical History:   Procedure Laterality Date     BIOPSY  August 2015,  mulitple others    colposcopy, several other mole removals     ENT SURGERY  2020    gum graft     [place] teeth extraction       [facility] ORAL SURGERY PROCEDURE      graft surgery for lower gum recession on 2020     OB History    Para Term  AB Living   3 2 2 0 1 2   SAB IAB Ectopic Multiple Live Births   1 0 0 0 2      # Outcome Date GA Lbr Eh/2nd Weight Sex Delivery Anes PTL Lv   3 SAB 20 6w0d    SAB      2 Term 17 40w0d 05:32 / 01:27 4.36 kg (9 lb 9.8 oz) F Vag-Spont EPI N NAZANIN      Birth Comments: none      Complications: Excessive Vaginal Bleeding, Dysfunctional Labor      Name: Sherice Galvan       Apgar1: 8  Apgar5: 9   1 Term 06/19/15 38w5d 16:00 / 02:37 3.569 kg (7 lb 13.9 oz) M Vag-Spont EPI  NAZANIN      Birth Comments: hand next to face       Complications: Shoulder Dystocia      Name: Maik      Apgar1: 8  Apgar5: 9      Obstetric Comments   Son Maik - born in    Daughter, Sherice, born in 2017    SAB 2020        Review of Systems   Constitutional: Negative for chills and fever.   HENT: Negative for congestion, ear pain, hearing loss and sore throat.    Eyes: Negative for pain and visual disturbance.   Respiratory: Negative for cough and shortness of breath.    Cardiovascular: Negative for chest pain, palpitations and peripheral edema.   Gastrointestinal: Negative for abdominal pain, constipation, diarrhea, heartburn, hematochezia and nausea.   Breasts:  Negative for tenderness, breast mass and discharge.   Genitourinary: Negative for dysuria, frequency, genital sores, hematuria, pelvic pain, urgency, vaginal bleeding and vaginal discharge.   Musculoskeletal: Negative for arthralgias, joint swelling and myalgias.   Skin: Negative for rash.   Neurological: Negative for dizziness, weakness, headaches and paresthesias.   Psychiatric/Behavioral: Negative for mood changes. The patient is not nervous/anxious.           OBJECTIVE:   /76   Pulse 78   Temp 98.7  F  "(37.1  C)   Resp 18   Ht 1.727 m (5' 8\")   Wt 76.7 kg (169 lb)   LMP 05/22/2023   SpO2 98%   BMI 25.70 kg/m    Physical Exam:   GENERAL: healthy, alert and no distress  EYES: Eyes grossly normal to inspection, PERRL and conjunctivae and sclerae normal  HENT: ear canals and TM's normal, nose and mouth without ulcers or lesions  NECK: no adenopathy, no asymmetry, masses, or scars and thyroid normal to palpation  RESP: lungs clear to auscultation - no rales, rhonchi or wheezes  BREAST: normal without masses, tenderness or nipple discharge and no palpable axillary masses or adenopathy  CV: regular rate and rhythm, normal S1 S2, no S3 or S4, no murmur, click or rub, no peripheral edema and peripheral pulses strong  ABDOMEN: soft, nontender, no hepatosplenomegaly, no masses and bowel sounds normal  MS: no gross musculoskeletal defects noted, no edema  SKIN: no suspicious lesions or rashes, except for classic rosacea on facial cheeks and chin - flushed with reddish papules and some scattered telangiectasias.   NEURO: Normal strength and tone, mentation intact and speech normal  PSYCH: mentation appears normal, affect normal/bright    Diagnostic Test Results:  Labs reviewed in Epic    ASSESSMENT/PLAN:       ICD-10-CM    1. Encounter for routine adult health examination with abnormal findings  Z00.01       2. Anxiety  F41.9       3. Major depressive disorder, recurrent episode, mild (H)  F33.0       4. LANCE (stress urinary incontinence, female)- with cough, lift, sneezing and brisk walking as well - now has time for pelvic floor PT -desires another referral   N39.3 Physical Therapy Referral      5. First degree uterine prolapse  N81.2 Physical Therapy Referral      6. CARDIOVASCULAR SCREENING; LDL GOAL LESS THAN 160--needs lab follow up  Z13.6 CBC with platelets     TSH with free T4 reflex     Lipid panel reflex to direct LDL Non-fasting     CBC with platelets     TSH with free T4 reflex     Lipid panel reflex to direct " LDL Non-fasting     CANCELED: Lipid panel reflex to direct LDL Fasting      7. Acne rosacea  L71.9 metroNIDAZOLE (METROGEL) 0.75 % external gel     azelaic acid (FINACIA) 15 % external gel     erythromycin with ethanol (EMGEL) 2 % gel        For your rosacea: ok to use azelaic acid and metronidazole gels once a day at opposite times of day or blend together twice daily.   For cost purposes, you can try erthyromycin gel instead of the metronidazole gel 1x or 2x/ day if desired.      For sunscreen daily  Especially on face:   CeraVe AM SPF 30   Neutrogena - Moisturizing Skin Tint - with SPF 30 - available at [employer] and [employer].      Look at [employer] or [employer] for 10% Azelaic Acid - Naturium is a good brand.     Recent Labs   Lab Test 07/10/18  1650   CHOL 134   HDL 56   LDL 58   TRIG 98     Return in about 6 months (around 11/27/2023) for depression/anxiety and rosacea , w/ Dr Mcleod. appt scheduled today .     Patient has been advised of split billing requirements and indicates understanding: Yes      COUNSELING:  Reviewed preventive health counseling, as reflected in patient instructions    She reports that she has never smoked. She has never used smokeless tobacco.           Mey Mcleod MD  [facility] [place]

## 2023-05-26 NOTE — PATIENT INSTRUCTIONS
[address] [address]  Office: [phone]   Fax:    [phone]     For your rosacea: ok to use azelaic acid and metronidazole gels once a day at opposite times of day or blend together twice daily.   For cost purposes, you can try erthyromycin gel instead of the metronidazole gel 1x or 2x/ day if desired.      CeraVe AM SPF 30   Neutrogena - Moisturizing Skin Tint - with SPF 30 - available at [employer] and [employer].      Look at [employer] or [employer] for 10% Azelaic Acid - Naturium is a good brand.     Bare Minerals - powder foundation - if needed.      try an oral probiotic otc such as Culturelle, Align. IbSium, Florastor, or UltraFlora  Intensive Care taken DAILY  to help restore your natural gut bacteria to hopefully prevent yeast infections and/or diarrhea sometimes assoc. with this antibiotic.  Look for the USP symbol.        For bladder urgency and bladder irritation which can contribute to daytime and/or night-time urgency, accidents or incontinence for children and adults :   Avoid or better yet eliminate citrus juices (orange juice, grapefruit juice, lemonade or limeade), citric acid in things as preservatives,  and /or or vinegars ( acidic substances - even those in salad dressing), all caffeine, even decaf coffee, and teas; alcohol, and artificial sweeteners, red and blue food dyes, sports drinks, spicy foods, chocolate, tomato products, excessive dairy, and carbonated beverages ( even sparkling neal).     Even 1 serving of any of the above can irritate/negatively affect the bladder for 3 days.     If you eliminate all the above and are still having problems, please contact our office.         Preventive Health Recommendations  Female Ages 26 - 39  Yearly exam:   See your health care provider every year in order to  Review health changes.   Discuss preventive care.    Review your medicines if you your doctor has prescribed any.    Until age 30:  "Get a Pap test every three years (more often if you have had an abnormal result).    After age 30: Talk to your doctor about whether you should have a Pap test every 3 years or have a Pap test with HPV screening every 5 years.   You do not need a Pap test if your uterus was removed (hysterectomy) and you have not had cancer.  You should be tested each year for STDs (sexually transmitted diseases), if you're at risk.   Talk to your provider about how often to have your cholesterol checked.  If you are at risk for diabetes, you should have a diabetes test (fasting glucose).  Shots: Get a flu shot each year. Get a tetanus shot every 10 years.   Nutrition:   Eat at least 5 servings of fruits and vegetables each day.  Eat whole-grain bread, whole-wheat pasta and brown rice instead of white grains and rice.  Get adequate Calcium and Vitamin D.     Lifestyle  Exercise at least 150 minutes a week (30 minutes a day, 5 days of the week). This will help you control your weight and prevent disease.  Limit alcohol to one drink per day.  No smoking.   Wear sunscreen to prevent skin cancer.  See your dentist every six months for an exam and cleaning.    Patient Instructions    Tips for avoiding skin cancer and premature skin aging:  Wear sunscreen on face every day even in winter. UVA penetrates window glass and is just as strong in the winter as it is in the summer. Sunscreen with SPF > 30 is recommended.  Avoid mid-day sunshine (10 AM to 3 PM) if possible.  Never use a tanning bed. They are associated with much higher risks of skin cancer. There is no merit in getting \"a base tan\" before a warm weather vacation. All tanning damages the skin.  Wear a wide brimmed hat and sunglasses.  Wear  sun protective clothing such as [employer] ( local company out of [facility]- they guarantee their UPF 50+sun protection for the life of the garment -  or [employer] ( Sun[employer]) that is stylish, comfortable and cool. Try " "their web site for sales.  If you smoke, stop. Smokers have higher rates of skin cancer and also have premature wrinkling.  Note that spray sunscreens are only for re-application, not for initial applications. Caution around kids who may breathe in the chemicals.  At the beach, it is recommended to use 2 ounces (one shot glass) of sunscreen and to reapply every 2 hours. This means an 8 ounce bottle or tube of sunscreen contains four applications.  Any tan indicates sun damage. Aim for ivory skin year round and you will have less trouble with your skin in years to come.    Chemical free sunscreens (better for sensitive skin) include  -Vanicream SPF 30 and 50+  - EltaMD tinted mineral sunscreen, SPF 41 (for face)  - Blue Lizard  -Neutrogena Pure and Free          Thank you so much or choosing [facility]  for your Health Care. It was a pleasure seeing you at your visit today! Please contact us with any questions or concerns you may have.                   Mey Mcleod MD                              To reach your [facility] care team after hours call:   [phone] press #2 \"to speak with your care team\".  This will get you to our clinic instead of routing to central [facility]  scheduling.     PLEASE NOTE OUR HOURS HAVE CHANGED secondary to COVID-19 coronavirus pandemic, as we are trying to minimize patient exposure to the virus,  which is now widespread in the [facility].  These hours may change with very little notice.  We apologize for any inconvenience.       Our current clinic hours are:          Monday- Thursday   7:00am - 6:00pm  in person.      Friday  7:00am- 5:00pm                       Saturday and Sunday : Closed to in person and virtual visits        We have telephone and virtual visit times available between    7:00am - 6pm on Monday-Friday as well.                                                Phone:  [phone]      Our pharmacy " hours: Monday through Friday 8:00am to 5:00pm                        Saturday - 9:00 am to 12 noon       Sunday : Closed.              Phone:  [phone]              ###  Please note: at this time we are not accepting any walk-in visits. ###      There is also information available at our web site:  [email or web address]    If your provider ordered any lab tests and you do not receive the results within 10 business days, please call the clinic.    If you need a medication refill please contact your pharmacy.  Please allow 3 business days for your refill to be completed.    Our clinic offers telephone visits and e visits.  Please ask one of your team members to explain more.      Use MyChart (secure email communication and access to your chart) to send your primary care provider a message or make an appointment. Ask someone on your Team how to sign up for [employer].

## 2023-05-29 NOTE — RESULT ENCOUNTER NOTE
Dear Heather Galvanry for not getting this result note with my comments to you sooner.     All your recent labs are normal, improved, or pretty stable from previous.     Please, continue your current medications and/or supplements and follow up as we discussed at your last visit.     For additional lab test information, [email or web address] is an excellent reference.    Thank you so much for choosing [facility].  Please contact us with any questions that you may have.   We appreciate the opportunity to serve you now and look forward to supporting your healthcare needs for a long time to come!    Most Sincerely,     Mey Mcleod MD

## 2023-11-24 ASSESSMENT — ANXIETY QUESTIONNAIRES
7. FEELING AFRAID AS IF SOMETHING AWFUL MIGHT HAPPEN: NOT AT ALL
4. TROUBLE RELAXING: NOT AT ALL
GAD7 TOTAL SCORE: 0
GAD7 TOTAL SCORE: 0
3. WORRYING TOO MUCH ABOUT DIFFERENT THINGS: NOT AT ALL
5. BEING SO RESTLESS THAT IT IS HARD TO SIT STILL: NOT AT ALL
6. BECOMING EASILY ANNOYED OR IRRITABLE: NOT AT ALL
1. FEELING NERVOUS, ANXIOUS, OR ON EDGE: NOT AT ALL
2. NOT BEING ABLE TO STOP OR CONTROL WORRYING: NOT AT ALL

## 2023-11-27 ENCOUNTER — VIRTUAL VISIT (OUTPATIENT)
Dept: FAMILY MEDICINE | Facility: CLINIC | Age: 38
End: 2023-11-27
Payer: COMMERCIAL

## 2023-11-27 DIAGNOSIS — Z30.09 COUNSELING FOR BIRTH CONTROL, ORAL CONTRACEPTIVES: ICD-10-CM

## 2023-11-27 DIAGNOSIS — F41.9 ANXIETY: Primary | ICD-10-CM

## 2023-11-27 DIAGNOSIS — F41.0 PANIC ATTACK: ICD-10-CM

## 2023-11-27 PROCEDURE — 99214 OFFICE O/P EST MOD 30 MIN: CPT | Mod: VID | Performed by: FAMILY MEDICINE

## 2023-11-27 RX ORDER — LEVONORGESTREL/ETHIN.ESTRADIOL 0.1-0.02MG
1 TABLET ORAL DAILY
Qty: 84 TABLET | Refills: 2 | Status: SHIPPED | OUTPATIENT
Start: 2023-11-27

## 2023-11-27 RX ORDER — ESCITALOPRAM OXALATE 20 MG/1
20 TABLET ORAL DAILY
Qty: 90 TABLET | Refills: 2 | Status: SHIPPED | OUTPATIENT
Start: 2023-11-27 | End: 2024-10-06

## 2023-11-27 ASSESSMENT — PATIENT HEALTH QUESTIONNAIRE - PHQ9
SUM OF ALL RESPONSES TO PHQ QUESTIONS 1-9: 0
SUM OF ALL RESPONSES TO PHQ QUESTIONS 1-9: 0

## 2023-11-27 NOTE — PROGRESS NOTES
"Mandy is a 38 year old who is being evaluated via a billable video visit.      How would you like to obtain your AVS? MyChart  If the video visit is dropped, the invitation should be resent by: Text to cell phone: [phone]  Will anyone else be joining your video visit? No          Assessment & Plan       ICD-10-CM    1. Anxiety  F41.9 escitalopram (LEXAPRO) 20 MG tablet      2. Counseling for birth control, oral contraceptives  Z30.09 levonorgestrel-ethinyl estradiol (AVIANE) 0.1-20 MG-MCG tablet      3. Panic attack  F41.0         Return in 6 months (on 5/30/2024) for Wellness/Preventative Visit, anxiety, w/ Dr. CHAVES for 40 min appt.- appt made today.      Future Appointments 11/27/2023 - 5/25/2024      None         Continue current dose of escitalopram.  If considering taper or going off , would recommend doing so in the spring.     Please, call our clinic or go to the ER immediately if signs or symptoms worsen or fail to improve as anticipated.   Ordering of each unique test  Prescription drug management  23 minutes spent by me on the date of the encounter doing chart review, history and exam, documentation and further activities per the note    discussed risks, benefits, and alternatives of meds prescribed and pt agrees to accept possible side effects and risks, including, but not limited to nausea, vomiting, blood clots (no hx of in self or family),  irregular periods/breakthrough bleeding, etc.         BMI:   Estimated body mass index is 25.7 kg/m  as calculated from the following:    Height as of 5/26/23: 1.727 m (5' 8\").    Weight as of 5/26/23: 76.7 kg (169 lb).       MEDICATIONS:   Orders Placed This Encounter   Medications    escitalopram (LEXAPRO) 20 MG tablet     Sig: Take 1 tablet (20 mg) by mouth daily     Dispense:  90 tablet     Refill:  2    levonorgestrel-ethinyl estradiol (AVIANE) 0.1-20 MG-MCG tablet     Sig: Take 1 tablet by mouth daily     Dispense:  84 tablet     Refill:  2          - " Continue other medications without change  Regular exercise  See Patient Instructions         Mey Mcleod MD  [facility] PRIOR JESSEE Galvan is a 38 year old, presenting for the following health issues:   1. Anxiety        Recheck Medication      11/27/2023    12:40 PM   Additional Questions   Roomed by Fuentes RENE   Accompanied by Self       History of Present Illness       Mental Health Follow-up:  Patient presents to follow-up on Anxiety.    Patient's anxiety since last visit has been:  Good  The patient is not having other symptoms associated with anxiety.  Any significant life events: No  Patient is not feeling anxious or having panic attacks.  Patient has no concerns about alcohol or drug use.    She eats 4 or more servings of fruits and vegetables daily.She consumes 0 sweetened beverage(s) daily.She exercises with enough effort to increase her heart rate 9 or less minutes per day.  She exercises with enough effort to increase her heart rate 3 or less days per week.   She is taking medications regularly.         9/8/2022     8:56 AM 10/27/2022    12:22 PM 5/25/2023     1:41 PM   PHQ   PHQ-9 Total Score 6 0 0   Q9: Thoughts of better off dead/self-harm past 2 weeks Not at all Not at all Not at all         5/26/2023    11:10 AM 5/26/2023    12:21 PM 11/24/2023     4:15 PM   CRISTOFER-7 SCORE   Total Score   0 (minimal anxiety)   Total Score 0 0 0     Kids are in school - Marybel in first grade and Babar in 2nd grade and doing well - at [employer].  Pt has been volunteering with them.    One of pt's best friends is their .    She's not working regularly outside the home right now.  Rarely subbing at all.    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     Feels like her anxiety and very mild depression symptoms have been really well controlled.  Still has some problems with awakening and getting out of bed in the am's, but once she's up , she's  good.      Would like to go back on ocp's. Has been on them in past, both micronor - progestin only and ortho-cyclen/sprintec.  She'd like to go on least dose of estrogen/hormones as possible. Pt has never had elevated blood pressure or side effects with ocp's in the past.   BP Readings from Last 3 Encounters:   23 104/76   22 110/70   22 110/70         Patient Active Problem List   Diagnosis    CARDIOVASCULAR SCREENING; LDL GOAL LESS THAN 160    Situational syncope- with any blood draws or shots    Resolved - Marginal placenta previa with intrapartum hemorrhage in first trimester-repeat 1/15/2015= resolved     Multiple pigmented nevi    Supervision of other normal pregnancy, antepartum    Type O blood, Rh positive    Large for gestational age    Indication for care in labor or delivery    History of shoulder dystocia in prior pregnancy- with first baby 7lbs 14oz 2015      (normal spontaneous vaginal delivery)    Immediate postpartum hemorrhage, postpartum    Elevated d-dimer    Acute posthemorrhagic anemia    Anxiety    Postpartum depression associated with second pregnancy    Family history of nonmelanoma skin cancer    Difficulty falling asleep at night until early morning hours    Exposure to human papillomavirus-  with genital warts - recently diagnosed    Major depressive disorder, recurrent episode, mild (H24)    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     Rosacea    Panic attack       Current Outpatient Medications   Medication Sig Dispense Refill    azelaic acid (FINACIA) 15 % external gel Apply 0.5 inches topically 2 times daily 50 g 5    erythromycin with ethanol (EMGEL) 2 % gel Apply topically daily Instead of metronidazole get, if desired 30 g 1    escitalopram (LEXAPRO) 20 MG tablet TAKE 1 TABLET BY MOUTH EVERY DAY 90 tablet 2    metroNIDAZOLE (METROGEL) 0.75 % external gel Apply  topically 2 times daily 45 g 3    Prenatal Vit-Fe Fumarate-FA (PRENATAL VITAMINS) 28-0.8 MG TABS Take 1 tablet by mouth daily      propranolol (INDERAL) 10 MG tablet Take 1-2 tablets (10-20 mg) by mouth 3 times daily as needed (anxiety) 30 tablet 1    vitamin D3 (CHOLECALCIFEROL) 50 mcg (2000 units) tablet Take 1 tablet (50 mcg) by mouth daily      azelaic acid (FINACIA) 15 % external gel Apply 0.5 inches topically 2 times daily (Patient not taking: Reported on 5/26/2023) 50 g 5        No Known Allergies       Review of Systems   Constitutional, HEENT, cardiovascular, pulmonary, GI, , musculoskeletal, neuro, skin, endocrine and psych systems are negative, except as otherwise noted.      Objective           Vitals:  No vitals were obtained today due to virtual visit.    Physical Exam   GENERAL: Healthy, alert and no distress  EYES: Eyes grossly normal to inspection.  No discharge or erythema, or obvious scleral/conjunctival abnormalities.  RESP: No audible wheeze, cough, or visible cyanosis.  No visible retractions or increased work of breathing.    SKIN: Visible skin clear. No significant rash, abnormal pigmentation or lesions.  NEURO: Cranial nerves grossly intact.  Mentation and speech appropriate for age.  PSYCH: Mentation appears normal, affect normal/bright, judgement and insight intact, normal speech and appearance well-groomed.    Office Visit on 05/26/2023   Component Date Value Ref Range Status    WBC Count 05/26/2023 5.9  4.0 - 11.0 10e3/uL Final    RBC Count 05/26/2023 4.46  3.80 - 5.20 10e6/uL Final    Hemoglobin 05/26/2023 13.1  11.7 - 15.7 g/dL Final    Hematocrit 05/26/2023 38.8  35.0 - 47.0 % Final    MCV 05/26/2023 87  78 - 100 fL Final    MCH 05/26/2023 29.4  26.5 - 33.0 pg Final    MCHC 05/26/2023 33.8  31.5 - 36.5 g/dL Final    RDW 05/26/2023 12.0  10.0 - 15.0 % Final    Platelet Count 05/26/2023 283  150 - 450 10e3/uL Final    TSH 05/26/2023 1.50  0.30 - 4.20 uIU/mL Final    Cholesterol  05/26/2023 176  <200 mg/dL Final    Triglycerides 05/26/2023 84  <150 mg/dL Final    Direct Measure HDL 05/26/2023 59  >=50 mg/dL Final    LDL Cholesterol Calculated 05/26/2023 100  <=100 mg/dL Final    Non HDL Cholesterol 05/26/2023 117  <130 mg/dL Final               Video-Visit Details    Type of service:  Video Visit   Start time: 12:48pm  Stop time: 1:11pm   Total time on video: 22 minutes   Originating Location (pt. Location): Home    Distant Location (provider location):  On-site  Platform used for Video Visit: Dorian

## 2023-11-27 NOTE — PATIENT INSTRUCTIONS
" [facility]  [address] [place] [address]  Office: [phone]   Fax:    [phone]     Return in 6 months (on 5/30/2024) for Wellness/Preventative Visit, marilynn mabry/ Dr. CHAVES for 40 min appt.    Please,  continue your current medications and/or supplements and follow up as we discussed at your  visit.        Thank you so much or choosing [facility]  for your Health Care. It was a pleasure seeing you at your visit today! Please contact us with any questions or concerns you may have.                   Mey Mcleod MD                              To reach your [facility] care team after hours call:   [phone] press #2 \"to speak with your care team\".  This will get you to our clinic instead of routing to central [facility]  scheduling.     PLEASE NOTE OUR HOURS HAVE CHANGED secondary to COVID-19 coronavirus pandemic, as we are trying to minimize patient exposure to the virus,  which is now widespread in the [facility].  These hours may change with very little notice.  We apologize for any inconvenience.       Our current clinic hours are:          Monday- Thursday   7:00am - 6:00pm  in person.      Friday  7:00am- 5:00pm                       Saturday and Sunday : Closed to in person and virtual visits        We have telephone and virtual visit times available between    7:00am - 6pm on Monday-Friday as well.                                                Phone:  [phone]      Our pharmacy hours: Monday through Friday 8:00am to 5:00pm                        Saturday - 9:00 am to 12 noon       Sunday : Closed.              Phone:  [phone]              ###  Please note: at this time we are not accepting any walk-in visits. ###      There is also information available at our web site:  [email or web address]    If your provider ordered any lab tests and you do not receive the results within 10 business days, " please call the clinic.    If you need a medication refill please contact your pharmacy.  Please allow 3 business days for your refill to be completed.    Our clinic offers telephone visits and e visits.  Please ask one of your team members to explain more.      Use [employer] (secure email communication and access to your chart) to send your primary care provider a message or make an appointment. Ask someone on your Team how to sign up for [employer].

## 2024-07-07 ENCOUNTER — HEALTH MAINTENANCE LETTER (OUTPATIENT)
Age: 39
End: 2024-07-07

## 2024-10-02 DIAGNOSIS — F41.9 ANXIETY: ICD-10-CM

## 2024-10-06 RX ORDER — ESCITALOPRAM OXALATE 20 MG/1
20 TABLET ORAL DAILY
Qty: 60 TABLET | Refills: 0 | Status: SHIPPED | OUTPATIENT
Start: 2024-10-06

## 2024-10-06 NOTE — TELEPHONE ENCOUNTER
Pt overdue for followup on her mood and px  Was due 5/27/2024.     Future Appointments 10/6/2024 - 4/4/2025      None           Please assist pt in making appt(s) for the above.     Need px in the next 1-2 months, please.     Ok for 2-20 min visits back to back for px. . Ok for 1-20 min virtual visit in the meantime for follow up on mood.

## 2024-11-13 ENCOUNTER — OFFICE VISIT (OUTPATIENT)
Dept: URGENT CARE | Facility: URGENT CARE | Age: 39
End: 2024-11-13
Payer: COMMERCIAL

## 2024-11-13 ENCOUNTER — ANCILLARY PROCEDURE (OUTPATIENT)
Dept: GENERAL RADIOLOGY | Facility: CLINIC | Age: 39
End: 2024-11-13
Attending: PHYSICIAN ASSISTANT
Payer: COMMERCIAL

## 2024-11-13 VITALS
BODY MASS INDEX: 25.39 KG/M2 | HEART RATE: 77 BPM | WEIGHT: 167 LBS | SYSTOLIC BLOOD PRESSURE: 116 MMHG | OXYGEN SATURATION: 100 % | DIASTOLIC BLOOD PRESSURE: 66 MMHG | TEMPERATURE: 98.2 F

## 2024-11-13 DIAGNOSIS — J01.90 ACUTE SINUSITIS WITH COEXISTING CONDITION, NEED PROPHYLACTIC TREATMENT: Primary | ICD-10-CM

## 2024-11-13 DIAGNOSIS — R05.8 PRODUCTIVE COUGH: ICD-10-CM

## 2024-11-13 PROCEDURE — 99214 OFFICE O/P EST MOD 30 MIN: CPT | Performed by: PHYSICIAN ASSISTANT

## 2024-11-13 PROCEDURE — 71046 X-RAY EXAM CHEST 2 VIEWS: CPT | Mod: TC | Performed by: RADIOLOGY

## 2024-11-13 RX ORDER — DOXYCYCLINE 100 MG/1
100 CAPSULE ORAL 2 TIMES DAILY
Qty: 20 CAPSULE | Refills: 0 | Status: SHIPPED | OUTPATIENT
Start: 2024-11-13 | End: 2024-11-23

## 2024-11-13 RX ORDER — CODEINE PHOSPHATE AND GUAIFENESIN 10; 100 MG/5ML; MG/5ML
1-2 SOLUTION ORAL EVERY 4 HOURS PRN
Qty: 118 ML | Refills: 0 | Status: SHIPPED | OUTPATIENT
Start: 2024-11-13

## 2024-11-13 RX ORDER — FLUTICASONE PROPIONATE 50 MCG
1 SPRAY, SUSPENSION (ML) NASAL DAILY
Qty: 18.2 ML | Refills: 0 | Status: SHIPPED | OUTPATIENT
Start: 2024-11-13

## 2024-11-13 NOTE — PATIENT INSTRUCTIONS
(J01.90) Acute sinusitis with coexisting condition, need prophylactic treatment  (primary encounter diagnosis)  Comment:   Plan: doxycycline hyclate (VIBRAMYCIN) 100 MG         capsule, fluticasone (FLONASE) 50 MCG/ACT nasal        spray            Stay on Flonase nightly for the next month, use saline nasal spray during the day to flush out nasal passages.     (R05.8) Productive cough  Comment:   Plan: XR Chest 2 Views, guaiFENesin-codeine         (ROBITUSSIN AC) 100-10 MG/5ML solution

## 2024-11-13 NOTE — PROGRESS NOTES
(J01.90) Acute sinusitis with coexisting condition, need prophylactic treatment  (primary encounter diagnosis)  Comment:   Plan: doxycycline hyclate (VIBRAMYCIN) 100 MG         capsule, fluticasone (FLONASE) 50 MCG/ACT nasal        spray            Stay on Flonase nightly for the next month, use saline nasal spray during the day to flush out nasal passages.     (R05.8) Productive cough  Comment:   Plan: XR Chest 2 Views, guaiFENesin-codeine         (ROBITUSSIN AC) 100-10 MG/5ML solution          At the end of the encounter, I discussed results, diagnosis, medications. Discussed red flags for immediate return to clinic/ER, as well as indications for follow up if no improvement. Patient understood and agreed to plan. Patient was stable for discharge     If not improving or if condition worsens, follow up with your Primary Care Provider        SUBJECTIVE:   Mandy Novak is a 39 year old female who presents today with productive cough for the past week with greenish-yellow phlegm.  Denies any significant fevers, but low-grade fever of 100 started yesterday.  Postnasal drip.  She is a .    Patient Active Problem List   Diagnosis    CARDIOVASCULAR SCREENING; LDL GOAL LESS THAN 160    Situational syncope- with any blood draws or shots    Resolved - Marginal placenta previa with intrapartum hemorrhage in first trimester-repeat 1/15/2015= resolved     Multiple pigmented nevi    Supervision of other normal pregnancy, antepartum    Type O blood, Rh positive    Large for gestational age    Indication for care in labor or delivery    History of shoulder dystocia in prior pregnancy- with first baby 7lbs 14oz 2015      (normal spontaneous vaginal delivery)    Immediate postpartum hemorrhage, postpartum    Elevated d-dimer    Acute posthemorrhagic anemia    Anxiety    Postpartum depression associated with second pregnancy    Family history of nonmelanoma skin cancer    Difficulty falling asleep at night  until early morning hours    Exposure to human papillomavirus-  with genital warts - recently diagnosed    Major depressive disorder, recurrent episode, mild (H)    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 (Patient not taking: Reported on 10/19/2020) 30 tablet 7     Social History     Tobacco Use    Smoking status: Never Smoker    Smokeless tobacco: Never Used   Substance Use Topics    Alcohol use: Not on file     Family History   Problem Relation Age of Onset    Diabetes Mother     Diabetes Father          ROS:    10 point ROS of systems including Constitutional, Eyes, Respiratory, Cardiovascular, Gastroenterology, Genitourinary, Integumentary, Muscularskeletal, Psychiatric ,neurological were all negative except for pertinent positives noted in my HPI       OBJECTIVE:  /66   Pulse 77   Temp 98.2  F (36.8  C) (Tympanic)   Wt 75.8 kg (167 lb)   SpO2 100%   BMI 25.39 kg/m    Physical Exam:  GENERAL APPEARANCE: healthy, alert and no distress  EYES: EOMI,  PERRL, conjunctiva clear  HENT: ear canals and TM's normal.  Nose with edematous turbinates and purulent coryzaand mouth without ulcers, erythema or lesions  NECK: supple, nontender, no lymphadenopathy  RESP: lungs clear to auscultation - no rales, rhonchi or wheezes  CV: regular rates and rhythm, normal S1 S2, no murmur  noted  SKIN: no suspicious lesions or rashes    XRAY:  Chest 2 views:  Personally reviewed by me - no airspace, soft tissue, cardiac or bony abnormalities.

## 2025-03-01 ASSESSMENT — ANXIETY QUESTIONNAIRES
GAD7 TOTAL SCORE: 0
6. BECOMING EASILY ANNOYED OR IRRITABLE: NOT AT ALL
7. FEELING AFRAID AS IF SOMETHING AWFUL MIGHT HAPPEN: NOT AT ALL
5. BEING SO RESTLESS THAT IT IS HARD TO SIT STILL: NOT AT ALL
4. TROUBLE RELAXING: NOT AT ALL
3. WORRYING TOO MUCH ABOUT DIFFERENT THINGS: NOT AT ALL
1. FEELING NERVOUS, ANXIOUS, OR ON EDGE: NOT AT ALL
2. NOT BEING ABLE TO STOP OR CONTROL WORRYING: NOT AT ALL
GAD7 TOTAL SCORE: 0
GAD7 TOTAL SCORE: 0
7. FEELING AFRAID AS IF SOMETHING AWFUL MIGHT HAPPEN: NOT AT ALL

## 2025-03-05 ENCOUNTER — VIRTUAL VISIT (OUTPATIENT)
Dept: FAMILY MEDICINE | Facility: CLINIC | Age: 40
End: 2025-03-05
Payer: COMMERCIAL

## 2025-03-05 DIAGNOSIS — F41.9 ANXIETY: Primary | ICD-10-CM

## 2025-03-05 DIAGNOSIS — R49.9 HOARSENESS OR CHANGING VOICE: ICD-10-CM

## 2025-03-05 DIAGNOSIS — Z91.09 ENVIRONMENTAL ALLERGIES: ICD-10-CM

## 2025-03-05 PROCEDURE — 98005 SYNCH AUDIO-VIDEO EST LOW 20: CPT | Performed by: FAMILY MEDICINE

## 2025-03-05 PROCEDURE — 96127 BRIEF EMOTIONAL/BEHAV ASSMT: CPT | Mod: 95 | Performed by: FAMILY MEDICINE

## 2025-03-05 RX ORDER — FLUTICASONE PROPIONATE 50 MCG
1 SPRAY, SUSPENSION (ML) NASAL DAILY
Qty: 16 G | Refills: 11 | Status: SHIPPED | OUTPATIENT
Start: 2025-03-05

## 2025-03-05 ASSESSMENT — PATIENT HEALTH QUESTIONNAIRE - PHQ9
SUM OF ALL RESPONSES TO PHQ QUESTIONS 1-9: 0
SUM OF ALL RESPONSES TO PHQ QUESTIONS 1-9: 0

## 2025-03-05 NOTE — PROGRESS NOTES
"Mandy is a 39 year old who is being evaluated via a billable video visit.    How would you like to obtain your AVS? MyChart  If the video visit is dropped, the invitation should be resent by: Text to cell phone: [phone]  Will anyone else be joining your video visit? No      Assessment & Plan       ICD-10-CM    1. Anxiety  F41.9       2. Environmental allergies - - much improved with daily fluticasone nasal spray  Z91.09 fluticasone (FLONASE) 50 MCG/ACT nasal spray      3. Hoarseness or changing voice- due to allergies - much improved with daily fluticasone nasal spray  R49.9 fluticasone (FLONASE) 50 MCG/ACT nasal spray        Assessment & Plan  Dysphonia -   - Use flonase (fluticasone) nasal spray, one spray in each nostril once a day, preferably before bed. Prescription with refills sent to [facility] in [place]. Discussed proper technique to administer to avoid nosebleeds, etc.     Environmental allergies:  - Likely contributing to sinusitis and hoarseness.  - Continue using flonase nasal spray as described above.    Hoarseness or changing voice:  - Improved with daily use of flonase nasal spray.  - Continue using flonase nasal spray as described above.    Anxiety:    Doing well currently with escitalopram 20 mg one tablet daily. We refilled this on [phone] for number 90 tablets. No refills at that time. Period of patient is running short before I see her for her physical on [phone], she will call. Otherwise continue same dose.      The longitudinal plan of care for the diagnosis(es)/condition(s) as documented were addressed during this visit. Due to the added complexity in care, I will continue to support Mandy in the subsequent management and with ongoing continuity of care.    \"Consent was obtained from the patient to use an AI scribe/documentation tool in the creation of this note.This note/dictation was performed and completed with the assistance of voice recognition software and an AI scribe. It may " "contain inadvertant transcription  errors,  omissions and/or  inadvertent word substitution.\" --Mey Mcleod MD       MEDICATIONS:  Continue current medications without change  Regular exercise  See Patient Instructions  Future Appointments 3/5/2025 - 9/1/2025        Date Visit Type Length Department Provider     5/28/2025  8:40 AM PREVENTATIVE ADULT 40 min [facility] Mey Mcleod MD    Location Instructions:     [facility] is located at [address] [place] [address], along [address] Free parking is available; access the lot by turning north from [address] onto [address], then west onto [address].                       Mey Mcleod MD     Jazz Galvan is a 39 year old, presenting for the following health issues:  Recheck Medication  and the following other medical problems:      1. Anxiety    2. Environmental allergies - - much improved with daily fluticasone nasal spray    3. Hoarseness or changing voice- due to allergies - much improved with daily fluticasone nasal spray            3/5/2025    11:23 AM   Additional Questions   Roomed by Ashley HARRIS CMA     History of Present Illness       Mental Health Follow-up:  Patient presents to follow-up on Anxiety.    Patient's anxiety since last visit has been:  Good  The patient is not having other symptoms associated with anxiety.  Any significant life events: No and other  Patient is not feeling anxious or having panic attacks.  Patient has no concerns about alcohol or drug use.    She eats 4 or more servings of fruits and vegetables daily.She consumes 0 sweetened beverage(s) daily.She exercises with enough effort to increase her heart rate 9 or less minutes per day.  She exercises with enough effort to increase her heart rate 3 or less days per week.   She is taking medications regularly.          5/25/2023     1:41 PM 11/27/2023    12:49 PM 3/5/2025    10:47 AM   PHQ   PHQ-9 Total Score 0 " 0 0    Q9: Thoughts of better off dead/self-harm past 2 weeks Not at all Not at all Not at all       Patient-reported          5/26/2023    12:21 PM 11/24/2023     4:15 PM 3/1/2025    10:49 AM   CRISTOFER-7 SCORE   Total Score  0 (minimal anxiety) 0 (minimal anxiety)   Total Score 0 0 0        Patient-reported            3/5/2025    10:47 AM   Last PHQ-9   1.  Little interest or pleasure in doing things 0   2.  Feeling down, depressed, or hopeless 0   3.  Trouble falling or staying asleep, or sleeping too much 0   4.  Feeling tired or having little energy 0   5.  Poor appetite or overeating 0   6.  Feeling bad about yourself 0   7.  Trouble concentrating 0   8.  Moving slowly or restless 0   Q9: Thoughts of better off dead/self-harm past 2 weeks 0   PHQ-9 Total Score 0        Patient-reported         3/1/2025    10:49 AM   CRISTOFER-7    1. Feeling nervous, anxious, or on edge 0   2. Not being able to stop or control worrying 0   3. Worrying too much about different things 0   4. Trouble relaxing 0   5. Being so restless that it is hard to sit still 0   6. Becoming easily annoyed or irritable 0   7. Feeling afraid, as if something awful might happen 0   CRISTOFER-7 Total Score 0        Patient-reported      History of Present Illness-  Mandy Novak, 39 years    Seasonal Allergies  She has symptoms likely related to seasonal allergies, which got worse in the fall. A doctor at urgent care gave her Flonase nasal spray, which helped her breathe well and keep her singing voice. She used it for about one to two months and noticed she could speak clearly. Since stopping the spray, she feels less clear, but not stuffy.    Mental Health- -Anxiety  She is taking escitalopram and feels normal with no side effects if she takes it regularly. If she forgets, she feels strange and foggy the next day, like she can't focus. Her PHQ-9 and CRISTOFER-7 scores are both zero, showing no signs of depression or anxiety.    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 She is not using birth control pills and uses condoms and the rhythm method for contraception.    Patient Active Problem List   Diagnosis    CARDIOVASCULAR SCREENING; LDL GOAL LESS THAN 160    Situational syncope- with any blood draws or shots    Resolved - Marginal placenta previa with intrapartum hemorrhage in first trimester-repeat 1/15/2015= resolved     Multiple pigmented nevi    Supervision of other normal pregnancy, antepartum    Type O blood, Rh positive    Large for gestational age    Indication for care in labor or delivery    History of shoulder dystocia in prior pregnancy- with first baby 7lbs 14oz 2015      (normal spontaneous vaginal delivery)    Immediate postpartum hemorrhage, postpartum    Elevated d-dimer    Acute posthemorrhagic anemia    Anxiety    Postpartum depression associated with second pregnancy    Family history of nonmelanoma skin cancer    Difficulty falling asleep at night until early morning hours    Exposure to human papillomavirus-  with genital warts - recently diagnosed    Major depressive disorder, recurrent episode, mild    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 18.2 mL 0    Prenatal Vit-Fe Fumarate-FA (PRENATAL VITAMINS) 28-0.8 MG TABS Take 1 tablet by mouth daily      vitamin D3 (CHOLECALCIFEROL) 50 mcg (2000 units) tablet Take 1 tablet (50 mcg) by mouth daily      azelaic acid (FINACIA) 15 % external gel Apply 0.5 inches topically 2 times daily (Patient not taking: Reported on  3/5/2025) 50 g 5    azelaic acid (FINACIA) 15 % external gel Apply 0.5 inches topically 2 times daily (Patient not taking: Reported on 5/26/2023) 50 g 5    erythromycin with ethanol (EMGEL) 2 % gel Apply topically daily Instead of metronidazole get, if desired (Patient not taking: Reported on 3/5/2025) 30 g 1    metroNIDAZOLE (METROGEL) 0.75 % external gel Apply topically 2 times daily (Patient not taking: Reported on 3/5/2025) 45 g 3    propranolol (INDERAL) 10 MG tablet Take 1-2 tablets (10-20 mg) by mouth 3 times daily as needed (anxiety) (Patient not taking: Reported on 3/5/2025) 30 tablet 1        No Known Allergies           Review of Systems  Constitutional, HEENT, cardiovascular, pulmonary, GI, , musculoskeletal, neuro, skin, endocrine and psych systems are negative, except as otherwise noted.      Objective           Vitals:  No vitals were obtained today due to virtual visit.    Physical Exam :   GENERAL: alert and no distress  EYES: Eyes grossly normal to inspection.  No discharge or erythema, or obvious scleral/conjunctival abnormalities.  RESP: No audible wheeze, cough, or visible cyanosis.    SKIN: Visible skin clear. No significant rash, abnormal pigmentation or lesions.  NEURO: Cranial nerves grossly intact.  Mentation and speech appropriate for age.  PSYCH: Appropriate affect, tone, and pace of words, Alert and oriented. No acute distress. Appears well-groomed and casually dressed. Affect is normal, not particularly depressed. In good humor and laughs appropriately. Not particularly anxious. No evidence of psychosis.     Office Visit on 05/26/2023   Component Date Value Ref Range Status    WBC Count 05/26/2023 5.9  4.0 - 11.0 10e3/uL Final    RBC Count 05/26/2023 4.46  3.80 - 5.20 10e6/uL Final    Hemoglobin 05/26/2023 13.1  11.7 - 15.7 g/dL Final    Hematocrit 05/26/2023 38.8  35.0 - 47.0 % Final    MCV 05/26/2023 87  78 - 100 fL Final    MCH 05/26/2023 29.4  26.5 - 33.0 pg Final    MCHC  05/26/2023 33.8  31.5 - 36.5 g/dL Final    RDW 05/26/2023 12.0  10.0 - 15.0 % Final    Platelet Count 05/26/2023 283  150 - 450 10e3/uL Final    TSH 05/26/2023 1.50  0.30 - 4.20 uIU/mL Final    Cholesterol 05/26/2023 176  <200 mg/dL Final    Triglycerides 05/26/2023 84  <150 mg/dL Final    Direct Measure HDL 05/26/2023 59  >=50 mg/dL Final    LDL Cholesterol Calculated 05/26/2023 100  <=100 mg/dL Final    Non HDL Cholesterol 05/26/2023 117  <130 mg/dL Final     Results                 Video-Visit Details    Type of service:  Video Visit   Originating Location (pt. Location): Home  Platform used for Video Visit: Dorian  Signed Electronically by: Mey Mcleod MD

## 2025-03-09 ENCOUNTER — HEALTH MAINTENANCE LETTER (OUTPATIENT)
Age: 40
End: 2025-03-09

## 2025-03-23 SDOH — HEALTH STABILITY: PHYSICAL HEALTH: ON AVERAGE, HOW MANY DAYS PER WEEK DO YOU ENGAGE IN MODERATE TO STRENUOUS EXERCISE (LIKE A BRISK WALK)?: 0 DAYS

## 2025-03-23 SDOH — HEALTH STABILITY: PHYSICAL HEALTH: ON AVERAGE, HOW MANY MINUTES DO YOU ENGAGE IN EXERCISE AT THIS LEVEL?: 0 MIN

## 2025-03-23 ASSESSMENT — SOCIAL DETERMINANTS OF HEALTH (SDOH): HOW OFTEN DO YOU GET TOGETHER WITH FRIENDS OR RELATIVES?: MORE THAN THREE TIMES A WEEK

## 2025-03-24 ENCOUNTER — OFFICE VISIT (OUTPATIENT)
Dept: FAMILY MEDICINE | Facility: CLINIC | Age: 40
End: 2025-03-24
Payer: COMMERCIAL

## 2025-03-24 VITALS
HEART RATE: 86 BPM | RESPIRATION RATE: 16 BRPM | OXYGEN SATURATION: 99 % | WEIGHT: 162 LBS | TEMPERATURE: 98 F | DIASTOLIC BLOOD PRESSURE: 66 MMHG | BODY MASS INDEX: 24.55 KG/M2 | HEIGHT: 68 IN | SYSTOLIC BLOOD PRESSURE: 94 MMHG

## 2025-03-24 DIAGNOSIS — Z13.6 CARDIOVASCULAR SCREENING; LDL GOAL LESS THAN 160: ICD-10-CM

## 2025-03-24 DIAGNOSIS — R39.15 URINARY URGENCY: ICD-10-CM

## 2025-03-24 DIAGNOSIS — L71.9 ACNE ROSACEA: ICD-10-CM

## 2025-03-24 DIAGNOSIS — N39.3 SUI (STRESS URINARY INCONTINENCE, FEMALE): ICD-10-CM

## 2025-03-24 DIAGNOSIS — Z12.31 VISIT FOR SCREENING MAMMOGRAM: ICD-10-CM

## 2025-03-24 DIAGNOSIS — R55 SITUATIONAL SYNCOPE: ICD-10-CM

## 2025-03-24 DIAGNOSIS — D22.9 MULTIPLE PIGMENTED NEVI: ICD-10-CM

## 2025-03-24 DIAGNOSIS — Z12.4 CERVICAL CANCER SCREENING: ICD-10-CM

## 2025-03-24 DIAGNOSIS — Z80.8 FAMILY HISTORY OF NONMELANOMA SKIN CANCER: ICD-10-CM

## 2025-03-24 DIAGNOSIS — Z00.00 ROUTINE GENERAL MEDICAL EXAMINATION AT A HEALTH CARE FACILITY: Primary | ICD-10-CM

## 2025-03-24 DIAGNOSIS — F41.9 ANXIETY: ICD-10-CM

## 2025-03-24 DIAGNOSIS — L71.9 ROSACEA: ICD-10-CM

## 2025-03-24 PROBLEM — Z87.59 HISTORY OF SHOULDER DYSTOCIA IN PRIOR PREGNANCY: Status: RESOLVED | Noted: 2017-04-13 | Resolved: 2025-03-24

## 2025-03-24 LAB
ERYTHROCYTE [DISTWIDTH] IN BLOOD BY AUTOMATED COUNT: 12.7 % (ref 10–15)
HCT VFR BLD AUTO: 39.1 % (ref 35–47)
HGB BLD-MCNC: 13.2 G/DL (ref 11.7–15.7)
MCH RBC QN AUTO: 28.8 PG (ref 26.5–33)
MCHC RBC AUTO-ENTMCNC: 33.8 G/DL (ref 31.5–36.5)
MCV RBC AUTO: 85 FL (ref 78–100)
PLATELET # BLD AUTO: 210 10E3/UL (ref 150–450)
RBC # BLD AUTO: 4.59 10E6/UL (ref 3.8–5.2)
WBC # BLD AUTO: 5.4 10E3/UL (ref 4–11)

## 2025-03-24 PROCEDURE — 99214 OFFICE O/P EST MOD 30 MIN: CPT | Mod: 25 | Performed by: FAMILY MEDICINE

## 2025-03-24 PROCEDURE — 36415 COLL VENOUS BLD VENIPUNCTURE: CPT | Performed by: FAMILY MEDICINE

## 2025-03-24 PROCEDURE — 84443 ASSAY THYROID STIM HORMONE: CPT | Performed by: FAMILY MEDICINE

## 2025-03-24 PROCEDURE — 80061 LIPID PANEL: CPT | Performed by: FAMILY MEDICINE

## 2025-03-24 PROCEDURE — 85027 COMPLETE CBC AUTOMATED: CPT | Performed by: FAMILY MEDICINE

## 2025-03-24 PROCEDURE — 87624 HPV HI-RISK TYP POOLED RSLT: CPT | Performed by: FAMILY MEDICINE

## 2025-03-24 PROCEDURE — 3074F SYST BP LT 130 MM HG: CPT | Performed by: FAMILY MEDICINE

## 2025-03-24 PROCEDURE — 3078F DIAST BP <80 MM HG: CPT | Performed by: FAMILY MEDICINE

## 2025-03-24 PROCEDURE — G2211 COMPLEX E/M VISIT ADD ON: HCPCS | Performed by: FAMILY MEDICINE

## 2025-03-24 PROCEDURE — 99396 PREV VISIT EST AGE 40-64: CPT | Performed by: FAMILY MEDICINE

## 2025-03-24 PROCEDURE — 80053 COMPREHEN METABOLIC PANEL: CPT | Performed by: FAMILY MEDICINE

## 2025-03-24 RX ORDER — ESCITALOPRAM OXALATE 20 MG/1
10 TABLET ORAL DAILY
Qty: 45 TABLET | Refills: 1 | Status: SHIPPED | OUTPATIENT
Start: 2025-03-24

## 2025-03-24 ASSESSMENT — PATIENT HEALTH QUESTIONNAIRE - PHQ9
SUM OF ALL RESPONSES TO PHQ QUESTIONS 1-9: 0
SUM OF ALL RESPONSES TO PHQ QUESTIONS 1-9: 0
10. IF YOU CHECKED OFF ANY PROBLEMS, HOW DIFFICULT HAVE THESE PROBLEMS MADE IT FOR YOU TO DO YOUR WORK, TAKE CARE OF THINGS AT HOME, OR GET ALONG WITH OTHER PEOPLE: NOT DIFFICULT AT ALL

## 2025-03-24 ASSESSMENT — ANXIETY QUESTIONNAIRES
5. BEING SO RESTLESS THAT IT IS HARD TO SIT STILL: NOT AT ALL
GAD7 TOTAL SCORE: 0
2. NOT BEING ABLE TO STOP OR CONTROL WORRYING: NOT AT ALL
1. FEELING NERVOUS, ANXIOUS, OR ON EDGE: NOT AT ALL
7. FEELING AFRAID AS IF SOMETHING AWFUL MIGHT HAPPEN: NOT AT ALL
4. TROUBLE RELAXING: NOT AT ALL
7. FEELING AFRAID AS IF SOMETHING AWFUL MIGHT HAPPEN: NOT AT ALL
3. WORRYING TOO MUCH ABOUT DIFFERENT THINGS: NOT AT ALL
6. BECOMING EASILY ANNOYED OR IRRITABLE: NOT AT ALL
GAD7 TOTAL SCORE: 0
GAD7 TOTAL SCORE: 0

## 2025-03-24 NOTE — PATIENT INSTRUCTIONS
Patient Education     Based on our discussion, I have outlined the following instructions for you:    - Wait to be contacted to schedule your mammogram screening.    - Schedule an appointment with a dermatologist for a full body skin exam to monitor your pigmented nevi. and to check on your rosacea.    - Continue taking escitalopram for anxiety. If you decide to reduce the dose to 10 mg, keep track of how you feel.    - Prepare for cardiovascular screening by getting lab work done. This includes tests for fasting cholesterol, thyroid, liver and kidney functions, and a blood count.    - Start doing pelvic floor exercises to help with stress urinary incontinence. See other section here in your AVS.     - Be ready for a future urine test related to stress urinary incontinence to rule out inflammation and/or infection.     Thank you again for your visit, and we look forward to supporting you in your journey to better health.     Preventive Care Advice   This is general advice given by our system to help you stay healthy. However, your care team may have specific advice just for you. Please talk to your care team about your preventive care needs.  Nutrition  Eat 5 or more servings of fruits and vegetables each day.  Try wheat bread, brown rice and whole grain pasta (instead of white bread, rice, and pasta).  Get enough calcium and vitamin D. Check the label on foods and aim for 100% of the RDA (recommended daily allowance).  Lifestyle  Exercise at least 150 minutes each week  (30 minutes a day, 5 days a week).  Do muscle strengthening activities 2 days a week. These help control your weight and prevent disease.  No smoking.  Wear sunscreen to prevent skin cancer.  Have a dental exam and cleaning every 6 months.  Yearly exams  See your health care team every year to talk about:  Any changes in your health.  Any medicines your care team has prescribed.  Preventive care, family planning, and ways to prevent chronic  diseases.  Shots (vaccines)   HPV shots (up to age 26), if you've never had them before.  Hepatitis B shots (up to age 59), if you've never had them before.  COVID-19 shot: Get this shot when it's due.  Flu shot: Get a flu shot every year.  Tetanus shot: Get a tetanus shot every 10 years.  Pneumococcal, hepatitis A, and RSV shots: Ask your care team if you need these based on your risk.  Shingles shot (for age 50 and up)  General health tests  Diabetes screening:  Starting at age 35, Get screened for diabetes at least every 3 years.  If you are younger than age 35, ask your care team if you should be screened for diabetes.  Cholesterol test: At age 39, start having a cholesterol test every 5 years, or more often if advised.  Bone density scan (DEXA): At age 50, ask your care team if you should have this scan for osteoporosis (brittle bones).  Hepatitis C: Get tested at least once in your life.  STIs (sexually transmitted infections)  Before age 24: Ask your care team if you should be screened for STIs.  After age 24: Get screened for STIs if you're at risk. You are at risk for STIs (including HIV) if:  You are sexually active with more than one person.  You don't use condoms every time.  You or a partner was diagnosed with a sexually transmitted infection.  If you are at risk for HIV, ask about PrEP medicine to prevent HIV.  Get tested for HIV at least once in your life, whether you are at risk for HIV or not.  Cancer screening tests  Cervical cancer screening: If you have a cervix, begin getting regular cervical cancer screening tests starting at age 21.  Breast cancer scan (mammogram): If you've ever had breasts, begin having regular mammograms starting at age 40. This is a scan to check for breast cancer.  Colon cancer screening: It is important to start screening for colon cancer at age 45.  Have a colonoscopy test every 10 years (or more often if you're at risk) Or, ask your provider about stool tests like a  "FIT test every year or Cologuard test every 3 years.  To learn more about your testing options, visit:   .  For help making a decision, visit:   https://bit.ly/da80345.  Prostate cancer screening test: If you have a prostate, ask your care team if a prostate cancer screening test (PSA) at age 55 is right for you.  Lung cancer screening: If you are a current or former smoker ages 50 to 80, ask your care team if ongoing lung cancer screenings are right for you.  For informational purposes only. Not to replace the advice of your health care provider. Copyright   2023 [employer]. All rights reserved. Clinically reviewed by the [facility] Transitions Program. ivi.ru [id number] - REV 01/24.  Learning About Being Physically Active  What is physical activity?     Being physically active means doing any kind of activity that gets your body moving.  The types of physical activity that can help you get fit and stay healthy include:  Aerobic or \"cardio\" activities. These make your heart beat faster and make you breathe harder, such as brisk walking, riding a bike, or running. They strengthen your heart and lungs and build up your endurance.  Strength training activities. These make your muscles work against, or \"resist,\" something. Examples include lifting weights or doing push-ups. These activities help tone and strengthen your muscles and bones.  Stretches. These let you move your joints and muscles through their full range of motion. Stretching helps you be more flexible.  Reaching a balance between these three types of physical activity is important because each one contributes to your overall fitness.  What are the benefits of being active?  Being active is one of the best things you can do for your health. It helps you to:  Feel stronger and have more energy to do all the things you like to do.  Focus better at school or work.  Feel, think, and sleep better.  Reach and stay at a healthy weight.  Lose " "fat and build lean muscle.  Lower your risk for serious health problems, including diabetes, heart attack, high blood pressure, and some cancers.  Keep your heart, lungs, bones, muscles, and joints strong and healthy.  How can you make being active part of your life?  Start slowly. Make it your long-term goal to get at least 30 minutes of exercise on most days of the week. Walking is a good choice. You also may want to do other activities, such as running, swimming, cycling, or playing tennis or team sports.  Pick activities that you like--ones that make your heart beat faster, your muscles stronger, and your muscles and joints more flexible. If you find more than one thing you like doing, do them all. You don't have to do the same thing every day.  Get your heart pumping every day. Any activity that makes your heart beat faster and keeps it at that rate for a while counts.  Here are some great ways to get your heart beating faster:  Go for a brisk walk, run, or hike.  Go for a swim or bike ride.  Take an online exercise class or dance.  Play a game of touch football, basketball, or soccer.  Play tennis, pickleball, or racquetball.  Climb stairs.  Even some household chores can be aerobic. Just do them at a faster pace. Raking or mowing the lawn, sweeping the garage, and vacuuming and cleaning your home all can help get your heart rate up.  Strengthen your muscles during the week. You don't have to lift heavy weights or grow big, bulky muscles to get stronger. Doing a few simple activities that make your muscles work against, or \"resist,\" something can help you get stronger. Aim for at least twice a week.  For example, you can:  Do push-ups or sit-ups, which use your own body weight as resistance.  Lift weights or dumbbells or use stretch bands at home or in a gym or community center.  Stretch your muscles often. Stretching will help you as you become more active. It can help you stay flexible and loosen tight " "muscles. It can also help improve your balance and posture and can be a great way to relax.  Be sure to stretch the muscles you'll be using when you work out. It's best to warm your muscles slightly before you stretch them. Walk or do some other light aerobic activity for a few minutes. Then start stretching.  When you stretch your muscles:  Do it slowly. Stretching is not about going fast or making sudden movements.  Don't push or bounce during a stretch.  Hold each stretch for at least 15 to 30 seconds, if you can. You should feel a stretch in the muscle, but not pain.  Breathe out as you do the stretch. Then breathe in as you hold the stretch. Don't hold your breath.  If you're worried about how more activity might affect your health, have a checkup before you start. Follow any special advice your doctor gives you for getting a smart start.  Where can you learn more?  Go to https://www.[employer].net/patiented  Enter W332 in the search box to learn more about \"Learning About Being Physically Active.\"  Current as of: July 31, 2024  Content Version: 14.4    8615-2964 [employer].   Care instructions adapted under license by your healthcare professional. If you have questions about a medical condition or this instruction, always ask your healthcare professional. [employer] disclaims any warranty or liability for your use of this information.    Eating Healthy Foods: Care Instructions  With every meal, you can make healthy food choices. Try to eat a variety of fruits, vegetables, whole grains, lean proteins, and low-fat dairy products. This can help you get the right balance of nutrients, including vitamins and minerals. Small changes add up over time. You can start by adding one healthy food to your meals each day.    Try to make half your plate fruits and vegetables, one-fourth whole grains, and one-fourth lean proteins. Try including dairy with your meals.   Eat more fruits and vegetables. " "Try to have them with most meals and snacks.   Foods for healthy eating        Fruits   These can be fresh, frozen, canned, or dried.  Try to choose whole fruit rather than fruit juice.  Eat a variety of colors.        Vegetables   These can be fresh, frozen, canned, or dried.  Beans, peas, and lentils count too.        Whole grains   Choose whole-grain breads, cereals, and noodles.  Try brown rice.        Lean proteins   These can include lean meat, poultry, fish, and eggs.  You can also have tofu, beans, peas, lentils, nuts, and seeds.        Dairy   Try milk, yogurt, and cheese.  Choose low-fat or fat-free when you can.  If you need to, use lactose-free milk or fortified plant-based milk products, such as soy milk.        Water   Drink water when you're thirsty.  Limit sugar-sweetened drinks, including soda, fruit drinks, and sports drinks.  Where can you learn more?  Go to https://www.[employer].net/patiented  Enter T756 in the search box to learn more about \"Eating Healthy Foods: Care Instructions.\"  Current as of: October 7, 2024  Content Version: 14.4    1547-5767 [employer].   Care instructions adapted under license by your healthcare professional. If you have questions about a medical condition or this instruction, always ask your healthcare professional. [employer] disclaims any warranty or liability for your use of this information.       "

## 2025-03-24 NOTE — PROGRESS NOTES
Answers submitted by the patient for this visit:  Patient Health Questionnaire (Submitted on 3/24/2025)  If you checked off any problems, how difficult have these problems made it for you to do your work, take care of things at home, or get along with other people?: Not difficult at all  PHQ9 TOTAL SCORE: 0  Patient Health Questionnaire (G7) (Submitted on 3/24/2025)  CRISTOFER 7 TOTAL SCORE: 0  Preventive Care Visit  [facility] [place]  Mey Mcleod MD, Family Medicine  Mar 24, 2025      Assessment & Plan       ICD-10-CM    1. Routine general medical examination at a health care facility  Z00.00       2. Rosacea  L71.9 Adult Dermatology  Referral      3. Multiple pigmented nevi  D22.9       4. Family history of nonmelanoma skin cancer  Z80.8       5. Acne rosacea  L71.9       6. Situational syncope- with any blood draws or shots- pt drawn lying down today  R55       7. Anxiety  F41.9 escitalopram (LEXAPRO) 20 MG tablet      8. Cervical cancer screening  Z12.4 HPV and Gynecologic Cytology Panel - Recommended Age 30 - 65 Years      9. LANCE (stress urinary incontinence, female)  N39.3 Physical Therapy  Referral     UA Macroscopic with reflex to Microscopic and Culture - Lab Collect      10. CARDIOVASCULAR SCREENING; LDL GOAL LESS THAN 160  Z13.6 TSH with free T4 reflex     Lipid panel reflex to direct LDL Fasting     Comprehensive metabolic panel     CBC with platelets     TSH with free T4 reflex     Lipid panel reflex to direct LDL Fasting     Comprehensive metabolic panel     CBC with platelets      11. Visit for screening mammogram  Z12.31 MA Screening Bilateral w/ Buzz      12. Urinary urgency  R39.15 UA Macroscopic with reflex to Microscopic and Culture - Lab Collect        Return in about 6 months (around 9/24/2025) for depression, anxiety via video visit if still on meds, then/Preventative Visit, w/ Dr RENO in 1 year.    Assessment & Plan  - Visit for screening mammogram:  "Mammogram ordered. She will be contacted to schedule it.  - Rosacea: Referral to dermatology for full body skin exam and acne rosacea.  - Multiple pigmented nevi: History of multiple nevi removals, including one with precancerous cells. Referral to dermatology for full body skin exam.  - Anxiety: Managed with escitalopram. She is considering reducing the dose to 10 mg and will monitor her response.  - Cardiovascular screening; LDL goal less than 160: Cardiovascular screening planned. Lab work to include fasting cholesterol, thyroid, liver and kidney functions, and blood count.  - LANCE (stress urinary incontinence, female): Mild first-degree uterine prolapse noted; stress incontinence symptoms present. Initial pelvic floor exercises recommended; future urine test order placed.     Patient has been advised of split billing requirements and indicates understanding: Yes      BMI  Estimated body mass index is 25 kg/m  as calculated from the following:    Height as of this encounter: 1.715 m (5' 7.5\").    Weight as of this encounter: 73.5 kg (162 lb).       Counseling  Appropriate preventive services were addressed with this patient via screening, questionnaire, or discussion as appropriate for fall prevention, nutrition, physical activity, Tobacco-use cessation, social engagement, weight loss and cognition.  Checklist reviewing preventive services available has been given to the patient.  Reviewed patient's diet, addressing concerns and/or questions.       MEDICATIONS:   Orders Placed This Encounter   Medications    escitalopram (LEXAPRO) 20 MG tablet     Sig: Take 0.5 tablets (10 mg) by mouth daily.     Dispense:  45 tablet     Refill:  1     ### Profile Rx: patient will contact pharmacy when needed ###          - Continue other medications without change  Regular exercise  See Patient Instructions    The longitudinal plan of care for the diagnosis(es)/condition(s) as documented were addressed during this visit. Due to " "the added complexity in care, I will continue to support Mandy in the subsequent management and with ongoing continuity of care.    \"Consent was obtained from the patient to use an AI scribe/documentation tool in the creation of this note.This note/dictation was performed and completed with the assistance of voice recognition software and an AI scribe. It may contain inadvertant transcription  errors,  omissions and/or  inadvertent word substitution.\" --Mey Mcleod MD           Subjective   Mandy is a 40 year old, presenting for the following:  Physical  and the following other medical problems:      1. Routine general medical examination at a health care facility    2. Rosacea    3. Multiple pigmented nevi    4. Family history of nonmelanoma skin cancer    5. Acne rosacea    6. Situational syncope- with any blood draws or shots- pt drawn lying down today    7. Anxiety    8. Cervical cancer screening    9. LANCE (stress urinary incontinence, female)    10. CARDIOVASCULAR SCREENING; LDL GOAL LESS THAN 160    11. Visit for screening mammogram    12. Urinary urgency            3/24/2025     9:24 AM   Additional Questions   Roomed by Karin BAR MA   Accompanied by Son and Daughter          HPI  Vitals are w/in NL.  History of Present Illness-  Mandy Novak, 40 years    Skin Concerns  She is not using any topical gels like Azelic acid gel and erythromycin gel for her skin now. Metronidazole gel was also not effective for her Rosacea. She filled a prescription for these gels around 2022 but didn't find them effective. She usually has an annual skin check for moles but hasn't done it recently. She has had moles removed, some for cosmetic reasons and others due to medical advice. One mole removed during pregnancy had precancerous cells.  She'll talk with her dermatologist upcoming regarding her rosacea treatment as well. Pt declined any topical or oral treatment for her rosacea here today.     Pelvic Health  Mandy " has mild first-degree uterine prolapse, which hasn't changed. She has stress incontinence, especially with activities like jumping, and feels urinary urgency. Sometimes she feels like she hasn't completely emptied her bladder. Just urinated here in clinic and did not leave a urine specimen today.     Mental Health  She is taking escitalopram 20mg and feels it works well. She is thinking about reducing the dosage to 10mg daily or stopping the medication.       11/27/2023    12:49 PM 3/5/2025    10:47 AM 3/24/2025     9:06 AM   PHQ   PHQ-9 Total Score 0 0  0    Q9: Thoughts of better off dead/self-harm past 2 weeks Not at all Not at all Not at all       Patient-reported          11/24/2023     4:15 PM 3/1/2025    10:49 AM 3/24/2025     9:06 AM   CRISTOFER-7 SCORE   Total Score 0 (minimal anxiety) 0 (minimal anxiety) 0 (minimal anxiety)   Total Score 0 0  0        Patient-reported       Reproductive Health  She had an abnormal Pap smear in the past, which led to a colposcopy after her first child, Maik, was born. Later Pap smears have been normal. Her periods are regular, not too heavy, and not too crampy. The first day of her last period was March 3, 2025. Declines any need for birth control.      Travel and Lifestyle  Mandy traveled to [facility], with her , Raymond, about a month ago. This was their first trip together, as they didn't have a honeymoon. They stayed at an all-inclusive, adults-only resort. Her family often travels with her family to [place] to visit her elderly grandparents, which is a vacation for her children.    Depression and Anxiety   How are you doing with your depression since your last visit? Improved - see above.   How are you doing with your anxiety since your last visit?  Improved - see above.   Are you having other symptoms that might be associated with depression or anxiety? No  Have you had a significant life event? No   Do you have any concerns with your use of alcohol  or other drugs? No    Social History     Tobacco Use    Smoking status: Never     Passive exposure: Never    Smokeless tobacco: Never   Vaping Use    Vaping status: Never Used   Substance Use Topics    Alcohol use: Not Currently     Comment: 0-1 drinks per week    Drug use: No     Comment: no herbal meds either          11/27/2023    12:49 PM 3/5/2025    10:47 AM 3/24/2025     9:06 AM   PHQ   PHQ-9 Total Score 0 0  0    Q9: Thoughts of better off dead/self-harm past 2 weeks Not at all Not at all Not at all       Patient-reported         11/24/2023     4:15 PM 3/1/2025    10:49 AM 3/24/2025     9:06 AM   CRISTOFER-7 SCORE   Total Score 0 (minimal anxiety) 0 (minimal anxiety) 0 (minimal anxiety)   Total Score 0 0  0        Patient-reported         3/24/2025     9:06 AM   Last PHQ-9   1.  Little interest or pleasure in doing things 0   2.  Feeling down, depressed, or hopeless 0   3.  Trouble falling or staying asleep, or sleeping too much 0   4.  Feeling tired or having little energy 0   5.  Poor appetite or overeating 0   6.  Feeling bad about yourself 0   7.  Trouble concentrating 0   8.  Moving slowly or restless 0   Q9: Thoughts of better off dead/self-harm past 2 weeks 0   PHQ-9 Total Score 0        Patient-reported         3/24/2025     9:06 AM   CRISTOFER-7    1. Feeling nervous, anxious, or on edge 0   2. Not being able to stop or control worrying 0   3. Worrying too much about different things 0   4. Trouble relaxing 0   5. Being so restless that it is hard to sit still 0   6. Becoming easily annoyed or irritable 0   7. Feeling afraid, as if something awful might happen 0   CRISTOFER-7 Total Score 0        Patient-reported       Suicide Assessment Five-step Evaluation and Treatment (SAFE-T)    Advance Care Planning  Patient does not have a Health Care Directive: Discussed advance care planning with patient; however, patient declined at this time.      3/23/2025   General Health   How would you rate your overall physical health?  Excellent   Feel stress (tense, anxious, or unable to sleep) Not at all         3/23/2025   Nutrition   Three or more servings of calcium each day? Yes   Diet: Regular (no restrictions)   How many servings of fruit and vegetables per day? 4 or more   How many sweetened beverages each day? 0-1         3/23/2025   Exercise   Days per week of moderate/strenous exercise 0 days   Average minutes spent exercising at this level 0 min   (!) EXERCISE CONCERN      3/23/2025   Social Factors   Frequency of gathering with friends or relatives More than three times a week   Worry food won't last until get money to buy more No   Food not last or not have enough money for food? No   Do you have housing? (Housing is defined as stable permanent housing and does not include staying ouside in a car, in a tent, in an abandoned building, in an overnight shelter, or couch-surfing.) Yes   Are you worried about losing your housing? No   Lack of transportation? No   Unable to get utilities (heat,electricity)? No         3/23/2025   Dental   Dentist two times every year? Yes           1/12/2025   TB Screening   Were you born outside of the [place]? No         Today's PHQ-9 Score:       3/24/2025     9:06 AM   PHQ-9 SCORE   PHQ-9 Total Score MyChart 0   PHQ-9 Total Score 0        Patient-reported         3/23/2025   Substance Use   Alcohol more than 3/day or more than 7/wk Not Applicable   Do you use any other substances recreationally? No     Social History     Tobacco Use    Smoking status: Never     Passive exposure: Never    Smokeless tobacco: Never   Vaping Use    Vaping status: Never Used   Substance Use Topics    Alcohol use: Not Currently     Comment: 0-1 drinks per week    Drug use: No     Comment: no herbal meds either          11/8/2021   LAST FHS-7 RESULTS   1st degree relative breast or ovarian cancer No   Any relative bilateral breast cancer No   Any male have breast cancer No   Any ONE woman have BOTH breast AND ovarian cancer No    Any woman with breast cancer before 50yrs No   2 or more relatives with breast AND/OR ovarian cancer Yes   2 or more relatives with breast AND/OR bowel cancer Yes   Mammogram Screening - Mammogram every 1-2 years updated in Health Maintenance based on mutual decision making        3/23/2025   STI Screening   New sexual partner(s) since last STI/HIV test? No     History of abnormal Pap smear: No - age 30- 64 PAP with HPV every 5 years recommended        Latest Ref Rng & Units 11/11/2021     1:29 PM 8/2/2018     9:45 AM 8/2/2018     9:33 AM   PAP / HPV   PAP  Negative for Intraepithelial Lesion or Malignancy (NILM)      PAP (Historical)    NIL    HPV 16 DNA Negative Negative  Negative     HPV 18 DNA Negative Negative  Negative     Other HR HPV Negative Negative  Negative       ASCVD Risk   The 10-year ASCVD risk score (Iqra ARNDT, et al., 2019) is: 0.2%    Values used to calculate the score:      Age: 40 years      Sex: Female      Is Non- : No      Diabetic: No      Tobacco smoker: No      Systolic Blood Pressure: 94 mmHg      Is BP treated: No      HDL Cholesterol: 59 mg/dL      Total Cholesterol: 176 mg/dL        3/23/2025   Contraception/Family Planning   Questions about contraception or family planning No   Reviewed and updated as needed this visit by Provider                    Past Medical History:   Diagnosis Date    ASCUS with positive high risk HPV 12/2014    + HPV 16, hx of ASCUS , but neg HPV in 2008     Depressive disorder 2017    post-partum    Cecilio breech presentation - seen on 31 week US - resolved with external version on 3/24/2017 02/20/2017    History of blood transfusion April 2017    hemorrhage after giving birth    History of shoulder dystocia in prior pregnancy- with first baby 7lbs 14oz 6/19/2015 04/13/2017    Postpartum depression associated with second pregnancy 06/28/2017    Resolved - Marginal placenta previa with intrapartum hemorrhage in first  trimester-repeat 1/15/2015= resolved  2014     Past Surgical History:   Procedure Laterality Date    BIOPSY  2015, mulitple others    colposcopy, several other mole removals    ENT SURGERY  2020    gum graft    [place] teeth extraction      ZZC ORAL SURGERY PROCEDURE      graft surgery for lower gum recession on 2020     OB History    Para Term  AB Living   3 2 2 0 1 2   SAB IAB Ectopic Multiple Live Births   1 0 0 0 2      # Outcome Date GA Lbr Eh/2nd Weight Sex Type Anes PTL Lv   3 SAB 20 6w0d    SAB      2 Term 17 40w0d 05:32 / 01:27 4.36 kg (9 lb 9.8 oz) F Vag-Spont EPI N NAZANIN      Birth Comments: none      Complications: Excessive Vaginal Bleeding, Dysfunctional Labor      Name: Sherice Galvan       Apgar1: 8  Apgar5: 9   1 Term 06/19/15 38w5d 16:00 / 02:37 3.569 kg (7 lb 13.9 oz) M Vag-Spont EPI  NAZANIN      Birth Comments: hand next to face       Complications: Shoulder Dystocia      Name: Maik      Apgar1: 8  Apgar5: 9      Obstetric Comments   Son Maik - born in    Daughter, Sherice, born in     SAB 2020      Lab work is in process  Labs reviewed in EPIC  BP Readings from Last 3 Encounters:   25 94/66   24 116/66   23 104/76    Wt Readings from Last 3 Encounters:   25 73.5 kg (162 lb)   24 75.8 kg (167 lb)   23 76.7 kg (169 lb)                  Patient Active Problem List   Diagnosis    CARDIOVASCULAR SCREENING; LDL GOAL LESS THAN 160    Situational syncope- with any blood draws or shots    Multiple pigmented nevi    Supervision of other normal pregnancy, antepartum    Type O blood, Rh positive    Large for gestational age    Indication for care in labor or delivery     (normal spontaneous vaginal delivery)    Immediate postpartum hemorrhage, postpartum    Elevated d-dimer    Acute posthemorrhagic anemia    Anxiety    Family history of nonmelanoma skin cancer    Difficulty falling asleep at night until  early morning hours    Exposure to human papillomavirus-  with genital warts - recently diagnosed    Major depressive disorder, recurrent episode, mild    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     Rosacea    Panic attack     Past Surgical History:   Procedure Laterality Date    BIOPSY  August 2015, mulitple others    colposcopy, several other mole removals    ENT SURGERY  November 2020    gum graft    [place] teeth extraction      Z ORAL SURGERY PROCEDURE      graft surgery for lower gum recession on 11/24/2020       Social History     Tobacco Use    Smoking status: Never     Passive exposure: Never    Smokeless tobacco: Never   Substance Use Topics    Alcohol use: Not Currently     Comment: 0-1 drinks per week     Family History   Problem Relation Age of Onset    Family History Negative Mother     Family History Negative Father     Hyperlipidemia Father     Anxiety Disorder Sister     Depression Sister     Depression Sister     Anxiety Disorder Brother     Depression Brother     Depression Brother     Thyroid Disease Maternal Grandmother     Depression Maternal Grandmother     Other Cancer Maternal Grandmother         skin cancer    Diabetes Maternal Grandfather         diet controlled    Prostate Cancer Paternal Grandfather 90    Breast Cancer Maternal Aunt 65    Breast Cancer Maternal Great-Grandmother     Mental Illness Maternal Uncle         had a psychotic break    Breast Cancer Other     Mental Illness Other          Current Outpatient Medications   Medication Sig Dispense Refill    escitalopram (LEXAPRO) 20 MG tablet Take 0.5 tablets (10 mg) by mouth daily. 45 tablet 1    fluticasone (FLONASE) 50 MCG/ACT nasal spray Spray 1 spray into both nostrils daily. 16 g 11    Prenatal Vit-Fe Fumarate-FA (PRENATAL VITAMINS) 28-0.8 MG TABS Take 1 tablet by mouth daily      vitamin D3 (CHOLECALCIFEROL) 50 mcg (2000 units) tablet  "Take 1 tablet (50 mcg) by mouth daily       No Known Allergies  Recent Labs   Lab Test 05/26/23  1159 11/05/20  1133 03/15/20  0459 08/02/18  0947 07/10/18  1650     --   --   --  58   HDL 59  --   --   --  56   TRIG 84  --   --   --  98   ALT  --  33  --   --   --    CR  --  0.68 0.66  --   --    GFRESTIMATED  --  >90 >90  --   --    GFRESTBLACK  --  >90 >90  --   --    POTASSIUM  --  4.5 3.5  --   --    TSH 1.50 1.44  --    < >  --     < > = values in this interval not displayed.           Review of Systems  Constitutional, HEENT, cardiovascular, pulmonary, GI, , musculoskeletal, neuro, skin, endocrine and psych systems are negative, except as otherwise noted.     Objective    Exam  BP 94/66   Pulse 86   Temp 98  F (36.7  C) (Tympanic)   Resp 16   Ht 1.715 m (5' 7.5\")   Wt 73.5 kg (162 lb)   LMP 03/03/2025 (Exact Date)   SpO2 99%   BMI 25.00 kg/m     Estimated body mass index is 25 kg/m  as calculated from the following:    Height as of this encounter: 1.715 m (5' 7.5\").    Weight as of this encounter: 73.5 kg (162 lb).    Physical Exam  GENERAL: alert and no distress  EYES: Eyes grossly normal to inspection, PERRL and conjunctivae and sclerae normal  HENT: ear canals and TM's normal, nose and mouth without ulcers or lesions  NECK: no adenopathy, no asymmetry, masses, or scars  RESP: lungs clear to auscultation - no rales, rhonchi or wheezes  BREAST: normal without masses, tenderness or nipple discharge and no palpable axillary masses or adenopathy  CV: regular rate and rhythm, normal S1 S2, no S3 or S4, no murmur, click or rub, no peripheral edema  ABDOMEN: soft, nontender, no hepatosplenomegaly, no masses and bowel sounds normal   (female) w/bimanual: normal female external genitalia, normal urethral meatus, normal vaginal mucosa, and normal cervix/adnexa/uterus without masses or discharge. First degree uterine prolapse = unchanged from previous noted again today.   MS: no gross " musculoskeletal defects noted, no edema  SKIN: no suspicious lesions or rashes  NEURO: Normal strength and tone, mentation intact and speech normal  PSYCH: mentation appears normal, affect normal/bright        Signed Electronically by: Mey Mcleod MD

## 2025-03-25 ENCOUNTER — PATIENT OUTREACH (OUTPATIENT)
Dept: CARE COORDINATION | Facility: CLINIC | Age: 40
End: 2025-03-25
Payer: COMMERCIAL

## 2025-03-25 LAB
ALBUMIN SERPL BCG-MCNC: 4.5 G/DL (ref 3.5–5.2)
ALP SERPL-CCNC: 52 U/L (ref 40–150)
ALT SERPL W P-5'-P-CCNC: 13 U/L (ref 0–50)
ANION GAP SERPL CALCULATED.3IONS-SCNC: 10 MMOL/L (ref 7–15)
AST SERPL W P-5'-P-CCNC: 19 U/L (ref 0–45)
BILIRUB SERPL-MCNC: 0.7 MG/DL
BUN SERPL-MCNC: 10.1 MG/DL (ref 6–20)
CALCIUM SERPL-MCNC: 9.4 MG/DL (ref 8.8–10.4)
CHLORIDE SERPL-SCNC: 101 MMOL/L (ref 98–107)
CHOLEST SERPL-MCNC: 173 MG/DL
CREAT SERPL-MCNC: 0.72 MG/DL (ref 0.51–0.95)
EGFRCR SERPLBLD CKD-EPI 2021: >90 ML/MIN/1.73M2
FASTING STATUS PATIENT QL REPORTED: NORMAL
FASTING STATUS PATIENT QL REPORTED: NORMAL
GLUCOSE SERPL-MCNC: 95 MG/DL (ref 70–99)
HCO3 SERPL-SCNC: 26 MMOL/L (ref 22–29)
HDLC SERPL-MCNC: 59 MG/DL
HPV HR 12 DNA CVX QL NAA+PROBE: NEGATIVE
HPV16 DNA CVX QL NAA+PROBE: NEGATIVE
HPV18 DNA CVX QL NAA+PROBE: NEGATIVE
HUMAN PAPILLOMA VIRUS FINAL DIAGNOSIS: NORMAL
LDLC SERPL CALC-MCNC: 99 MG/DL
NONHDLC SERPL-MCNC: 114 MG/DL
POTASSIUM SERPL-SCNC: 3.9 MMOL/L (ref 3.4–5.3)
PROT SERPL-MCNC: 7.3 G/DL (ref 6.4–8.3)
SODIUM SERPL-SCNC: 137 MMOL/L (ref 135–145)
TRIGL SERPL-MCNC: 75 MG/DL
TSH SERPL DL<=0.005 MIU/L-ACNC: 0.54 UIU/ML (ref 0.3–4.2)